# Patient Record
Sex: FEMALE | Race: WHITE | NOT HISPANIC OR LATINO | Employment: OTHER | RURAL
[De-identification: names, ages, dates, MRNs, and addresses within clinical notes are randomized per-mention and may not be internally consistent; named-entity substitution may affect disease eponyms.]

---

## 2017-02-01 ENCOUNTER — LAB (OUTPATIENT)
Dept: FAMILY MEDICINE CLINIC | Facility: CLINIC | Age: 77
End: 2017-02-01

## 2017-02-01 DIAGNOSIS — I10 ESSENTIAL HYPERTENSION: ICD-10-CM

## 2017-02-01 DIAGNOSIS — E03.9 HYPOTHYROIDISM, UNSPECIFIED TYPE: ICD-10-CM

## 2017-02-01 DIAGNOSIS — E78.5 HYPERLIPIDEMIA, UNSPECIFIED HYPERLIPIDEMIA TYPE: Primary | ICD-10-CM

## 2017-02-01 LAB
ALBUMIN SERPL-MCNC: 4.3 G/DL (ref 3.5–5)
ALBUMIN/GLOB SERPL: 1.4 G/DL (ref 1.1–2.5)
ALP SERPL-CCNC: 76 U/L (ref 24–120)
ALT SERPL-CCNC: 39 U/L (ref 0–54)
AST SERPL-CCNC: 21 U/L (ref 7–45)
BILIRUB SERPL-MCNC: 0.6 MG/DL (ref 0.1–1)
BUN SERPL-MCNC: 19 MG/DL (ref 5–21)
BUN/CREAT SERPL: 17.1 (ref 7–25)
CALCIUM SERPL-MCNC: 9 MG/DL (ref 8.4–10.4)
CHLORIDE SERPL-SCNC: 103 MMOL/L (ref 98–110)
CHOLEST SERPL-MCNC: 191 MG/DL (ref 130–200)
CO2 SERPL-SCNC: 28 MMOL/L (ref 24–31)
CREAT SERPL-MCNC: 1.11 MG/DL (ref 0.5–1.4)
GLOBULIN SER CALC-MCNC: 3 GM/DL
GLUCOSE SERPL-MCNC: 106 MG/DL (ref 70–100)
HDLC SERPL-MCNC: 47 MG/DL
LDLC SERPL CALC-MCNC: 118 MG/DL (ref 0–99)
POTASSIUM SERPL-SCNC: 4.5 MMOL/L (ref 3.5–5.3)
PROT SERPL-MCNC: 7.3 G/DL (ref 6.3–8.7)
SODIUM SERPL-SCNC: 138 MMOL/L (ref 135–145)
TRIGL SERPL-MCNC: 130 MG/DL (ref 0–149)
TSH SERPL DL<=0.005 MIU/L-ACNC: 1.71 MIU/ML (ref 0.47–4.68)
VLDLC SERPL CALC-MCNC: 26 MG/DL

## 2017-02-01 RX ORDER — HYDROCODONE BITARTRATE AND ACETAMINOPHEN 5; 325 MG/1; MG/1
1 TABLET ORAL DAILY
Qty: 30 TABLET | Refills: 0 | Status: SHIPPED | OUTPATIENT
Start: 2017-02-01 | End: 2017-08-28 | Stop reason: SDUPTHER

## 2017-02-02 NOTE — PROGRESS NOTES
Pt informed labs stable. Faxed to Dr. Valenzuela. She sees him 2/8/2017 and sees Dr. Jaramillo 2/6/2017

## 2017-02-06 ENCOUNTER — OFFICE VISIT (OUTPATIENT)
Dept: FAMILY MEDICINE CLINIC | Facility: CLINIC | Age: 77
End: 2017-02-06

## 2017-02-06 VITALS
BODY MASS INDEX: 31.18 KG/M2 | DIASTOLIC BLOOD PRESSURE: 80 MMHG | SYSTOLIC BLOOD PRESSURE: 130 MMHG | WEIGHT: 165 LBS | RESPIRATION RATE: 18 BRPM | OXYGEN SATURATION: 98 % | HEART RATE: 76 BPM

## 2017-02-06 DIAGNOSIS — N18.30 CHRONIC KIDNEY DISEASE, STAGE III (MODERATE) (HCC): ICD-10-CM

## 2017-02-06 DIAGNOSIS — E78.2 MIXED HYPERLIPIDEMIA: ICD-10-CM

## 2017-02-06 DIAGNOSIS — I10 ESSENTIAL HYPERTENSION: Primary | ICD-10-CM

## 2017-02-06 DIAGNOSIS — F41.9 ANXIETY: ICD-10-CM

## 2017-02-06 DIAGNOSIS — E03.9 ACQUIRED HYPOTHYROIDISM: ICD-10-CM

## 2017-02-06 PROCEDURE — 99214 OFFICE O/P EST MOD 30 MIN: CPT | Performed by: FAMILY MEDICINE

## 2017-02-06 RX ORDER — LOSARTAN POTASSIUM 100 MG/1
TABLET ORAL
Qty: 30 TABLET | Refills: 5 | Status: SHIPPED | OUTPATIENT
Start: 2017-02-06 | End: 2017-04-19 | Stop reason: SDUPTHER

## 2017-02-06 RX ORDER — ATORVASTATIN CALCIUM 10 MG/1
10 TABLET, FILM COATED ORAL DAILY
Qty: 30 TABLET | Refills: 4 | Status: SHIPPED | OUTPATIENT
Start: 2017-02-06 | End: 2017-11-16 | Stop reason: SDUPTHER

## 2017-02-06 NOTE — PROGRESS NOTES
Subjective   Paige Martinez is a 76 y.o. female.     Chief Complaint   Patient presents with   • Follow-up    CC: im here for [facility]    History of Present Illness     overall feels good..she notes good bp control--willl see neprhologist this week for recheck ...she statesa celexa is working well for anxiety..sleeps well..denies any suicidal thoughts--off statin drugs due to musle pain      Current Outpatient Prescriptions:   •  aspirin 81 MG EC tablet, Take 81 mg by mouth daily, Disp: , Rfl:   •  Cholecalciferol (VITAMIN D) 2000 UNITS capsule, Take 2,000 Units by mouth, Disp: , Rfl:   •  citalopram (CeleXA) 20 MG tablet, Take 1 tablet by mouth Daily., Disp: 90 tablet, Rfl: 1  •  HYDROcodone-acetaminophen (NORCO) 5-325 MG per tablet, Take 1 tablet by mouth Daily., Disp: 30 tablet, Rfl: 0  •  HYDROcodone-acetaminophen (NORCO) 7.5-325 MG per tablet, Every 6 (Six) Hours., Disp: , Rfl:   •  levothyroxine (SYNTHROID, LEVOTHROID) 100 MCG tablet, Take 100 mcg by mouth Daily, Disp: , Rfl:   •  losartan (COZAAR) 100 MG tablet, Take 100 mg by mouth daily, Disp: , Rfl:   •  montelukast (SINGULAIR) 10 MG tablet, Take 10 mg by mouth nightly, Disp: , Rfl:   •  Omega-3 Fatty Acids (FISH OIL) 1000 MG capsule capsule, Take 1,000 mg by mouth 3 times daily, Disp: , Rfl:   No Known Allergies    Past Medical History   Diagnosis Date   • Anxiety    • Hypertension      No past surgical history on file.    Review of Systems   Constitutional: Negative.    HENT: Negative.    Eyes: Negative.    Respiratory: Negative.    Cardiovascular: Negative.    Gastrointestinal: Negative.    Endocrine: Negative.    Genitourinary: Negative.    Musculoskeletal: Negative.    Skin: Negative.    Allergic/Immunologic: Negative.    Neurological: Negative.    Hematological: Negative.    Psychiatric/Behavioral: Negative for self-injury, sleep disturbance and suicidal ideas. The patient is nervous/anxious.        Objective    Visit Vitals   • /80   • Pulse 76   •  Resp 18   • Wt 165 lb (74.8 kg)   • SpO2 98%   • BMI 31.18 kg/m2     Physical Exam   Constitutional: She is oriented to person, place, and time. She appears well-developed and well-nourished.   HENT:   Head: Normocephalic and atraumatic.   Right Ear: External ear normal.   Left Ear: External ear normal.   Nose: Nose normal.   Mouth/Throat: Oropharynx is clear and moist.   Eyes: Conjunctivae and EOM are normal. Pupils are equal, round, and reactive to light.   Neck: Normal range of motion. Neck supple.   Cardiovascular: Normal rate, regular rhythm, normal heart sounds and intact distal pulses.    Pulmonary/Chest: Effort normal and breath sounds normal.   Abdominal: Soft. Bowel sounds are normal.   Musculoskeletal: Normal range of motion.   Neurological: She is alert and oriented to person, place, and time. She has normal reflexes.   Skin: Skin is warm and dry.   Psychiatric: She has a normal mood and affect. Judgment and thought content normal.   Nursing note and vitals reviewed.      Assessment/Plan   Paige was seen today for follow-up.    Diagnoses and all orders for this visit:    Essential hypertension    Chronic kidney disease, stage III (moderate)    Acquired hypothyroidism    Anxiety                 No orders of the defined types were placed in this encounter.      Follow up: 4 month(s)

## 2017-03-22 ENCOUNTER — TELEPHONE (OUTPATIENT)
Dept: FAMILY MEDICINE CLINIC | Facility: CLINIC | Age: 77
End: 2017-03-22

## 2017-03-22 ENCOUNTER — OFFICE VISIT (OUTPATIENT)
Dept: FAMILY MEDICINE CLINIC | Facility: CLINIC | Age: 77
End: 2017-03-22

## 2017-03-22 VITALS
HEART RATE: 83 BPM | DIASTOLIC BLOOD PRESSURE: 80 MMHG | RESPIRATION RATE: 16 BRPM | SYSTOLIC BLOOD PRESSURE: 124 MMHG | BODY MASS INDEX: 30.36 KG/M2 | WEIGHT: 165 LBS | HEIGHT: 62 IN | OXYGEN SATURATION: 98 %

## 2017-03-22 DIAGNOSIS — J32.9 OTHER SINUSITIS: Primary | ICD-10-CM

## 2017-03-22 PROCEDURE — 99213 OFFICE O/P EST LOW 20 MIN: CPT | Performed by: FAMILY MEDICINE

## 2017-03-22 RX ORDER — AMOXICILLIN AND CLAVULANATE POTASSIUM 875; 125 MG/1; MG/1
1 TABLET, FILM COATED ORAL 2 TIMES DAILY
Qty: 20 TABLET | Refills: 0 | Status: SHIPPED | OUTPATIENT
Start: 2017-03-22 | End: 2017-04-19

## 2017-03-22 RX ORDER — BENZONATATE 100 MG/1
100 CAPSULE ORAL 3 TIMES DAILY PRN
Qty: 30 CAPSULE | Refills: 0 | Status: SHIPPED | OUTPATIENT
Start: 2017-03-22 | End: 2017-04-19

## 2017-03-22 NOTE — PROGRESS NOTES
"Subjective   Paige Matrinez is a 76 y.o. female.     Chief Complaint   Patient presents with   • URI     chest & head congestion        History of Present Illness     notes having sinus pressure with post nasal drip wtih low grade temp..      Current Outpatient Prescriptions:   •  aspirin 81 MG EC tablet, Take 81 mg by mouth daily, Disp: , Rfl:   •  atorvastatin (LIPITOR) 10 MG tablet, Take 1 tablet by mouth Daily., Disp: 30 tablet, Rfl: 4  •  Cholecalciferol (VITAMIN D) 2000 UNITS capsule, Take 2,000 Units by mouth, Disp: , Rfl:   •  citalopram (CeleXA) 20 MG tablet, Take 1 tablet by mouth Daily., Disp: 90 tablet, Rfl: 1  •  HYDROcodone-acetaminophen (NORCO) 5-325 MG per tablet, Take 1 tablet by mouth Daily., Disp: 30 tablet, Rfl: 0  •  HYDROcodone-acetaminophen (NORCO) 7.5-325 MG per tablet, Every 6 (Six) Hours., Disp: , Rfl:   •  levothyroxine (SYNTHROID, LEVOTHROID) 100 MCG tablet, Take 100 mcg by mouth Daily, Disp: , Rfl:   •  losartan (COZAAR) 100 MG tablet, TAKE ONE TABLET DAILY GENERIC FOR COZAAR, Disp: 30 tablet, Rfl: 5  •  montelukast (SINGULAIR) 10 MG tablet, Take 10 mg by mouth nightly, Disp: , Rfl:   •  Omega-3 Fatty Acids (FISH OIL) 1000 MG capsule capsule, Take 1,000 mg by mouth 3 times daily, Disp: , Rfl:   No Known Allergies    Past Medical History:   Diagnosis Date   • Anxiety    • Hypertension      No past surgical history on file.    Review of Systems   Constitutional: Positive for fever.   HENT: Positive for rhinorrhea, sinus pressure and sore throat.    Respiratory: Negative.    Cardiovascular: Negative.    Gastrointestinal: Negative.    Endocrine: Negative.    Genitourinary: Negative.    Musculoskeletal: Negative.    Skin: Negative.    Allergic/Immunologic: Negative.    Neurological: Negative.    Hematological: Negative.    Psychiatric/Behavioral: Negative.        Objective  /80  Pulse 83  Resp 16  Ht 62\" (157.5 cm)  Wt 165 lb (74.8 kg)  SpO2 98%  BMI 30.18 kg/m2  Physical Exam "   Constitutional: She is oriented to person, place, and time. She appears well-developed and well-nourished.   HENT:   Head: Normocephalic and atraumatic.   Right Ear: External ear normal.   Left Ear: External ear normal.   Nose: Nose normal.   Mouth/Throat: Oropharynx is clear and moist.   Noted purulent post nasal drip   Eyes: Conjunctivae and EOM are normal. Pupils are equal, round, and reactive to light.   Neck: Normal range of motion. Neck supple.   Cardiovascular: Normal rate, regular rhythm, normal heart sounds and intact distal pulses.    Pulmonary/Chest: Effort normal and breath sounds normal.   Abdominal: Soft. Bowel sounds are normal.   Musculoskeletal: Normal range of motion.   Neurological: She is alert and oriented to person, place, and time. She has normal reflexes.   Skin: Skin is warm and dry.   Psychiatric: She has a normal mood and affect. Her behavior is normal. Judgment and thought content normal.   Nursing note and vitals reviewed.      Assessment/Plan   Paige was seen today for uri.    Diagnoses and all orders for this visit:    Other sinusitis    Other orders  -     amoxicillin-clavulanate (AUGMENTIN) 875-125 MG per tablet; Take 1 tablet by mouth 2 (Two) Times a Day.  -     benzonatate (TESSALON PERLES) 100 MG capsule; Take 1 capsule by mouth 3 (Three) Times a Day As Needed for Cough.    keep me informed           No orders of the defined types were placed in this encounter.      Follow up: prn

## 2017-03-22 NOTE — TELEPHONE ENCOUNTER
Pt called is wanting to be seen today she has been sick for 2 weeks with coughing with congestion? [phone]

## 2017-04-19 ENCOUNTER — OFFICE VISIT (OUTPATIENT)
Dept: FAMILY MEDICINE CLINIC | Facility: CLINIC | Age: 77
End: 2017-04-19

## 2017-04-19 VITALS
OXYGEN SATURATION: 97 % | HEIGHT: 62 IN | SYSTOLIC BLOOD PRESSURE: 130 MMHG | DIASTOLIC BLOOD PRESSURE: 82 MMHG | BODY MASS INDEX: 30.36 KG/M2 | WEIGHT: 165 LBS | RESPIRATION RATE: 16 BRPM | HEART RATE: 80 BPM

## 2017-04-19 DIAGNOSIS — J30.1 SEASONAL ALLERGIC RHINITIS DUE TO POLLEN: ICD-10-CM

## 2017-04-19 DIAGNOSIS — I10 ESSENTIAL HYPERTENSION: Primary | ICD-10-CM

## 2017-04-19 PROCEDURE — 99213 OFFICE O/P EST LOW 20 MIN: CPT | Performed by: FAMILY MEDICINE

## 2017-04-19 RX ORDER — LOSARTAN POTASSIUM 100 MG/1
100 TABLET ORAL DAILY
Qty: 90 TABLET | Refills: 1 | Status: SHIPPED | OUTPATIENT
Start: 2017-04-19 | End: 2017-12-16 | Stop reason: SDUPTHER

## 2017-04-19 NOTE — PROGRESS NOTES
"Subjective   Paige Martinez is a 76 y.o. female.     Chief Complaint   Patient presents with   • Follow-up        History of Present Illness     she does complain of sneezing and clear rhinorrhea well controlled with nightime benadryl...she notes bp has been stable wituout ha or chest pain      Current Outpatient Prescriptions:   •  aspirin 81 MG EC tablet, Take 81 mg by mouth daily, Disp: , Rfl:   •  atorvastatin (LIPITOR) 10 MG tablet, Take 1 tablet by mouth Daily., Disp: 30 tablet, Rfl: 4  •  Cholecalciferol (VITAMIN D) 2000 UNITS capsule, Take 2,000 Units by mouth, Disp: , Rfl:   •  citalopram (CeleXA) 20 MG tablet, Take 1 tablet by mouth Daily., Disp: 90 tablet, Rfl: 1  •  HYDROcodone-acetaminophen (NORCO) 5-325 MG per tablet, Take 1 tablet by mouth Daily., Disp: 30 tablet, Rfl: 0  •  levothyroxine (SYNTHROID, LEVOTHROID) 100 MCG tablet, Take 100 mcg by mouth Daily, Disp: , Rfl:   •  losartan (COZAAR) 100 MG tablet, Take 1 tablet by mouth Daily., Disp: 90 tablet, Rfl: 1  •  montelukast (SINGULAIR) 10 MG tablet, Take 10 mg by mouth nightly, Disp: , Rfl:   •  Omega-3 Fatty Acids (FISH OIL) 1000 MG capsule capsule, Take 1,000 mg by mouth 3 times daily, Disp: , Rfl:   No Known Allergies    Past Medical History:   Diagnosis Date   • Anxiety    • Hypertension      No past surgical history on file.    Review of Systems   Constitutional: Negative.    HENT: Positive for postnasal drip, rhinorrhea and sneezing.    Eyes: Negative.    Respiratory: Negative.    Cardiovascular: Negative.    Gastrointestinal: Negative.    Endocrine: Negative.    Genitourinary: Negative.    Musculoskeletal: Negative.    Skin: Negative.    Allergic/Immunologic: Negative.    Neurological: Negative.    Hematological: Negative.    Psychiatric/Behavioral: Negative.        Objective  /82  Pulse 80  Resp 16  Ht 62\" (157.5 cm)  Wt 165 lb (74.8 kg)  SpO2 97%  BMI 30.18 kg/m2  Physical Exam   Constitutional: She is oriented to person, place, " and time. She appears well-developed and well-nourished.   HENT:   Head: Normocephalic and atraumatic.   Right Ear: External ear normal.   Left Ear: External ear normal.   Nose: Nose normal.   Mouth/Throat: Oropharynx is clear and moist.   Eyes: Conjunctivae and EOM are normal. Pupils are equal, round, and reactive to light.   Neck: Normal range of motion. Neck supple.   Cardiovascular: Normal rate, regular rhythm, normal heart sounds and intact distal pulses.    Pulmonary/Chest: Effort normal and breath sounds normal.   Abdominal: Soft. Bowel sounds are normal.   Musculoskeletal: Normal range of motion.   Neurological: She is alert and oriented to person, place, and time. She has normal reflexes.   Skin: Skin is warm and dry.   Psychiatric: She has a normal mood and affect. Her behavior is normal. Judgment and thought content normal.   Nursing note and vitals reviewed.      Assessment/Plan   Paige was seen today for follow-up.    Diagnoses and all orders for this visit:    Essential hypertension    Seasonal allergic rhinitis due to pollen    im glad she is better           No orders of the defined types were placed in this encounter.      Follow up: next appt

## 2017-05-30 DIAGNOSIS — E03.9 ACQUIRED HYPOTHYROIDISM: Primary | ICD-10-CM

## 2017-05-30 DIAGNOSIS — E78.2 MIXED HYPERLIPIDEMIA: ICD-10-CM

## 2017-05-30 DIAGNOSIS — I10 ESSENTIAL HYPERTENSION: ICD-10-CM

## 2017-05-31 LAB
ALBUMIN SERPL-MCNC: 4 G/DL (ref 3.5–5)
ALBUMIN/GLOB SERPL: 1.3 G/DL (ref 1.1–2.5)
ALP SERPL-CCNC: 74 U/L (ref 24–120)
ALT SERPL-CCNC: 29 U/L (ref 0–54)
AST SERPL-CCNC: 20 U/L (ref 7–45)
BASOPHILS # BLD AUTO: 0.01 10*3/MM3 (ref 0–0.2)
BASOPHILS NFR BLD AUTO: 0.1 % (ref 0–2)
BILIRUB SERPL-MCNC: 0.6 MG/DL (ref 0.1–1)
BUN SERPL-MCNC: 21 MG/DL (ref 5–21)
BUN/CREAT SERPL: 18.9 (ref 7–25)
CALCIUM SERPL-MCNC: 9.1 MG/DL (ref 8.4–10.4)
CHLORIDE SERPL-SCNC: 101 MMOL/L (ref 98–110)
CHOLEST SERPL-MCNC: 192 MG/DL (ref 130–200)
CO2 SERPL-SCNC: 25 MMOL/L (ref 24–31)
CREAT SERPL-MCNC: 1.11 MG/DL (ref 0.5–1.4)
EOSINOPHIL # BLD AUTO: 0.18 10*3/MM3 (ref 0–0.7)
EOSINOPHIL NFR BLD AUTO: 2.2 % (ref 0–4)
ERYTHROCYTE [DISTWIDTH] IN BLOOD BY AUTOMATED COUNT: 13.4 % (ref 12–15)
GLOBULIN SER CALC-MCNC: 3.1 GM/DL
GLUCOSE SERPL-MCNC: 114 MG/DL (ref 70–100)
HCT VFR BLD AUTO: 38 % (ref 37–47)
HDLC SERPL-MCNC: 63 MG/DL
HGB BLD-MCNC: 12.4 G/DL (ref 12–16)
IMM GRANULOCYTES # BLD: 0.03 10*3/MM3 (ref 0–0.03)
IMM GRANULOCYTES NFR BLD: 0.4 % (ref 0–5)
LDLC SERPL CALC-MCNC: 110 MG/DL (ref 0–99)
LYMPHOCYTES # BLD AUTO: 1.75 10*3/MM3 (ref 0.72–4.86)
LYMPHOCYTES NFR BLD AUTO: 21.8 % (ref 15–45)
MCH RBC QN AUTO: 29.5 PG (ref 28–32)
MCHC RBC AUTO-ENTMCNC: 32.6 G/DL (ref 33–36)
MCV RBC AUTO: 90.5 FL (ref 82–98)
MONOCYTES # BLD AUTO: 0.68 10*3/MM3 (ref 0.19–1.3)
MONOCYTES NFR BLD AUTO: 8.5 % (ref 4–12)
NEUTROPHILS # BLD AUTO: 5.36 10*3/MM3 (ref 1.87–8.4)
NEUTROPHILS NFR BLD AUTO: 67 % (ref 39–78)
PLATELET # BLD AUTO: 218 10*3/MM3 (ref 130–400)
POTASSIUM SERPL-SCNC: 4.9 MMOL/L (ref 3.5–5.3)
PROT SERPL-MCNC: 7.1 G/DL (ref 6.3–8.7)
RBC # BLD AUTO: 4.2 10*6/MM3 (ref 4.2–5.4)
SODIUM SERPL-SCNC: 139 MMOL/L (ref 135–145)
T4 SERPL-MCNC: 6.8 UG/DL (ref 4.5–12)
TRIGL SERPL-MCNC: 95 MG/DL (ref 0–149)
TSH SERPL DL<=0.005 MIU/L-ACNC: 2.81 MIU/ML (ref 0.47–4.68)
VLDLC SERPL CALC-MCNC: 19 MG/DL
WBC # BLD AUTO: 8.01 10*3/MM3 (ref 4.8–10.8)

## 2017-06-06 ENCOUNTER — OFFICE VISIT (OUTPATIENT)
Dept: FAMILY MEDICINE CLINIC | Facility: CLINIC | Age: 77
End: 2017-06-06

## 2017-06-06 VITALS
OXYGEN SATURATION: 98 % | RESPIRATION RATE: 16 BRPM | DIASTOLIC BLOOD PRESSURE: 86 MMHG | BODY MASS INDEX: 31.28 KG/M2 | HEIGHT: 62 IN | HEART RATE: 83 BPM | WEIGHT: 170 LBS | SYSTOLIC BLOOD PRESSURE: 140 MMHG

## 2017-06-06 DIAGNOSIS — N18.30 CHRONIC KIDNEY DISEASE, STAGE III (MODERATE) (HCC): ICD-10-CM

## 2017-06-06 DIAGNOSIS — E78.2 MIXED HYPERLIPIDEMIA: ICD-10-CM

## 2017-06-06 DIAGNOSIS — I10 ESSENTIAL HYPERTENSION: Primary | ICD-10-CM

## 2017-06-06 DIAGNOSIS — E03.9 ACQUIRED HYPOTHYROIDISM: ICD-10-CM

## 2017-06-06 PROCEDURE — 99213 OFFICE O/P EST LOW 20 MIN: CPT | Performed by: FAMILY MEDICINE

## 2017-06-06 RX ORDER — CITALOPRAM 20 MG/1
20 TABLET ORAL DAILY
Qty: 90 TABLET | Refills: 1 | Status: SHIPPED | OUTPATIENT
Start: 2017-06-06 | End: 2018-01-09 | Stop reason: SDUPTHER

## 2017-06-06 NOTE — PROGRESS NOTES
"Subjective   Paige Martinez is a 76 y.o. female.     Chief Complaint   Patient presents with   • Follow-up     6 mo        History of Present Illness     he notes seeing dr alston yearly for ckd--she notes stable bp without cp or ha--sheis tolerating lipitor withoiut myalgia or muscle weakness--she is toleraging synthroid wituout chest pain or palpitation or heat/cold intolerances..      Current Outpatient Prescriptions:   •  aspirin 81 MG EC tablet, Take 81 mg by mouth daily, Disp: , Rfl:   •  atorvastatin (LIPITOR) 10 MG tablet, Take 1 tablet by mouth Daily., Disp: 30 tablet, Rfl: 4  •  Cholecalciferol (VITAMIN D) 2000 UNITS capsule, Take 2,000 Units by mouth, Disp: , Rfl:   •  citalopram (CeleXA) 20 MG tablet, Take 1 tablet by mouth Daily., Disp: 90 tablet, Rfl: 1  •  HYDROcodone-acetaminophen (NORCO) 5-325 MG per tablet, Take 1 tablet by mouth Daily., Disp: 30 tablet, Rfl: 0  •  levothyroxine (SYNTHROID, LEVOTHROID) 100 MCG tablet, Take 100 mcg by mouth Daily, Disp: , Rfl:   •  losartan (COZAAR) 100 MG tablet, Take 1 tablet by mouth Daily., Disp: 90 tablet, Rfl: 1  •  montelukast (SINGULAIR) 10 MG tablet, Take 10 mg by mouth nightly, Disp: , Rfl:   •  Omega-3 Fatty Acids (FISH OIL) 1000 MG capsule capsule, Take 1,000 mg by mouth 3 times daily, Disp: , Rfl:   No Known Allergies    Past Medical History:   Diagnosis Date   • Anxiety    • Hypertension      No past surgical history on file.    Review of Systems   Constitutional: Negative.    HENT: Negative.    Eyes: Negative.    Respiratory: Negative.    Cardiovascular: Negative.    Gastrointestinal: Negative.    Endocrine: Negative.    Genitourinary: Negative.    Musculoskeletal: Positive for arthralgias.   Skin: Negative.    Allergic/Immunologic: Negative.    Neurological: Negative.    Hematological: Negative.    Psychiatric/Behavioral: Negative.        Objective  /86  Pulse 83  Resp 16  Ht 62\" (157.5 cm)  Wt 170 lb (77.1 kg)  SpO2 98%  BMI 31.09 " kg/m2  Physical Exam   Constitutional: She is oriented to person, place, and time. She appears well-developed and well-nourished.   HENT:   Head: Normocephalic and atraumatic.   Right Ear: External ear normal.   Left Ear: External ear normal.   Nose: Nose normal.   Mouth/Throat: Oropharynx is clear and moist.   Eyes: Conjunctivae and EOM are normal. Pupils are equal, round, and reactive to light.   Neck: Normal range of motion. Neck supple.   Cardiovascular: Normal rate, regular rhythm, normal heart sounds and intact distal pulses.    Pulmonary/Chest: Effort normal and breath sounds normal.   Abdominal: Soft. Bowel sounds are normal.   Musculoskeletal: Normal range of motion.   Neurological: She is alert and oriented to person, place, and time. She has normal reflexes.   Skin: Skin is warm and dry.   Psychiatric: She has a normal mood and affect. Her behavior is normal. Judgment and thought content normal.   Nursing note and vitals reviewed.      Assessment/Plan   Paige was seen today for follow-up.    Diagnoses and all orders for this visit:    Essential hypertension    Mixed hyperlipidemia    Acquired hypothyroidism    Chronic kidney disease, stage III (moderate)                 No orders of the defined types were placed in this encounter.      Follow up: 6 month(s)

## 2017-08-28 ENCOUNTER — CLINICAL SUPPORT (OUTPATIENT)
Dept: FAMILY MEDICINE CLINIC | Facility: CLINIC | Age: 77
End: 2017-08-28

## 2017-08-28 DIAGNOSIS — Z23 NEED FOR VACCINATION: Primary | ICD-10-CM

## 2017-08-28 PROCEDURE — 86580 TB INTRADERMAL TEST: CPT | Performed by: FAMILY MEDICINE

## 2017-08-28 RX ORDER — HYDROCODONE BITARTRATE AND ACETAMINOPHEN 5; 325 MG/1; MG/1
1 TABLET ORAL DAILY
Qty: 30 TABLET | Refills: 0 | Status: SHIPPED | OUTPATIENT
Start: 2017-08-28 | End: 2017-12-27 | Stop reason: SDUPTHER

## 2017-08-29 NOTE — PROGRESS NOTES
The pt presented to the office today for a tb skin test    ndc  36778-979-16   Lot  [id number]   Exp  10-29-18

## 2017-08-31 LAB
INDURATION: 0 MM (ref 0–10)
TB SKIN TEST: NEGATIVE

## 2017-09-14 RX ORDER — LEVOTHYROXINE SODIUM 0.1 MG/1
TABLET ORAL
Qty: 90 TABLET | Refills: 1 | Status: SHIPPED | OUTPATIENT
Start: 2017-09-14 | End: 2018-06-05 | Stop reason: SDUPTHER

## 2017-10-18 RX ORDER — MONTELUKAST SODIUM 10 MG/1
TABLET ORAL
Qty: 30 TABLET | Refills: 5 | Status: SHIPPED | OUTPATIENT
Start: 2017-10-18 | End: 2018-06-05 | Stop reason: SDUPTHER

## 2017-10-18 RX ORDER — HYDROCODONE BITARTRATE AND ACETAMINOPHEN 5; 325 MG/1; MG/1
1 TABLET ORAL DAILY
Qty: 30 TABLET | Refills: 0 | Status: SHIPPED | OUTPATIENT
Start: 2017-10-18 | End: 2017-12-27 | Stop reason: SDUPTHER

## 2017-11-16 RX ORDER — ATORVASTATIN CALCIUM 10 MG/1
TABLET, FILM COATED ORAL
Qty: 30 TABLET | Refills: 2 | Status: SHIPPED | OUTPATIENT
Start: 2017-11-16 | End: 2018-10-05 | Stop reason: SDUPTHER

## 2017-11-30 ENCOUNTER — LAB (OUTPATIENT)
Dept: FAMILY MEDICINE CLINIC | Facility: CLINIC | Age: 77
End: 2017-11-30

## 2017-11-30 DIAGNOSIS — Z00.00 WELLNESS EXAMINATION: Primary | ICD-10-CM

## 2017-12-01 LAB
ALBUMIN SERPL-MCNC: 4.3 G/DL (ref 3.5–5)
ALBUMIN/GLOB SERPL: 1.4 G/DL (ref 1.1–2.5)
ALP SERPL-CCNC: 68 U/L (ref 24–120)
ALT SERPL-CCNC: 31 U/L (ref 0–54)
AST SERPL-CCNC: 20 U/L (ref 7–45)
BASOPHILS # BLD AUTO: 0.03 10*3/MM3 (ref 0–0.2)
BASOPHILS NFR BLD AUTO: 0.4 % (ref 0–2)
BILIRUB SERPL-MCNC: 0.4 MG/DL (ref 0.1–1)
BUN SERPL-MCNC: 22 MG/DL (ref 5–21)
BUN/CREAT SERPL: 20.6 (ref 7–25)
CALCIUM SERPL-MCNC: 9.4 MG/DL (ref 8.4–10.4)
CHLORIDE SERPL-SCNC: 104 MMOL/L (ref 98–110)
CHOLEST SERPL-MCNC: 201 MG/DL (ref 130–200)
CO2 SERPL-SCNC: 26 MMOL/L (ref 24–31)
CREAT SERPL-MCNC: 1.07 MG/DL (ref 0.5–1.4)
EOSINOPHIL # BLD AUTO: 0.09 10*3/MM3 (ref 0–0.7)
EOSINOPHIL NFR BLD AUTO: 1.3 % (ref 0–4)
ERYTHROCYTE [DISTWIDTH] IN BLOOD BY AUTOMATED COUNT: 12.5 % (ref 12–15)
GFR SERPLBLD CREATININE-BSD FMLA CKD-EPI: 50 ML/MIN/1.73
GFR SERPLBLD CREATININE-BSD FMLA CKD-EPI: 61 ML/MIN/1.73
GLOBULIN SER CALC-MCNC: 3 GM/DL
GLUCOSE SERPL-MCNC: 121 MG/DL (ref 70–100)
HCT VFR BLD AUTO: 36.5 % (ref 37–47)
HDLC SERPL-MCNC: 65 MG/DL
HGB BLD-MCNC: 12.3 G/DL (ref 12–16)
IMM GRANULOCYTES # BLD: 0.01 10*3/MM3 (ref 0–0.03)
IMM GRANULOCYTES NFR BLD: 0.1 % (ref 0–5)
LDLC SERPL CALC-MCNC: 117 MG/DL (ref 0–99)
LYMPHOCYTES # BLD AUTO: 1.43 10*3/MM3 (ref 0.72–4.86)
LYMPHOCYTES NFR BLD AUTO: 20.6 % (ref 15–45)
MCH RBC QN AUTO: 29.9 PG (ref 28–32)
MCHC RBC AUTO-ENTMCNC: 33.7 G/DL (ref 33–36)
MCV RBC AUTO: 88.6 FL (ref 82–98)
MONOCYTES # BLD AUTO: 0.5 10*3/MM3 (ref 0.19–1.3)
MONOCYTES NFR BLD AUTO: 7.2 % (ref 4–12)
NEUTROPHILS # BLD AUTO: 4.89 10*3/MM3 (ref 1.87–8.4)
NEUTROPHILS NFR BLD AUTO: 70.4 % (ref 39–78)
NRBC BLD AUTO-RTO: 0 /100 WBC (ref 0–0)
PLATELET # BLD AUTO: 222 10*3/MM3 (ref 130–400)
POTASSIUM SERPL-SCNC: 4.4 MMOL/L (ref 3.5–5.3)
PROT SERPL-MCNC: 7.3 G/DL (ref 6.3–8.7)
RBC # BLD AUTO: 4.12 10*6/MM3 (ref 4.2–5.4)
SODIUM SERPL-SCNC: 140 MMOL/L (ref 135–145)
T4 SERPL-MCNC: 6.9 UG/DL (ref 4.5–12)
TRIGL SERPL-MCNC: 94 MG/DL (ref 0–149)
TSH SERPL DL<=0.005 MIU/L-ACNC: 3.05 MIU/ML (ref 0.47–4.68)
VLDLC SERPL CALC-MCNC: 18.8 MG/DL
WBC # BLD AUTO: 6.95 10*3/MM3 (ref 4.8–10.8)

## 2017-12-06 ENCOUNTER — OFFICE VISIT (OUTPATIENT)
Dept: FAMILY MEDICINE CLINIC | Facility: CLINIC | Age: 77
End: 2017-12-06

## 2017-12-06 VITALS
WEIGHT: 175 LBS | OXYGEN SATURATION: 97 % | BODY MASS INDEX: 32.2 KG/M2 | DIASTOLIC BLOOD PRESSURE: 84 MMHG | HEIGHT: 62 IN | SYSTOLIC BLOOD PRESSURE: 140 MMHG | HEART RATE: 96 BPM | RESPIRATION RATE: 16 BRPM

## 2017-12-06 DIAGNOSIS — I10 ESSENTIAL HYPERTENSION: Primary | ICD-10-CM

## 2017-12-06 DIAGNOSIS — N18.30 CHRONIC KIDNEY DISEASE, STAGE III (MODERATE) (HCC): ICD-10-CM

## 2017-12-06 DIAGNOSIS — E78.2 MIXED HYPERLIPIDEMIA: ICD-10-CM

## 2017-12-06 DIAGNOSIS — E03.9 ACQUIRED HYPOTHYROIDISM: ICD-10-CM

## 2017-12-06 PROCEDURE — 99214 OFFICE O/P EST MOD 30 MIN: CPT | Performed by: FAMILY MEDICINE

## 2017-12-06 NOTE — PROGRESS NOTES
Subjective   Paige Martinez is a 77 y.o. female.     Chief Complaint   Patient presents with   • Follow-up     6 mo       History of Present Illness     she notes good bp control wituout ha or chest pain--still seeing nephrologist for ckd--she is noting thyroid replacement withtou cp or ha...she is tolerating liiptor witjhout myalgias...celex a is working for her anxiety..      Current Outpatient Prescriptions:   •  aspirin 81 MG EC tablet, Take 81 mg by mouth daily, Disp: , Rfl:   •  atorvastatin (LIPITOR) 10 MG tablet, TAKE ONE TABLET DAILY GENERIC FOR LIPITOR, Disp: 30 tablet, Rfl: 2  •  Cholecalciferol (VITAMIN D) 2000 UNITS capsule, Take 2,000 Units by mouth, Disp: , Rfl:   •  citalopram (CeleXA) 20 MG tablet, Take 1 tablet by mouth Daily., Disp: 90 tablet, Rfl: 1  •  fluocinonide-emollient (LIDEX-E) 0.05 % cream, Apply  topically 2 (Two) Times a Day., Disp: 30 g, Rfl: 1  •  HYDROcodone-acetaminophen (NORCO) 5-325 MG per tablet, Take 1 tablet by mouth Daily., Disp: 30 tablet, Rfl: 0  •  HYDROcodone-acetaminophen (NORCO) 5-325 MG per tablet, Take 1 tablet by mouth Daily., Disp: 30 tablet, Rfl: 0  •  levothyroxine (SYNTHROID, LEVOTHROID) 100 MCG tablet, TAKE ONE TABLET DAILY GENERIC FOR SYNTHROID, Disp: 90 tablet, Rfl: 1  •  losartan (COZAAR) 100 MG tablet, Take 1 tablet by mouth Daily., Disp: 90 tablet, Rfl: 1  •  montelukast (SINGULAIR) 10 MG tablet, TAKE ONE TABLET DAILY GENERIC FOR SINGULAR, Disp: 30 tablet, Rfl: 5  •  Omega-3 Fatty Acids (FISH OIL) 1000 MG capsule capsule, Take 1,000 mg by mouth 3 times daily, Disp: , Rfl:   No Known Allergies    Past Medical History:   Diagnosis Date   • Anxiety    • Hypertension      No past surgical history on file.    Review of Systems   Constitutional: Negative.    HENT: Negative.    Eyes: Negative.    Respiratory: Negative.    Cardiovascular: Negative.    Gastrointestinal: Negative.    Endocrine: Negative.    Genitourinary: Negative.    Musculoskeletal: Positive for  "arthralgias.   Skin: Negative.    Allergic/Immunologic: Negative.    Neurological: Negative.    Hematological: Negative.    Psychiatric/Behavioral: Negative.        Objective  /84  Pulse 96  Resp 16  Ht 157.5 cm (62\")  Wt 79.4 kg (175 lb)  SpO2 97%  BMI 32.01 kg/m2  Physical Exam   Constitutional: She is oriented to person, place, and time. She appears well-developed and well-nourished.   HENT:   Head: Normocephalic and atraumatic.   Right Ear: External ear normal.   Left Ear: External ear normal.   Nose: Nose normal.   Mouth/Throat: Oropharynx is clear and moist.   Eyes: Conjunctivae and EOM are normal. Pupils are equal, round, and reactive to light.   Neck: Normal range of motion. Neck supple.   Cardiovascular: Normal rate, regular rhythm, normal heart sounds and intact distal pulses.    Pulmonary/Chest: Effort normal and breath sounds normal.   Abdominal: Soft. Bowel sounds are normal.   Musculoskeletal: Normal range of motion.   Neurological: She is alert and oriented to person, place, and time. She has normal reflexes.   Skin: Skin is warm and dry.   Psychiatric: She has a normal mood and affect. Her behavior is normal. Judgment and thought content normal.   Nursing note and vitals reviewed.      Assessment/Plan   Paige was seen today for follow-up.    Diagnoses and all orders for this visit:    Essential hypertension    Mixed hyperlipidemia    Acquired hypothyroidism    Chronic kidney disease, stage III (moderate)      Influenza immunization was not given due to patient refusal.  We reviewed her recent labs  She will see nephrology in January       Current outpatient and discharge medications have been reconciled for the patient.  Barrington Jaramillo MD  No orders of the defined types were placed in this encounter.    Patient's BMI is above normal parameters. Follow-up plan includes: home exercises.  Follow up: 4 month(s)  "

## 2017-12-19 RX ORDER — LOSARTAN POTASSIUM 100 MG/1
TABLET ORAL
Qty: 90 TABLET | Refills: 2 | Status: SHIPPED | OUTPATIENT
Start: 2017-12-19 | End: 2017-12-27 | Stop reason: SDUPTHER

## 2017-12-27 RX ORDER — LOSARTAN POTASSIUM 100 MG/1
100 TABLET ORAL DAILY
Qty: 90 TABLET | Refills: 2 | Status: SHIPPED | OUTPATIENT
Start: 2017-12-27 | End: 2018-11-28 | Stop reason: SDUPTHER

## 2017-12-27 RX ORDER — HYDROCODONE BITARTRATE AND ACETAMINOPHEN 5; 325 MG/1; MG/1
1 TABLET ORAL DAILY
Qty: 30 TABLET | Refills: 0 | Status: SHIPPED | OUTPATIENT
Start: 2017-12-27 | End: 2018-06-05 | Stop reason: SDUPTHER

## 2018-01-10 RX ORDER — CITALOPRAM 20 MG/1
TABLET ORAL
Qty: 90 TABLET | Refills: 1 | Status: SHIPPED | OUTPATIENT
Start: 2018-01-10 | End: 2018-09-18 | Stop reason: SDUPTHER

## 2018-04-06 ENCOUNTER — OFFICE VISIT (OUTPATIENT)
Dept: FAMILY MEDICINE CLINIC | Facility: CLINIC | Age: 78
End: 2018-04-06

## 2018-04-06 VITALS
OXYGEN SATURATION: 98 % | HEART RATE: 86 BPM | WEIGHT: 180 LBS | HEIGHT: 62 IN | RESPIRATION RATE: 16 BRPM | SYSTOLIC BLOOD PRESSURE: 136 MMHG | BODY MASS INDEX: 33.13 KG/M2 | DIASTOLIC BLOOD PRESSURE: 84 MMHG

## 2018-04-06 DIAGNOSIS — F41.9 ANXIETY: ICD-10-CM

## 2018-04-06 DIAGNOSIS — E03.9 HYPOTHYROIDISM, UNSPECIFIED TYPE: ICD-10-CM

## 2018-04-06 DIAGNOSIS — I10 ESSENTIAL HYPERTENSION: Primary | ICD-10-CM

## 2018-04-06 DIAGNOSIS — E78.2 MIXED HYPERLIPIDEMIA: ICD-10-CM

## 2018-04-06 DIAGNOSIS — N18.30 CHRONIC KIDNEY DISEASE, STAGE III (MODERATE) (HCC): ICD-10-CM

## 2018-04-06 PROBLEM — J30.1 SEASONAL ALLERGIC RHINITIS DUE TO POLLEN: Status: RESOLVED | Noted: 2017-04-19 | Resolved: 2018-04-06

## 2018-04-06 PROBLEM — J32.9 SINUSITIS: Status: RESOLVED | Noted: 2017-03-22 | Resolved: 2018-04-06

## 2018-04-06 LAB
ALBUMIN SERPL-MCNC: 4.3 G/DL (ref 3.5–5)
ALBUMIN/GLOB SERPL: 1.4 G/DL (ref 1.1–2.5)
ALP SERPL-CCNC: 61 U/L (ref 24–120)
ALT SERPL-CCNC: 27 U/L (ref 0–54)
AST SERPL-CCNC: 26 U/L (ref 7–45)
BILIRUB SERPL-MCNC: 0.4 MG/DL (ref 0.1–1)
BUN SERPL-MCNC: 17 MG/DL (ref 5–21)
BUN/CREAT SERPL: 15.5 (ref 7–25)
CALCIUM SERPL-MCNC: 9.5 MG/DL (ref 8.4–10.4)
CHLORIDE SERPL-SCNC: 101 MMOL/L (ref 98–110)
CO2 SERPL-SCNC: 29 MMOL/L (ref 24–31)
CREAT SERPL-MCNC: 1.1 MG/DL (ref 0.5–1.4)
GFR SERPLBLD CREATININE-BSD FMLA CKD-EPI: 48 ML/MIN/1.73
GFR SERPLBLD CREATININE-BSD FMLA CKD-EPI: 59 ML/MIN/1.73
GLOBULIN SER CALC-MCNC: 3.1 GM/DL
GLUCOSE SERPL-MCNC: 106 MG/DL (ref 70–100)
POTASSIUM SERPL-SCNC: 4.1 MMOL/L (ref 3.5–5.3)
PROT SERPL-MCNC: 7.4 G/DL (ref 6.3–8.7)
SODIUM SERPL-SCNC: 143 MMOL/L (ref 135–145)
TSH SERPL DL<=0.005 MIU/L-ACNC: 4.43 MIU/ML (ref 0.47–4.68)

## 2018-04-06 PROCEDURE — 99214 OFFICE O/P EST MOD 30 MIN: CPT | Performed by: FAMILY MEDICINE

## 2018-04-06 NOTE — PROGRESS NOTES
Subjective   Paige Martinez is a 77 y.o. female.     Chief Complaint   Patient presents with   • Follow-up     4 mo       History of Present Illness     she notes good bp control wituout ha or chest pain--toleraging synthtroid whtout heat or cold intolerances--celexa is helping her anxiety--toelraging statin tx wituout myalgias      Current Outpatient Prescriptions:   •  aspirin 81 MG EC tablet, Take 81 mg by mouth daily, Disp: , Rfl:   •  atorvastatin (LIPITOR) 10 MG tablet, TAKE ONE TABLET DAILY GENERIC FOR LIPITOR, Disp: 30 tablet, Rfl: 2  •  Cholecalciferol (VITAMIN D) 2000 UNITS capsule, Take 2,000 Units by mouth, Disp: , Rfl:   •  citalopram (CeleXA) 20 MG tablet, TAKE ONE TABLET DAILY GENERIC FOR CELEXA, Disp: 90 tablet, Rfl: 1  •  fluocinonide-emollient (LIDEX-E) 0.05 % cream, Apply  topically 2 (Two) Times a Day., Disp: 30 g, Rfl: 1  •  HYDROcodone-acetaminophen (NORCO) 5-325 MG per tablet, Take 1 tablet by mouth Daily., Disp: 30 tablet, Rfl: 0  •  levothyroxine (SYNTHROID, LEVOTHROID) 100 MCG tablet, TAKE ONE TABLET DAILY GENERIC FOR SYNTHROID, Disp: 90 tablet, Rfl: 1  •  losartan (COZAAR) 100 MG tablet, Take 1 tablet by mouth Daily., Disp: 90 tablet, Rfl: 2  •  montelukast (SINGULAIR) 10 MG tablet, TAKE ONE TABLET DAILY GENERIC FOR SINGULAR, Disp: 30 tablet, Rfl: 5  •  Omega-3 Fatty Acids (FISH OIL) 1000 MG capsule capsule, Take 1,000 mg by mouth 3 times daily, Disp: , Rfl:   No Known Allergies    Past Medical History:   Diagnosis Date   • Anxiety    • Hypertension      No past surgical history on file.    Review of Systems   Constitutional: Negative.    HENT: Negative.    Eyes: Negative.    Respiratory: Negative.    Cardiovascular: Negative.    Gastrointestinal: Negative.    Endocrine: Negative.    Genitourinary: Negative.    Musculoskeletal: Positive for arthralgias.   Skin: Negative.    Allergic/Immunologic: Negative.    Neurological: Negative.    Hematological: Negative.    Psychiatric/Behavioral: The  patient is nervous/anxious.        Objective   Physical Exam   Constitutional: She is oriented to person, place, and time. She appears well-developed and well-nourished.   HENT:   Head: Normocephalic and atraumatic.   Right Ear: External ear normal.   Left Ear: External ear normal.   Nose: Nose normal.   Mouth/Throat: Oropharynx is clear and moist.   Eyes: Conjunctivae and EOM are normal. Pupils are equal, round, and reactive to light.   Neck: Normal range of motion. Neck supple.   Cardiovascular: Normal rate, regular rhythm, normal heart sounds and intact distal pulses.    Pulmonary/Chest: Effort normal and breath sounds normal.   Abdominal: Soft. Bowel sounds are normal.   Musculoskeletal: Normal range of motion.   Neurological: She is alert and oriented to person, place, and time. She has normal reflexes.   Skin: Skin is warm. Capillary refill takes less than 2 seconds.   Psychiatric: She has a normal mood and affect. Her behavior is normal. Judgment and thought content normal.   Nursing note and vitals reviewed.      Assessment/Plan   Paige was seen today for follow-up.    Diagnoses and all orders for this visit:    Essential hypertension    Mixed hyperlipidemia    Chronic kidney disease, stage III (moderate)    Anxiety    Hypothyroidism, unspecified type  -     Comprehensive metabolic panel  -     TSH    Discussed the patient's BMI with her. BMI is above normal parameters. Follow-up plan includes:  exercise counseling and nutrition counseling.             No orders of the defined types were placed in this encounter.    Current outpatient and discharge medications have been reconciled for the patient.  Barrington Jaramillo MD  Follow up: 4 month(s)

## 2018-06-05 RX ORDER — HYDROCODONE BITARTRATE AND ACETAMINOPHEN 5; 325 MG/1; MG/1
1 TABLET ORAL DAILY
Qty: 30 TABLET | Refills: 0 | Status: SHIPPED | OUTPATIENT
Start: 2018-06-05 | End: 2018-07-31 | Stop reason: SDUPTHER

## 2018-06-05 RX ORDER — MONTELUKAST SODIUM 10 MG/1
10 TABLET ORAL NIGHTLY
Qty: 90 TABLET | Refills: 1 | Status: SHIPPED | OUTPATIENT
Start: 2018-06-05 | End: 2019-04-25 | Stop reason: SDUPTHER

## 2018-06-05 RX ORDER — LEVOTHYROXINE SODIUM 0.1 MG/1
100 TABLET ORAL DAILY
Qty: 90 TABLET | Refills: 1 | Status: SHIPPED | OUTPATIENT
Start: 2018-06-05 | End: 2018-10-26 | Stop reason: DRUGHIGH

## 2018-07-12 ENCOUNTER — TELEPHONE (OUTPATIENT)
Dept: FAMILY MEDICINE CLINIC | Facility: CLINIC | Age: 78
End: 2018-07-12

## 2018-07-12 NOTE — TELEPHONE ENCOUNTER
Pt called her bp has been elevated and her legs and feet has been swollen she wants to know if u will see her tomorrow?

## 2018-07-13 ENCOUNTER — OFFICE VISIT (OUTPATIENT)
Dept: FAMILY MEDICINE CLINIC | Facility: CLINIC | Age: 78
End: 2018-07-13

## 2018-07-13 VITALS
HEART RATE: 83 BPM | OXYGEN SATURATION: 97 % | DIASTOLIC BLOOD PRESSURE: 88 MMHG | SYSTOLIC BLOOD PRESSURE: 188 MMHG | WEIGHT: 177 LBS | BODY MASS INDEX: 32.57 KG/M2 | RESPIRATION RATE: 16 BRPM | HEIGHT: 62 IN

## 2018-07-13 DIAGNOSIS — R60.9 EDEMA, UNSPECIFIED TYPE: Primary | ICD-10-CM

## 2018-07-13 DIAGNOSIS — I10 ESSENTIAL HYPERTENSION: ICD-10-CM

## 2018-07-13 PROCEDURE — 99213 OFFICE O/P EST LOW 20 MIN: CPT | Performed by: FAMILY MEDICINE

## 2018-07-13 RX ORDER — BUMETANIDE 0.5 MG/1
1 TABLET ORAL DAILY
Qty: 30 TABLET | Refills: 2 | Status: SHIPPED | OUTPATIENT
Start: 2018-07-13 | End: 2018-10-05 | Stop reason: SDUPTHER

## 2018-07-13 NOTE — PROGRESS NOTES
Subjective   Paige Martinez is a 77 y.o. female.     Chief Complaint   Patient presents with   • Hypertension   • Foot Swelling     bilateral feet edema        History of Present Illness     she notes having elevated bp and swollen feet for a few days wituout cp or ha      Current Outpatient Prescriptions:   •  aspirin 81 MG EC tablet, Take 81 mg by mouth daily, Disp: , Rfl:   •  atorvastatin (LIPITOR) 10 MG tablet, TAKE ONE TABLET DAILY GENERIC FOR LIPITOR, Disp: 30 tablet, Rfl: 2  •  bumetanide (BUMEX) 0.5 MG tablet, Take 2 tablets by mouth Daily., Disp: 30 tablet, Rfl: 2  •  Cholecalciferol (VITAMIN D) 2000 UNITS capsule, Take 2,000 Units by mouth, Disp: , Rfl:   •  citalopram (CeleXA) 20 MG tablet, TAKE ONE TABLET DAILY GENERIC FOR CELEXA, Disp: 90 tablet, Rfl: 1  •  fluocinonide-emollient (LIDEX-E) 0.05 % cream, Apply  topically 2 (Two) Times a Day., Disp: 30 g, Rfl: 1  •  HYDROcodone-acetaminophen (NORCO) 5-325 MG per tablet, Take 1 tablet by mouth Daily., Disp: 30 tablet, Rfl: 0  •  levothyroxine (SYNTHROID, LEVOTHROID) 100 MCG tablet, Take 1 tablet by mouth Daily., Disp: 90 tablet, Rfl: 1  •  losartan (COZAAR) 100 MG tablet, Take 1 tablet by mouth Daily., Disp: 90 tablet, Rfl: 2  •  montelukast (SINGULAIR) 10 MG tablet, Take 1 tablet by mouth Every Night., Disp: 90 tablet, Rfl: 1  •  Omega-3 Fatty Acids (FISH OIL) 1000 MG capsule capsule, Take 1,000 mg by mouth 3 times daily, Disp: , Rfl:   No Known Allergies    Past Medical History:   Diagnosis Date   • Anxiety    • Hypertension      No past surgical history on file.    Review of Systems   Constitutional: Negative.    HENT: Negative.    Eyes: Negative.    Respiratory: Negative.    Cardiovascular: Positive for leg swelling.   Gastrointestinal: Negative.    Endocrine: Negative.    Genitourinary: Negative.    Musculoskeletal: Negative.    Skin: Negative.    Allergic/Immunologic: Negative.    Neurological: Negative.    Hematological: Negative.   "  Psychiatric/Behavioral: Negative.        Objective  BP (!) 188/88   Pulse 83   Resp 16   Ht 157.5 cm (62\")   Wt 80.3 kg (177 lb)   SpO2 97%   BMI 32.37 kg/m²   Physical Exam   Constitutional: She is oriented to person, place, and time. She appears well-developed and well-nourished.   HENT:   Head: Normocephalic and atraumatic.   Right Ear: External ear normal.   Left Ear: External ear normal.   Nose: Nose normal.   Mouth/Throat: Oropharynx is clear and moist.   Eyes: Conjunctivae and EOM are normal. Pupils are equal, round, and reactive to light.   Neck: Normal range of motion. Neck supple.   Cardiovascular: Normal rate, regular rhythm, normal heart sounds and intact distal pulses.    Pulmonary/Chest: Breath sounds normal.   Abdominal: Soft. Bowel sounds are normal.   Musculoskeletal: Normal range of motion. She exhibits edema.   Neurological: She is alert and oriented to person, place, and time.   Skin: Skin is warm. Capillary refill takes less than 2 seconds.   Psychiatric: She has a normal mood and affect. Her behavior is normal. Judgment and thought content normal.   Vitals reviewed.      Assessment/Plan   Paige was seen today for hypertension and foot swelling.    Diagnoses and all orders for this visit:    Edema, unspecified type  -     Basic metabolic panel    Essential hypertension    Other orders  -     bumetanide (BUMEX) 0.5 MG tablet; Take 2 tablets by mouth Daily.                 Orders Placed This Encounter   Procedures   • Basic metabolic panel       Follow up: 2 week(s)  "

## 2018-07-14 LAB
BUN SERPL-MCNC: 14 MG/DL (ref 8–27)
BUN/CREAT SERPL: 13 (ref 12–28)
CALCIUM SERPL-MCNC: 9.1 MG/DL (ref 8.7–10.3)
CHLORIDE SERPL-SCNC: 103 MMOL/L (ref 96–106)
CO2 SERPL-SCNC: 25 MMOL/L (ref 20–29)
CREAT SERPL-MCNC: 1.1 MG/DL (ref 0.57–1)
GLUCOSE SERPL-MCNC: 92 MG/DL (ref 65–99)
POTASSIUM SERPL-SCNC: 4.1 MMOL/L (ref 3.5–5.2)
SODIUM SERPL-SCNC: 141 MMOL/L (ref 134–144)

## 2018-07-31 ENCOUNTER — OFFICE VISIT (OUTPATIENT)
Dept: FAMILY MEDICINE CLINIC | Facility: CLINIC | Age: 78
End: 2018-07-31

## 2018-07-31 VITALS
OXYGEN SATURATION: 98 % | RESPIRATION RATE: 16 BRPM | SYSTOLIC BLOOD PRESSURE: 122 MMHG | DIASTOLIC BLOOD PRESSURE: 64 MMHG | HEART RATE: 70 BPM | WEIGHT: 171 LBS | HEIGHT: 62 IN | TEMPERATURE: 98.6 F | BODY MASS INDEX: 31.47 KG/M2

## 2018-07-31 DIAGNOSIS — E78.2 MIXED HYPERLIPIDEMIA: ICD-10-CM

## 2018-07-31 DIAGNOSIS — I10 ESSENTIAL HYPERTENSION: Primary | ICD-10-CM

## 2018-07-31 PROCEDURE — 99213 OFFICE O/P EST LOW 20 MIN: CPT | Performed by: FAMILY MEDICINE

## 2018-07-31 PROCEDURE — G0439 PPPS, SUBSEQ VISIT: HCPCS | Performed by: FAMILY MEDICINE

## 2018-07-31 RX ORDER — HYDROCODONE BITARTRATE AND ACETAMINOPHEN 5; 325 MG/1; MG/1
1 TABLET ORAL DAILY
Qty: 30 TABLET | Refills: 0 | Status: SHIPPED | OUTPATIENT
Start: 2018-07-31 | End: 2018-10-05 | Stop reason: SDUPTHER

## 2018-07-31 NOTE — PROGRESS NOTES
Subjective   Paige Martinez is a 77 y.o. female.     Chief Complaint   Patient presents with   • Annual Exam        History of Present Illness     she nots good control of bp without cp or ha--she is toleraigng lipitgior wituout myalgi as      Current Outpatient Prescriptions:   •  aspirin 81 MG EC tablet, Take 81 mg by mouth daily, Disp: , Rfl:   •  atorvastatin (LIPITOR) 10 MG tablet, TAKE ONE TABLET DAILY GENERIC FOR LIPITOR, Disp: 30 tablet, Rfl: 2  •  bumetanide (BUMEX) 0.5 MG tablet, Take 2 tablets by mouth Daily., Disp: 30 tablet, Rfl: 2  •  Cholecalciferol (VITAMIN D) 2000 UNITS capsule, Take 2,000 Units by mouth, Disp: , Rfl:   •  citalopram (CeleXA) 20 MG tablet, TAKE ONE TABLET DAILY GENERIC FOR CELEXA, Disp: 90 tablet, Rfl: 1  •  fluocinonide-emollient (LIDEX-E) 0.05 % cream, Apply  topically 2 (Two) Times a Day., Disp: 30 g, Rfl: 1  •  HYDROcodone-acetaminophen (NORCO) 5-325 MG per tablet, Take 1 tablet by mouth Daily., Disp: 30 tablet, Rfl: 0  •  levothyroxine (SYNTHROID, LEVOTHROID) 100 MCG tablet, Take 1 tablet by mouth Daily., Disp: 90 tablet, Rfl: 1  •  losartan (COZAAR) 100 MG tablet, Take 1 tablet by mouth Daily., Disp: 90 tablet, Rfl: 2  •  montelukast (SINGULAIR) 10 MG tablet, Take 1 tablet by mouth Every Night., Disp: 90 tablet, Rfl: 1  •  Omega-3 Fatty Acids (FISH OIL) 1000 MG capsule capsule, Take 1,000 mg by mouth 3 times daily, Disp: , Rfl:   No Known Allergies    Past Medical History:   Diagnosis Date   • Anxiety    • Hypertension      No past surgical history on file.    Review of Systems   Constitutional: Negative.    HENT: Negative.    Eyes: Negative.    Respiratory: Negative.    Cardiovascular: Negative.    Gastrointestinal: Negative.    Endocrine: Negative.    Genitourinary: Negative.    Musculoskeletal: Negative.    Skin: Negative.    Allergic/Immunologic: Negative.    Neurological: Negative.    Hematological: Negative.    Psychiatric/Behavioral: Negative.        Objective  /64   " Pulse 70   Temp 98.6 °F (37 °C)   Resp 16   Ht 157.5 cm (62.01\")   Wt 77.6 kg (171 lb)   SpO2 98%   BMI 31.27 kg/m²   Physical Exam   Constitutional: She is oriented to person, place, and time. She appears well-developed and well-nourished.   HENT:   Head: Normocephalic and atraumatic.   Eyes: Pupils are equal, round, and reactive to light. EOM are normal.   Neck: Normal range of motion. Neck supple.   Cardiovascular: Normal rate, regular rhythm, normal heart sounds and intact distal pulses.    Pulmonary/Chest: Effort normal and breath sounds normal.   Abdominal: Soft. Bowel sounds are normal.   Musculoskeletal: Normal range of motion.   Neurological: She is alert and oriented to person, place, and time.   Skin: Skin is warm. Capillary refill takes less than 2 seconds.   Psychiatric: She has a normal mood and affect. Her behavior is normal. Judgment and thought content normal.   Nursing note and vitals reviewed.      Assessment/Plan   Paige was seen today for annual exam.    Diagnoses and all orders for this visit:    Essential hypertension    Mixed hyperlipidemia                 No orders of the defined types were placed in this encounter.      Follow up: 3 month(s)  "

## 2018-07-31 NOTE — PROGRESS NOTES
QUICK REFERENCE INFORMATION:  The ABCs of the Annual Wellness Visit    Subsequent Medicare Wellness Visit    HEALTH RISK ASSESSMENT    1940    Recent Hospitalizations:  No hospitalization(s) within the last year..        Current Medical Providers:  Patient Care Team:  Barrington Jaramillo MD as PCP - General  Barrington Jaramillo MD as PCP - Claims Attributed        Smoking Status:  History   Smoking Status   • Former Smoker   Smokeless Tobacco   • Never Used       Alcohol Consumption:  History   Alcohol Use No       Depression Screen:   PHQ-2/PHQ-9 Depression Screening 7/31/2018   Little interest or pleasure in doing things 0   Feeling down, depressed, or hopeless 0   Trouble falling or staying asleep, or sleeping too much 0   Feeling tired or having little energy 0   Poor appetite or overeating 0   Feeling bad about yourself - or that you are a failure or have let yourself or your family down 0   Trouble concentrating on things, such as reading the newspaper or watching television 0   Moving or speaking so slowly that other people could have noticed. Or the opposite - being so fidgety or restless that you have been moving around a lot more than usual 0   Thoughts that you would be better off dead, or of hurting yourself in some way 0   Total Score 0   If you checked off any problems, how difficult have these problems made it for you to do your work, take care of things at home, or get along with other people? Not difficult at all       Health Habits and Functional and Cognitive Screening:  Functional & Cognitive Status 7/31/2018   Do you have difficulty preparing food and eating? No   Do you have difficulty bathing yourself, getting dressed or grooming yourself? No   Do you have difficulty using the toilet? No   Do you have difficulty moving around from place to place? No   Do you have trouble with steps or getting out of a bed or a chair? No   In the past year have you fallen or experienced a near fall?  Yes   Current Diet Well Balanced Diet   Dental Exam Not up to date   Eye Exam Up to date   Exercise (times per week) 7 times per week   Current Exercise Activities Include Housecleaning   Do you need help using the phone?  No   Are you deaf or do you have serious difficulty hearing?  No   Do you need help with transportation? No   Do you need help shopping? No   Do you need help preparing meals?  No   Do you need help with housework?  No   Do you need help with laundry? No   Do you need help taking your medications? No   Do you need help managing money? No   Do you ever drive or ride in a car without wearing a seat belt? No   Have you felt unusual stress, anger or loneliness in the last month? No   Who do you live with? Alone   If you need help, do you have trouble finding someone available to you? No   Have you been bothered in the last four weeks by sexual problems? No   Do you have difficulty concentrating, remembering or making decisions? No           Does the patient have evidence of cognitive impairment? No    Aspirin use counseling: Taking ASA appropriately as indicated      Recent Lab Results:  CMP:  Lab Results   Component Value Date    GLU 92 07/13/2018    BUN 14 07/13/2018    CREATININE 1.10 (H) 07/13/2018    EGFRIFNONA 49 (L) 07/13/2018    EGFRIFAFRI 57 (L) 07/13/2018    BCR 13 07/13/2018     07/13/2018    K 4.1 07/13/2018    CO2 25 07/13/2018    CALCIUM 9.1 07/13/2018    PROTENTOTREF 7.4 04/06/2018    ALBUMIN 4.30 04/06/2018    LABGLOBREF 3.1 04/06/2018    LABIL2 1.4 04/06/2018    BILITOT 0.4 04/06/2018    ALKPHOS 61 04/06/2018    AST 26 04/06/2018    ALT 27 04/06/2018     Lipid Panel:  Lab Results   Component Value Date    TRIG 94 11/30/2017    HDL 65 11/30/2017    VLDL 18.8 11/30/2017     HbA1c:       Visual Acuity:   Visual Acuity Screening    Right eye Left eye Both eyes   Without correction: 20/25 20/25 20/25   With correction:          Age-appropriate Screening Schedule:  Refer to the list  below for future screening recommendations based on patient's age, sex and/or medical conditions. Orders for these recommended tests are listed in the plan section. The patient has been provided with a written plan.    Health Maintenance   Topic Date Due   • URINE MICROALBUMIN  1940   • TDAP/TD VACCINES (1 - Tdap) 11/14/1961   • ZOSTER VACCINE (1 of 2) 11/14/1992   • PNEUMOCOCCAL VACCINES (65+ LOW/MEDIUM RISK) (1 of 2 - PCV13) 11/14/2007   • DIABETIC FOOT EXAM  12/07/2016   • HEMOGLOBIN A1C  12/07/2016   • COLONOSCOPY  12/07/2016   • DIABETIC EYE EXAM  02/01/2017   • MAMMOGRAM  07/27/2017   • INFLUENZA VACCINE  08/01/2018   • LIPID PANEL  11/30/2018        Subjective   History of Present Illness    Paige Martinez is a 77 y.o. female who presents for an Subsequent Wellness Visit.    The following portions of the patient's history were reviewed and updated as appropriate: allergies, current medications, past family history, past medical history, past social history, past surgical history and problem list.    Outpatient Medications Prior to Visit   Medication Sig Dispense Refill   • aspirin 81 MG EC tablet Take 81 mg by mouth daily     • atorvastatin (LIPITOR) 10 MG tablet TAKE ONE TABLET DAILY GENERIC FOR LIPITOR 30 tablet 2   • bumetanide (BUMEX) 0.5 MG tablet Take 2 tablets by mouth Daily. 30 tablet 2   • Cholecalciferol (VITAMIN D) 2000 UNITS capsule Take 2,000 Units by mouth     • citalopram (CeleXA) 20 MG tablet TAKE ONE TABLET DAILY GENERIC FOR CELEXA 90 tablet 1   • fluocinonide-emollient (LIDEX-E) 0.05 % cream Apply  topically 2 (Two) Times a Day. 30 g 1   • HYDROcodone-acetaminophen (NORCO) 5-325 MG per tablet Take 1 tablet by mouth Daily. 30 tablet 0   • levothyroxine (SYNTHROID, LEVOTHROID) 100 MCG tablet Take 1 tablet by mouth Daily. 90 tablet 1   • losartan (COZAAR) 100 MG tablet Take 1 tablet by mouth Daily. 90 tablet 2   • montelukast (SINGULAIR) 10 MG tablet Take 1 tablet by mouth Every Night. 90  "tablet 1   • Omega-3 Fatty Acids (FISH OIL) 1000 MG capsule capsule Take 1,000 mg by mouth 3 times daily       No facility-administered medications prior to visit.        Patient Active Problem List   Diagnosis   • Essential hypertension   • Chronic kidney disease, stage III (moderate)   • Acquired hypothyroidism   • Anxiety   • Mixed hyperlipidemia   • Edema       Advance Care Planning:  has NO advance directive - information provided to the patient today    Identification of Risk Factors:  Risk factors include: cardiovascular risk.    Review of Systems    Compared to one year ago, the patient feels her physical health is the same.  Compared to one year ago, the patient feels her mental health is the same.    Objective     Physical Exam    Vitals:    07/31/18 0831   BP: 122/64   Pulse: 70   Resp: 16   Temp: 98.6 °F (37 °C)   SpO2: 98%   Weight: 77.6 kg (171 lb)   Height: 157.5 cm (62.01\")   PainSc: 0-No pain       Patient's Body mass index is 31.27 kg/m². BMI is above normal parameters. Recommendations include: no follow-up required.      Assessment/Plan   Patient Self-Management and Personalized Health Advice  The patient has been provided with information about: designing advance directives and preventive services including:   · Advance directive.    Visit Diagnoses:    ICD-10-CM ICD-9-CM   1. Essential hypertension I10 401.9   2. Mixed hyperlipidemia E78.2 272.2       No orders of the defined types were placed in this encounter.      Outpatient Encounter Prescriptions as of 7/31/2018   Medication Sig Dispense Refill   • aspirin 81 MG EC tablet Take 81 mg by mouth daily     • atorvastatin (LIPITOR) 10 MG tablet TAKE ONE TABLET DAILY GENERIC FOR LIPITOR 30 tablet 2   • bumetanide (BUMEX) 0.5 MG tablet Take 2 tablets by mouth Daily. 30 tablet 2   • Cholecalciferol (VITAMIN D) 2000 UNITS capsule Take 2,000 Units by mouth     • citalopram (CeleXA) 20 MG tablet TAKE ONE TABLET DAILY GENERIC FOR CELEXA 90 tablet 1   • " fluocinonide-emollient (LIDEX-E) 0.05 % cream Apply  topically 2 (Two) Times a Day. 30 g 1   • HYDROcodone-acetaminophen (NORCO) 5-325 MG per tablet Take 1 tablet by mouth Daily. 30 tablet 0   • levothyroxine (SYNTHROID, LEVOTHROID) 100 MCG tablet Take 1 tablet by mouth Daily. 90 tablet 1   • losartan (COZAAR) 100 MG tablet Take 1 tablet by mouth Daily. 90 tablet 2   • montelukast (SINGULAIR) 10 MG tablet Take 1 tablet by mouth Every Night. 90 tablet 1   • Omega-3 Fatty Acids (FISH OIL) 1000 MG capsule capsule Take 1,000 mg by mouth 3 times daily       No facility-administered encounter medications on file as of 7/31/2018.        Reviewed use of high risk medication in the elderly: yes  Reviewed for potential of harmful drug interactions in the elderly: yes    Follow Up:  No Follow-up on file.     An After Visit Summary and PPPS with all of these plans were given to the patient.

## 2018-07-31 NOTE — PATIENT INSTRUCTIONS
Advance Directive  Advance directives are legal documents that let you make choices ahead of time about your health care and medical treatment in case you become unable to communicate for yourself. Advance directives are a way for you to communicate your wishes to family, friends, and health care providers. This can help convey your decisions about end-of-life care if you become unable to communicate.  Discussing and writing advance directives should happen over time rather than all at once. Advance directives can be changed depending on your situation and what you want, even after you have signed the advance directives.  If you do not have an advance directive, some states assign family decision makers to act on your behalf based on how closely you are related to them. Each state has its own laws regarding advance directives. You may want to check with your health care provider, , or state representative about the laws in your state. There are different types of advance directives, such as:  · Medical power of .  · Living will.  · Do not resuscitate (DNR) or do not attempt resuscitation (DNAR) order.    Health care proxy and medical power of   A health care proxy, also called a health care agent, is a person who is appointed to make medical decisions for you in cases in which you are unable to make the decisions yourself. Generally, people choose someone they know well and trust to represent their preferences. Make sure to ask this person for an agreement to act as your proxy. A proxy may have to exercise judgment in the event of a medical decision for which your wishes are not known.  A medical power of  is a legal document that names your health care proxy. Depending on the laws in your state, after the document is written, it may also need to be:  · Signed.  · Notarized.  · Dated.  · Copied.  · Witnessed.  · Incorporated into your medical record.    You may also want to appoint  someone to manage your financial affairs in a situation in which you are unable to do so. This is called a durable power of  for finances. It is a separate legal document from the durable power of  for health care. You may choose the same person or someone different from your health care proxy to act as your agent in financial matters.  If you do not appoint a proxy, or if there is a concern that the proxy is not acting in your best interests, a court-appointed guardian may be designated to act on your behalf.  Living will  A living will is a set of instructions documenting your wishes about medical care when you cannot express them yourself. Health care providers should keep a copy of your living will in your medical record. You may want to give a copy to family members or friends. To alert caregivers in case of an emergency, you can place a card in your wallet to let them know that you have a living will and where they can find it. A living will is used if you become:  · Terminally ill.  · Incapacitated.  · Unable to communicate or make decisions.    Items to consider in your living will include:  · The use or non-use of life-sustaining equipment, such as dialysis machines and breathing machines (ventilators).  · A DNR or DNAR order, which is the instruction not to use cardiopulmonary resuscitation (CPR) if breathing or heartbeat stops.  · The use or non-use of tube feeding.  · Withholding of food and fluids.  · Comfort (palliative) care when the goal becomes comfort rather than a cure.  · Organ and tissue donation.    A living will does not give instructions for distributing your money and property if you should pass away. It is recommended that you seek the advice of a  when writing a will. Decisions about taxes, beneficiaries, and asset distribution will be legally binding. This process can relieve your family and friends of any concerns surrounding disputes or questions that may come up  about the distribution of your assets.  DNR or DNAR  A DNR or DNAR order is a request not to have CPR in the event that your heart stops beating or you stop breathing. If a DNR or DNAR order has not been made and shared, a health care provider will try to help any patient whose heart has stopped or who has stopped breathing. If you plan to have surgery, talk with your health care provider about how your DNR or DNAR order will be followed if problems occur.  Summary  · Advance directives are the legal documents that allow you to make choices ahead of time about your health care and medical treatment in case you become unable to communicate for yourself.  · The process of discussing and writing advance directives should happen over time. You can change the advance directives, even after you have signed them.  · Advance directives include DNR or DNAR orders, living valerio, and designating an agent as your medical power of .  This information is not intended to replace advice given to you by your health care provider. Make sure you discuss any questions you have with your health care provider.  Document Released: 03/26/2009 Document Revised: 11/06/2017 Document Reviewed: 11/06/2017  [employer] Interactive Patient Education © 2017 [employer]

## 2018-09-18 RX ORDER — CITALOPRAM 20 MG/1
TABLET ORAL
Qty: 90 TABLET | Refills: 1 | Status: SHIPPED | OUTPATIENT
Start: 2018-09-18 | End: 2019-04-25 | Stop reason: SDUPTHER

## 2018-10-05 RX ORDER — HYDROCODONE BITARTRATE AND ACETAMINOPHEN 5; 325 MG/1; MG/1
TABLET ORAL
Qty: 30 TABLET | Refills: 0 | Status: SHIPPED | OUTPATIENT
Start: 2018-10-05 | End: 2018-12-19 | Stop reason: SDUPTHER

## 2018-10-05 RX ORDER — BUMETANIDE 0.5 MG/1
TABLET ORAL
Qty: 60 TABLET | Refills: 5 | Status: ON HOLD | OUTPATIENT
Start: 2018-10-05 | End: 2019-10-03

## 2018-10-05 RX ORDER — ATORVASTATIN CALCIUM 10 MG/1
TABLET, FILM COATED ORAL
Qty: 30 TABLET | Refills: 5 | Status: ON HOLD | OUTPATIENT
Start: 2018-10-05 | End: 2019-10-03

## 2018-10-18 ENCOUNTER — TELEPHONE (OUTPATIENT)
Dept: FAMILY MEDICINE CLINIC | Facility: CLINIC | Age: 78
End: 2018-10-18

## 2018-10-18 NOTE — TELEPHONE ENCOUNTER
10-5-18  Pt req refill of [place].  [place] Drug Monitoring report ran and scanned into chart.

## 2018-10-24 DIAGNOSIS — E03.9 HYPOTHYROIDISM, UNSPECIFIED TYPE: Primary | ICD-10-CM

## 2018-10-24 DIAGNOSIS — I10 HYPERTENSION, UNSPECIFIED TYPE: ICD-10-CM

## 2018-10-25 LAB
ALBUMIN SERPL-MCNC: 4.1 G/DL (ref 3.5–5)
ALBUMIN/GLOB SERPL: 1.5 G/DL (ref 1.1–2.5)
ALP SERPL-CCNC: 67 U/L (ref 24–120)
ALT SERPL-CCNC: 22 U/L (ref 0–54)
AST SERPL-CCNC: 18 U/L (ref 7–45)
BASOPHILS # BLD AUTO: 0.03 10*3/MM3 (ref 0–0.2)
BASOPHILS NFR BLD AUTO: 0.4 % (ref 0–2)
BILIRUB SERPL-MCNC: 0.8 MG/DL (ref 0.1–1)
BUN SERPL-MCNC: 29 MG/DL (ref 5–21)
BUN/CREAT SERPL: 24.8 (ref 7–25)
CALCIUM SERPL-MCNC: 9.2 MG/DL (ref 8.4–10.4)
CHLORIDE SERPL-SCNC: 101 MMOL/L (ref 98–110)
CHOLEST SERPL-MCNC: 155 MG/DL (ref 130–200)
CO2 SERPL-SCNC: 25 MMOL/L (ref 24–31)
CREAT SERPL-MCNC: 1.17 MG/DL (ref 0.5–1.4)
EOSINOPHIL # BLD AUTO: 0.22 10*3/MM3 (ref 0–0.7)
EOSINOPHIL NFR BLD AUTO: 2.7 % (ref 0–4)
ERYTHROCYTE [DISTWIDTH] IN BLOOD BY AUTOMATED COUNT: 12.3 % (ref 12–15)
GLOBULIN SER CALC-MCNC: 2.7 GM/DL
GLUCOSE SERPL-MCNC: 163 MG/DL (ref 70–100)
HCT VFR BLD AUTO: 33.5 % (ref 37–47)
HDLC SERPL-MCNC: 50 MG/DL
HGB BLD-MCNC: 10.9 G/DL (ref 12–16)
IMM GRANULOCYTES # BLD: 0.02 10*3/MM3 (ref 0–0.03)
IMM GRANULOCYTES NFR BLD: 0.2 % (ref 0–5)
LDLC SERPL CALC-MCNC: 84 MG/DL (ref 0–99)
LYMPHOCYTES # BLD AUTO: 1.96 10*3/MM3 (ref 0.72–4.86)
LYMPHOCYTES NFR BLD AUTO: 24.4 % (ref 15–45)
MCH RBC QN AUTO: 30.8 PG (ref 28–32)
MCHC RBC AUTO-ENTMCNC: 32.5 G/DL (ref 33–36)
MCV RBC AUTO: 94.6 FL (ref 82–98)
MONOCYTES # BLD AUTO: 0.63 10*3/MM3 (ref 0.19–1.3)
MONOCYTES NFR BLD AUTO: 7.8 % (ref 4–12)
NEUTROPHILS # BLD AUTO: 5.17 10*3/MM3 (ref 1.87–8.4)
NEUTROPHILS NFR BLD AUTO: 64.5 % (ref 39–78)
NRBC BLD AUTO-RTO: 0 /100 WBC (ref 0–0)
PLATELET # BLD AUTO: 221 10*3/MM3 (ref 130–400)
POTASSIUM SERPL-SCNC: 4.4 MMOL/L (ref 3.5–5.3)
PROT SERPL-MCNC: 6.8 G/DL (ref 6.3–8.7)
RBC # BLD AUTO: 3.54 10*6/MM3 (ref 4.2–5.4)
SODIUM SERPL-SCNC: 140 MMOL/L (ref 135–145)
T4 FREE SERPL-MCNC: 1.56 NG/DL (ref 0.78–2.19)
TRIGL SERPL-MCNC: 103 MG/DL (ref 0–149)
TSH SERPL DL<=0.005 MIU/L-ACNC: 0.08 MIU/ML (ref 0.47–4.68)
VLDLC SERPL CALC-MCNC: 20.6 MG/DL
WBC # BLD AUTO: 8.03 10*3/MM3 (ref 4.8–10.8)

## 2018-10-26 ENCOUNTER — TELEPHONE (OUTPATIENT)
Dept: FAMILY MEDICINE CLINIC | Facility: CLINIC | Age: 78
End: 2018-10-26

## 2018-10-26 RX ORDER — LEVOTHYROXINE SODIUM 0.05 MG/1
50 TABLET ORAL DAILY
Qty: 30 TABLET | Refills: 2 | Status: SHIPPED | OUTPATIENT
Start: 2018-10-26 | End: 2019-02-20 | Stop reason: SDUPTHER

## 2018-10-30 ENCOUNTER — OFFICE VISIT (OUTPATIENT)
Dept: FAMILY MEDICINE CLINIC | Facility: CLINIC | Age: 78
End: 2018-10-30

## 2018-10-30 VITALS
BODY MASS INDEX: 31.65 KG/M2 | RESPIRATION RATE: 16 BRPM | WEIGHT: 172 LBS | OXYGEN SATURATION: 97 % | HEIGHT: 62 IN | DIASTOLIC BLOOD PRESSURE: 64 MMHG | HEART RATE: 60 BPM | TEMPERATURE: 98.8 F | SYSTOLIC BLOOD PRESSURE: 130 MMHG

## 2018-10-30 DIAGNOSIS — D64.9 ANEMIA, UNSPECIFIED TYPE: Primary | ICD-10-CM

## 2018-10-30 DIAGNOSIS — R19.5 OCCULT BLOOD IN STOOLS: ICD-10-CM

## 2018-10-30 LAB
HEMOCCULT STL QL IA: NEGATIVE
HEMOCCULT STL QL IA: POSITIVE
HEMOCCULT STL QL IA: POSITIVE

## 2018-10-30 PROCEDURE — 82274 ASSAY TEST FOR BLOOD FECAL: CPT | Performed by: FAMILY MEDICINE

## 2018-10-30 PROCEDURE — 99214 OFFICE O/P EST MOD 30 MIN: CPT | Performed by: FAMILY MEDICINE

## 2018-10-30 NOTE — PROGRESS NOTES
Subjective   Paige Martinez is a 77 y.o. female.     Chief Complaint   Patient presents with   • Follow-up     3mo       History of Present Illness     he thinks his bp has been stable ---denies any cp or ha---seh is toleratgint statin without myualgia s--toleratin thyroid without heat or cold intoelrances      Current Outpatient Prescriptions:   •  aspirin 81 MG EC tablet, Take 81 mg by mouth daily, Disp: , Rfl:   •  atorvastatin (LIPITOR) 10 MG tablet, TAKE ONE TABLET DAILY GENERIC FOR LIPITOR, Disp: 30 tablet, Rfl: 5  •  bumetanide (BUMEX) 0.5 MG tablet, TAKE TWO TABLETS DAILY GENERIC FOR BUMEX, Disp: 60 tablet, Rfl: 5  •  Cholecalciferol (VITAMIN D) 2000 UNITS capsule, Take 2,000 Units by mouth, Disp: , Rfl:   •  citalopram (CeleXA) 20 MG tablet, TAKE ONE TABLET DAILY GENERIC FOR CELEXA, Disp: 90 tablet, Rfl: 1  •  fluocinonide-emollient (LIDEX-E) 0.05 % cream, Apply  topically 2 (Two) Times a Day., Disp: 30 g, Rfl: 1  •  HYDROcodone-acetaminophen (NORCO) 5-325 MG per tablet, TAKE ONE TABLET DAILY GENERIC FOR NORCO, Disp: 30 tablet, Rfl: 0  •  levothyroxine (SYNTHROID) 50 MCG tablet, Take 1 tablet by mouth Daily., Disp: 30 tablet, Rfl: 2  •  losartan (COZAAR) 100 MG tablet, Take 1 tablet by mouth Daily., Disp: 90 tablet, Rfl: 2  •  montelukast (SINGULAIR) 10 MG tablet, Take 1 tablet by mouth Every Night., Disp: 90 tablet, Rfl: 1  •  Omega-3 Fatty Acids (FISH OIL) 1000 MG capsule capsule, Take 1,000 mg by mouth 3 times daily, Disp: , Rfl:   No Known Allergies    Past Medical History:   Diagnosis Date   • Anxiety    • Hypertension      No past surgical history on file.    Review of Systems   Constitutional: Negative.    HENT: Negative.    Eyes: Negative.    Respiratory: Negative.    Cardiovascular: Negative.    Gastrointestinal: Negative.    Endocrine: Negative.    Genitourinary: Negative.    Musculoskeletal: Negative.    Skin: Negative.    Allergic/Immunologic: Negative.    Neurological: Negative.    Hematological:  "Negative.    Psychiatric/Behavioral: Negative.        Objective  /64   Pulse 60   Temp 98.8 °F (37.1 °C)   Resp 16   Ht 157.5 cm (62.01\")   Wt 78 kg (172 lb)   SpO2 97%   BMI 31.45 kg/m²   Physical Exam   Constitutional: She is oriented to person, place, and time. She appears well-developed and well-nourished.   HENT:   Head: Normocephalic and atraumatic.   Right Ear: External ear normal.   Left Ear: External ear normal.   Nose: Nose normal.   Mouth/Throat: Oropharynx is clear and moist.   Eyes: Pupils are equal, round, and reactive to light. Conjunctivae and EOM are normal.   Neck: Normal range of motion. Neck supple.   Cardiovascular: Normal rate, regular rhythm, normal heart sounds and intact distal pulses.    Pulmonary/Chest: Effort normal and breath sounds normal.   Abdominal: Soft. Bowel sounds are normal.   Musculoskeletal: Normal range of motion.   Neurological: She is alert and oriented to person, place, and time.   Skin: Skin is warm. Capillary refill takes less than 2 seconds.   Psychiatric: She has a normal mood and affect. Her behavior is normal. Judgment and thought content normal.   Nursing note and vitals reviewed.  stools positive for blood    Assessment/Plan   Paige was seen today for follow-up.    Diagnoses and all orders for this visit:    Anemia, unspecified type  -     Ambulatory Referral to Gastroenterology    Occult blood in stools  -     Ambulatory Referral to Gastroenterology      Stop [facility]    Patient's Body mass index is 31.45 kg/m². BMI is above normal parameters. Recommendations include: no follow-up required.         Orders Placed This Encounter   Procedures   • Ambulatory Referral to Gastroenterology     Referral Priority:   Urgent     Referral Type:   Consultation     Referral Reason:   Specialty Services Required     Referred to Provider:   Gonzalez Cordero MD     Requested Specialty:   Gastroenterology     Number of Visits Requested:   1     Current outpatient and " discharge medications have been reconciled for the patient.  Reviewed by: Barrington Jaramillo MD  Follow up: 3 month(s)

## 2018-11-15 NOTE — PROGRESS NOTES
"[facility] GASTROENTEROLOGY - OFFICE NOTE    11/16/2018    Paige Martinez   1940    Primary Physician: Barrington Jaramillo MD    Chief Complaint   Patient presents with   • Anemia     possitive stool tests, dark stools         HISTORY OF PRESENT ILLNESS:    Paige Martinez is a 78 y.o. female presents  with anemia and heme positive stool.  The anemia is new.  Her stools have been \"dark\" x 1 month. The last dark stool was 2 weeks. No iron pills or pepto bismol. Was taking aspirin daily and stopped 2 weeks ago. Stools not as dark since stopped asa. No nsaids. No brb pr.  No constipation or diarrhea. No change in bowel habits. No n/v. No abdominal pain. No fever. No weight loss. Appetite good.     Labs:  10/2018 hgb 10.9, mcv normal, stool was positive for blood, bun 29, creat 1.17.               11/2017 hgb 12.3.     No h/o egd.   Last colonoscopy 7/2014 two polyps ( path hyperplastic), sigmoid diverticulosis, recall rec 5yr. She has h/o tubular adenomatous polyp 2009.       Past Medical History:   Diagnosis Date   • Anxiety    • Cholelithiasis    • Colon polyp    • Depression    • Diabetes mellitus (CMS/HCC)    • Disease of thyroid gland    • Hyperlipidemia    • Hypertension    • Myalgia    • Nephrolithiasis        Past Surgical History:   Procedure Laterality Date   • COLONOSCOPY  07/15/2014    2 rectal polyps, hyperplastic, diverticulosis       Outpatient Medications Marked as Taking for the 11/16/18 encounter (Office Visit) with Rhina Harrell APRN   Medication Sig Dispense Refill   • atorvastatin (LIPITOR) 10 MG tablet TAKE ONE TABLET DAILY GENERIC FOR LIPITOR 30 tablet 5   • bumetanide (BUMEX) 0.5 MG tablet TAKE TWO TABLETS DAILY GENERIC FOR BUMEX 60 tablet 5   • Cholecalciferol (VITAMIN D) 2000 UNITS capsule Take 2,000 Units by mouth     • citalopram (CeleXA) 20 MG tablet TAKE ONE TABLET DAILY GENERIC FOR CELEXA 90 tablet 1   • fluocinonide-emollient (LIDEX-E) 0.05 % cream Apply  topically 2 (Two) Times " a Day. 30 g 1   • HYDROcodone-acetaminophen (NORCO) 5-325 MG per tablet TAKE ONE TABLET DAILY GENERIC FOR NORCO 30 tablet 0   • levothyroxine (SYNTHROID) 50 MCG tablet Take 1 tablet by mouth Daily. 30 tablet 2   • losartan (COZAAR) 100 MG tablet Take 1 tablet by mouth Daily. 90 tablet 2   • montelukast (SINGULAIR) 10 MG tablet Take 1 tablet by mouth Every Night. 90 tablet 1   • Omega-3 Fatty Acids (FISH OIL) 1000 MG capsule capsule Take 1,000 mg by mouth 3 times daily         No Known Allergies    Social History     Socioeconomic History   • Marital status: Single     Spouse name: Not on file   • Number of children: Not on file   • Years of education: Not on file   • Highest education level: Not on file   Social Needs   • Financial resource strain: Not on file   • Food insecurity - worry: Not on file   • Food insecurity - inability: Not on file   • Transportation needs - medical: Not on file   • Transportation needs - non-medical: Not on file   Occupational History   • Occupation: retired   Tobacco Use   • Smoking status: Former Smoker   • Smokeless tobacco: Never Used   Substance and Sexual Activity   • Alcohol use: Yes     Comment: occ   • Drug use: No   • Sexual activity: No   Other Topics Concern   • Not on file   Social History Narrative   • Not on file       Family History   Problem Relation Age of Onset   • Colon cancer Neg Hx    • Colon polyps Neg Hx        Review of Systems   Constitutional: Negative for appetite change, chills, fatigue, fever and unexpected weight change.   HENT: Negative for sore throat and trouble swallowing.    Eyes: Negative for visual disturbance.   Respiratory: Negative for cough, chest tightness, shortness of breath and wheezing.    Cardiovascular: Negative for chest pain and palpitations.   Gastrointestinal: Negative for abdominal distention, abdominal pain, anal bleeding, blood in stool, constipation, diarrhea, nausea, rectal pain and vomiting.        As mentioned in hpi  "  Genitourinary: Negative for difficulty urinating and hematuria.   Musculoskeletal: Negative for arthralgias and back pain.   Skin: Negative for color change and rash.   Neurological: Negative for dizziness, seizures, syncope, light-headedness and headaches.   Hematological: Negative for adenopathy.   Psychiatric/Behavioral: Negative for confusion. The patient is not nervous/anxious.         Vitals:    11/16/18 0906   BP: 152/82   Pulse: 82   Temp: 98.2 °F (36.8 °C)   SpO2: 100%   Weight: 78.9 kg (174 lb)   Height: 154.9 cm (61\")      Body mass index is 32.88 kg/m².    Physical Exam   Constitutional: She appears well-developed and well-nourished. No distress.   HENT:   Head: Normocephalic and atraumatic.   Eyes: EOM are normal. No scleral icterus.   Neck: Neck supple. No JVD present.   Cardiovascular: Normal rate, regular rhythm and normal heart sounds.   Pulmonary/Chest: Effort normal and breath sounds normal. No stridor.   Abdominal: Soft. Bowel sounds are normal. She exhibits no distension and no mass. There is no tenderness. There is no rebound and no guarding.   Musculoskeletal: Normal range of motion. She exhibits no deformity.   Neurological: She is alert.   Skin: Skin is warm and dry. No rash noted.   Psychiatric: She has a normal mood and affect. Her behavior is normal.   Vitals reviewed.      Results for orders placed or performed in visit on 10/30/18   POCT FECAL OCCULT BLOOD BY IMMUNOASSAY   Result Value Ref Range    POC OCCULT BLOOD BY IMMUNOASSAY Positive (A) Negative   POCT FECAL OCCULT BLOOD BY IMMUNOASSAY   Result Value Ref Range    POC OCCULT BLOOD BY IMMUNOASSAY Positive (A) Negative   POCT FECAL OCCULT BLOOD BY IMMUNOASSAY   Result Value Ref Range    POC OCCULT BLOOD BY IMMUNOASSAY Negative Negative           ASSESSMENT AND PLAN    Assessment/Plan     Paige was seen today for anemia.    Diagnoses and all orders for this visit:    Heme positive stool  -     External Facility Surgical/Procedural " Request; Future    Anemia, unspecified type  Comments:  normocytic  Orders:  -     External Facility Surgical/Procedural Request; Future    Dark stools  -     External Facility Surgical/Procedural Request; Future    Essential hypertension    Other orders  -     omeprazole OTC (PriLOSEC OTC) 20 MG EC tablet; Take 1 tablet by mouth Daily.    no further dark stool.  Differential diagnoses discussed.  Recommend omeprazole daily. No asa nsaids. Er if worsening symptoms.  Plan for EGD.  Plan for colonoscopy. The patient was advised to take any blood pressure or heart  medications the morning of  procedure if that is when he/she normally takes.           egd and colonoscopy.   Risk, benefits, and alternatives of endoscopy were explained in full.  They understand that there is a risk of bleeding, perforation, and infection.  The risk of perforation goes up with esophageal dilation.  Other options to evaluate UGI complaints could involve barium swallow or UGI series, but these would be diagnostic tests only.  Patient was given time to ask questions.  I answered them to their satisfaction and they are agreeable to proceedingAll risks, benefits, alternatives, and indications of colonoscopy procedure have been discussed with the patient. Risks to include perforation of the colon requiring possible surgery or colostomy, risk of bleeding from biopsies or removal of colon tissue, possibility of missing a colon polyp or cancer, or adverse drug reaction.  Benefits to include the diagnosis and management of disease of the colon and rectum. Alternatives to include barium enema, radiographic evaluation, lab testing or no intervention. Pt verbalizes understanding and agrees.            Body mass index is 32.88 kg/m².    Patient's Body mass index is 32.88 kg/m². BMI is above normal parameters. Recommendations include: no follow-up required.  recommend weight loss.            [facility] Dragon/transcription disclaimer:   Much of this encounter note is electronic transcription/translation of spoken language to printed text.  The electronic translation of spoken language may be erroneous, or at times, nonsensical words or phrases may be inadvertently transcribed.  Although I have reviewed the note for such errors, some may still exist.

## 2018-11-16 ENCOUNTER — OFFICE VISIT (OUTPATIENT)
Dept: GASTROENTEROLOGY | Facility: CLINIC | Age: 78
End: 2018-11-16

## 2018-11-16 VITALS
TEMPERATURE: 98.2 F | SYSTOLIC BLOOD PRESSURE: 152 MMHG | OXYGEN SATURATION: 100 % | BODY MASS INDEX: 32.85 KG/M2 | DIASTOLIC BLOOD PRESSURE: 82 MMHG | HEART RATE: 82 BPM | HEIGHT: 61 IN | WEIGHT: 174 LBS

## 2018-11-16 DIAGNOSIS — R19.5 HEME POSITIVE STOOL: Primary | ICD-10-CM

## 2018-11-16 DIAGNOSIS — R19.5 DARK STOOLS: ICD-10-CM

## 2018-11-16 DIAGNOSIS — I10 ESSENTIAL HYPERTENSION: ICD-10-CM

## 2018-11-16 DIAGNOSIS — D64.9 ANEMIA, UNSPECIFIED TYPE: ICD-10-CM

## 2018-11-16 PROCEDURE — 99204 OFFICE O/P NEW MOD 45 MIN: CPT | Performed by: NURSE PRACTITIONER

## 2018-11-16 RX ORDER — POLYETHYLENE GLYCOL 3350, SODIUM CHLORIDE, SODIUM BICARBONATE, POTASSIUM CHLORIDE 420; 11.2; 5.72; 1.48 G/4L; G/4L; G/4L; G/4L
4000 POWDER, FOR SOLUTION ORAL SEE ADMIN INSTRUCTIONS
Qty: 4000 ML | Refills: 0 | Status: SHIPPED | OUTPATIENT
Start: 2018-11-16 | End: 2019-07-31

## 2018-11-16 RX ORDER — OMEPRAZOLE 20 MG/1
20 TABLET, DELAYED RELEASE ORAL DAILY
Qty: 28 TABLET | Refills: 11 | Status: ON HOLD | OUTPATIENT
Start: 2018-11-16 | End: 2019-10-03

## 2018-11-17 NOTE — TELEPHONE ENCOUNTER
----- Message from Carline Trevino LPN sent at 10/25/2018  5:00 PM CDT -----  Pt informed thyroid is high and she is anemic. She is presently taking 100mcg of Synthroid daily      Cut the dose to 50cmg and rpeat tsh in 3mos--hmeoccults x3  
Pt informed to cut thyroid med to 50 and torie labs in 3 mos. Also needs to  Hemoccults because of anemia  
Fall

## 2018-11-28 RX ORDER — LOSARTAN POTASSIUM 100 MG/1
TABLET ORAL
Qty: 90 TABLET | Refills: 2 | Status: ON HOLD | OUTPATIENT
Start: 2018-11-28 | End: 2019-10-03

## 2018-12-04 ENCOUNTER — OUTSIDE FACILITY SERVICE (OUTPATIENT)
Dept: GASTROENTEROLOGY | Facility: CLINIC | Age: 78
End: 2018-12-04

## 2018-12-04 PROCEDURE — 43235 EGD DIAGNOSTIC BRUSH WASH: CPT | Performed by: INTERNAL MEDICINE

## 2018-12-04 PROCEDURE — 45378 DIAGNOSTIC COLONOSCOPY: CPT | Performed by: INTERNAL MEDICINE

## 2018-12-19 RX ORDER — HYDROCODONE BITARTRATE AND ACETAMINOPHEN 5; 325 MG/1; MG/1
TABLET ORAL
Qty: 30 TABLET | Refills: 0 | Status: SHIPPED | OUTPATIENT
Start: 2018-12-19 | End: 2019-04-25 | Stop reason: SDUPTHER

## 2019-01-17 DIAGNOSIS — E03.9 ACQUIRED HYPOTHYROIDISM: Primary | ICD-10-CM

## 2019-01-21 ENCOUNTER — RESULTS ENCOUNTER (OUTPATIENT)
Dept: FAMILY MEDICINE CLINIC | Facility: CLINIC | Age: 79
End: 2019-01-21

## 2019-01-21 DIAGNOSIS — E03.9 ACQUIRED HYPOTHYROIDISM: ICD-10-CM

## 2019-01-21 LAB — TSH SERPL DL<=0.005 MIU/L-ACNC: 4.41 MIU/ML (ref 0.47–4.68)

## 2019-01-30 ENCOUNTER — OFFICE VISIT (OUTPATIENT)
Dept: FAMILY MEDICINE CLINIC | Facility: CLINIC | Age: 79
End: 2019-01-30

## 2019-01-30 VITALS
HEIGHT: 61 IN | RESPIRATION RATE: 16 BRPM | OXYGEN SATURATION: 96 % | SYSTOLIC BLOOD PRESSURE: 132 MMHG | HEART RATE: 74 BPM | DIASTOLIC BLOOD PRESSURE: 78 MMHG | BODY MASS INDEX: 32.66 KG/M2 | WEIGHT: 173 LBS

## 2019-01-30 DIAGNOSIS — I10 ESSENTIAL HYPERTENSION: Primary | ICD-10-CM

## 2019-01-30 DIAGNOSIS — N18.30 CHRONIC KIDNEY DISEASE, STAGE III (MODERATE) (HCC): ICD-10-CM

## 2019-01-30 DIAGNOSIS — K21.9 GASTROESOPHAGEAL REFLUX DISEASE, ESOPHAGITIS PRESENCE NOT SPECIFIED: ICD-10-CM

## 2019-01-30 DIAGNOSIS — E03.9 ACQUIRED HYPOTHYROIDISM: ICD-10-CM

## 2019-01-30 LAB
ALBUMIN SERPL-MCNC: 4.3 G/DL (ref 3.5–5)
ALBUMIN/GLOB SERPL: 1.4 G/DL (ref 1.1–2.5)
ALP SERPL-CCNC: 73 U/L (ref 24–120)
ALT SERPL-CCNC: 22 U/L (ref 0–54)
AST SERPL-CCNC: 21 U/L (ref 7–45)
BASOPHILS # BLD AUTO: 0.03 10*3/MM3 (ref 0–0.2)
BASOPHILS NFR BLD AUTO: 0.4 % (ref 0–2)
BILIRUB SERPL-MCNC: 0.9 MG/DL (ref 0.1–1)
BUN SERPL-MCNC: 18 MG/DL (ref 5–21)
BUN/CREAT SERPL: 16.5 (ref 7–25)
CALCIUM SERPL-MCNC: 9.3 MG/DL (ref 8.4–10.4)
CHLORIDE SERPL-SCNC: 99 MMOL/L (ref 98–110)
CO2 SERPL-SCNC: 30 MMOL/L (ref 24–31)
CREAT SERPL-MCNC: 1.09 MG/DL (ref 0.5–1.4)
EOSINOPHIL # BLD AUTO: 0.35 10*3/MM3 (ref 0–0.7)
EOSINOPHIL NFR BLD AUTO: 4.5 % (ref 0–4)
ERYTHROCYTE [DISTWIDTH] IN BLOOD BY AUTOMATED COUNT: 12.5 % (ref 12–15)
GLOBULIN SER CALC-MCNC: 3.1 GM/DL
GLUCOSE SERPL-MCNC: 139 MG/DL (ref 70–100)
HCT VFR BLD AUTO: 36.9 % (ref 37–47)
HGB BLD-MCNC: 11.9 G/DL (ref 12–16)
IMM GRANULOCYTES # BLD AUTO: 0.03 10*3/MM3 (ref 0–0.03)
IMM GRANULOCYTES NFR BLD AUTO: 0.4 % (ref 0–5)
LYMPHOCYTES # BLD AUTO: 1.29 10*3/MM3 (ref 0.72–4.86)
LYMPHOCYTES NFR BLD AUTO: 16.6 % (ref 15–45)
MCH RBC QN AUTO: 29.9 PG (ref 28–32)
MCHC RBC AUTO-ENTMCNC: 32.2 G/DL (ref 33–36)
MCV RBC AUTO: 92.7 FL (ref 82–98)
MONOCYTES # BLD AUTO: 0.61 10*3/MM3 (ref 0.19–1.3)
MONOCYTES NFR BLD AUTO: 7.9 % (ref 4–12)
NEUTROPHILS # BLD AUTO: 5.46 10*3/MM3 (ref 1.87–8.4)
NEUTROPHILS NFR BLD AUTO: 70.2 % (ref 39–78)
NRBC BLD AUTO-RTO: 0 /100 WBC (ref 0–0)
PLATELET # BLD AUTO: 230 10*3/MM3 (ref 130–400)
POTASSIUM SERPL-SCNC: 4.1 MMOL/L (ref 3.5–5.3)
PROT SERPL-MCNC: 7.4 G/DL (ref 6.3–8.7)
RBC # BLD AUTO: 3.98 10*6/MM3 (ref 4.2–5.4)
SODIUM SERPL-SCNC: 138 MMOL/L (ref 135–145)
WBC # BLD AUTO: 7.77 10*3/MM3 (ref 4.8–10.8)

## 2019-01-30 PROCEDURE — 99214 OFFICE O/P EST MOD 30 MIN: CPT | Performed by: FAMILY MEDICINE

## 2019-01-30 NOTE — PROGRESS NOTES
Subjective   Paige Martinez is a 78 y.o. female.     Chief Complaint   Patient presents with   • Follow-up     3 mo   htn       History of Present Illness     she thinks her bp is doing well wituout cp or ha--she will see nehrology for her ckd next month...she is toleerating synthroid wtuout heat or cold intolrandes--her gerd symptoms are stable      Current Outpatient Medications:   •  aspirin 81 MG EC tablet, Take 81 mg by mouth daily, Disp: , Rfl:   •  atorvastatin (LIPITOR) 10 MG tablet, TAKE ONE TABLET DAILY GENERIC FOR LIPITOR, Disp: 30 tablet, Rfl: 5  •  bumetanide (BUMEX) 0.5 MG tablet, TAKE TWO TABLETS DAILY GENERIC FOR BUMEX, Disp: 60 tablet, Rfl: 5  •  Cholecalciferol (VITAMIN D) 2000 UNITS capsule, Take 2,000 Units by mouth, Disp: , Rfl:   •  citalopram (CeleXA) 20 MG tablet, TAKE ONE TABLET DAILY GENERIC FOR CELEXA, Disp: 90 tablet, Rfl: 1  •  fluocinonide-emollient (LIDEX-E) 0.05 % cream, Apply  topically 2 (Two) Times a Day., Disp: 30 g, Rfl: 1  •  HYDROcodone-acetaminophen (NORCO) 5-325 MG per tablet, TAKE ONE TABLET DAILY GENERIC FOR NORCO, Disp: 30 tablet, Rfl: 0  •  levothyroxine (SYNTHROID) 50 MCG tablet, Take 1 tablet by mouth Daily., Disp: 30 tablet, Rfl: 2  •  losartan (COZAAR) 100 MG tablet, TAKE ONE TABLET DAILY GENERIC FOR COZAAR, Disp: 90 tablet, Rfl: 2  •  montelukast (SINGULAIR) 10 MG tablet, Take 1 tablet by mouth Every Night., Disp: 90 tablet, Rfl: 1  •  Omega-3 Fatty Acids (FISH OIL) 1000 MG capsule capsule, Take 1,000 mg by mouth 3 times daily, Disp: , Rfl:   •  omeprazole OTC (PriLOSEC OTC) 20 MG EC tablet, Take 1 tablet by mouth Daily., Disp: 28 tablet, Rfl: 11  •  polyethylene glycol-electrolytes (NULYTELY WITH FLAVOR PACKS) 420 g solution, Take 4,000 mL by mouth See Admin Instructions., Disp: 4000 mL, Rfl: 0  No Known Allergies    Past Medical History:   Diagnosis Date   • Anxiety    • Cholelithiasis    • Colon polyp    • Depression    • Diabetes mellitus (CMS/HCC)    • Disease of  "thyroid gland    • Hyperlipidemia    • Hypertension    • Myalgia    • Nephrolithiasis      Past Surgical History:   Procedure Laterality Date   • COLONOSCOPY  07/15/2014    2 rectal polyps, hyperplastic, diverticulosis       Review of Systems   Constitutional: Negative.    HENT: Negative.    Eyes: Negative.    Respiratory: Negative.    Cardiovascular: Negative.    Gastrointestinal: Negative.    Endocrine: Negative.    Genitourinary: Negative.    Musculoskeletal: Negative.    Skin: Negative.    Allergic/Immunologic: Negative.    Neurological: Negative.    Hematological: Negative.    Psychiatric/Behavioral: Negative.        Objective  /78   Pulse 74   Resp 16   Ht 154.9 cm (61\")   Wt 78.5 kg (173 lb)   SpO2 96%   BMI 32.69 kg/m²   Physical Exam   Constitutional: She is oriented to person, place, and time. She appears well-developed and well-nourished.   HENT:   Head: Normocephalic and atraumatic.   Right Ear: External ear normal.   Left Ear: External ear normal.   Nose: Nose normal.   Mouth/Throat: Oropharynx is clear and moist.   Eyes: Conjunctivae and EOM are normal. Pupils are equal, round, and reactive to light.   Neck: Normal range of motion. Neck supple.   Cardiovascular: Normal rate, regular rhythm, normal heart sounds and intact distal pulses.   Pulmonary/Chest: Effort normal and breath sounds normal.   Abdominal: Soft. Bowel sounds are normal.   Musculoskeletal: Normal range of motion.   Neurological: She is alert and oriented to person, place, and time.   Skin: Skin is warm and dry. Capillary refill takes less than 2 seconds.   Psychiatric: She has a normal mood and affect. Her behavior is normal. Judgment and thought content normal.   Nursing note and vitals reviewed.      Assessment/Plan   Paige was seen today for follow-up.    Diagnoses and all orders for this visit:    Essential hypertension  -     CBC w AUTO Differential  -     Comprehensive metabolic panel    Acquired hypothyroidism  -     " TSH    Chronic kidney disease, stage III (moderate) (CMS/HCC)    Gastroesophageal reflux disease, esophagitis presence not specified      Patient's Body mass index is 32.69 kg/m². BMI is above normal parameters. Recommendations include: no follow-up required.           Orders Placed This Encounter   Procedures   • Comprehensive metabolic panel   • TSH   • CBC w AUTO Differential     Order Specific Question:   Manual Differential     Answer:   No     Current outpatient and discharge medications have been reconciled for the patient.  Reviewed by: Barrington Jaramillo MD  Follow up: 6 month(s)

## 2019-02-20 RX ORDER — LEVOTHYROXINE SODIUM 0.05 MG/1
TABLET ORAL
Qty: 30 TABLET | Refills: 2 | Status: SHIPPED | OUTPATIENT
Start: 2019-02-20 | End: 2019-06-13 | Stop reason: SDUPTHER

## 2019-02-27 ENCOUNTER — TELEPHONE (OUTPATIENT)
Dept: FAMILY MEDICINE CLINIC | Facility: CLINIC | Age: 79
End: 2019-02-27

## 2019-02-27 RX ORDER — AZITHROMYCIN 250 MG/1
TABLET, FILM COATED ORAL
Qty: 6 TABLET | Refills: 0 | Status: SHIPPED | OUTPATIENT
Start: 2019-02-27 | End: 2019-07-31

## 2019-02-27 NOTE — TELEPHONE ENCOUNTER
Pt stopped by the office on her way to see dr alston and she has coughing with a lot of congestion she asked for anbtx to be called in md2

## 2019-04-25 RX ORDER — AMOXICILLIN 500 MG/1
500 TABLET, FILM COATED ORAL 3 TIMES DAILY
Qty: 30 TABLET | Refills: 0 | Status: SHIPPED | OUTPATIENT
Start: 2019-04-25 | End: 2019-05-05

## 2019-04-25 RX ORDER — CITALOPRAM 20 MG/1
TABLET ORAL
Qty: 90 TABLET | Refills: 1 | Status: ON HOLD | OUTPATIENT
Start: 2019-04-25 | End: 2019-10-03

## 2019-04-25 RX ORDER — HYDROCODONE BITARTRATE AND ACETAMINOPHEN 5; 325 MG/1; MG/1
TABLET ORAL
Qty: 30 TABLET | Refills: 0 | Status: SHIPPED | OUTPATIENT
Start: 2019-04-25 | End: 2019-06-24 | Stop reason: SDUPTHER

## 2019-04-25 RX ORDER — MONTELUKAST SODIUM 10 MG/1
TABLET ORAL
Qty: 90 TABLET | Refills: 1 | Status: ON HOLD | OUTPATIENT
Start: 2019-04-25 | End: 2019-10-03

## 2019-06-14 RX ORDER — LEVOTHYROXINE SODIUM 0.05 MG/1
TABLET ORAL
Qty: 30 TABLET | Refills: 2 | Status: ON HOLD | OUTPATIENT
Start: 2019-06-14 | End: 2019-10-03

## 2019-06-24 RX ORDER — HYDROCODONE BITARTRATE AND ACETAMINOPHEN 5; 325 MG/1; MG/1
TABLET ORAL
Qty: 30 TABLET | Refills: 0 | Status: SHIPPED | OUTPATIENT
Start: 2019-06-24 | End: 2019-08-28 | Stop reason: SDUPTHER

## 2019-07-26 DIAGNOSIS — E03.9 HYPOTHYROIDISM, UNSPECIFIED TYPE: Primary | ICD-10-CM

## 2019-07-26 DIAGNOSIS — N18.30 CHRONIC KIDNEY DISEASE, STAGE III (MODERATE) (HCC): ICD-10-CM

## 2019-07-26 DIAGNOSIS — I10 HYPERTENSION, UNSPECIFIED TYPE: ICD-10-CM

## 2019-07-29 ENCOUNTER — RESULTS ENCOUNTER (OUTPATIENT)
Dept: FAMILY MEDICINE CLINIC | Facility: CLINIC | Age: 79
End: 2019-07-29

## 2019-07-29 DIAGNOSIS — E03.9 HYPOTHYROIDISM, UNSPECIFIED TYPE: ICD-10-CM

## 2019-07-29 DIAGNOSIS — I10 HYPERTENSION, UNSPECIFIED TYPE: ICD-10-CM

## 2019-07-29 DIAGNOSIS — N18.30 CHRONIC KIDNEY DISEASE, STAGE III (MODERATE) (HCC): ICD-10-CM

## 2019-07-30 LAB
ALBUMIN SERPL-MCNC: 4.2 G/DL (ref 3.5–5.2)
ALBUMIN/GLOB SERPL: 1.4 G/DL
ALP SERPL-CCNC: 77 U/L (ref 39–117)
ALT SERPL-CCNC: 10 U/L (ref 1–33)
AST SERPL-CCNC: 15 U/L (ref 1–32)
BASOPHILS # BLD AUTO: 0.04 10*3/MM3 (ref 0–0.2)
BASOPHILS NFR BLD AUTO: 0.7 % (ref 0–1.5)
BILIRUB SERPL-MCNC: 0.8 MG/DL (ref 0.2–1.2)
BUN SERPL-MCNC: 22 MG/DL (ref 8–23)
BUN/CREAT SERPL: 19 (ref 7–25)
CALCIUM SERPL-MCNC: 9 MG/DL (ref 8.6–10.5)
CHLORIDE SERPL-SCNC: 100 MMOL/L (ref 98–107)
CHOLEST SERPL-MCNC: 189 MG/DL (ref 0–200)
CHOLEST/HDLC SERPL: 3.5 {RATIO}
CO2 SERPL-SCNC: 25.1 MMOL/L (ref 22–29)
CREAT SERPL-MCNC: 1.16 MG/DL (ref 0.57–1)
EOSINOPHIL # BLD AUTO: 0.16 10*3/MM3 (ref 0–0.4)
EOSINOPHIL NFR BLD AUTO: 2.7 % (ref 0.3–6.2)
ERYTHROCYTE [DISTWIDTH] IN BLOOD BY AUTOMATED COUNT: 13.3 % (ref 12.3–15.4)
GLOBULIN SER CALC-MCNC: 2.9 GM/DL
GLUCOSE SERPL-MCNC: 133 MG/DL (ref 65–99)
HCT VFR BLD AUTO: 38.4 % (ref 34–46.6)
HDLC SERPL-MCNC: 54 MG/DL (ref 40–60)
HGB BLD-MCNC: 11.5 G/DL (ref 12–15.9)
IMM GRANULOCYTES # BLD AUTO: 0.01 10*3/MM3 (ref 0–0.05)
IMM GRANULOCYTES NFR BLD AUTO: 0.2 % (ref 0–0.5)
LDLC SERPL CALC-MCNC: 114 MG/DL (ref 0–100)
LYMPHOCYTES # BLD AUTO: 1.48 10*3/MM3 (ref 0.7–3.1)
LYMPHOCYTES NFR BLD AUTO: 24.6 % (ref 19.6–45.3)
MCH RBC QN AUTO: 29.3 PG (ref 26.6–33)
MCHC RBC AUTO-ENTMCNC: 29.9 G/DL (ref 31.5–35.7)
MCV RBC AUTO: 98 FL (ref 79–97)
MONOCYTES # BLD AUTO: 0.52 10*3/MM3 (ref 0.1–0.9)
MONOCYTES NFR BLD AUTO: 8.7 % (ref 5–12)
NEUTROPHILS # BLD AUTO: 3.8 10*3/MM3 (ref 1.7–7)
NEUTROPHILS NFR BLD AUTO: 63.1 % (ref 42.7–76)
NRBC BLD AUTO-RTO: 0 /100 WBC (ref 0–0.2)
PLATELET # BLD AUTO: 204 10*3/MM3 (ref 140–450)
POTASSIUM SERPL-SCNC: 4.7 MMOL/L (ref 3.5–5.2)
PROT SERPL-MCNC: 7.1 G/DL (ref 6–8.5)
RBC # BLD AUTO: 3.92 10*6/MM3 (ref 3.77–5.28)
SODIUM SERPL-SCNC: 139 MMOL/L (ref 136–145)
T4 FREE SERPL-MCNC: 1.19 NG/DL (ref 0.93–1.7)
TRIGL SERPL-MCNC: 106 MG/DL (ref 0–150)
TSH SERPL DL<=0.005 MIU/L-ACNC: 4.66 MIU/ML (ref 0.27–4.2)
VLDLC SERPL CALC-MCNC: 21.2 MG/DL
WBC # BLD AUTO: 6.01 10*3/MM3 (ref 3.4–10.8)

## 2019-07-31 ENCOUNTER — OFFICE VISIT (OUTPATIENT)
Dept: FAMILY MEDICINE CLINIC | Facility: CLINIC | Age: 79
End: 2019-07-31

## 2019-07-31 VITALS
DIASTOLIC BLOOD PRESSURE: 76 MMHG | BODY MASS INDEX: 33.23 KG/M2 | SYSTOLIC BLOOD PRESSURE: 130 MMHG | RESPIRATION RATE: 16 BRPM | HEART RATE: 71 BPM | HEIGHT: 61 IN | OXYGEN SATURATION: 98 % | WEIGHT: 176 LBS

## 2019-07-31 DIAGNOSIS — N18.30 CHRONIC KIDNEY DISEASE, STAGE III (MODERATE) (HCC): ICD-10-CM

## 2019-07-31 DIAGNOSIS — K21.9 GASTROESOPHAGEAL REFLUX DISEASE, ESOPHAGITIS PRESENCE NOT SPECIFIED: ICD-10-CM

## 2019-07-31 DIAGNOSIS — M25.552 HIP PAIN, ACUTE, LEFT: ICD-10-CM

## 2019-07-31 DIAGNOSIS — E78.2 MIXED HYPERLIPIDEMIA: ICD-10-CM

## 2019-07-31 DIAGNOSIS — M25.552 HIP PAIN, ACUTE, LEFT: Primary | ICD-10-CM

## 2019-07-31 DIAGNOSIS — I10 ESSENTIAL HYPERTENSION: ICD-10-CM

## 2019-07-31 PROCEDURE — 99214 OFFICE O/P EST MOD 30 MIN: CPT | Performed by: FAMILY MEDICINE

## 2019-07-31 NOTE — PROGRESS NOTES
Subjective   Paige Martinez is a 78 y.o. female.     Chief Complaint   Patient presents with   • Follow-up     6 mo   htn       History of Present Illness     she nots good bp control witout cp or ha---she is toleraing statin tx without malgias...she is toleratgin synthroid wituot heat or cold intolances---her gerfd symptoms are stable without dysphagia  She is experiencing left hip pain    Current Outpatient Medications:   •  aspirin 81 MG EC tablet, Take 81 mg by mouth daily, Disp: , Rfl:   •  atorvastatin (LIPITOR) 10 MG tablet, TAKE ONE TABLET DAILY GENERIC FOR LIPITOR, Disp: 30 tablet, Rfl: 5  •  bumetanide (BUMEX) 0.5 MG tablet, TAKE TWO TABLETS DAILY GENERIC FOR BUMEX, Disp: 60 tablet, Rfl: 5  •  Cholecalciferol (VITAMIN D) 2000 UNITS capsule, Take 2,000 Units by mouth, Disp: , Rfl:   •  citalopram (CeleXA) 20 MG tablet, TAKE ONE TABLET DAILY GENERIC FOR CELEXA, Disp: 90 tablet, Rfl: 1  •  HYDROcodone-acetaminophen (NORCO) 5-325 MG per tablet, TAKE ONE TABLET DAILY GENERIC FOR NORCO, Disp: 30 tablet, Rfl: 0  •  levothyroxine (SYNTHROID, LEVOTHROID) 50 MCG tablet, TAKE ONE TABLET DAILY GENERIC FOR SYNTHROID, Disp: 30 tablet, Rfl: 2  •  losartan (COZAAR) 100 MG tablet, TAKE ONE TABLET DAILY GENERIC FOR COZAAR, Disp: 90 tablet, Rfl: 2  •  montelukast (SINGULAIR) 10 MG tablet, TAKE ONE TABLET AT BEDTIME GENERIC FOR SINGULAIR, Disp: 90 tablet, Rfl: 1  •  Omega-3 Fatty Acids (FISH OIL) 1000 MG capsule capsule, Take 1,000 mg by mouth 3 times daily, Disp: , Rfl:   •  omeprazole OTC (PriLOSEC OTC) 20 MG EC tablet, Take 1 tablet by mouth Daily., Disp: 28 tablet, Rfl: 11  No Known Allergies    Past Medical History:   Diagnosis Date   • Anxiety    • Cholelithiasis    • Colon polyp    • Depression    • Diabetes mellitus (CMS/HCC)    • Disease of thyroid gland    • Hyperlipidemia    • Hypertension    • Myalgia    • Nephrolithiasis      Past Surgical History:   Procedure Laterality Date   • COLONOSCOPY  07/15/2014    2 rectal  "polyps, hyperplastic, diverticulosis       Review of Systems   Constitutional: Negative.    HENT: Negative.    Eyes: Negative.    Respiratory: Negative.    Cardiovascular: Negative.    Gastrointestinal: Negative.    Endocrine: Negative.    Genitourinary: Negative.    Musculoskeletal: Positive for arthralgias and gait problem.   Skin: Negative.    Allergic/Immunologic: Negative.    Hematological: Negative.    Psychiatric/Behavioral: Negative.        Objective  /76   Pulse 71   Resp 16   Ht 154.9 cm (61\")   Wt 79.8 kg (176 lb)   SpO2 98%   BMI 33.25 kg/m²   Physical Exam   Constitutional: She is oriented to person, place, and time. She appears well-developed and well-nourished.   HENT:   Head: Normocephalic and atraumatic.   Right Ear: External ear normal.   Left Ear: External ear normal.   Nose: Nose normal.   Mouth/Throat: Oropharynx is clear and moist.   Eyes: Conjunctivae and EOM are normal. Pupils are equal, round, and reactive to light.   Neck: Normal range of motion. Neck supple.   Cardiovascular: Normal rate, regular rhythm, normal heart sounds and intact distal pulses.   Pulmonary/Chest: Effort normal and breath sounds normal.   Abdominal: Soft. Bowel sounds are normal.   Musculoskeletal: Normal range of motion.   Neurological: She is alert and oriented to person, place, and time.   Skin: Skin is warm. Capillary refill takes less than 2 seconds.   Psychiatric: She has a normal mood and affect. Her behavior is normal. Judgment and thought content normal.   Nursing note and vitals reviewed.      Assessment/Plan   Paige was seen today for follow-up.    Diagnoses and all orders for this visit:    Hip pain, acute, left  -     XR Hip With or Without Pelvis 2 - 3 View Left; Future    Essential hypertension    Mixed hyperlipidemia    Chronic kidney disease, stage III (moderate) (CMS/HCC)    Gastroesophageal reflux disease, esophagitis presence not specified    Patient's Body mass index is 33.25 kg/m². BMI " is above normal parameters. Recommendations include: nutrition counseling.           Orders Placed This Encounter   Procedures   • XR Hip With or Without Pelvis 2 - 3 View Left     Standing Status:   Future     Standing Expiration Date:   7/31/2020     Order Specific Question:   Reason for Exam:     Answer:   left hip pain     Current outpatient and discharge medications have been reconciled for the patient.  Reviewed by: Barrington Jaramillo MD  Follow up: 6 month(s)

## 2019-08-28 RX ORDER — HYDROCODONE BITARTRATE AND ACETAMINOPHEN 5; 325 MG/1; MG/1
TABLET ORAL
Qty: 30 TABLET | Refills: 0 | Status: ON HOLD | OUTPATIENT
Start: 2019-08-28 | End: 2019-10-03

## 2019-10-01 ENCOUNTER — HOSPITAL ENCOUNTER (OUTPATIENT)
Facility: HOSPITAL | Age: 79
Setting detail: OBSERVATION
Discharge: HOME | End: 2019-10-04
Attending: FAMILY MEDICINE | Admitting: FAMILY MEDICINE

## 2019-10-01 DIAGNOSIS — R55 SYNCOPE, UNSPECIFIED SYNCOPE TYPE: Primary | ICD-10-CM

## 2019-10-01 DIAGNOSIS — R90.89 ABNORMAL FINDING ON MRI OF BRAIN: ICD-10-CM

## 2019-10-01 PROCEDURE — G0378 HOSPITAL OBSERVATION PER HR: HCPCS

## 2019-10-01 PROCEDURE — 99218 PR INITIAL OBSERVATION CARE/DAY 30 MINUTES: CPT | Performed by: FAMILY MEDICINE

## 2019-10-01 PROCEDURE — G0379 DIRECT REFER HOSPITAL OBSERV: HCPCS

## 2019-10-01 PROCEDURE — 93005 ELECTROCARDIOGRAM TRACING: CPT | Performed by: FAMILY MEDICINE

## 2019-10-02 ENCOUNTER — APPOINTMENT (OUTPATIENT)
Dept: MRI IMAGING | Facility: HOSPITAL | Age: 79
End: 2019-10-02

## 2019-10-02 ENCOUNTER — APPOINTMENT (OUTPATIENT)
Dept: CARDIOLOGY | Facility: HOSPITAL | Age: 79
End: 2019-10-02

## 2019-10-02 ENCOUNTER — APPOINTMENT (OUTPATIENT)
Dept: NEUROLOGY | Facility: HOSPITAL | Age: 79
End: 2019-10-02

## 2019-10-02 LAB
ALBUMIN SERPL-MCNC: 3.6 G/DL (ref 3.5–5.2)
ALBUMIN/GLOB SERPL: 1.2 G/DL
ALP SERPL-CCNC: 57 U/L (ref 39–117)
ALT SERPL W P-5'-P-CCNC: 10 U/L (ref 1–33)
ANION GAP SERPL CALCULATED.3IONS-SCNC: 8 MMOL/L (ref 5–15)
AST SERPL-CCNC: 15 U/L (ref 1–32)
BASOPHILS # BLD AUTO: 0.01 10*3/MM3 (ref 0–0.2)
BASOPHILS NFR BLD AUTO: 0.2 % (ref 0–1.5)
BH CV ECHO MEAS - AO MAX PG (FULL): 2.5 MMHG
BH CV ECHO MEAS - AO MAX PG: 11.2 MMHG
BH CV ECHO MEAS - AO MEAN PG (FULL): 2 MMHG
BH CV ECHO MEAS - AO MEAN PG: 6 MMHG
BH CV ECHO MEAS - AO ROOT AREA (BSA CORRECTED): 1.6
BH CV ECHO MEAS - AO ROOT AREA: 6.6 CM^2
BH CV ECHO MEAS - AO ROOT DIAM: 2.9 CM
BH CV ECHO MEAS - AO V2 MAX: 167 CM/SEC
BH CV ECHO MEAS - AO V2 MEAN: 108 CM/SEC
BH CV ECHO MEAS - AO V2 VTI: 38 CM
BH CV ECHO MEAS - AVA(I,A): 2.9 CM^2
BH CV ECHO MEAS - AVA(I,D): 2.9 CM^2
BH CV ECHO MEAS - AVA(V,A): 2.8 CM^2
BH CV ECHO MEAS - AVA(V,D): 2.8 CM^2
BH CV ECHO MEAS - BSA(HAYCOCK): 1.9 M^2
BH CV ECHO MEAS - BSA: 1.9 M^2
BH CV ECHO MEAS - BZI_BMI: 29.1 KILOGRAMS/M^2
BH CV ECHO MEAS - BZI_METRIC_HEIGHT: 165.1 CM
BH CV ECHO MEAS - BZI_METRIC_WEIGHT: 79.4 KG
BH CV ECHO MEAS - EDV(CUBED): 107.2 ML
BH CV ECHO MEAS - EDV(MOD-SP4): 105 ML
BH CV ECHO MEAS - EDV(TEICH): 104.9 ML
BH CV ECHO MEAS - EF(MOD-SP4): 69.1 %
BH CV ECHO MEAS - ESV(MOD-SP4): 32.4 ML
BH CV ECHO MEAS - IVS/LVPW: 1
BH CV ECHO MEAS - IVSD: 1.4 CM
BH CV ECHO MEAS - LA DIMENSION: 4.1 CM
BH CV ECHO MEAS - LA/AO: 1.4
BH CV ECHO MEAS - LAT PEAK E' VEL: 6.7 CM/SEC
BH CV ECHO MEAS - LV DIASTOLIC VOL/BSA (35-75): 56.2 ML/M^2
BH CV ECHO MEAS - LV MASS(C)D: 252.7 GRAMS
BH CV ECHO MEAS - LV MASS(C)DI: 135.2 GRAMS/M^2
BH CV ECHO MEAS - LV MAX PG: 8.6 MMHG
BH CV ECHO MEAS - LV MEAN PG: 4 MMHG
BH CV ECHO MEAS - LV SYSTOLIC VOL/BSA (12-30): 17.3 ML/M^2
BH CV ECHO MEAS - LV V1 MAX: 147 CM/SEC
BH CV ECHO MEAS - LV V1 MEAN: 91 CM/SEC
BH CV ECHO MEAS - LV V1 VTI: 34.5 CM
BH CV ECHO MEAS - LVIDD: 4.8 CM
BH CV ECHO MEAS - LVLD AP4: 7.6 CM
BH CV ECHO MEAS - LVLS AP4: 6.5 CM
BH CV ECHO MEAS - LVOT AREA (M): 3.1 CM^2
BH CV ECHO MEAS - LVOT AREA: 3.1 CM^2
BH CV ECHO MEAS - LVOT DIAM: 2 CM
BH CV ECHO MEAS - LVPWD: 1.3 CM
BH CV ECHO MEAS - MED PEAK E' VEL: 7.2 CM/SEC
BH CV ECHO MEAS - MV A MAX VEL: 88.3 CM/SEC
BH CV ECHO MEAS - MV DEC SLOPE: 249 CM/SEC^2
BH CV ECHO MEAS - MV DEC TIME: 0.32 SEC
BH CV ECHO MEAS - MV E MAX VEL: 79.1 CM/SEC
BH CV ECHO MEAS - MV E/A: 0.9
BH CV ECHO MEAS - RAP SYSTOLE: 10 MMHG
BH CV ECHO MEAS - RVSP: 34.4 MMHG
BH CV ECHO MEAS - SI(AO): 134.3 ML/M^2
BH CV ECHO MEAS - SI(LVOT): 58 ML/M^2
BH CV ECHO MEAS - SI(MOD-SP4): 38.8 ML/M^2
BH CV ECHO MEAS - SV(AO): 251 ML
BH CV ECHO MEAS - SV(LVOT): 108.4 ML
BH CV ECHO MEAS - SV(MOD-SP4): 72.6 ML
BH CV ECHO MEAS - TR MAX VEL: 247 CM/SEC
BH CV ECHO MEASUREMENTS AVERAGE E/E' RATIO: 11.38
BILIRUB SERPL-MCNC: 0.5 MG/DL (ref 0.2–1.2)
BUN BLD-MCNC: 15 MG/DL (ref 8–23)
BUN/CREAT SERPL: 16 (ref 7–25)
CALCIUM SPEC-SCNC: 8.5 MG/DL (ref 8.6–10.5)
CHLORIDE SERPL-SCNC: 105 MMOL/L (ref 98–107)
CK SERPL-CCNC: 123 U/L (ref 20–180)
CO2 SERPL-SCNC: 29 MMOL/L (ref 22–29)
CREAT BLD-MCNC: 0.94 MG/DL (ref 0.57–1)
DEPRECATED RDW RBC AUTO: 41.8 FL (ref 37–54)
EOSINOPHIL # BLD AUTO: 0.08 10*3/MM3 (ref 0–0.4)
EOSINOPHIL NFR BLD AUTO: 1.2 % (ref 0.3–6.2)
ERYTHROCYTE [DISTWIDTH] IN BLOOD BY AUTOMATED COUNT: 12.8 % (ref 12.3–15.4)
GFR SERPL CREATININE-BSD FRML MDRD: 58 ML/MIN/1.73
GLOBULIN UR ELPH-MCNC: 2.9 GM/DL
GLUCOSE BLD-MCNC: 112 MG/DL (ref 65–99)
HCT VFR BLD AUTO: 31.8 % (ref 34–46.6)
HGB BLD-MCNC: 10.7 G/DL (ref 12–15.9)
IMM GRANULOCYTES # BLD AUTO: 0.04 10*3/MM3 (ref 0–0.05)
IMM GRANULOCYTES NFR BLD AUTO: 0.6 % (ref 0–0.5)
LEFT ATRIUM VOLUME INDEX: 22.2 ML/M2
LEFT ATRIUM VOLUME: 41.5 CM3
LYMPHOCYTES # BLD AUTO: 1.51 10*3/MM3 (ref 0.7–3.1)
LYMPHOCYTES NFR BLD AUTO: 22.8 % (ref 19.6–45.3)
MAXIMAL PREDICTED HEART RATE: 144 BPM
MCH RBC QN AUTO: 30 PG (ref 26.6–33)
MCHC RBC AUTO-ENTMCNC: 33.6 G/DL (ref 31.5–35.7)
MCV RBC AUTO: 89.1 FL (ref 79–97)
MONOCYTES # BLD AUTO: 0.82 10*3/MM3 (ref 0.1–0.9)
MONOCYTES NFR BLD AUTO: 12.4 % (ref 5–12)
NEUTROPHILS # BLD AUTO: 4.16 10*3/MM3 (ref 1.7–7)
NEUTROPHILS NFR BLD AUTO: 62.8 % (ref 42.7–76)
NRBC BLD AUTO-RTO: 0 /100 WBC (ref 0–0.2)
PLATELET # BLD AUTO: 176 10*3/MM3 (ref 140–450)
PMV BLD AUTO: 10.8 FL (ref 6–12)
POTASSIUM BLD-SCNC: 3.7 MMOL/L (ref 3.5–5.2)
PROT SERPL-MCNC: 6.5 G/DL (ref 6–8.5)
RBC # BLD AUTO: 3.57 10*6/MM3 (ref 3.77–5.28)
SODIUM BLD-SCNC: 142 MMOL/L (ref 136–145)
STRESS TARGET HR: 122 BPM
TROPONIN T SERPL-MCNC: <0.01 NG/ML (ref 0–0.03)
WBC NRBC COR # BLD: 6.62 10*3/MM3 (ref 3.4–10.8)

## 2019-10-02 PROCEDURE — G0378 HOSPITAL OBSERVATION PER HR: HCPCS

## 2019-10-02 PROCEDURE — A9577 INJ MULTIHANCE: HCPCS | Performed by: FAMILY MEDICINE

## 2019-10-02 PROCEDURE — 94760 N-INVAS EAR/PLS OXIMETRY 1: CPT

## 2019-10-02 PROCEDURE — 0 GADOBENATE DIMEGLUMINE 529 MG/ML SOLUTION: Performed by: FAMILY MEDICINE

## 2019-10-02 PROCEDURE — 94799 UNLISTED PULMONARY SVC/PX: CPT

## 2019-10-02 PROCEDURE — 93010 ELECTROCARDIOGRAM REPORT: CPT | Performed by: INTERNAL MEDICINE

## 2019-10-02 PROCEDURE — 99205 OFFICE O/P NEW HI 60 MIN: CPT | Performed by: PSYCHIATRY & NEUROLOGY

## 2019-10-02 PROCEDURE — 70553 MRI BRAIN STEM W/O & W/DYE: CPT

## 2019-10-02 PROCEDURE — 82550 ASSAY OF CK (CPK): CPT | Performed by: FAMILY MEDICINE

## 2019-10-02 PROCEDURE — 95812 EEG 41-60 MINUTES: CPT | Performed by: PSYCHIATRY & NEUROLOGY

## 2019-10-02 PROCEDURE — 85025 COMPLETE CBC W/AUTO DIFF WBC: CPT | Performed by: FAMILY MEDICINE

## 2019-10-02 PROCEDURE — 93306 TTE W/DOPPLER COMPLETE: CPT | Performed by: INTERNAL MEDICINE

## 2019-10-02 PROCEDURE — 99224 PR SBSQ OBSERVATION CARE/DAY 15 MINUTES: CPT | Performed by: FAMILY MEDICINE

## 2019-10-02 PROCEDURE — 95816 EEG AWAKE AND DROWSY: CPT

## 2019-10-02 PROCEDURE — 93306 TTE W/DOPPLER COMPLETE: CPT

## 2019-10-02 PROCEDURE — 80053 COMPREHEN METABOLIC PANEL: CPT | Performed by: FAMILY MEDICINE

## 2019-10-02 PROCEDURE — 84484 ASSAY OF TROPONIN QUANT: CPT | Performed by: FAMILY MEDICINE

## 2019-10-02 PROCEDURE — 25010000002 PERFLUTREN 6.52 MG/ML SUSPENSION: Performed by: FAMILY MEDICINE

## 2019-10-02 PROCEDURE — 99204 OFFICE O/P NEW MOD 45 MIN: CPT | Performed by: INTERNAL MEDICINE

## 2019-10-02 RX ORDER — ATORVASTATIN CALCIUM 10 MG/1
10 TABLET, FILM COATED ORAL DAILY
Status: DISCONTINUED | OUTPATIENT
Start: 2019-10-02 | End: 2019-10-03

## 2019-10-02 RX ORDER — CLONIDINE HYDROCHLORIDE 0.1 MG/1
0.1 TABLET ORAL EVERY 6 HOURS PRN
Status: DISCONTINUED | OUTPATIENT
Start: 2019-10-02 | End: 2019-10-04 | Stop reason: HOSPADM

## 2019-10-02 RX ORDER — CITALOPRAM 20 MG/1
20 TABLET ORAL DAILY
Status: DISCONTINUED | OUTPATIENT
Start: 2019-10-02 | End: 2019-10-04 | Stop reason: HOSPADM

## 2019-10-02 RX ORDER — LEVOTHYROXINE SODIUM 0.05 MG/1
50 TABLET ORAL
Status: DISCONTINUED | OUTPATIENT
Start: 2019-10-02 | End: 2019-10-04 | Stop reason: HOSPADM

## 2019-10-02 RX ORDER — HYDROCODONE BITARTRATE AND ACETAMINOPHEN 5; 325 MG/1; MG/1
1 TABLET ORAL DAILY PRN
Status: DISCONTINUED | OUTPATIENT
Start: 2019-10-02 | End: 2019-10-04 | Stop reason: HOSPADM

## 2019-10-02 RX ORDER — LOSARTAN POTASSIUM 50 MG/1
100 TABLET ORAL
Status: DISCONTINUED | OUTPATIENT
Start: 2019-10-02 | End: 2019-10-04 | Stop reason: HOSPADM

## 2019-10-02 RX ORDER — BUMETANIDE 1 MG/1
1 TABLET ORAL DAILY
Status: DISCONTINUED | OUTPATIENT
Start: 2019-10-02 | End: 2019-10-02

## 2019-10-02 RX ORDER — MONTELUKAST SODIUM 10 MG/1
10 TABLET ORAL NIGHTLY
Status: DISCONTINUED | OUTPATIENT
Start: 2019-10-02 | End: 2019-10-04 | Stop reason: HOSPADM

## 2019-10-02 RX ORDER — MELATONIN
2000 DAILY
Status: DISCONTINUED | OUTPATIENT
Start: 2019-10-02 | End: 2019-10-04 | Stop reason: HOSPADM

## 2019-10-02 RX ADMIN — BUMETANIDE 1 MG: 1 TABLET ORAL at 09:21

## 2019-10-02 RX ADMIN — VITAMIN D 2000 UNITS: 25 TAB ORAL at 09:21

## 2019-10-02 RX ADMIN — LEVOTHYROXINE SODIUM 50 MCG: 50 TABLET ORAL at 06:22

## 2019-10-02 RX ADMIN — LOSARTAN POTASSIUM 100 MG: 50 TABLET, FILM COATED ORAL at 09:22

## 2019-10-02 RX ADMIN — CLONIDINE HYDROCHLORIDE 0.1 MG: 0.1 TABLET ORAL at 01:37

## 2019-10-02 RX ADMIN — PERFLUTREN 8.48 MG: 6.52 INJECTION, SUSPENSION INTRAVENOUS at 12:49

## 2019-10-02 RX ADMIN — ATORVASTATIN CALCIUM 10 MG: 10 TABLET, FILM COATED ORAL at 09:22

## 2019-10-02 RX ADMIN — GADOBENATE DIMEGLUMINE 15 ML: 529 INJECTION, SOLUTION INTRAVENOUS at 13:00

## 2019-10-02 RX ADMIN — CITALOPRAM 20 MG: 20 TABLET, FILM COATED ORAL at 09:22

## 2019-10-02 RX ADMIN — MONTELUKAST SODIUM 10 MG: 10 TABLET, FILM COATED ORAL at 20:25

## 2019-10-02 NOTE — CONSULTS
Patient Care Team:  Barrington Jaramillo MD as PCP - General  Barrington Jaramillo MD as PCP - Claims [facility]  Gonzalez Cordero MD as Consulting Physician (Gastroenterology)  Barrington Jaramillo MD  REASON FOR REFERRAL: syncope   Chief complaint: syncope     Subjective     Patient is a 78 y.o. female presents with syncope. She states she was at home late Sunday/early Monday and was feeling sick at her stomach. She had nausea and was vomiting and had diarrhea as well. She states she was sitting on the edge of the bed, bent over hugging the trash can and the next thing she remembers she woke up in the floor. She states she had a similar episode in 2016. She presented to an [facility] and had multiple CT scans, including of her head. These were negative. EKG reveals NSR with non specific T wave abnormalities. Troponins were checked and were negative. She denies chest pain, shortness of breath, palpitations, or edema. She states she was lying in bed at the [facility] and had a brief episode of shaking and her eyes rolled back in her head per report from witnesses.  She was transferred to [facility]. She remains stable. No further syncope or spells. No arrhythmia on telemetry. She had an echo at [facility] 12/2016 which revealed an LVEF of 60%, borderline LVH, DD, and mild MR, AI, and TR. She denies a cardiac history.     Review of Systems   Review of Systems   Constitutional: Negative for diaphoresis, fatigue, fever and unexpected weight change.   HENT: Negative for nosebleeds.    Respiratory: Negative for apnea, cough, chest tightness, shortness of breath and wheezing.    Cardiovascular: Negative for chest pain, palpitations and leg swelling.   Gastrointestinal: Positive for diarrhea, nausea and vomiting. Negative for abdominal distention.   Genitourinary: Negative for hematuria.   Musculoskeletal: Negative for gait problem.   Skin: Negative for color change.   Neurological: Positive for syncope. Negative for dizziness, weakness  and light-headedness.       History  Past Medical History:   Diagnosis Date   • Anxiety    • Cholelithiasis    • Colon polyp    • Depression    • Disease of thyroid gland    • Hyperlipidemia    • Hypertension    • Myalgia    • Nephrolithiasis      Past Surgical History:   Procedure Laterality Date   • COLONOSCOPY  07/15/2014    2 rectal polyps, hyperplastic, diverticulosis     Family History   Problem Relation Age of Onset   • Arthritis Mother    • Arthritis Father    • Hypertension Sister    • Colon cancer Neg Hx    • Colon polyps Neg Hx      Social History     Tobacco Use   • Smoking status: Former Smoker     Last attempt to quit: 2014     Years since quittin.7   • Smokeless tobacco: Never Used   Substance Use Topics   • Alcohol use: No     Frequency: Never     Comment: occ   • Drug use: No     Medications Prior to Admission   Medication Sig Dispense Refill Last Dose   • atorvastatin (LIPITOR) 10 MG tablet TAKE ONE TABLET DAILY GENERIC FOR LIPITOR 30 tablet 5 Taking   • bumetanide (BUMEX) 0.5 MG tablet TAKE TWO TABLETS DAILY GENERIC FOR BUMEX 60 tablet 5 Taking   • Cholecalciferol (VITAMIN D) 2000 UNITS capsule Take 2,000 Units by mouth   Taking   • citalopram (CeleXA) 20 MG tablet TAKE ONE TABLET DAILY GENERIC FOR CELEXA 90 tablet 1    • HYDROcodone-acetaminophen (NORCO) 5-325 MG per tablet TAKE ONE TABLET DAILY GENERIC FOR NORCO 30 tablet 0    • levothyroxine (SYNTHROID, LEVOTHROID) 50 MCG tablet TAKE ONE TABLET DAILY GENERIC FOR SYNTHROID 30 tablet 2    • losartan (COZAAR) 100 MG tablet TAKE ONE TABLET DAILY GENERIC FOR COZAAR 90 tablet 2    • montelukast (SINGULAIR) 10 MG tablet TAKE ONE TABLET AT BEDTIME GENERIC FOR SINGULAIR 90 tablet 1    • Omega-3 Fatty Acids (FISH OIL) 1000 MG capsule capsule Take 1,000 mg by mouth 3 times daily   Taking   • omeprazole OTC (PriLOSEC OTC) 20 MG EC tablet Take 1 tablet by mouth Daily. 28 tablet 11        Current Facility-Administered Medications:   •  atorvastatin  (LIPITOR) tablet 10 mg, 10 mg, Oral, Daily, Barrington Jaramillo MD, 10 mg at 10/02/19 0922  •  bumetanide (BUMEX) tablet 1 mg, 1 mg, Oral, Daily, Barrington Jaramillo MD, 1 mg at 10/02/19 0921  •  cholecalciferol (VITAMIN D3) tablet 2,000 Units, 2,000 Units, Oral, Daily, Barrington Jaramillo MD, 2,000 Units at 10/02/19 0921  •  citalopram (CeleXA) tablet 20 mg, 20 mg, Oral, Daily, Barrington Jaramillo MD, 20 mg at 10/02/19 0922  •  cloNIDine (CATAPRES) tablet 0.1 mg, 0.1 mg, Oral, Q6H PRN, Barrington Jaramillo MD, 0.1 mg at 10/02/19 0137  •  HYDROcodone-acetaminophen (NORCO) 5-325 MG per tablet 1 tablet, 1 tablet, Oral, Daily PRN, Barrington Jaramillo MD  •  levothyroxine (SYNTHROID, LEVOTHROID) tablet 50 mcg, 50 mcg, Oral, Q AM, Barrington Jaramillo MD, 50 mcg at 10/02/19 0622  •  losartan (COZAAR) tablet 100 mg, 100 mg, Oral, Q24H, Barrington Jaramillo MD, 100 mg at 10/02/19 0922  •  montelukast (SINGULAIR) tablet 10 mg, 10 mg, Oral, Nightly, Barrington Jaramillo MD  Allergies:  Patient has no known allergies.    Objective     Vital Signs  Temp:  [97.7 °F (36.5 °C)-98.7 °F (37.1 °C)] 98.7 °F (37.1 °C)  Heart Rate:  [60-71] 60  Resp:  [20] 20  BP: (107-196)/(48-66) 133/57    Physical Exam:   Physical Exam   Constitutional: She is oriented to person, place, and time. She appears well-developed and well-nourished. No distress.   HENT:   Head: Normocephalic and atraumatic.   Eyes: Pupils are equal, round, and reactive to light.   Ecchymosis under right eye    Neck: Normal range of motion. Neck supple. No JVD present. No thyromegaly present.   Cardiovascular: Normal rate, regular rhythm, normal heart sounds and intact distal pulses. Exam reveals no gallop and no friction rub.   No murmur heard.  Pulmonary/Chest: Effort normal and breath sounds normal. No respiratory distress. She has no wheezes. She has no rales. She exhibits no tenderness.   Abdominal: Soft. Bowel sounds are normal. She exhibits no  distension. There is no tenderness.   Musculoskeletal: Normal range of motion. She exhibits no edema.   Neurological: She is alert and oriented to person, place, and time. No cranial nerve deficit.   Skin: Skin is warm and dry. She is not diaphoretic.   Psychiatric: She has a normal mood and affect. Her behavior is normal.     Results Review:     Lab Results (last 72 hours)     Procedure Component Value Units Date/Time    CK [[id number]]  (Normal) Collected:  10/02/19 0434    Specimen:  Blood Updated:  10/02/19 0541     Creatine Kinase 123 U/L     Comprehensive Metabolic Panel [[id number]]  (Abnormal) Collected:  10/02/19 0434    Specimen:  Blood Updated:  10/02/19 0541     Glucose 112 mg/dL      BUN 15 mg/dL      Creatinine 0.94 mg/dL      Sodium 142 mmol/L      Potassium 3.7 mmol/L      Chloride 105 mmol/L      CO2 29.0 mmol/L      Calcium 8.5 mg/dL      Total Protein 6.5 g/dL      Albumin 3.60 g/dL      ALT (SGPT) 10 U/L      AST (SGOT) 15 U/L      Alkaline Phosphatase 57 U/L      Total Bilirubin 0.5 mg/dL      eGFR Non African Amer 58 mL/min/1.73      Globulin 2.9 gm/dL      A/G Ratio 1.2 g/dL      BUN/Creatinine Ratio 16.0     Anion Gap 8.0 mmol/L     Narrative:       GFR Normal >60  Chronic Kidney Disease <60  Kidney Failure <15    Troponin [[id number]]  (Normal) Collected:  10/02/19 0434    Specimen:  Blood Updated:  10/02/19 0541     Troponin T <0.010 ng/mL     Narrative:       Troponin T Reference Range:  <= 0.03 ng/mL-   Negative for AMI  >0.03 ng/mL-     Abnormal for myocardial necrosis.  Clinicians would have to utilize clinical acumen, EKG, Troponin and serial changes to determine if it is an Acute Myocardial Infarction or myocardial injury due to an underlying chronic condition.     CBC Auto Differential [[id number]]  (Abnormal) Collected:  10/02/19 0434    Specimen:  Blood Updated:  10/02/19 0511     WBC 6.62 10*3/mm3      RBC 3.57 10*6/mm3      Hemoglobin 10.7 g/dL      Hematocrit 31.8 %      MCV 89.1 fL       MCH 30.0 pg      MCHC 33.6 g/dL      RDW 12.8 %      RDW-SD 41.8 fl      MPV 10.8 fL      Platelets 176 10*3/mm3      Neutrophil % 62.8 %      Lymphocyte % 22.8 %      Monocyte % 12.4 %      Eosinophil % 1.2 %      Basophil % 0.2 %      Immature Grans % 0.6 %      Neutrophils, Absolute 4.16 10*3/mm3      Lymphocytes, Absolute 1.51 10*3/mm3      Monocytes, Absolute 0.82 10*3/mm3      Eosinophils, Absolute 0.08 10*3/mm3      Basophils, Absolute 0.01 10*3/mm3      Immature Grans, Absolute 0.04 10*3/mm3      nRBC 0.0 /100 WBC           Assessment/Plan     -Syncope - following an episode of nausea, vomiting, and diarrhea ; she also was reported to have had a spell while in bed at the [facility] including shaking and abnormal eye movement   -Hypertension   -Hyperlipidemia   -Chronic kidney disease, stage III  -Anemia - stable     Plan-  2d echo  Monitor telemetry   Orthostatic blood pressures   Neurology evaluation pending   Given her GI illness and reported volume loss from vomiting and diarrhea, without evidence of volume overload, would hold her bumex for now (pt states she takes this at home for chronic, intermittent lower extremity edema, but she does not have a CHF history).   Encourage oral hydration  Further recommendations per Dr. Cuevas     I discussed the patient's findings and my recommendations with patient.     Rere Ernandez, ASH  10/02/19  9:49 AM

## 2019-10-02 NOTE — CONSULTS
Neurology Consult Note    Referring Provider: Dr. LEILA Jaramillo  Reason for Consultation: syncope      History of present illness:      This is a 78-year-old female who on Monday had 3 episodes of syncope.  She describes that she had GI upset and went to her restroom in her home.  She grabbed the trash can from her bathroom and proceeded to go back into the other room.  She then suddenly lost consciousness after dry heaving and woke up briefly afterwards.  She called her family and was taken to [facility].  There she had 2 further spells that were described as a GI upset leading to syncope.  The nursing staff saw shaking activity that was very brief in nature.  She did have bowel and bladder incontinence per the patient's report.  She had no tongue biting.  She has no previous history of head trauma.  She has no history of family members with seizures.  She did have similar episodes of syncope 2 years ago.  She reports a work-up at [facility].  I do see that in the fall 2016 she underwent a cardiac echo, twelve-lead EKG, and carotid ultrasound all which were unremarkable.      Past Medical History:   Diagnosis Date   • Anxiety    • Cholelithiasis    • Colon polyp    • Depression    • Disease of thyroid gland    • Hyperlipidemia    • Hypertension    • Myalgia    • Nephrolithiasis        No Known Allergies  No current facility-administered medications on file prior to encounter.      Current Outpatient Medications on File Prior to Encounter   Medication Sig   • atorvastatin (LIPITOR) 10 MG tablet TAKE ONE TABLET DAILY GENERIC FOR LIPITOR   • bumetanide (BUMEX) 0.5 MG tablet TAKE TWO TABLETS DAILY GENERIC FOR BUMEX   • Cholecalciferol (VITAMIN D) 2000 UNITS capsule Take 2,000 Units by mouth   • citalopram (CeleXA) 20 MG tablet TAKE ONE TABLET DAILY GENERIC FOR CELEXA   • HYDROcodone-acetaminophen (NORCO) 5-325 MG per tablet TAKE ONE TABLET DAILY GENERIC FOR NORCO   • levothyroxine (SYNTHROID, LEVOTHROID)  50 MCG tablet TAKE ONE TABLET DAILY GENERIC FOR SYNTHROID   • losartan (COZAAR) 100 MG tablet TAKE ONE TABLET DAILY GENERIC FOR COZAAR   • montelukast (SINGULAIR) 10 MG tablet TAKE ONE TABLET AT BEDTIME GENERIC FOR SINGULAIR   • Omega-3 Fatty Acids (FISH OIL) 1000 MG capsule capsule Take 1,000 mg by mouth 3 times daily   • omeprazole OTC (PriLOSEC OTC) 20 MG EC tablet Take 1 tablet by mouth Daily.       Social History     Socioeconomic History   • Marital status: Single     Spouse name: Not on file   • Number of children: Not on file   • Years of education: Not on file   • Highest education level: Not on file   Occupational History   • Occupation: retired   Tobacco Use   • Smoking status: Former Smoker     Last attempt to quit:      Years since quittin.7   • Smokeless tobacco: Never Used   Substance and Sexual Activity   • Alcohol use: No     Frequency: Never     Comment: occ   • Drug use: No   • Sexual activity: Defer     Family History   Problem Relation Age of Onset   • Arthritis Mother    • Arthritis Father    • Hypertension Sister    • Colon cancer Neg Hx    • Colon polyps Neg Hx        Review of Systems  A 14 point review of systems was reviewed and was negative except for syncope    Vital Signs   Temp:  [97.7 °F (36.5 °C)-98.7 °F (37.1 °C)] 98.7 °F (37.1 °C)  Heart Rate:  [60-71] 60  Resp:  [20] 20  BP: (107-196)/(48-66) 133/57    General Exam:  Head:  Normal cephalic, right periorbital ecchymosis  HEENT:  Neck supple  Fundoscopic Exam:  No signs of disc edema  CVS:  Regular rate and rhythm.  No murmurs  Carotid Examination:  No bruits  Lungs:  Clear to auscultation  Abdomen:  Non-tender, Non-distended  Extremities:  No signs of peripheral edema  Skin:  No rashes    Neurologic Exam:    Mental Status:    -Awake, Alert, Oriented X 3  -No word finding difficulties  -No aphasia  -No dysarthria  -Follows simple and complex commands    CN II:  Visual fields full.  Pupils equally reactive to light  CN III,  IV, VI:  Extraocular Muscles full with no signs of nystagmus  CN V:  Facial sensory is symmetric with no asymmetries.  CN VII:  Facial motor symmetric  CN VIII:  Gross hearing intact bilaterally  CN IX:  Palate elevates symmetrically  CN X:  Palate elevates symmetrically  CN XI:  Shoulder shrug symmetric  CN XII:  Tongue is midline on protrusion    Motor: (strength out of 5:  1= minimal movement, 2 = movement in plane of gravity, 3 = movement against gravity, 4 = movement against some resistance, 5 = full strength)    -Right Upper Ext: Proximal: 5 Distal: 5  -Left Upper Ext: Proximal: 5 Distal: 5    -Right Lower Ext: Proximal: 5 Distal: 5  -Left Lower Ext: Proximal: 5 Distal: 5    DTR:  -Right   Bicep: 2+ Tricep: 2+ Brachoradialis: 2+   Patella: 2+ Ankle: 2+ Neg Babinski  -Left   Bicep: 2+ Tricep: 2+ Brachoradialis: 2+   Patella: 2+ Ankle: 2+ Neg Babinski    Sensory:  -Intact to light touch, pinprick, temperature, pain, and proprioception    Coordination:  -Finger to nose intact  -Heel to shin intact  -No ataxia    Gait  -No signs of ataxia  -ambulates unassisted      Results Review:  Lab Results (last 24 hours)     Procedure Component Value Units Date/Time    CK [[id number]]  (Normal) Collected:  10/02/19 0434    Specimen:  Blood Updated:  10/02/19 0541     Creatine Kinase 123 U/L     Comprehensive Metabolic Panel [[id number]]  (Abnormal) Collected:  10/02/19 0434    Specimen:  Blood Updated:  10/02/19 0541     Glucose 112 mg/dL      BUN 15 mg/dL      Creatinine 0.94 mg/dL      Sodium 142 mmol/L      Potassium 3.7 mmol/L      Chloride 105 mmol/L      CO2 29.0 mmol/L      Calcium 8.5 mg/dL      Total Protein 6.5 g/dL      Albumin 3.60 g/dL      ALT (SGPT) 10 U/L      AST (SGOT) 15 U/L      Alkaline Phosphatase 57 U/L      Total Bilirubin 0.5 mg/dL      eGFR Non African Amer 58 mL/min/1.73      Globulin 2.9 gm/dL      A/G Ratio 1.2 g/dL      BUN/Creatinine Ratio 16.0     Anion Gap 8.0 mmol/L     Narrative:       GFR  Normal >60  Chronic Kidney Disease <60  Kidney Failure <15    Troponin [[id number]]  (Normal) Collected:  10/02/19 0434    Specimen:  Blood Updated:  10/02/19 0541     Troponin T <0.010 ng/mL     Narrative:       Troponin T Reference Range:  <= 0.03 ng/mL-   Negative for AMI  >0.03 ng/mL-     Abnormal for myocardial necrosis.  Clinicians would have to utilize clinical acumen, EKG, Troponin and serial changes to determine if it is an Acute Myocardial Infarction or myocardial injury due to an underlying chronic condition.     CBC Auto Differential [[id number]]  (Abnormal) Collected:  10/02/19 0434    Specimen:  Blood Updated:  10/02/19 0511     WBC 6.62 10*3/mm3      RBC 3.57 10*6/mm3      Hemoglobin 10.7 g/dL      Hematocrit 31.8 %      MCV 89.1 fL      MCH 30.0 pg      MCHC 33.6 g/dL      RDW 12.8 %      RDW-SD 41.8 fl      MPV 10.8 fL      Platelets 176 10*3/mm3      Neutrophil % 62.8 %      Lymphocyte % 22.8 %      Monocyte % 12.4 %      Eosinophil % 1.2 %      Basophil % 0.2 %      Immature Grans % 0.6 %      Neutrophils, Absolute 4.16 10*3/mm3      Lymphocytes, Absolute 1.51 10*3/mm3      Monocytes, Absolute 0.82 10*3/mm3      Eosinophils, Absolute 0.08 10*3/mm3      Basophils, Absolute 0.01 10*3/mm3      Immature Grans, Absolute 0.04 10*3/mm3      nRBC 0.0 /100 WBC           .  Imaging Results (last 24 hours)     ** No results found for the last 24 hours. **        Decision to review of lab work  Reviewed echo and carotid ultrasound reports as noted above  Reviewed lab work here including a CK at 123, a CBC which is unremarkable and a CMP which was unremarkable.      Impression    1.  Syncopal spells with preceding GI upset    Differential diagnosis includes vasovagal, orthostasis, and possible epileptic.    Plan    · Orthostatic blood pressures  · Recommend against carotid ultrasound  · Cardiac work-up per internal medicine physician  · EEG  · MRI brain with and without contrast    I discussed the patients  findings and my recommendations with patient    ePter Germain MD  10/02/19  9:46 AM

## 2019-10-02 NOTE — PLAN OF CARE
Problem: Patient Care Overview  Goal: Plan of Care Review  Outcome: Ongoing (interventions implemented as appropriate)   10/02/19 0309   Coping/Psychosocial   Plan of Care Reviewed With patient   Plan of Care Review   Progress no change   OTHER   Outcome Summary Pt was transferred here from [facility] with syncope/gastroenteritis/fall. Right lower eye black from fall. Elevated BP with Clonidine prn given. OSH had CT head -, CT A/P with liver cyst, ovarian cyst, and diverticulosis, CT face -, CT c,t,lsp -. Pt to have cardiology and neuro consult today. NIHSS 0. Passed swollow study. no c/o pain. Ab hien. NSR 61 on tele. No falls. Cont to monitor     Goal: Individualization and Mutuality  Outcome: Ongoing (interventions implemented as appropriate)    Goal: Discharge Needs Assessment  Outcome: Ongoing (interventions implemented as appropriate)      Problem: Fall Risk (Adult)  Goal: Identify Related Risk Factors and Signs and Symptoms  Outcome: Ongoing (interventions implemented as appropriate)   10/02/19 0309   Fall Risk (Adult)   Related Risk Factors (Fall Risk) history of falls;environment unfamiliar   Signs and Symptoms (Fall Risk) presence of risk factors     Goal: Absence of Fall  Outcome: Ongoing (interventions implemented as appropriate)      Problem: Syncope (Adult)  Goal: Identify Related Risk Factors and Signs and Symptoms  Outcome: Ongoing (interventions implemented as appropriate)   10/02/19 0309   Syncope (Adult)   Related Risk Factors (Syncope) other (see comments)  (all)   Signs and Symptoms (Syncope) other (see comments)  (none)     Goal: Physical Safety/Health Maintenance  Outcome: Ongoing (interventions implemented as appropriate)    Goal: Optimal Emotional/Functional [place]  Outcome: Ongoing (interventions implemented as appropriate)

## 2019-10-02 NOTE — PLAN OF CARE
Problem: Patient Care Overview  Goal: Plan of Care Review  Outcome: Ongoing (interventions implemented as appropriate)   10/02/19 1618   Coping/Psychosocial   Plan of Care Reviewed With patient   Plan of Care Review   Progress improving   OTHER   Outcome Summary Patient did not c/o being dizzy today. No pain voiced. Cardio and Neuro seen the patient today. EEG, MRI done and echo been done also. VSS. ORtho BP as ordered. Continue to monitor pt and labs.      Goal: Individualization and Mutuality  Outcome: Ongoing (interventions implemented as appropriate)      Problem: Fall Risk (Adult)  Goal: Identify Related Risk Factors and Signs and Symptoms  Outcome: Outcome(s) achieved Date Met: 10/02/19    Goal: Absence of Fall  Outcome: Ongoing (interventions implemented as appropriate)      Problem: Syncope (Adult)  Goal: Identify Related Risk Factors and Signs and Symptoms  Outcome: Outcome(s) achieved Date Met: 10/02/19    Goal: Physical Safety/Health Maintenance  Outcome: Ongoing (interventions implemented as appropriate)    Goal: Optimal Emotional/Functional [place]  Outcome: Ongoing (interventions implemented as appropriate)      Problem: Cardiac: ACS (Acute Coronary Syndrome) (Adult)  Goal: Signs and Symptoms of Listed Potential Problems Will be Absent, Minimized or Managed (Cardiac: ACS)  Outcome: Ongoing (interventions implemented as appropriate)

## 2019-10-02 NOTE — PROGRESS NOTES
Discharge Planning Assessment  [facility]     Patient Name: Paige Martinez  MRN: [id number]  Today's Date: 10/2/2019    Admit Date: 10/1/2019    Discharge Needs Assessment     Row Name 10/02/19 0957       Living Environment    Lives With  alone    Current Living Arrangements  home/apartment/condo    Primary Care Provided by  self    Provides Primary Care For  no one    Quality of Family Relationships  helpful;involved;supportive    Able to Return to Prior Arrangements  yes       Resource/Environmental Concerns    Resource/Environmental Concerns  none       Transition Planning    Patient/Family Anticipates Transition to  home    Patient/Family Anticipated Services at Transition  none    Transportation Anticipated  family or friend will provide       Discharge Needs Assessment    Readmission Within the Last 30 Days  no previous admission in last 30 days    Concerns to be Addressed  no discharge needs identified    Equipment Currently Used at Home  none    Anticipated Changes Related to Illness  none    Equipment Needed After Discharge  none    Current Discharge Risk  lives alone    Discharge Coordination/Progress  Pt has PCP and RX coverage.  Pt can afford medications.  Pt denies any dc needs and does not want hh services.         Discharge Plan    No documentation.       Destination      No service coordination in this encounter.      Durable Medical Equipment      No service coordination in this encounter.      Dialysis/Infusion      No service coordination in this encounter.      Home Medical Care      No service coordination in this encounter.      Therapy      No service coordination in this encounter.      Community Resources      No service coordination in this encounter.          Demographic Summary    No documentation.       Functional Status    No documentation.       Psychosocial    No documentation.       Abuse/Neglect    No documentation.       Legal    No documentation.       Substance Abuse    No  documentation.       Patient Forms    No documentation.           FELISA HowellW

## 2019-10-02 NOTE — H&P
"[facility]  HISTORY AND PHYSICAL    Date of Admission: 10/1/2019  Primary Care Physician: Barrington Jaramillo MD    Subjective    Chief Complaint: \"I keep passing out\"    This is a 77 yr old lady with hx of hypertension, ckd who presented to [facility] er after a fall. She states she was in the kitchen and she \"just passed out\". She sustained facial injury and came to the [facility] er--She began vomiting and was admitted to my services for obs. Shortly there after she was found to have a few secs of \"shaking and her eyes rolled back in her head\" witnessed by an RN- Ct head x 2 neg for bleeding and the patient was transferred to [facility] for further testing. She recalls having something similar and was hospitalized at [facility] severl mos ago.        Review of Systems   Constitutional: Positive for fatigue.   HENT: Negative.    Eyes: Negative.    Respiratory: Negative.    Cardiovascular: Negative.    Gastrointestinal: Negative.    Endocrine: Negative.    Genitourinary: Negative.    Musculoskeletal: Negative.    Skin: Positive for color change and wound.   Allergic/Immunologic: Negative.    Neurological: Positive for syncope. Negative for seizures.   Hematological: Negative.    Psychiatric/Behavioral: Negative.         Otherwise complete ROS reviewed and negative except as mentioned in the HPI.      Past Medical History:   Past Medical History:   Diagnosis Date   • Anxiety    • Cholelithiasis    • Colon polyp    • Depression    • Disease of thyroid gland    • Hyperlipidemia    • Hypertension    • Myalgia    • Nephrolithiasis        Past Surgical History:  Past Surgical History:   Procedure Laterality Date   • COLONOSCOPY  07/15/2014    2 rectal polyps, hyperplastic, diverticulosis       Social History:  reports that she quit smoking about 5 years ago. She has never used smokeless tobacco. She reports that she does not drink alcohol or use drugs.    Family History: family history includes Arthritis in her father " "and mother; Hypertension in her sister.     Allergies:  No Known Allergies    Medications:  Prior to Admission medications    Medication Sig Start Date End Date Taking? Authorizing Provider   atorvastatin (LIPITOR) 10 MG tablet TAKE ONE TABLET DAILY GENERIC FOR LIPITOR 10/5/18   Barrington Jaramillo MD   bumetanide (BUMEX) 0.5 MG tablet TAKE TWO TABLETS DAILY GENERIC FOR BUMEX 10/5/18   Barrington Jaramillo MD   Cholecalciferol (VITAMIN D) 2000 UNITS capsule Take 2,000 Units by mouth    ProviderErrol MD   citalopram (CeleXA) 20 MG tablet TAKE ONE TABLET DAILY GENERIC FOR CELEXA 4/25/19   Barrington Jaramillo MD   HYDROcodone-acetaminophen (NORCO) 5-325 MG per tablet TAKE ONE TABLET DAILY GENERIC FOR NORCO 8/28/19   Barrington Jaramillo MD   levothyroxine (SYNTHROID, LEVOTHROID) 50 MCG tablet TAKE ONE TABLET DAILY GENERIC FOR SYNTHROID 6/14/19   Barrington Jaramillo MD   losartan (COZAAR) 100 MG tablet TAKE ONE TABLET DAILY GENERIC FOR COZAAR 11/28/18   Barrington Jaramillo MD   montelukast (SINGULAIR) 10 MG tablet TAKE ONE TABLET AT BEDTIME GENERIC FOR SINGULAIR 4/25/19   Barrington Jaramillo MD   Omega-3 Fatty Acids (FISH OIL) 1000 MG capsule capsule Take 1,000 mg by mouth 3 times daily    ProviderErrol MD   omeprazole OTC (PriLOSEC OTC) 20 MG EC tablet Take 1 tablet by mouth Daily. 11/16/18   Rhina Harrell, APRN       Objective    Vital Signs: /48 (BP Location: Right arm, Patient Position: Lying)   Pulse 63   Temp 97.9 °F (36.6 °C) (Oral)   Resp 20   Ht 165.1 cm (65\")   Wt 79.7 kg (175 lb 12.8 oz)   SpO2 96%   BMI 29.25 kg/m²   Physical Exam   Constitutional: She is oriented to person, place, and time. She appears well-developed and well-nourished.   HENT:   Head: Normocephalic and atraumatic.   Right Ear: External ear normal.   Left Ear: External ear normal.   Nose: Nose normal.   Mouth/Throat: Oropharynx is clear and moist.   Eyes: Conjunctivae and EOM are normal. " Pupils are equal, round, and reactive to light.   Neck: Normal range of motion. Neck supple.   Cardiovascular: Normal rate, regular rhythm, normal heart sounds and intact distal pulses.   Pulmonary/Chest: Effort normal and breath sounds normal.   Abdominal: Soft. Bowel sounds are normal.   Musculoskeletal: Normal range of motion.   Neurological: She is alert and oriented to person, place, and time.   Skin: Skin is warm. Capillary refill takes less than 2 seconds.   Psychiatric: She has a normal mood and affect. Her behavior is normal. Judgment and thought content normal.   Nursing note and vitals reviewed.      Results Reviewed:  Lab Results (last 24 hours)     Procedure Component Value Units Date/Time    CK [[id number]]  (Normal) Collected:  10/02/19 0434    Specimen:  Blood Updated:  10/02/19 0541     Creatine Kinase 123 U/L     Comprehensive Metabolic Panel [[id number]]  (Abnormal) Collected:  10/02/19 0434    Specimen:  Blood Updated:  10/02/19 0541     Glucose 112 mg/dL      BUN 15 mg/dL      Creatinine 0.94 mg/dL      Sodium 142 mmol/L      Potassium 3.7 mmol/L      Chloride 105 mmol/L      CO2 29.0 mmol/L      Calcium 8.5 mg/dL      Total Protein 6.5 g/dL      Albumin 3.60 g/dL      ALT (SGPT) 10 U/L      AST (SGOT) 15 U/L      Alkaline Phosphatase 57 U/L      Total Bilirubin 0.5 mg/dL      eGFR Non African Amer 58 mL/min/1.73      Globulin 2.9 gm/dL      A/G Ratio 1.2 g/dL      BUN/Creatinine Ratio 16.0     Anion Gap 8.0 mmol/L     Narrative:       GFR Normal >60  Chronic Kidney Disease <60  Kidney Failure <15    Troponin [[id number]]  (Normal) Collected:  10/02/19 0434    Specimen:  Blood Updated:  10/02/19 0541     Troponin T <0.010 ng/mL     Narrative:       Troponin T Reference Range:  <= 0.03 ng/mL-   Negative for AMI  >0.03 ng/mL-     Abnormal for myocardial necrosis.  Clinicians would have to utilize clinical acumen, EKG, Troponin and serial changes to determine if it is an Acute Myocardial Infarction or  myocardial injury due to an underlying chronic condition.     CBC Auto Differential [[id number]]  (Abnormal) Collected:  10/02/19 0434    Specimen:  Blood Updated:  10/02/19 0511     WBC 6.62 10*3/mm3      RBC 3.57 10*6/mm3      Hemoglobin 10.7 g/dL      Hematocrit 31.8 %      MCV 89.1 fL      MCH 30.0 pg      MCHC 33.6 g/dL      RDW 12.8 %      RDW-SD 41.8 fl      MPV 10.8 fL      Platelets 176 10*3/mm3      Neutrophil % 62.8 %      Lymphocyte % 22.8 %      Monocyte % 12.4 %      Eosinophil % 1.2 %      Basophil % 0.2 %      Immature Grans % 0.6 %      Neutrophils, Absolute 4.16 10*3/mm3      Lymphocytes, Absolute 1.51 10*3/mm3      Monocytes, Absolute 0.82 10*3/mm3      Eosinophils, Absolute 0.08 10*3/mm3      Basophils, Absolute 0.01 10*3/mm3      Immature Grans, Absolute 0.04 10*3/mm3      nRBC 0.0 /100 WBC         Imaging Results (last 24 hours)     ** No results found for the last 24 hours. **            Active Hospital Problems    Diagnosis   • Syncope       Assessment / Plan  Admit, telemetry, will ask neurology and cardiology to see--see orders      Code Status: full code     I discussed the patient's findings and my recommendations with the patient..    Estimated length of stay 24 -36hrs.    Barrington Jaramillo MD   10/02/19   7:27 AM

## 2019-10-03 PROCEDURE — 99214 OFFICE O/P EST MOD 30 MIN: CPT | Performed by: PSYCHIATRY & NEUROLOGY

## 2019-10-03 PROCEDURE — 99213 OFFICE O/P EST LOW 20 MIN: CPT | Performed by: NURSE PRACTITIONER

## 2019-10-03 PROCEDURE — G0378 HOSPITAL OBSERVATION PER HR: HCPCS

## 2019-10-03 PROCEDURE — 99224 PR SBSQ OBSERVATION CARE/DAY 15 MINUTES: CPT | Performed by: FAMILY MEDICINE

## 2019-10-03 RX ORDER — CHLORAL HYDRATE 500 MG
1 CAPSULE ORAL 3 TIMES DAILY
COMMUNITY
End: 2019-10-04 | Stop reason: HOSPADM

## 2019-10-03 RX ORDER — ATORVASTATIN CALCIUM 10 MG/1
10 TABLET, FILM COATED ORAL DAILY
COMMUNITY
End: 2019-10-28 | Stop reason: SDUPTHER

## 2019-10-03 RX ORDER — BUMETANIDE 0.5 MG/1
1 TABLET ORAL DAILY
COMMUNITY

## 2019-10-03 RX ORDER — CITALOPRAM 20 MG/1
20 TABLET ORAL DAILY
COMMUNITY
End: 2020-01-06

## 2019-10-03 RX ORDER — OMEPRAZOLE 20 MG/1
20 CAPSULE, DELAYED RELEASE ORAL DAILY
COMMUNITY
End: 2020-04-03 | Stop reason: SDUPTHER

## 2019-10-03 RX ORDER — HYDROCODONE BITARTRATE AND ACETAMINOPHEN 5; 325 MG/1; MG/1
1 TABLET ORAL DAILY
COMMUNITY
End: 2020-01-29

## 2019-10-03 RX ORDER — LEVOTHYROXINE SODIUM 0.05 MG/1
50 TABLET ORAL DAILY
COMMUNITY
End: 2019-10-28 | Stop reason: SDUPTHER

## 2019-10-03 RX ORDER — LOSARTAN POTASSIUM 100 MG/1
100 TABLET ORAL DAILY
COMMUNITY
End: 2019-12-09 | Stop reason: SDUPTHER

## 2019-10-03 RX ORDER — MONTELUKAST SODIUM 10 MG/1
10 TABLET ORAL NIGHTLY
COMMUNITY
End: 2020-03-16 | Stop reason: HOSPADM

## 2019-10-03 RX ORDER — ATORVASTATIN CALCIUM 10 MG/1
10 TABLET, FILM COATED ORAL NIGHTLY
Status: DISCONTINUED | OUTPATIENT
Start: 2019-10-03 | End: 2019-10-04 | Stop reason: HOSPADM

## 2019-10-03 RX ADMIN — MONTELUKAST SODIUM 10 MG: 10 TABLET, FILM COATED ORAL at 20:01

## 2019-10-03 RX ADMIN — ATORVASTATIN CALCIUM 10 MG: 10 TABLET, FILM COATED ORAL at 20:01

## 2019-10-03 RX ADMIN — VITAMIN D 2000 UNITS: 25 TAB ORAL at 08:01

## 2019-10-03 RX ADMIN — CITALOPRAM 20 MG: 20 TABLET, FILM COATED ORAL at 08:01

## 2019-10-03 RX ADMIN — LOSARTAN POTASSIUM 100 MG: 50 TABLET, FILM COATED ORAL at 08:01

## 2019-10-03 RX ADMIN — LEVOTHYROXINE SODIUM 50 MCG: 50 TABLET ORAL at 05:59

## 2019-10-03 NOTE — PROGRESS NOTES
Neurology Progress Note      Date of admission: 10/1/2019 10:43 PM  Date of visit: 10/3/2019    Chief Complaint:  F/u syncope    Subjective     Subjective:    Syncope x 3 after emesis or after dry heaving. She was transferred from [facility] when nursing staff at [place] saw brief shaking spell and had bladder incontinence but no tongue biting.     She reports her right eye must've hit the pail she had brought to her bedroom for her nausea.  She states when she lost consciousness, the pail was turned over and broken when she came to.    Medications:  Current Facility-Administered Medications   Medication Dose Route Frequency Provider Last Rate Last Dose   • atorvastatin (LIPITOR) tablet 10 mg  10 mg Oral Nightly Barrington Jaramillo MD       • cholecalciferol (VITAMIN D3) tablet 2,000 Units  2,000 Units Oral Daily Barrington Jaramillo MD   2,000 Units at 10/03/19 0801   • citalopram (CeleXA) tablet 20 mg  20 mg Oral Daily Barrington Jaramillo MD   20 mg at 10/03/19 0801   • cloNIDine (CATAPRES) tablet 0.1 mg  0.1 mg Oral Q6H PRN Barrington Jaramillo MD   0.1 mg at 10/02/19 0137   • HYDROcodone-acetaminophen (NORCO) 5-325 MG per tablet 1 tablet  1 tablet Oral Daily PRN Barrington Jaramillo MD       • levothyroxine (SYNTHROID, LEVOTHROID) tablet 50 mcg  50 mcg Oral Q AM Barrington Jaramillo MD   50 mcg at 10/03/19 0559   • losartan (COZAAR) tablet 100 mg  100 mg Oral Q24H Barrington Jaramillo MD   100 mg at 10/03/19 0801   • montelukast (SINGULAIR) tablet 10 mg  10 mg Oral Nightly Barrington Jaramillo MD   10 mg at 10/02/19 2025       Review of Systems:   -A 14 point review of systems is completed and is negative  Objective     Objective      Vital Signs  Temp:  [98 °F (36.7 °C)-98.4 °F (36.9 °C)] 98.1 °F (36.7 °C)  Heart Rate:  [62-78] 70  Resp:  [20] 20  BP: (131-163)/(55-67) 136/57    Physical Exam:    HEENT:  Neck supple  Contusion over the right eye  CVS:  Regular rate and rhythm.  No  murmurs  Carotid Examination:  No bruits  Lungs:  Clear to auscultation  Abdomen:  Non-tender, Non-distended  Extremities:  No signs of peripheral edema    Neurologic Exam:    -Awake, Alert, Oriented X 3  -No word finding difficulties  -No aphasia  -No dysarthria  -Follows simple and complex commands    Cranial nerves II through XII    CN II:  Visual fields full.  Pupils equally reactive to light  CN III, IV, VI:  Extraocular Muscles full with no signs of nystagmus  CN V:  Facial sensory is symmetric with no asymmetries.  CN VII:  Facial motor symmetric  CN VIII:  Gross hearing intact bilaterally  CN IX/X:  Palate elevates symmetrically  CN XI:  Shoulder shrug symmetric  CN XII:  Tongue is midline on protrusion    Motor: (strength out of 5:  1= minimal movement, 2 = movement in plane of gravity, 3 = movement against gravity, 4 = movement against some resistance, 5 = full strength)    -Right Upper Ext: Proximal: 5 Distal: 5  -Left Upper Ext: Proximal: 5 Distal: 5    -Right Lower Ext: Proximal: 5 Distal: 5  -Left Lower Ext: Proximal: 5 Distal: 5    DTR:  2+ throughout in all four extremities    Sensory:  -Intact to light touch, pinprick, temperature, pain, and proprioception    Coordination/Gait:  -No ataxia     Results Review:    I reviewed the patient's new clinical results.    Lab Results (last 72 hours)     Procedure Component Value Units Date/Time    CK [[id number]]  (Normal) Collected:  10/02/19 0434    Specimen:  Blood Updated:  10/02/19 0541     Creatine Kinase 123 U/L     Comprehensive Metabolic Panel [[id number]]  (Abnormal) Collected:  10/02/19 0434    Specimen:  Blood Updated:  10/02/19 0541     Glucose 112 mg/dL      BUN 15 mg/dL      Creatinine 0.94 mg/dL      Sodium 142 mmol/L      Potassium 3.7 mmol/L      Chloride 105 mmol/L      CO2 29.0 mmol/L      Calcium 8.5 mg/dL      Total Protein 6.5 g/dL      Albumin 3.60 g/dL      ALT (SGPT) 10 U/L      AST (SGOT) 15 U/L      Alkaline Phosphatase 57 U/L       Total Bilirubin 0.5 mg/dL      eGFR Non African Amer 58 mL/min/1.73      Globulin 2.9 gm/dL      A/G Ratio 1.2 g/dL      BUN/Creatinine Ratio 16.0     Anion Gap 8.0 mmol/L     Narrative:       GFR Normal >60  Chronic Kidney Disease <60  Kidney Failure <15    Troponin [[id number]]  (Normal) Collected:  10/02/19 0434    Specimen:  Blood Updated:  10/02/19 0541     Troponin T <0.010 ng/mL     Narrative:       Troponin T Reference Range:  <= 0.03 ng/mL-   Negative for AMI  >0.03 ng/mL-     Abnormal for myocardial necrosis.  Clinicians would have to utilize clinical acumen, EKG, Troponin and serial changes to determine if it is an Acute Myocardial Infarction or myocardial injury due to an underlying chronic condition.     CBC Auto Differential [[id number]]  (Abnormal) Collected:  10/02/19 0434    Specimen:  Blood Updated:  10/02/19 0511     WBC 6.62 10*3/mm3      RBC 3.57 10*6/mm3      Hemoglobin 10.7 g/dL      Hematocrit 31.8 %      MCV 89.1 fL      MCH 30.0 pg      MCHC 33.6 g/dL      RDW 12.8 %      RDW-SD 41.8 fl      MPV 10.8 fL      Platelets 176 10*3/mm3      Neutrophil % 62.8 %      Lymphocyte % 22.8 %      Monocyte % 12.4 %      Eosinophil % 1.2 %      Basophil % 0.2 %      Immature Grans % 0.6 %      Neutrophils, Absolute 4.16 10*3/mm3      Lymphocytes, Absolute 1.51 10*3/mm3      Monocytes, Absolute 0.82 10*3/mm3      Eosinophils, Absolute 0.08 10*3/mm3      Basophils, Absolute 0.01 10*3/mm3      Immature Grans, Absolute 0.04 10*3/mm3      nRBC 0.0 /100 WBC         Imaging Results (last 72 hours)     Procedure Component Value Units Date/Time    MRI Brain With & Without Contrast [[id number]] Collected:  10/02/19 1245     Updated:  10/02/19 1315    Narrative:       EXAMINATION: MRI BRAIN W WO CONTRAST- 10/2/2019 12:45 PM CDT     HISTORY: Encephalopathy     COMPARISON: None     Technical: Multiplanar, multisequence imaging was performed through the  brain before and after the administration of IV contrast.      FINDINGS:  There is no diffusion restriction to suggest acute ischemia.     There is normal-appearing anatomy at tram cervical junction. There is  a mostly empty sella configuration.     There is no evidence of midline shift.     The ventricles appear normal in configuration. Normal signal void is in  the visualized carotid and basilar arteries.     The visualized globes and orbits are unremarkable. There is a mucus  retention cyst versus polyp in the inferior left maxillary sinus.     Periventricular and subcortical FLAIR signal hyperintensities are  present, a nonspecific finding most often seen as sequela of chronic  microvascular ischemia. There is gliosis in the medial right occipital  lobe. There is a focal area of increased FLAIR signal adjacent to the  right temporal lobe measuring 9 mm in size. This area appears mildly  hyperintense on the precontrast T1-weighted images. On postcontrast  imaging, there appears to be mild enhancement in this region. There is  otherwise no abnormal enhancement identified.       Impression:          1. Area of abnormal signal with associated enhancement adjacent to the  right temporal lobe, possibly extra-axial in location. Due to the small  size, this is somewhat difficult to characterize. This could represent  hemorrhage or a small lesion. Short interval follow-up is recommended in  4 weeks.  2. Sequela of chronic microvascular ischemia.  This report was finalized on 10/02/2019 13:12 by Dr. Hamlet Millan MD.        Results for orders placed during the hospital encounter of 10/01/19   Transthoracic Echo Complete With Contrast if Necessary Per Protocol    Narrative · Left ventricular systolic function is normal. Estimated EF appears to be   in the range of 61 - 65%.  · Left ventricular wall thickness is consistent with mild-to-moderate   concentric hypertrophy.  · No hemodynamically significant valvular abnormalities identified on the   study.  · There is a small (<1cm)  pericardial effusion with no evidence of cardiac   tamponade.        Telemetry   Sinus 60 to 93  Last night sinus 63 to 81    Personal review of EEG 10/2/2019  : Normal  Formal report later (currently assigned to another physician I will need to get the techs to change to my name or we can wait for formal reading from the other provider.     Personal review of MRI of brain with and without contrast:       Assessment/Plan     Hospital Problem List      Syncope    Impression:    1. Syncope vs seizure.  EEG is normal.   2. Abnormal MRI  Lesion in right temporal lobe is small and appears to be extra-axial.  In the sagittal view it is rounded and does not look like an acute bleed so I favor mass. . However she did fall and hit her head .  3. Patient was mildly orthostatic     63 79 78   /50 123/68 123/68   Patient Position Lying Sitting Standing       Plan:  · Repeat MRI of brain in 4 weeks. If is an acute bleed there will be an obvious evolution.  If it looks the same then is likely mass.   · Obtain magnesium level  · Repeat orthostasis tomorrow after hydration.  · Recommend  Neurosurgery. input    I attempted to call Dr Jaramillo around 7:45 PM but there was a bad connection and repeat calls were unanswered. Suspect cell phone connection  issue. .         Kym Lyles MD  10/03/19  6:55 PM

## 2019-10-03 NOTE — PLAN OF CARE
Problem: Patient Care Overview  Goal: Plan of Care Review  Outcome: Ongoing (interventions implemented as appropriate)   10/03/19 1024   Coping/Psychosocial   Plan of Care Reviewed With patient   Plan of Care Review   Progress improving   OTHER   Outcome Summary Pt A&O. Room air. No neuro changes. No syncopal episodes Halter monitor to be placed. VSS. Safety maintained. Possible DC a little later today.

## 2019-10-03 NOTE — PROGRESS NOTES
"No new \"spells\"--awaiting w/u and recs per neurology    Review of Systems   Constitutional: Negative.    HENT: Negative.    Eyes: Negative.    Respiratory: Negative.    Cardiovascular: Negative.    Gastrointestinal: Negative.    Endocrine: Negative.    Genitourinary: Negative.    Musculoskeletal: Negative.    Skin: Positive for wound.   Allergic/Immunologic: Negative.    Neurological: Negative.    Hematological: Negative.    Psychiatric/Behavioral: Negative.      Temp:  [98.3 °F (36.8 °C)-98.7 °F (37.1 °C)] 98.3 °F (36.8 °C)  Heart Rate:  [60-81] 78  Resp:  [20] 20  BP: (123-163)/(50-71) 163/67  I/O last 3 completed shifts:  In: 960 [P.O.:960]  Out: 1800 [Urine:1800]  No intake/output data recorded.    Physical Exam   Constitutional: She is oriented to person, place, and time. She appears well-developed and well-nourished.   HENT:   Head: Normocephalic and atraumatic.   Right Ear: External ear normal.   Left Ear: External ear normal.   Nose: Nose normal.   Mouth/Throat: Oropharynx is clear and moist.   Eyes: Conjunctivae and EOM are normal. Pupils are equal, round, and reactive to light.   Neck: Normal range of motion. Neck supple.   Cardiovascular: Normal rate, regular rhythm, normal heart sounds and intact distal pulses.   Pulmonary/Chest: Effort normal and breath sounds normal.   Abdominal: Soft. Bowel sounds are normal.   Musculoskeletal: Normal range of motion.   Neurological: She is alert and oriented to person, place, and time.   Skin: Skin is warm. Capillary refill takes less than 2 seconds.   Psychiatric: She has a normal mood and affect. Her behavior is normal. Judgment and thought content normal.   Nursing note and vitals reviewed.        Syncope    Neurology w/u in progresss    "

## 2019-10-03 NOTE — PROGRESS NOTES
[facility] -  Progress Note     LOS: 0 days   Patient Care Team:  Barrington Jaramillo MD as PCP - General  Barrington Jaramillo MD as PCP - [facility]  Gonzalez Cordero MD as Consulting Physician (Gastroenterology)    Chief Complaint: syncope     Subjective     Interval History:     Patient Complaints: No further syncope or N/V/D. She is resting comfortably and without compliant. Review of telemetry reveals NSR without arrhythmia.          Review of Systems:     Review of Systems   Constitutional: Negative for diaphoresis, fatigue, fever and unexpected weight change.   HENT: Negative for nosebleeds.    Respiratory: Negative for apnea, cough, chest tightness, shortness of breath and wheezing.    Cardiovascular: Negative for chest pain, palpitations and leg swelling.   Gastrointestinal: Negative for abdominal distention, nausea and vomiting.   Genitourinary: Negative for hematuria.   Musculoskeletal: Negative for gait problem.   Skin: Negative for color change.   Neurological: Negative for dizziness, syncope, weakness and light-headedness.     Objective     Vital Sign Min/Max for last 24 hours  Temp  Min: 98 °F (36.7 °C)  Max: 98.7 °F (37.1 °C)   BP  Min: 131/61  Max: 163/67   Pulse  Min: 62  Max: 81   Resp  Min: 20  Max: 20   SpO2  Min: 93 %  Max: 97 %   No Data Recorded   Weight  Min: 79.5 kg (175 lb 6 oz)  Max: 79.7 kg (175 lb 11.3 oz)         10/02/19  2031   Weight: 79.5 kg (175 lb 6 oz)       Physical Exam:    Physical Exam   Constitutional: She is oriented to person, place, and time. She appears well-developed and well-nourished. No distress.   HENT:   Head: Normocephalic and atraumatic.   Eyes: Pupils are equal, round, and reactive to light.   Neck: Normal range of motion. Neck supple. No JVD present. No thyromegaly present.   Cardiovascular: Normal rate, regular rhythm, normal heart sounds and intact distal pulses. Exam reveals no gallop and no friction rub.   No murmur  heard.  Pulmonary/Chest: Effort normal and breath sounds normal. No respiratory distress. She has no wheezes. She has no rales. She exhibits no tenderness.   Abdominal: Soft. Bowel sounds are normal. She exhibits no distension. There is no tenderness.   Musculoskeletal: Normal range of motion. She exhibits no edema.   Neurological: She is alert and oriented to person, place, and time. No cranial nerve deficit.   Skin: Skin is warm and dry. She is not diaphoretic.   Ecchymosis under right eye    Psychiatric: She has a normal mood and affect. Her behavior is normal.     Results Review:   Lab Results (last 72 hours)     Procedure Component Value Units Date/Time    CK [[id number]]  (Normal) Collected:  10/02/19 0434    Specimen:  Blood Updated:  10/02/19 0541     Creatine Kinase 123 U/L     Comprehensive Metabolic Panel [[id number]]  (Abnormal) Collected:  10/02/19 0434    Specimen:  Blood Updated:  10/02/19 0541     Glucose 112 mg/dL      BUN 15 mg/dL      Creatinine 0.94 mg/dL      Sodium 142 mmol/L      Potassium 3.7 mmol/L      Chloride 105 mmol/L      CO2 29.0 mmol/L      Calcium 8.5 mg/dL      Total Protein 6.5 g/dL      Albumin 3.60 g/dL      ALT (SGPT) 10 U/L      AST (SGOT) 15 U/L      Alkaline Phosphatase 57 U/L      Total Bilirubin 0.5 mg/dL      eGFR Non African Amer 58 mL/min/1.73      Globulin 2.9 gm/dL      A/G Ratio 1.2 g/dL      BUN/Creatinine Ratio 16.0     Anion Gap 8.0 mmol/L     Narrative:       GFR Normal >60  Chronic Kidney Disease <60  Kidney Failure <15    Troponin [[id number]]  (Normal) Collected:  10/02/19 0434    Specimen:  Blood Updated:  10/02/19 0541     Troponin T <0.010 ng/mL     Narrative:       Troponin T Reference Range:  <= 0.03 ng/mL-   Negative for AMI  >0.03 ng/mL-     Abnormal for myocardial necrosis.  Clinicians would have to utilize clinical acumen, EKG, Troponin and serial changes to determine if it is an Acute Myocardial Infarction or myocardial injury due to an underlying  chronic condition.     CBC Auto Differential [[id number]]  (Abnormal) Collected:  10/02/19 0434    Specimen:  Blood Updated:  10/02/19 0511     WBC 6.62 10*3/mm3      RBC 3.57 10*6/mm3      Hemoglobin 10.7 g/dL      Hematocrit 31.8 %      MCV 89.1 fL      MCH 30.0 pg      MCHC 33.6 g/dL      RDW 12.8 %      RDW-SD 41.8 fl      MPV 10.8 fL      Platelets 176 10*3/mm3      Neutrophil % 62.8 %      Lymphocyte % 22.8 %      Monocyte % 12.4 %      Eosinophil % 1.2 %      Basophil % 0.2 %      Immature Grans % 0.6 %      Neutrophils, Absolute 4.16 10*3/mm3      Lymphocytes, Absolute 1.51 10*3/mm3      Monocytes, Absolute 0.82 10*3/mm3      Eosinophils, Absolute 0.08 10*3/mm3      Basophils, Absolute 0.01 10*3/mm3      Immature Grans, Absolute 0.04 10*3/mm3      nRBC 0.0 /100 WBC               Echo EF Estimated  No results found for: ECHOEFEST      Cath Ejection Fraction Quantitative  No results found for: CATHEF        Medication Review: yes  Current Facility-Administered Medications   Medication Dose Route Frequency Provider Last Rate Last Dose   • atorvastatin (LIPITOR) tablet 10 mg  10 mg Oral Nightly Barrington Jaramillo MD       • cholecalciferol (VITAMIN D3) tablet 2,000 Units  2,000 Units Oral Daily Barrington Jaramillo MD   2,000 Units at 10/03/19 0801   • citalopram (CeleXA) tablet 20 mg  20 mg Oral Daily Barrington Jaramillo MD   20 mg at 10/03/19 0801   • cloNIDine (CATAPRES) tablet 0.1 mg  0.1 mg Oral Q6H PRN Barrington Jaramillo MD   0.1 mg at 10/02/19 0137   • HYDROcodone-acetaminophen (NORCO) 5-325 MG per tablet 1 tablet  1 tablet Oral Daily PRN Barrington Jaramillo MD       • levothyroxine (SYNTHROID, LEVOTHROID) tablet 50 mcg  50 mcg Oral Q AM Barrington Jaramillo MD   50 mcg at 10/03/19 0559   • losartan (COZAAR) tablet 100 mg  100 mg Oral Q24H Barrington Jaramillo MD   100 mg at 10/03/19 0801   • montelukast (SINGULAIR) tablet 10 mg  10 mg Oral Nightly Barrington Jaramillo MD   10 mg at  10/02/19 2025         Assessment/Plan     -Syncope - following an episode of nausea, vomiting, and diarrhea ; she also was reported to have had a spell while in bed at the [facility] including shaking and abnormal eye movement   -Hypertension   -Hyperlipidemia   -Chronic kidney disease, stage III  -Anemia - stable   -Abnormal MRI - neurology following, workup ongoing     Plan -   Cardiac workup is essentially normal - no significant abnormalities on echo to explain syncope, no arrhythmia on telemetry  However 14 day zio patch will be order to be placed at discharge for longer monitoring - if this is abnormal, will contact the patient to schedule follow up   Orthostatic blood pressures WNL   Will sign off, recall KONRAD Ernandez, ASH  10/03/19  11:27 AM

## 2019-10-03 NOTE — PLAN OF CARE
Problem: Patient Care Overview  Goal: Plan of Care Review  Outcome: Ongoing (interventions implemented as appropriate)   10/03/19 0226   Coping/Psychosocial   Plan of Care Reviewed With patient   Plan of Care Review   Progress improving   OTHER   Outcome Summary No c/o dizziness , up ad hien , no king changes , pt planning on going home in am , cont to monitor        Problem: Fall Risk (Adult)  Goal: Absence of Fall  Outcome: Ongoing (interventions implemented as appropriate)      Problem: Syncope (Adult)  Goal: Physical Safety/Health Maintenance  Outcome: Ongoing (interventions implemented as appropriate)      Problem: Cardiac: ACS (Acute Coronary Syndrome) (Adult)  Goal: Signs and Symptoms of Listed Potential Problems Will be Absent, Minimized or Managed (Cardiac: ACS)  Outcome: Ongoing (interventions implemented as appropriate)

## 2019-10-03 NOTE — PROGRESS NOTES
"  No new \"spells\"  Review of Systems   Constitutional: Positive for activity change.   HENT: Negative.    Eyes: Negative.    Respiratory: Negative.    Cardiovascular: Negative.    Gastrointestinal: Negative.    Endocrine: Negative.    Genitourinary: Negative.    Musculoskeletal: Negative.    Skin: Negative.    Allergic/Immunologic: Negative.    Neurological: Negative.    Hematological: Negative.    Psychiatric/Behavioral: Negative.      Temp:  [98 °F (36.7 °C)-98.4 °F (36.9 °C)] 98.3 °F (36.8 °C)  Heart Rate:  [62-78] 63  Resp:  [20] 20  BP: (131-163)/(55-67) 131/55  I/O last 3 completed shifts:  In: 960 [P.O.:960]  Out: 1800 [Urine:1800]  No intake/output data recorded.    Physical Exam   Constitutional: She appears well-developed and well-nourished.   HENT:   Head: Normocephalic and atraumatic.   Right Ear: External ear normal.   Left Ear: External ear normal.   Nose: Nose normal.   Mouth/Throat: Oropharynx is clear and moist.   Eyes: Conjunctivae and EOM are normal. Pupils are equal, round, and reactive to light.   Neck: Normal range of motion. Neck supple.   Cardiovascular: Normal rate, regular rhythm, normal heart sounds and intact distal pulses.   Pulmonary/Chest: Effort normal and breath sounds normal.   Abdominal: Soft. Bowel sounds are normal.   Musculoskeletal: Normal range of motion.   Neurological: She is alert.   Skin: Skin is warm. Capillary refill takes less than 2 seconds.   Psychiatric: She has a normal mood and affect. Her behavior is normal. Judgment and thought content normal.   Nursing note and vitals reviewed.        Syncope    Awaiting neuro input on MRI and EEG results    "

## 2019-10-04 ENCOUNTER — HOSPITAL ENCOUNTER (OUTPATIENT)
Dept: CARDIOLOGY | Facility: HOSPITAL | Age: 79
Setting detail: OBSERVATION
Discharge: HOME | End: 2019-10-04

## 2019-10-04 VITALS
WEIGHT: 174.5 LBS | TEMPERATURE: 98.5 F | RESPIRATION RATE: 18 BRPM | HEART RATE: 64 BPM | HEIGHT: 65 IN | SYSTOLIC BLOOD PRESSURE: 153 MMHG | DIASTOLIC BLOOD PRESSURE: 53 MMHG | BODY MASS INDEX: 29.07 KG/M2 | OXYGEN SATURATION: 98 %

## 2019-10-04 LAB — MAGNESIUM SERPL-MCNC: 2.1 MG/DL (ref 1.6–2.4)

## 2019-10-04 PROCEDURE — 99213 OFFICE O/P EST LOW 20 MIN: CPT | Performed by: NURSE PRACTITIONER

## 2019-10-04 PROCEDURE — 99217 PR OBSERVATION CARE DISCHARGE MANAGEMENT: CPT | Performed by: FAMILY MEDICINE

## 2019-10-04 PROCEDURE — 83735 ASSAY OF MAGNESIUM: CPT | Performed by: PSYCHIATRY & NEUROLOGY

## 2019-10-04 PROCEDURE — 0296T HC EXT ECG > 48HR TO 21 DAY RCRD W/CONECT INTL RCRD: CPT

## 2019-10-04 PROCEDURE — G0378 HOSPITAL OBSERVATION PER HR: HCPCS

## 2019-10-04 RX ADMIN — CITALOPRAM 20 MG: 20 TABLET, FILM COATED ORAL at 08:20

## 2019-10-04 RX ADMIN — VITAMIN D 2000 UNITS: 25 TAB ORAL at 08:20

## 2019-10-04 RX ADMIN — HYDROCODONE BITARTRATE AND ACETAMINOPHEN 0.5 TABLET: 5; 325 TABLET ORAL at 05:38

## 2019-10-04 RX ADMIN — LEVOTHYROXINE SODIUM 50 MCG: 50 TABLET ORAL at 05:32

## 2019-10-04 RX ADMIN — LOSARTAN POTASSIUM 100 MG: 50 TABLET, FILM COATED ORAL at 08:20

## 2019-10-04 NOTE — DISCHARGE INSTRUCTIONS
[facility] Neurosurgery    Dear Patient,  You recently were admitted to the hospital for small hemorrhage versus lesion near the right temporal lobe and are now ready to go home. These written instructions are intended to help you to recover quickly.  • If you have ANY QUESTIONS about your condition prior to discharge please ask Dr. Chilel. In particular, if you have concerns about going home discuss them now. We do not want you to go until you are completely comfortable leaving the hospital.   • If you have ANY QUESTIONS about your condition after you go home call your doctor. The number is [phone] which is answered 24 hours a day. During regular working hours a  will connect you to your doctor, one of his partners, or one of our nurses. At night or on weekends the answering service will connect you with the physicianon call. DO NOT HESITATE to call. We want to help you with any problems.     Seizures  Anyone who has brain injury has a small risk of seizures. Seizures may involve involuntary shaking of the arms and legs, loss of consciousness, loss of urinary or bowel control. They typically last a few minutes, then the patient returns to normal. Seizures are frightening to watch, but usually resolve without long-term problems. Your doctor will often attempt to prevent seizures after surgery by putting you on anti-seizure medicine like Dilantin or Keppra. Sometimes seizures occur even when these medicines are used  What to do if a seizure occurs:  • If a seizure occurs and the patient does not return to normal in 10 minutes, call your Dr. Chilel or go to the local emergency room.   • If multiple seizures occur, call 911.     Neurological Deficit  Neurological deficits are problems with brain function like speech difficulty, weakness, numbness, imbalance, etc. These deficits may be present before or after brain injury. Prior to discharge Dr. Chilel will make sure that all treatment needed  to help you recover from such deficits has been instituted. He will also make sure that these deficits are stable or improving. After you go home, if you think any of your brain problems are getting worse, not better, it may be a sign of bleeding, infection, or other problems. Call Dr. Chilel. He will order tests and prescribe treatment as needed.    Deep vein thrombosis/ pulmonary embolus  Some patients who are admitted to the hospital develop blood clots in the veins of the legs. These are caused by decreased walking and laying in bed.  These clots can cause pain or swelling in the legs, or may cause no obvious problem. They can break free from the legs and travel to the lungs causing shortness of breath and/or chest pain. If you develop pain or swelling in your legs after surgery, call your doctor. If you develop breathing problems or chest pain after surgery, call 911.    Medication  It is important to take your medication EXACTLY as prescribed. Some patients are reluctant to take pain medication. It is perfectly fine to take pain medication for several weeks after injury. We want to eliminate pain whenever possible. Many pain medications can cause nausea (sick to your stomach), constipation (inability to poop), or itching. Nausea may be minimized by taking the medication with food. Constipation can be relieved by taking stool softeners and/ or laxatives that you can purchase over the counter as needed. Some medications, like steroids or seizure medications, should not be stopped abruptly. They need to be weaned off over time. For instructions on weaning schedules call your doctor. Sometimes patients develop an allergy to a medication that was started during hospitalization. Most frequently an allergy will cause an itchy rash. Call your doctor if you think you might be having an allergic reaction.    Hydrocephalus  Your brain manufactures about one quart of clear fluid (called cerebrospinal fluid or CSF)  every day. Normally this fluid is reabsorbed into the blood stream. After brain injury the flow of fluid and the reabsorption process may be impaired. This leads to a buildup of water on the brain that is called hydrocephalus. Hydrocephalus can cause headache, somnolence, difficulty thinking, imbalance and loss of urinary control. If you think that the patient may have hydrocephalus, call your doctor. She/He will order a brain scan. If it shows water buildup a simple operation called a shunt may be needed to fix the problem.    How to contact your doctor  The neurosurgeon, Dr. Chilel, and her/his team did your surgery and, therefore, are likely to know more about your condition than any other physicians. We are immediately available to help you with any problems after surgery. Please call us for any concerns at the following numbers:   • Doctor’s office [phone] (answered 24 hours a day)   • [facility]'s  [phone] (alternative emergency number for on-call neurosurgeon)     Specific instructions related to your surgery  Diet: no restrictions, eat a heart healthy diet.   Activity: as tolerated, no heavy lifting or strenuous exercise for at least 2 weeks.    Follow up:   You should call as soon as possible for follow up with Rafael ALEMAN/ Dr. Chilel in the neurosurgery clinic ([phone]) in 6 weeks.

## 2019-10-04 NOTE — CONSULTS
NEUROSURGERY INITIAL HOSPITAL ENCOUNTER    Assessment/Plan:   Paige Martinez is a 78 y.o. female with a significant medical history of hypertension, hyperlipidemia, anxiety, depression, and nephrolithiasis.  She presents with a new problem of syncope and fall with no significant complaints.  Physical exam findings of hematoma in various stages of healing surrounding right eye; neurologically intact.  Their imaging shows a small area of abnormal signal with associated enhancement adjacent to the right temporal lobe that could represent either a hemorrhage or small lesion.    Differential Diagnosis:   Small subdural hemorrhage versus lesion    Recommendations:  Ms. Martinez is neurologically intact with no significant complaints.  Given she is not currently taking anticoagulants no additional imaging is needed.  Recommend close observation with outpatient follow-up in the neurosurgical clinic.    Subdural hematoma  In a large database study from [place] (2015) the cumulative risk of recurrent subdural hematoma was 9% at 4 weeks after the primary bleeding, increasing to and stabilising at 14% after one year. Predictors associated with recurrence were: Male sex (RR 1.60, 95% CI:1.43-1.80), older age (>70 years compared to 20-49 years; RR 1.41, 95% CI: 1.21-1.65), alcohol addiction (RR 1.20, 95% CI:1.04-1.37), surgical treatment (RR 1.76, 95% CI:1.58-1.96), trauma diagnoses (RR 1.14, 95% CI:1.03-1.27), and diabetes mellitus (RR 1.40, 95% CI:1.11-1.74). Out of a selected combination of risk factors, the highest cumulative 1-year recurrence risks for subdural hematoma of 25% (compared to 14% for all patients) was found in surgically treated males with diabetes mellitus.  Oddly adjusting for anticoagulation use did not significantly alter the risk of recurrence.     I discussed the patients findings and my recommendations with patient and primary care team    Thank you very much for this interesting consult.     Level of Risk:  Moderate due to: undiagnosed new problem  MDM: Moderate Complexity  Mod = 66465, High=99223  ___________________________________________________________________    Reason for consult: Abnormal MRI brain    Chief Complaint: None    HPI: Paige Martinez is a 78 y.o. female with a significant medical history of hypertension, hyperlipidemia, anxiety, depression, and nephrolithiasis.  She presents today with no significant complaints.  She was transferred from [facility] on 10/2/19 for further evaluation and care following an episode of emesis and syncope that occurred 9/29/2019 at approximately 0200.  Ms. Martinez states she experienced a bout of nausea, ambulated to her bathroom, shortly after returning to her room carrying a bucket and sitting on the edge of her bed.  She is unsure of any specific events or time, but states she woke up on the floor, her nose was bleeding, and the bucket she was previously holding was broken in multiple pieces.  At approximately 0500 she notified her son and he transferred her to [facility] ED.  Ms. Martinez does report a previous similar event that occurred approximately 2 years prior.    Currently, Ms. Martinez denies headaches, dizziness, blurred vision, diplopia, pain with ocular motion, additional episodes of syncope, seizure-like activity, upper or lower extremity weakness, unilateral weakness, numbness or paresthesias to the upper or lower extremities, or additional episodes of nausea and/or vomiting.  She does report occasional neck stiffness which is unchanged prior to fall.  She denies thoracic or lumbar back pain.  She additionally denies fevers, chills, night sweats, unexplained weight loss, saddle anesthesia, or bowel bladder dysfunction.  Ms. Martinez states she is overall fairly healthy and generally active, keeping her grandchildren when needed.  She currently rates the severity of her symptoms 0/10.  No additional concerns at this time.    Review of  Systems   Constitutional: Negative.  Negative for activity change, appetite change, chills, diaphoresis, fatigue, fever and unexpected weight change.   HENT: Negative.    Eyes: Negative.  Negative for photophobia and visual disturbance.   Respiratory: Negative.    Cardiovascular: Negative.    Gastrointestinal: Negative.  Negative for nausea and vomiting.   Endocrine: Negative.    Genitourinary: Negative.    Musculoskeletal: Negative.  Negative for back pain, gait problem, joint swelling, neck pain and neck stiffness.   Skin: Positive for color change.   Allergic/Immunologic: Negative.    Neurological: Positive for syncope. Negative for dizziness, tremors, seizures, facial asymmetry, speech difficulty, weakness, light-headedness, numbness and headaches.   Hematological: Negative.    Psychiatric/Behavioral: Negative.    All other systems reviewed and are negative.     Past Medical History:  has a past medical history of Anxiety, Cholelithiasis, Colon polyp, Depression, Disease of thyroid gland, Hyperlipidemia, Hypertension, Myalgia, and Nephrolithiasis.    Past Surgical History:  has a past surgical history that includes Colonoscopy (07/15/2014).    Family History: family history includes Arthritis in her father and mother; Hypertension in her sister.    Social History:  reports that she quit smoking about 5 years ago. She has never used smokeless tobacco. She reports that she does not drink alcohol or use drugs.    Allergies: Patient has no known allergies.    Home Medications:   Current Facility-Administered Medications:   •  atorvastatin (LIPITOR) tablet 10 mg, 10 mg, Oral, Nightly, Barrington Jaramillo MD, 10 mg at 10/03/19 2001  •  cholecalciferol (VITAMIN D3) tablet 2,000 Units, 2,000 Units, Oral, Daily, Barrington Jaramillo MD, 2,000 Units at 10/04/19 0820  •  citalopram (CeleXA) tablet 20 mg, 20 mg, Oral, Daily, Barrington Jaramillo MD, 20 mg at 10/04/19 0820  •  cloNIDine (CATAPRES) tablet 0.1 mg, 0.1  mg, Oral, Q6H PRN, Barrington Jaramillo MD, 0.1 mg at 10/02/19 0137  •  HYDROcodone-acetaminophen (NORCO) 5-325 MG per tablet 1 tablet, 1 tablet, Oral, Daily PRN, Barrington Jaramillo MD, 0.5 tablet at 10/04/19 0538  •  levothyroxine (SYNTHROID, LEVOTHROID) tablet 50 mcg, 50 mcg, Oral, Q AM, Barrington Jaramillo MD, 50 mcg at 10/04/19 0532  •  losartan (COZAAR) tablet 100 mg, 100 mg, Oral, Q24H, Barrington Jaramillo MD, 100 mg at 10/04/19 0820  •  montelukast (SINGULAIR) tablet 10 mg, 10 mg, Oral, Nightly, Barrington Jaramillo MD, 10 mg at 10/03/19 2001    Medications: Scheduled Meds:  atorvastatin 10 mg Oral Nightly   cholecalciferol 2,000 Units Oral Daily   citalopram 20 mg Oral Daily   levothyroxine 50 mcg Oral Q AM   losartan 100 mg Oral Q24H   montelukast 10 mg Oral Nightly     Continuous Infusions:   PRN Meds:.cloNIDine  •  HYDROcodone-acetaminophen    Vital Signs  Temp:  [98 °F (36.7 °C)-98.6 °F (37 °C)] 98.6 °F (37 °C)  Heart Rate:  [63-75] 75  Resp:  [18-20] 18  BP: (131-154)/(55-71) 147/71    Physical Exam  Physical Exam   Constitutional: She is oriented to person, place, and time. She appears well-developed and well-nourished. She is cooperative.  Non-toxic appearance. She does not have a sickly appearance. She does not appear ill. No distress.   BMI 29.04   HENT:   Head: Normocephalic and atraumatic.   Right Ear: Hearing normal.   Left Ear: Hearing normal.   Mouth/Throat: Mucous membranes are normal.   Eyes: Conjunctivae and EOM are normal. Pupils are equal, round, and reactive to light.   Neck: Trachea normal and full passive range of motion without pain. Neck supple.   Cardiovascular: Normal rate and regular rhythm.   Pulmonary/Chest: Effort normal. No accessory muscle usage. No apnea, no tachypnea and no bradypnea. No respiratory distress.   Abdominal: Soft. Normal appearance.   Neurological: She is alert and oriented to person, place, and time. Gait normal.   Reflex Scores:       Tricep  reflexes are 2+ on the right side and 2+ on the left side.       Bicep reflexes are 2+ on the right side and 2+ on the left side.       Brachioradialis reflexes are 2+ on the right side and 2+ on the left side.       Patellar reflexes are 2+ on the right side and 2+ on the left side.       Achilles reflexes are 2+ on the right side and 2+ on the left side.  Skin: Skin is warm, dry and intact. Bruising noted.   Hematoma and very stages of healing surrounding right eye   Psychiatric: She has a normal mood and affect. Her speech is normal and behavior is normal.   Nursing note and vitals reviewed.      Neurologic Exam     Mental Status   Oriented to person, place, and time.   Attention: normal. Concentration: normal.   Speech: speech is normal   Level of consciousness: alert  Able to name object.     Oriented x3.  Follows commands.  Shows thumbs up into fingers bilaterally.     Cranial Nerves     CN II   Visual fields full to confrontation.     CN III, IV, VI   Pupils are equal, round, and reactive to light.  Extraocular motions are normal.     CN V   Facial sensation intact.     CN VII   Facial expression full, symmetric.     CN VIII   CN VIII normal.     CN IX, X   CN IX normal.     CN XI   CN XI normal.     Motor Exam   Muscle bulk: normal  Overall muscle tone: normal  Right arm tone: normal  Left arm tone: normal  Right arm pronator drift: absent  Left arm pronator drift: absent  Right leg tone: normal  Left leg tone: normal    Strength   Right deltoid: 5/5  Left deltoid: 5/5  Right biceps: 5/5  Left biceps: 5/5  Right triceps: 5/5  Left triceps: 5/5  Right wrist extension: 5/5  Left wrist extension: 5/5  Right iliopsoas: 5/5  Left iliopsoas: 5/5  Right quadriceps: 5/5  Left quadriceps: 5/5  Right anterior tibial: 5/5  Left anterior tibial: 5/5  Right posterior tibial: 5/5  Left posterior tibial: 5/5  No pronator drift or dysmetria     Sensory Exam   Right arm light touch: normal  Left arm light touch:  normal  Right leg light touch: normal  Left leg light touch: normal    Gait, Coordination, and Reflexes     Gait  Gait: normal    Tremor   Resting tremor: absent  Intention tremor: absent  Action tremor: absent    Reflexes   Right brachioradialis: 2+  Left brachioradialis: 2+  Right biceps: 2+  Left biceps: 2+  Right triceps: 2+  Left triceps: 2+  Right patellar: 2+  Left patellar: 2+  Right achilles: 2+  Left achilles: 2+  Right : 4+  Left : 4+  Right plantar: normal  Left plantar: normal  Right Faulkner: absent  Left Faulkner: absent  Right ankle clonus: absent  Left ankle clonus: absent  Right pendular knee jerk: absent  Left pendular knee jerk: absent    Results Review:   Independent review and interpretation of imaging  Imaging Results (last 24 hours)     ** No results found for the last 24 hours. **        MRI brain:  10/2/19      MRI spine:   CT Head:  CT c-spine:  CT t-spine:  CT l-spine:  X-ray:    I reviewed the patient's new clinical results.  Lab Results (last 24 hours)     Procedure Component Value Units Date/Time    Magnesium [[id number]]  (Normal) Collected:  10/04/19 0555    Specimen:  Blood Updated:  10/04/19 0659     Magnesium 2.1 mg/dL         Rafael Castillo, APRN

## 2019-10-04 NOTE — PLAN OF CARE
Problem: Patient Care Overview  Goal: Plan of Care Review  Outcome: Ongoing (interventions implemented as appropriate)   10/04/19 0787   Coping/Psychosocial   Plan of Care Reviewed With patient   Plan of Care Review   Progress improving   OTHER   Outcome Summary Pt A&O. Room air. No neuro changes and no syncopal episodes. Halter monitor to be placed upon DC. Neurosurgery consult placed. VSS. DC possible today. Safety maintained.

## 2019-10-04 NOTE — PLAN OF CARE
Problem: Patient Care Overview  Goal: Plan of Care Review  Outcome: Ongoing (interventions implemented as appropriate)   10/04/19 0602   Coping/Psychosocial   Plan of Care Reviewed With patient   Plan of Care Review   Progress improving   OTHER   Outcome Summary Pt c/o pain this am, given 0.5 tablet Norco. A & O. No neuro changes, no syncopal episodes. New iv access started in the left forearm. Up ad hien. Sinus 64-83 on tele. Safety maintained, no falls. Possible d/c today. Continue to monitor.      Goal: Individualization and Mutuality  Outcome: Ongoing (interventions implemented as appropriate)    Goal: Discharge Needs Assessment  Outcome: Ongoing (interventions implemented as appropriate)      Problem: Fall Risk (Adult)  Goal: Absence of Fall  Outcome: Ongoing (interventions implemented as appropriate)    Goal: Identify Related Risk Factors and Signs and Symptoms  Outcome: Outcome(s) achieved Date Met: 10/04/19    Goal: Absence of Fall  Outcome: Ongoing (interventions implemented as appropriate)      Problem: Syncope (Adult)  Goal: Physical Safety/Health Maintenance  Outcome: Ongoing (interventions implemented as appropriate)    Goal: Optimal Emotional/Functional Nelson  Outcome: Ongoing (interventions implemented as appropriate)      Problem: Cardiac: ACS (Acute Coronary Syndrome) (Adult)  Goal: Signs and Symptoms of Listed Potential Problems Will be Absent, Minimized or Managed (Cardiac: ACS)  Outcome: Ongoing (interventions implemented as appropriate)

## 2019-10-11 ENCOUNTER — OFFICE VISIT (OUTPATIENT)
Dept: FAMILY MEDICINE CLINIC | Facility: CLINIC | Age: 79
End: 2019-10-11

## 2019-10-11 VITALS
SYSTOLIC BLOOD PRESSURE: 146 MMHG | HEIGHT: 65 IN | OXYGEN SATURATION: 98 % | RESPIRATION RATE: 16 BRPM | TEMPERATURE: 98.8 F | WEIGHT: 175 LBS | HEART RATE: 68 BPM | BODY MASS INDEX: 29.16 KG/M2 | DIASTOLIC BLOOD PRESSURE: 90 MMHG

## 2019-10-11 DIAGNOSIS — R93.89 ABNORMAL MRI: ICD-10-CM

## 2019-10-11 DIAGNOSIS — I10 ESSENTIAL HYPERTENSION: Primary | ICD-10-CM

## 2019-10-11 PROCEDURE — 99213 OFFICE O/P EST LOW 20 MIN: CPT | Performed by: FAMILY MEDICINE

## 2019-10-11 PROCEDURE — G0439 PPPS, SUBSEQ VISIT: HCPCS | Performed by: FAMILY MEDICINE

## 2019-10-11 NOTE — PROGRESS NOTES
The ABCs of the Annual Wellness Visit  Subsequent Medicare Wellness Visit    Chief Complaint   Patient presents with   • Medicare Wellness-subsequent     er/follow up       Subjective   History of Present Illness:  Paige Martinez is a 78 y.o. female who presents for a Subsequent Medicare Wellness Visit.    HEALTH RISK ASSESSMENT    Recent Hospitalizations:  Recently treated at the following:  [facility]    Current Medical Providers:  Patient Care Team:  Barrington Jaramillo MD as PCP - General  Barrington Jaramillo MD as PCP - Claims Attributed  Gonzalez Cordero MD as Consulting Physician (Gastroenterology)    Smoking Status:  Social History     Tobacco Use   Smoking Status Former Smoker   • Last attempt to quit:    • Years since quittin.7   Smokeless Tobacco Never Used       Alcohol Consumption:  Social History     Substance and Sexual Activity   Alcohol Use No   • Frequency: Never    Comment: occ       Depression Screen:   PHQ-2/PHQ-9 Depression Screening 10/11/2019   Little interest or pleasure in doing things 0   Feeling down, depressed, or hopeless 0   Trouble falling or staying asleep, or sleeping too much 0   Feeling tired or having little energy 0   Poor appetite or overeating 0   Feeling bad about yourself - or that you are a failure or have let yourself or your family down 0   Trouble concentrating on things, such as reading the newspaper or watching television 0   Moving or speaking so slowly that other people could have noticed. Or the opposite - being so fidgety or restless that you have been moving around a lot more than usual 0   Thoughts that you would be better off dead, or of hurting yourself in some way 0   Total Score 0   If you checked off any problems, how difficult have these problems made it for you to do your work, take care of things at home, or get along with other people? Not difficult at all       Fall Risk Screen:  STEADI Fall Risk Assessment was completed, and  patient is at MODERATE risk for falls. Assessment completed on:10/11/2019    Health Habits and Functional and Cognitive Screening:  Functional & Cognitive Status 10/11/2019   Do you have difficulty preparing food and eating? No   Do you have difficulty bathing yourself, getting dressed or grooming yourself? No   Do you have difficulty using the toilet? No   Do you have difficulty moving around from place to place? No   Do you have trouble with steps or getting out of a bed or a chair? No   Current Diet Well Balanced Diet   Dental Exam Up to date   Eye Exam Up to date   Exercise (times per week) 7 times per week   Current Exercise Activities Include Housecleaning   Do you need help using the phone?  No   Are you deaf or do you have serious difficulty hearing?  No   Do you need help with transportation? No   Do you need help shopping? No   Do you need help preparing meals?  No   Do you need help with housework?  No   Do you need help with laundry? No   Do you need help taking your medications? No   Do you need help managing money? No   Do you ever drive or ride in a car without wearing a seat belt? No   Have you felt unusual stress, anger or loneliness in the last month? No   Who do you live with? Alone   If you need help, do you have trouble finding someone available to you? No   Have you been bothered in the last four weeks by sexual problems? No   Do you have difficulty concentrating, remembering or making decisions? No         Does the patient have evidence of cognitive impairment? No    Asprin use counseling:Does not need ASA (and currently is not on it)    Age-appropriate Screening Schedule:  Refer to the list below for future screening recommendations based on patient's age, sex and/or medical conditions. Orders for these recommended tests are listed in the plan section. The patient has been provided with a written plan.    Health Maintenance   Topic Date Due   • URINE MICROALBUMIN  1940   • TDAP/TD  VACCINES (1 - Tdap) 11/14/1961   • ZOSTER VACCINE (1 of 2) 11/14/1992   • PNEUMOCOCCAL VACCINES (65+ LOW/MEDIUM RISK) (1 of 2 - PCV13) 11/14/2007   • HEMOGLOBIN A1C  12/07/2016   • DIABETIC EYE EXAM  02/01/2017   • MAMMOGRAM  08/03/2018   • INFLUENZA VACCINE  08/01/2019   • LIPID PANEL  07/29/2020   • COLONOSCOPY  12/04/2023          The following portions of the patient's history were reviewed and updated as appropriate: allergies, current medications, past family history, past medical history, past social history, past surgical history and problem list.    Outpatient Medications Prior to Visit   Medication Sig Dispense Refill   • atorvastatin (LIPITOR) 10 MG tablet Take 10 mg by mouth Daily.     • bumetanide (BUMEX) 0.5 MG tablet Take 1 mg by mouth Daily.     • Cholecalciferol 2000 units capsule Take 2,000 Units by mouth Daily.     • citalopram (CeleXA) 20 MG tablet Take 20 mg by mouth Daily.     • HYDROcodone-acetaminophen (NORCO) 5-325 MG per tablet Take 1 tablet by mouth Daily.     • levothyroxine (SYNTHROID, LEVOTHROID) 50 MCG tablet Take 50 mcg by mouth Daily.     • losartan (COZAAR) 100 MG tablet Take 100 mg by mouth Daily.     • montelukast (SINGULAIR) 10 MG tablet Take 10 mg by mouth Every Night.     • omeprazole (priLOSEC) 20 MG capsule Take 20 mg by mouth Daily.       No facility-administered medications prior to visit.        Patient Active Problem List   Diagnosis   • Essential hypertension   • Chronic kidney disease, stage III (moderate) (CMS/HCC)   • Acquired hypothyroidism   • Anxiety   • Mixed hyperlipidemia   • Edema   • Anemia   • Heme positive stool   • Dark stools   • Gastroesophageal reflux disease   • Hip pain, acute, left   • Syncope   • Abnormal MRI       Advanced Care Planning:  power of  for healthcare on file    Review of Systems    Compared to one year ago, the patient feels her physical health is the same.  Compared to one year ago, the patient feels her mental health is the  "same.    Reviewed chart for potential of high risk medication in the elderly: yes  Reviewed chart for potential of harmful drug interactions in the elderly:yes    Objective         Vitals:    10/11/19 0943   BP: 146/90   Pulse: 68   Resp: 16   Temp: 98.8 °F (37.1 °C)   SpO2: 98%   Weight: 79.4 kg (175 lb)   Height: 165.1 cm (65\")   PainSc: 0-No pain       Body mass index is 29.12 kg/m².  Discussed the patient's BMI with her. The BMI is in the acceptable range.    Physical Exam    Lab Results   Component Value Date     (H) 07/29/2019    CHLPL 189 07/29/2019    TRIG 106 07/29/2019    HDL 54 07/29/2019     (H) 07/29/2019    VLDL 21.2 07/29/2019        Assessment/Plan   Medicare Risks and Personalized Health Plan  CMS Preventative Services Quick Reference  Cardiovascular risk    The above risks/problems have been discussed with the patient.  Pertinent information has been shared with the patient in the After Visit Summary.  Follow up plans and orders are seen below in the Assessment/Plan Section.    Diagnoses and all orders for this visit:    1. Essential hypertension (Primary)    2. Abnormal MRI      Follow Up:  No Follow-up on file.     An After Visit Summary and PPPS were given to the patient.             "

## 2019-10-11 NOTE — PROGRESS NOTES
Subjective   Paige Martinez is a 78 y.o. female.     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 and has f/u with neurosurgeyrin a few days--no further spells      Current Outpatient Medications:   •  atorvastatin (LIPITOR) 10 MG tablet, Take 10 mg by mouth Daily., Disp: , Rfl:   •  bumetanide (BUMEX) 0.5 MG tablet, Take 1 mg by mouth Daily., Disp: , Rfl:   •  Cholecalciferol 2000 units capsule, Take 2,000 Units by mouth Daily., Disp: , Rfl:   •  citalopram (CeleXA) 20 MG tablet, Take 20 mg by mouth Daily., Disp: , Rfl:   •  HYDROcodone-acetaminophen (NORCO) 5-325 MG per tablet, Take 1 tablet by mouth Daily., Disp: , Rfl:   •  levothyroxine (SYNTHROID, LEVOTHROID) 50 MCG tablet, Take 50 mcg by mouth Daily., Disp: , Rfl:   •  losartan (COZAAR) 100 MG tablet, Take 100 mg by mouth Daily., Disp: , Rfl:   •  montelukast (SINGULAIR) 10 MG tablet, Take 10 mg by mouth Every Night., Disp: , Rfl:   •  omeprazole (priLOSEC) 20 MG capsule, Take 20 mg by mouth Daily., Disp: , Rfl:   No Known Allergies    Past Medical History:   Diagnosis Date   • Anxiety    • Cholelithiasis    • Colon polyp    • Depression    • Disease of thyroid gland    • Hyperlipidemia    • Hypertension    • Myalgia    • Nephrolithiasis      Past Surgical History:   Procedure Laterality Date   • COLONOSCOPY  07/15/2014    2 rectal polyps, hyperplastic, diverticulosis       Review of Systems   Constitutional: Negative.    HENT: Negative.    Eyes: Negative.    Respiratory: Negative.    Cardiovascular: Negative.    Gastrointestinal: Negative.    Endocrine: Negative.    Genitourinary: Negative.    Musculoskeletal: Negative.    Skin: Negative.    Allergic/Immunologic: Negative.    Neurological: Negative.    Hematological: Negative.        Objective  /90   Pulse 68   Temp 98.8 °F  "(37.1 °C)   Resp 16   Ht 165.1 cm (65\")   Wt 79.4 kg (175 lb)   SpO2 98%   BMI 29.12 kg/m²   Physical Exam   Constitutional: She is oriented to person, place, and time. She appears well-developed and well-nourished.   HENT:   Head: Normocephalic and atraumatic.   Right Ear: External ear normal.   Left Ear: External ear normal.   Mouth/Throat: Oropharynx is clear and moist.   Eyes: Conjunctivae and EOM are normal. Pupils are equal, round, and reactive to light.   Neck: Normal range of motion. Neck supple.   Cardiovascular: Normal rate, regular rhythm, normal heart sounds and intact distal pulses.   Pulmonary/Chest: Effort normal and breath sounds normal.   Abdominal: Soft. Bowel sounds are normal.   Musculoskeletal: Normal range of motion.   Neurological: She is alert and oriented to person, place, and time.   Skin: Skin is warm. Capillary refill takes less than 2 seconds.   Psychiatric: She has a normal mood and affect. Her behavior is normal. Judgment and thought content normal.   Nursing note and vitals reviewed.      Assessment/Plan   Paige was seen today for medicare wellness-subsequent.    Diagnoses and all orders for this visit:    Essential hypertension    Abnormal MRI        Keep appt with ns           No orders of the defined types were placed in this encounter.      Follow up: 3 month(s)  "

## 2019-10-14 NOTE — DISCHARGE SUMMARY
"[facility]   DISCHARGE SUMMARY       Date of Admission: 10/1/2019  Date of Discharge:  10/04/2019  Primary Care Physician: Barrington Jaramillo MD    Presenting Problem/History of Present Illness:  Syncope [R55]     Final Discharge Diagnoses:  Active Hospital Problems    Diagnosis   • Syncope       Consults: neurology, cardiology, neurosurgery    Procedures Performed: zio patch, eeg, mri brain    Pertinent Test Results: noted bleed on mri brain    Chief Complaint on Day of Discharge: \"im ready to go home\"    History of Present Illness on Day of Discharge: no further syncope or seizure     Hospital Course:  The patient is a 78 y.o. female who presented to [facility] with syncopal episode as a transfer from [facility]. She was seen by cardiology and was cleared with zio patch applied. She was seen by neurology where EEG was negative but MRI of brain was suspicious for brain blood. She was seen by NS who elected to follow this as an outpatient..      Condition on Discharge:  stable    Physical Exam on Discharge:  /53 (BP Location: Right arm, Patient Position: Lying)   Pulse 64   Temp 98.5 °F (36.9 °C) (Oral)   Resp 18   Ht 165.1 cm (65\")   Wt 79.2 kg (174 lb 8 oz)   SpO2 98%   BMI 29.04 kg/m²   Physical Exam   Constitutional: She appears well-developed and well-nourished.   Cardiovascular: Normal rate, regular rhythm, normal heart sounds and intact distal pulses.   Pulmonary/Chest: Effort normal and breath sounds normal.   Abdominal: Soft. Bowel sounds are normal.   Nursing note and vitals reviewed.        Discharge Disposition:  Home or Self Care    Discharge Medications:     Discharge Medications      Continue These Medications      Instructions Start Date   atorvastatin 10 MG tablet  Commonly known as:  LIPITOR   10 mg, Oral, Daily      bumetanide 0.5 MG tablet  Commonly known as:  BUMEX   1 mg, Oral, Daily      Cholecalciferol 2000 units capsule   2,000 Units, Oral, Daily    "   citalopram 20 MG tablet  Commonly known as:  CeleXA   20 mg, Oral, Daily      HYDROcodone-acetaminophen 5-325 MG per tablet  Commonly known as:  NORCO   1 tablet, Oral, Daily      levothyroxine 50 MCG tablet  Commonly known as:  SYNTHROID, LEVOTHROID   50 mcg, Oral, Daily      losartan 100 MG tablet  Commonly known as:  COZAAR   100 mg, Oral, Daily      montelukast 10 MG tablet  Commonly known as:  SINGULAIR   10 mg, Oral, Nightly      omeprazole 20 MG capsule  Commonly known as:  priLOSEC   20 mg, Oral, Daily         Stop These Medications    Omega-3 1000 MG capsule            Discharge Diet:   As tolerated  Activity at Discharge:   As tolerated  Discharge Care Plan/Instructions: she will get f/u mri brain and see NS in a few weeks    Follow-up Appointments:   Future Appointments   Date Time Provider Department Center   11/14/2019 12:15 PM PAD B[facility] CT 1 BH PAD CT BI PAD   11/14/2019  1:00 PM Rafael Castillo APRN [facility] PAD None   1/10/2020  9:15 AM Barrington Jaramillo MD [facility] None       Test Results Pending at Discharge: none    Barrington Jaramillo MD  10/14/19  7:15 AM    Time: 7:19am

## 2019-10-28 RX ORDER — LEVOTHYROXINE SODIUM 0.05 MG/1
TABLET ORAL
Qty: 30 TABLET | Refills: 5 | Status: SHIPPED | OUTPATIENT
Start: 2019-10-28 | End: 2020-01-29

## 2019-10-28 RX ORDER — HYDROCODONE BITARTRATE AND ACETAMINOPHEN 5; 325 MG/1; MG/1
TABLET ORAL
Qty: 30 TABLET | Refills: 0 | Status: SHIPPED | OUTPATIENT
Start: 2019-10-28 | End: 2019-12-09 | Stop reason: SDUPTHER

## 2019-10-28 RX ORDER — ATORVASTATIN CALCIUM 10 MG/1
TABLET, FILM COATED ORAL
Qty: 30 TABLET | Refills: 5 | Status: SHIPPED | OUTPATIENT
Start: 2019-10-28 | End: 2021-08-23

## 2019-11-01 PROCEDURE — 0298T PR EXT ECG > 48HR TO 21 DAY REVIEW AND INTERPRETATN: CPT | Performed by: INTERNAL MEDICINE

## 2019-11-07 ENCOUNTER — TELEPHONE (OUTPATIENT)
Dept: FAMILY MEDICINE CLINIC | Facility: CLINIC | Age: 79
End: 2019-11-07

## 2019-11-14 ENCOUNTER — OFFICE VISIT (OUTPATIENT)
Dept: NEUROSURGERY | Facility: CLINIC | Age: 79
End: 2019-11-14

## 2019-11-14 ENCOUNTER — HOSPITAL ENCOUNTER (OUTPATIENT)
Dept: CT IMAGING | Facility: HOSPITAL | Age: 79
Discharge: HOME | End: 2019-11-14
Admitting: NURSE PRACTITIONER

## 2019-11-14 VITALS — WEIGHT: 177 LBS | BODY MASS INDEX: 29.49 KG/M2 | HEIGHT: 65 IN

## 2019-11-14 DIAGNOSIS — E66.3 OVERWEIGHT (BMI 25.0-29.9): ICD-10-CM

## 2019-11-14 DIAGNOSIS — R90.89 ABNORMAL FINDING ON MRI OF BRAIN: ICD-10-CM

## 2019-11-14 DIAGNOSIS — R93.89 ABNORMAL MRI: Primary | ICD-10-CM

## 2019-11-14 PROCEDURE — 70450 CT HEAD/BRAIN W/O DYE: CPT

## 2019-11-14 PROCEDURE — 99214 OFFICE O/P EST MOD 30 MIN: CPT | Performed by: NURSE PRACTITIONER
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
## 2019-11-14 NOTE — PATIENT INSTRUCTIONS
"DASH Eating Plan  DASH stands for \"Dietary Approaches to Stop Hypertension.\" The DASH eating plan is a healthy eating plan that has been shown to reduce high blood pressure (hypertension). It may also reduce your risk for type 2 diabetes, heart disease, and stroke. The DASH eating plan may also help with weight loss.  What are tips for following this plan?    General guidelines  · Avoid eating more than 2,300 mg (milligrams) of salt (sodium) a day. If you have hypertension, you may need to reduce your sodium intake to 1,500 mg a day.  · Limit alcohol intake to no more than 1 drink a day for nonpregnant women and 2 drinks a day for men. One drink equals 12 oz of beer, 5 oz of wine, or 1½ oz of hard liquor.  · Work with your health care provider to maintain a healthy body weight or to lose weight. Ask what an ideal weight is for you.  · Get at least 30 minutes of exercise that causes your heart to beat faster (aerobic exercise) most days of the week. Activities may include walking, swimming, or biking.  · Work with your health care provider or diet and nutrition specialist (dietitian) to adjust your eating plan to your individual calorie needs.  Reading food labels    · Check food labels for the amount of sodium per serving. Choose foods with less than 5 percent of the Daily Value of sodium. Generally, foods with less than 300 mg of sodium per serving fit into this eating plan.  · To find whole grains, look for the word \"whole\" as the first word in the ingredient list.  Shopping  · Buy products labeled as \"low-sodium\" or \"no salt added.\"  · Buy fresh foods. Avoid canned foods and premade or frozen meals.  Cooking  · Avoid adding salt when cooking. Use salt-free seasonings or herbs instead of table salt or sea salt. Check with your health care provider or pharmacist before using salt substitutes.  · Do not cortez foods. Cook foods using healthy methods such as baking, boiling, grilling, and broiling instead.  · Cook with " heart-healthy oils, such as olive, canola, soybean, or sunflower oil.  Meal planning  · Eat a balanced diet that includes:  ? 5 or more servings of fruits and vegetables each day. At each meal, try to fill half of your plate with fruits and vegetables.  ? Up to 6-8 servings of whole grains each day.  ? Less than 6 oz of lean meat, poultry, or fish each day. A 3-oz serving of meat is about the same size as a deck of cards. One egg equals 1 oz.  ? 2 servings of low-fat dairy each day.  ? A serving of nuts, seeds, or beans 5 times each week.  ? Heart-healthy fats. Healthy fats called Omega-3 fatty acids are found in foods such as flaxseeds and coldwater fish, like sardines, salmon, and mackerel.  · Limit how much you eat of the following:  ? Canned or prepackaged foods.  ? Food that is high in trans fat, such as fried foods.  ? Food that is high in saturated fat, such as fatty meat.  ? Sweets, desserts, sugary drinks, and other foods with added sugar.  ? Full-fat dairy products.  · Do not salt foods before eating.  · Try to eat at least 2 vegetarian meals each week.  · Eat more home-cooked food and less restaurant, buffet, and fast food.  · When eating at a restaurant, ask that your food be prepared with less salt or no salt, if possible.  What foods are recommended?  The items listed may not be a complete list. Talk with your dietitian about what dietary choices are best for you.  Grains  Whole-grain or whole-wheat bread. Whole-grain or whole-wheat pasta. Brown rice. Oatmeal. Quinoa. Bulgur. Whole-grain and low-sodium cereals. Amira bread. Low-fat, low-sodium crackers. Whole-wheat flour tortillas.  Vegetables  Fresh or frozen vegetables (raw, steamed, roasted, or grilled). Low-sodium or reduced-sodium tomato and vegetable juice. Low-sodium or reduced-sodium tomato sauce and tomato paste. Low-sodium or reduced-sodium canned vegetables.  Fruits  All fresh, dried, or frozen fruit. Canned fruit in natural juice (without  added sugar).  Meat and other protein foods  Skinless chicken or turkey. Ground chicken or turkey. Pork with fat trimmed off. Fish and seafood. Egg whites. Dried beans, peas, or lentils. Unsalted nuts, nut butters, and seeds. Unsalted canned beans. Lean cuts of beef with fat trimmed off. Low-sodium, lean deli meat.  Dairy  Low-fat (1%) or fat-free (skim) milk. Fat-free, low-fat, or reduced-fat cheeses. Nonfat, low-sodium ricotta or cottage cheese. Low-fat or nonfat yogurt. Low-fat, low-sodium cheese.  Fats and oils  Soft margarine without trans fats. Vegetable oil. Low-fat, reduced-fat, or light mayonnaise and salad dressings (reduced-sodium). Canola, safflower, olive, soybean, and sunflower oils. Avocado.  Seasoning and other foods  Herbs. Spices. Seasoning mixes without salt. Unsalted popcorn and pretzels. Fat-free sweets.  What foods are not recommended?  The items listed may not be a complete list. Talk with your dietitian about what dietary choices are best for you.  Grains  Baked goods made with fat, such as croissants, muffins, or some breads. Dry pasta or rice meal packs.  Vegetables  Creamed or fried vegetables. Vegetables in a cheese sauce. Regular canned vegetables (not low-sodium or reduced-sodium). Regular canned tomato sauce and paste (not low-sodium or reduced-sodium). Regular tomato and vegetable juice (not low-sodium or reduced-sodium). Pickles. Olives.  Fruits  Canned fruit in a light or heavy syrup. Fried fruit. Fruit in cream or butter sauce.  Meat and other protein foods  Fatty cuts of meat. Ribs. Fried meat. Corey. Sausage. Bologna and other processed lunch meats. Salami. Fatback. Hotdogs. Bratwurst. Salted nuts and seeds. Canned beans with added salt. Canned or smoked fish. Whole eggs or egg yolks. Chicken or turkey with skin.  Dairy  Whole or 2% milk, cream, and half-and-half. Whole or full-fat cream cheese. Whole-fat or sweetened yogurt. Full-fat cheese. Nondairy creamers. Whipped toppings.  Processed cheese and cheese spreads.  Fats and oils  Butter. Stick margarine. Lard. Shortening. Ghee. Corey fat. Tropical oils, such as coconut, palm kernel, or palm oil.  Seasoning and other foods  Salted popcorn and pretzels. Onion salt, garlic salt, seasoned salt, table salt, and sea salt. Worcestershire sauce. Tartar sauce. Barbecue sauce. Teriyaki sauce. Soy sauce, including reduced-sodium. Steak sauce. Canned and packaged gravies. Fish sauce. Oyster sauce. Cocktail sauce. Horseradish that you find on the shelf. Ketchup. Mustard. Meat flavorings and tenderizers. Bouillon cubes. Hot sauce and Tabasco sauce. Premade or packaged marinades. Premade or packaged taco seasonings. Relishes. Regular salad dressings.  Where to find more information:  · National Heart, Lung, and Blood [place]: www.nhlbi.nih.gov  · American Heart Association: www.heart.org  Summary  · The DASH eating plan is a healthy eating plan that has been shown to reduce high blood pressure (hypertension). It may also reduce your risk for type 2 diabetes, heart disease, and stroke.  · With the DASH eating plan, you should limit salt (sodium) intake to 2,300 mg a day. If you have hypertension, you may need to reduce your sodium intake to 1,500 mg a day.  · When on the DASH eating plan, aim to eat more fresh fruits and vegetables, whole grains, lean proteins, low-fat dairy, and heart-healthy fats.  · Work with your health care provider or diet and nutrition specialist (dietitian) to adjust your eating plan to your individual calorie needs.  This information is not intended to replace advice given to you by your health care provider. Make sure you discuss any questions you have with your health care provider.  Document Released: 12/06/2012 Document Revised: 12/11/2017 Document Reviewed: 12/11/2017  [employer] Interactive Patient Education © 2019 [employer]

## 2019-12-09 DIAGNOSIS — M19.90 OSTEOARTHRITIS, UNSPECIFIED OSTEOARTHRITIS TYPE, UNSPECIFIED SITE: Primary | ICD-10-CM

## 2019-12-09 RX ORDER — LOSARTAN POTASSIUM 100 MG/1
TABLET ORAL
Qty: 90 TABLET | Refills: 1 | Status: SHIPPED | OUTPATIENT
Start: 2019-12-09 | End: 2020-08-10

## 2019-12-09 RX ORDER — HYDROCODONE BITARTRATE AND ACETAMINOPHEN 5; 325 MG/1; MG/1
TABLET ORAL
Qty: 30 TABLET | Refills: 0 | Status: SHIPPED | OUTPATIENT
Start: 2019-12-09 | End: 2020-01-29 | Stop reason: SDUPTHER

## 2020-01-06 RX ORDER — CITALOPRAM 20 MG/1
TABLET ORAL
Qty: 90 TABLET | Refills: 1 | Status: SHIPPED | OUTPATIENT
Start: 2020-01-06 | End: 2020-09-14

## 2020-01-21 DIAGNOSIS — I10 ESSENTIAL HYPERTENSION: Primary | ICD-10-CM

## 2020-01-21 DIAGNOSIS — E03.9 ACQUIRED HYPOTHYROIDISM: ICD-10-CM

## 2020-01-21 DIAGNOSIS — E78.2 MIXED HYPERLIPIDEMIA: ICD-10-CM

## 2020-01-23 DIAGNOSIS — E03.9 ACQUIRED HYPOTHYROIDISM: Primary | ICD-10-CM

## 2020-01-23 LAB
ALBUMIN SERPL-MCNC: 4.4 G/DL (ref 3.5–5.2)
ALBUMIN/GLOB SERPL: 1.9 G/DL
ALP SERPL-CCNC: 71 U/L (ref 39–117)
ALT SERPL-CCNC: 9 U/L (ref 1–33)
AST SERPL-CCNC: 14 U/L (ref 1–32)
BASOPHILS # BLD AUTO: 0.04 10*3/MM3 (ref 0–0.2)
BASOPHILS NFR BLD AUTO: 0.5 % (ref 0–1.5)
BILIRUB SERPL-MCNC: 0.4 MG/DL (ref 0.2–1.2)
BUN SERPL-MCNC: 17 MG/DL (ref 8–23)
BUN/CREAT SERPL: 14.8 (ref 7–25)
CALCIUM SERPL-MCNC: 8.8 MG/DL (ref 8.6–10.5)
CHLORIDE SERPL-SCNC: 100 MMOL/L (ref 98–107)
CHOLEST SERPL-MCNC: 189 MG/DL (ref 0–200)
CO2 SERPL-SCNC: 28 MMOL/L (ref 22–29)
CREAT SERPL-MCNC: 1.15 MG/DL (ref 0.57–1)
EOSINOPHIL # BLD AUTO: 0.12 10*3/MM3 (ref 0–0.4)
EOSINOPHIL NFR BLD AUTO: 1.5 % (ref 0.3–6.2)
ERYTHROCYTE [DISTWIDTH] IN BLOOD BY AUTOMATED COUNT: 12.6 % (ref 12.3–15.4)
GLOBULIN SER CALC-MCNC: 2.3 GM/DL
GLUCOSE SERPL-MCNC: 117 MG/DL (ref 65–99)
HCT VFR BLD AUTO: 35.7 % (ref 34–46.6)
HDLC SERPL-MCNC: 57 MG/DL (ref 40–60)
HGB BLD-MCNC: 11.9 G/DL (ref 12–15.9)
IMM GRANULOCYTES # BLD AUTO: 0.02 10*3/MM3 (ref 0–0.05)
IMM GRANULOCYTES NFR BLD AUTO: 0.3 % (ref 0–0.5)
LDLC SERPL CALC-MCNC: 114 MG/DL (ref 0–100)
LYMPHOCYTES # BLD AUTO: 1.95 10*3/MM3 (ref 0.7–3.1)
LYMPHOCYTES NFR BLD AUTO: 25.1 % (ref 19.6–45.3)
MCH RBC QN AUTO: 29.7 PG (ref 26.6–33)
MCHC RBC AUTO-ENTMCNC: 33.3 G/DL (ref 31.5–35.7)
MCV RBC AUTO: 89 FL (ref 79–97)
MONOCYTES # BLD AUTO: 0.57 10*3/MM3 (ref 0.1–0.9)
MONOCYTES NFR BLD AUTO: 7.3 % (ref 5–12)
NEUTROPHILS # BLD AUTO: 5.06 10*3/MM3 (ref 1.7–7)
NEUTROPHILS NFR BLD AUTO: 65.3 % (ref 42.7–76)
NRBC BLD AUTO-RTO: 0 /100 WBC (ref 0–0.2)
PLATELET # BLD AUTO: 238 10*3/MM3 (ref 140–450)
POTASSIUM SERPL-SCNC: 4.9 MMOL/L (ref 3.5–5.2)
PROT SERPL-MCNC: 6.7 G/DL (ref 6–8.5)
RBC # BLD AUTO: 4.01 10*6/MM3 (ref 3.77–5.28)
SODIUM SERPL-SCNC: 141 MMOL/L (ref 136–145)
T4 FREE SERPL-MCNC: 1 NG/DL (ref 0.93–1.7)
TRIGL SERPL-MCNC: 89 MG/DL (ref 0–150)
TSH SERPL DL<=0.005 MIU/L-ACNC: 7.46 UIU/ML (ref 0.27–4.2)
VLDLC SERPL CALC-MCNC: 17.8 MG/DL
WBC # BLD AUTO: 7.76 10*3/MM3 (ref 3.4–10.8)

## 2020-01-23 RX ORDER — LEVOTHYROXINE SODIUM 0.07 MG/1
75 TABLET ORAL DAILY
Qty: 30 TABLET | Refills: 4 | Status: SHIPPED | OUTPATIENT
Start: 2020-01-23 | End: 2020-08-10

## 2020-01-29 ENCOUNTER — OFFICE VISIT (OUTPATIENT)
Dept: FAMILY MEDICINE CLINIC | Facility: CLINIC | Age: 80
End: 2020-01-29

## 2020-01-29 VITALS
OXYGEN SATURATION: 98 % | RESPIRATION RATE: 16 BRPM | DIASTOLIC BLOOD PRESSURE: 78 MMHG | SYSTOLIC BLOOD PRESSURE: 140 MMHG | HEART RATE: 80 BPM | HEIGHT: 65 IN | TEMPERATURE: 98.5 F | WEIGHT: 179 LBS | BODY MASS INDEX: 29.82 KG/M2

## 2020-01-29 DIAGNOSIS — N18.30 CHRONIC KIDNEY DISEASE, STAGE III (MODERATE) (HCC): ICD-10-CM

## 2020-01-29 DIAGNOSIS — E78.2 MIXED HYPERLIPIDEMIA: ICD-10-CM

## 2020-01-29 DIAGNOSIS — M19.90 ARTHRITIS: ICD-10-CM

## 2020-01-29 DIAGNOSIS — I10 ESSENTIAL HYPERTENSION: Primary | ICD-10-CM

## 2020-01-29 DIAGNOSIS — E03.9 ACQUIRED HYPOTHYROIDISM: ICD-10-CM

## 2020-01-29 DIAGNOSIS — M19.90 OSTEOARTHRITIS, UNSPECIFIED OSTEOARTHRITIS TYPE, UNSPECIFIED SITE: ICD-10-CM

## 2020-01-29 PROCEDURE — 99214 OFFICE O/P EST MOD 30 MIN: CPT | Performed by: FAMILY MEDICINE

## 2020-01-29 RX ORDER — HYDROCODONE BITARTRATE AND ACETAMINOPHEN 5; 325 MG/1; MG/1
1 TABLET ORAL DAILY PRN
Qty: 30 TABLET | Refills: 0 | Status: SHIPPED | OUTPATIENT
Start: 2020-01-29 | End: 2020-03-10 | Stop reason: SDUPTHER

## 2020-01-29 NOTE — PROGRESS NOTES
Subjective   Paige Martinez is a 79 y.o. female.     Chief Complaint   Patient presents with   • Follow-up     6mo check up        History of Present Illness     we recently had to increase her thyroid meds---tolerating lipitior wituhout myalgia s...her athriris pain is stable--toleraigng synthroid wituout heat ro cold intolaerance---her kidney function is stable      Current Outpatient Medications:   •  atorvastatin (LIPITOR) 10 MG tablet, TAKE ONE TABLET DAILY GENERIC FOR LIPITOR, Disp: 30 tablet, Rfl: 5  •  bumetanide (BUMEX) 0.5 MG tablet, Take 1 mg by mouth Daily., Disp: , Rfl:   •  Cholecalciferol 2000 units capsule, Take 2,000 Units by mouth Daily., Disp: , Rfl:   •  citalopram (CeleXA) 20 MG tablet, TAKE ONE TABLET DAILY GENERIC FOR CELEXA, Disp: 90 tablet, Rfl: 1  •  HYDROcodone-acetaminophen (NORCO) 5-325 MG per tablet, Take 1 tablet by mouth Daily., Disp: , Rfl:   •  HYDROcodone-acetaminophen (NORCO) 5-325 MG per tablet, TAKE ONE TABLET DAILY GENERIC FOR NORCO, Disp: 30 tablet, Rfl: 0  •  levothyroxine (SYNTHROID) 75 MCG tablet, Take 1 tablet by mouth Daily., Disp: 30 tablet, Rfl: 4  •  levothyroxine (SYNTHROID, LEVOTHROID) 50 MCG tablet, TAKE ONE TABLET DAILY GENERIC FOR SYNTHROID, Disp: 30 tablet, Rfl: 5  •  losartan (COZAAR) 100 MG tablet, TAKE ONE TABLET DAILY GENERIC FOR COZAAR, Disp: 90 tablet, Rfl: 1  •  montelukast (SINGULAIR) 10 MG tablet, Take 10 mg by mouth Every Night., Disp: , Rfl:   •  omeprazole (priLOSEC) 20 MG capsule, Take 20 mg by mouth Daily., Disp: , Rfl:   No Known Allergies    Past Medical History:   Diagnosis Date   • Anxiety    • Cholelithiasis    • Colon polyp    • Depression    • Disease of thyroid gland    • Hyperlipidemia    • Hypertension    • Myalgia    • Nephrolithiasis      Past Surgical History:   Procedure Laterality Date   • COLONOSCOPY  07/15/2014    2 rectal polyps, hyperplastic, diverticulosis       Review of Systems   Constitutional: Negative.    HENT: Negative.   "  Eyes: Negative.    Respiratory: Negative.    Cardiovascular: Negative.    Gastrointestinal: Negative.    Endocrine: Negative.    Genitourinary: Negative.    Musculoskeletal: Positive for arthralgias.   Skin: Negative.    Allergic/Immunologic: Negative.    Neurological: Negative.    Hematological: Negative.    Psychiatric/Behavioral: Negative.        Objective  /78   Pulse 80   Temp 98.5 °F (36.9 °C)   Resp 16   Ht 165.1 cm (65\")   Wt 81.2 kg (179 lb)   SpO2 98%   BMI 29.79 kg/m²   Physical Exam   Constitutional: She is oriented to person, place, and time. She appears well-developed and well-nourished.   HENT:   Head: Normocephalic and atraumatic.   Right Ear: External ear normal.   Left Ear: External ear normal.   Nose: Nose normal.   Mouth/Throat: Oropharynx is clear and moist.   Eyes: Pupils are equal, round, and reactive to light. Conjunctivae and EOM are normal.   Neck: Normal range of motion. Neck supple.   Cardiovascular: Normal rate, regular rhythm, normal heart sounds and intact distal pulses.   Pulmonary/Chest: Effort normal and breath sounds normal.   Abdominal: Soft. Bowel sounds are normal.   Musculoskeletal: Normal range of motion.   Neurological: She is alert and oriented to person, place, and time.   Skin: Skin is warm. Capillary refill takes less than 2 seconds.   Psychiatric: She has a normal mood and affect. Her behavior is normal. Judgment and thought content normal.   Nursing note and vitals reviewed.      Assessment/Plan   Paige was seen today for follow-up.    Diagnoses and all orders for this visit:    Essential hypertension    Mixed hyperlipidemia    Acquired hypothyroidism    Chronic kidney disease, stage III (moderate) (CMS/HCC)    Arthritis      We reviewed [facility]    Patient's Body mass index is 29.79 kg/m². BMI is within normal parameters. No follow-up required..       No orders of the defined types were placed in this encounter.  Current outpatient and discharge " medications have been reconciled for the patient.  Reviewed by: Barrington Jaramillo MD    Follow up: 4 month(s)

## 2020-02-03 ENCOUNTER — HOSPITAL ENCOUNTER (OUTPATIENT)
Dept: CT IMAGING | Facility: HOSPITAL | Age: 80
Discharge: HOME | End: 2020-02-03
Admitting: NURSE PRACTITIONER

## 2020-02-03 ENCOUNTER — OFFICE VISIT (OUTPATIENT)
Dept: NEUROSURGERY | Facility: CLINIC | Age: 80
End: 2020-02-03

## 2020-02-03 VITALS
DIASTOLIC BLOOD PRESSURE: 70 MMHG | BODY MASS INDEX: 29.96 KG/M2 | HEIGHT: 65 IN | SYSTOLIC BLOOD PRESSURE: 120 MMHG | WEIGHT: 179.8 LBS

## 2020-02-03 DIAGNOSIS — R93.89 ABNORMAL MRI: ICD-10-CM

## 2020-02-03 DIAGNOSIS — Z78.9 NON-SMOKER: ICD-10-CM

## 2020-02-03 DIAGNOSIS — D32.9 MENINGIOMA (HCC): Primary | ICD-10-CM

## 2020-02-03 DIAGNOSIS — E66.3 OVERWEIGHT (BMI 25.0-29.9): ICD-10-CM

## 2020-02-03 PROCEDURE — 99213 OFFICE O/P EST LOW 20 MIN: CPT | Performed by: NEUROLOGICAL SURGERY

## 2020-02-03 PROCEDURE — 70450 CT HEAD/BRAIN W/O DYE: CPT

## 2020-02-03 RX ORDER — ASPIRIN 81 MG/1
81 TABLET ORAL
COMMUNITY
End: 2021-02-01
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
## 2020-02-20 ENCOUNTER — OFFICE VISIT (OUTPATIENT)
Dept: FAMILY MEDICINE CLINIC | Facility: CLINIC | Age: 80
End: 2020-02-20

## 2020-02-20 VITALS
WEIGHT: 179 LBS | RESPIRATION RATE: 18 BRPM | DIASTOLIC BLOOD PRESSURE: 90 MMHG | BODY MASS INDEX: 29.82 KG/M2 | HEART RATE: 77 BPM | SYSTOLIC BLOOD PRESSURE: 136 MMHG | HEIGHT: 65 IN | TEMPERATURE: 98.2 F | OXYGEN SATURATION: 99 %

## 2020-02-20 DIAGNOSIS — J40 BRONCHITIS: Primary | ICD-10-CM

## 2020-02-20 PROCEDURE — 99213 OFFICE O/P EST LOW 20 MIN: CPT | Performed by: NURSE PRACTITIONER

## 2020-02-20 RX ORDER — BENZONATATE 100 MG/1
100 CAPSULE ORAL 3 TIMES DAILY PRN
Qty: 15 CAPSULE | Refills: 0 | Status: SHIPPED | OUTPATIENT
Start: 2020-02-20 | End: 2020-03-10

## 2020-02-20 RX ORDER — METHYLPREDNISOLONE 4 MG/1
TABLET ORAL
Qty: 1 EACH | Refills: 0 | Status: SHIPPED | OUTPATIENT
Start: 2020-02-20 | End: 2020-03-16 | Stop reason: HOSPADM

## 2020-02-20 RX ORDER — AMOXICILLIN AND CLAVULANATE POTASSIUM 875; 125 MG/1; MG/1
1 TABLET, FILM COATED ORAL 2 TIMES DAILY
Qty: 20 TABLET | Refills: 0 | Status: SHIPPED | OUTPATIENT
Start: 2020-02-20 | End: 2020-03-01

## 2020-02-20 NOTE — PROGRESS NOTES
Subjective   Chief Complaint:  Cough    History of Present Illness:  This 79 y.o. female was seen in the office today for cough x 1 month.  Worse in the last 1 week.  Reports some sputum production.      No Known Allergies   Current Outpatient Medications on File Prior to Visit   Medication Sig   • aspirin 81 MG EC tablet Take 81 mg by mouth.   • atorvastatin (LIPITOR) 10 MG tablet TAKE ONE TABLET DAILY GENERIC FOR LIPITOR   • bumetanide (BUMEX) 0.5 MG tablet Take 1 mg by mouth Daily.   • Cholecalciferol 2000 units capsule Take 2,000 Units by mouth Daily.   • citalopram (CeleXA) 20 MG tablet TAKE ONE TABLET DAILY GENERIC FOR CELEXA   • HYDROcodone-acetaminophen (NORCO) 5-325 MG per tablet Take 1 tablet by mouth Daily As Needed for Moderate Pain .   • levothyroxine (SYNTHROID) 75 MCG tablet Take 1 tablet by mouth Daily.   • losartan (COZAAR) 100 MG tablet TAKE ONE TABLET DAILY GENERIC FOR COZAAR   • montelukast (SINGULAIR) 10 MG tablet Take 10 mg by mouth Every Night.   • OMEGA 3-6-9 FATTY ACIDS PO Take 1,000 mg by mouth.   • omeprazole (priLOSEC) 20 MG capsule Take 20 mg by mouth Daily.     No current facility-administered medications on file prior to visit.       Past Medical, Surgical, Social, and Family History:  Past Medical History:   Diagnosis Date   • Anxiety    • Cholelithiasis    • Colon polyp    • Depression    • Disease of thyroid gland    • Hyperlipidemia    • Hypertension    • Myalgia    • Nephrolithiasis      Past Surgical History:   Procedure Laterality Date   • COLONOSCOPY  07/15/2014    2 rectal polyps, hyperplastic, diverticulosis     Social History     Socioeconomic History   • Marital status: Single     Spouse name: Not on file   • Number of children: Not on file   • Years of education: Not on file   • Highest education level: Not on file   Occupational History   • Occupation: retired   Tobacco Use   • Smoking status: Former Smoker     Last attempt to quit:      Years since quittin.1   •  Smokeless tobacco: Never Used   Substance and Sexual Activity   • Alcohol use: No     Frequency: Never     Comment: occ   • Drug use: No   • Sexual activity: Defer     Family History   Problem Relation Age of Onset   • Arthritis Mother    • Arthritis Father    • Hypertension Sister    • Colon cancer Neg Hx    • Colon polyps Neg Hx      Review of Systems   Constitutional: Negative for appetite change, chills and fever.   Respiratory: Positive for cough and wheezing. Negative for shortness of breath.    Cardiovascular: Negative for chest pain and palpitations.   Musculoskeletal: Negative for arthralgias and myalgias.     Objective   Physical Exam   Constitutional: She is oriented to person, place, and time. No distress.   HENT:   Head: Normocephalic and atraumatic.   Nose: Nose normal.   Eyes: Pupils are equal, round, and reactive to light. Conjunctivae and EOM are normal.   Neck: Normal range of motion. Neck supple. No JVD present. No tracheal deviation present. No thyromegaly present.   Cardiovascular: Normal rate, regular rhythm, normal heart sounds and intact distal pulses. Exam reveals no gallop and no friction rub.   No murmur heard.  Pulmonary/Chest: Effort normal. No stridor. No respiratory distress. She has no decreased breath sounds. She has wheezes in the right upper field and the left upper field. She has rhonchi in the right upper field and the left upper field.   Abdominal: Soft. Bowel sounds are normal. There is no tenderness. There is no guarding.   Musculoskeletal: Normal range of motion. She exhibits no edema, tenderness or deformity.   Lymphadenopathy:     She has no cervical adenopathy.   Neurological: She is alert and oriented to person, place, and time.   Skin: Skin is warm and dry. Capillary refill takes less than 2 seconds. She is not diaphoretic.   Psychiatric: She has a normal mood and affect. Her behavior is normal. Judgment and thought content normal.   Nursing note and vitals reviewed.  BP  "136/90 (BP Location: Left arm)   Pulse 77   Temp 98.2 °F (36.8 °C) (Temporal)   Resp 18   Ht 165.1 cm (65\")   Wt 81.2 kg (179 lb)   SpO2 99%   BMI 29.79 kg/m²     Assessment/Plan   Diagnoses and all orders for this visit:    1. Bronchitis (Primary)  -     amoxicillin-clavulanate (AUGMENTIN) 875-125 MG per tablet; Take 1 tablet by mouth 2 (Two) Times a Day for 10 days.  Dispense: 20 tablet; Refill: 0  -     methylPREDNISolone (MEDROL, DEWEY,) 4 MG tablet; Take as directed on package instructions.  Dispense: 1 each; Refill: 0  -     benzonatate (TESSALON PERLES) 100 MG capsule; Take 1 capsule by mouth 3 (Three) Times a Day As Needed for Cough.  Dispense: 15 capsule; Refill: 0    Discussion:  Advised and educated plan of care.  ABT and steroids as ordered.  F/u as needed.  Advised with true bronchitis, residual cough may take several weeks to resolve.    Follow-up:  Return if symptoms worsen or fail to improve.    Note e-Signed by ASH Elam on 02/20/2020 at 10:42 AM  "

## 2020-03-03 ENCOUNTER — OFFICE VISIT (OUTPATIENT)
Dept: FAMILY MEDICINE CLINIC | Facility: CLINIC | Age: 80
End: 2020-03-03

## 2020-03-03 VITALS
WEIGHT: 179 LBS | BODY MASS INDEX: 29.82 KG/M2 | OXYGEN SATURATION: 96 % | SYSTOLIC BLOOD PRESSURE: 134 MMHG | HEART RATE: 114 BPM | DIASTOLIC BLOOD PRESSURE: 88 MMHG | HEIGHT: 65 IN | RESPIRATION RATE: 16 BRPM

## 2020-03-03 DIAGNOSIS — R05.9 COUGH: Primary | ICD-10-CM

## 2020-03-03 DIAGNOSIS — R05.9 COUGH: ICD-10-CM

## 2020-03-03 PROCEDURE — 99213 OFFICE O/P EST LOW 20 MIN: CPT | Performed by: FAMILY MEDICINE

## 2020-03-03 NOTE — PROGRESS NOTES
Subjective   Paige Maritnez is a 79 y.o. female.     Chief Complaint   Patient presents with   • Cough     chest congestion        History of Present Illness     she nots having chest congestion wth cough and wheezing--denies any fever or hemoptysis      Current Outpatient Medications:   •  aspirin 81 MG EC tablet, Take 81 mg by mouth., Disp: , Rfl:   •  atorvastatin (LIPITOR) 10 MG tablet, TAKE ONE TABLET DAILY GENERIC FOR LIPITOR, Disp: 30 tablet, Rfl: 5  •  benzonatate (TESSALON PERLES) 100 MG capsule, Take 1 capsule by mouth 3 (Three) Times a Day As Needed for Cough., Disp: 15 capsule, Rfl: 0  •  bumetanide (BUMEX) 0.5 MG tablet, Take 1 mg by mouth Daily., Disp: , Rfl:   •  Cholecalciferol 2000 units capsule, Take 2,000 Units by mouth Daily., Disp: , Rfl:   •  citalopram (CeleXA) 20 MG tablet, TAKE ONE TABLET DAILY GENERIC FOR CELEXA, Disp: 90 tablet, Rfl: 1  •  HYDROcodone-acetaminophen (NORCO) 5-325 MG per tablet, Take 1 tablet by mouth Daily As Needed for Moderate Pain ., Disp: 30 tablet, Rfl: 0  •  levothyroxine (SYNTHROID) 75 MCG tablet, Take 1 tablet by mouth Daily., Disp: 30 tablet, Rfl: 4  •  losartan (COZAAR) 100 MG tablet, TAKE ONE TABLET DAILY GENERIC FOR COZAAR, Disp: 90 tablet, Rfl: 1  •  methylPREDNISolone (MEDROL, DEWEY,) 4 MG tablet, Take as directed on package instructions., Disp: 1 each, Rfl: 0  •  montelukast (SINGULAIR) 10 MG tablet, Take 10 mg by mouth Every Night., Disp: , Rfl:   •  OMEGA 3-6-9 FATTY ACIDS PO, Take 1,000 mg by mouth., Disp: , Rfl:   •  omeprazole (priLOSEC) 20 MG capsule, Take 20 mg by mouth Daily., Disp: , Rfl:   No Known Allergies    Past Medical History:   Diagnosis Date   • Anxiety    • Cholelithiasis    • Colon polyp    • Depression    • Disease of thyroid gland    • Hyperlipidemia    • Hypertension    • Myalgia    • Nephrolithiasis      Past Surgical History:   Procedure Laterality Date   • COLONOSCOPY  07/15/2014    2 rectal polyps, hyperplastic, diverticulosis  "      Review of Systems   Constitutional: Negative.    HENT: Negative.    Eyes: Negative.    Respiratory: Positive for cough and wheezing.    Cardiovascular: Negative.    Gastrointestinal: Negative.    Endocrine: Negative.    Genitourinary: Negative.    Musculoskeletal: Negative.    Skin: Negative.    Allergic/Immunologic: Negative.    Neurological: Negative.    Hematological: Negative.    Psychiatric/Behavioral: Negative.        Objective  /88   Pulse 114   Resp 16   Ht 165.1 cm (65\")   Wt 81.2 kg (179 lb)   SpO2 96%   BMI 29.79 kg/m²   Physical Exam   Constitutional: She is oriented to person, place, and time. She appears well-developed and well-nourished.   HENT:   Head: Normocephalic and atraumatic.   Right Ear: External ear normal.   Left Ear: External ear normal.   Nose: Nose normal.   Mouth/Throat: Oropharynx is clear and moist.   Eyes: Pupils are equal, round, and reactive to light. Conjunctivae and EOM are normal.   Neck: Normal range of motion. Neck supple.   Cardiovascular: Normal rate, regular rhythm, normal heart sounds and intact distal pulses.   Pulmonary/Chest: Effort normal. She has wheezes.   Abdominal: Soft. Bowel sounds are normal.   Musculoskeletal: Normal range of motion.   Neurological: She is alert and oriented to person, place, and time.   Skin: Skin is warm. Capillary refill takes less than 2 seconds.   Psychiatric: She has a normal mood and affect. Her behavior is normal. Judgment and thought content normal.   Nursing note and vitals reviewed.      Assessment/Plan   Paige was seen today for cough.    Diagnoses and all orders for this visit:    Cough  -     XR Chest PA & Lateral; Future                 Orders Placed This Encounter   Procedures   • XR Chest PA & Lateral     Standing Status:   Future     Standing Expiration Date:   3/3/2021     Order Specific Question:   Reason for Exam:     Answer:   cough     Order Specific Question:   Does this patient have a diabetic " monitoring/medication delivering device on?     Answer:   No       Follow up: 2 week(s)

## 2020-03-04 RX ORDER — IPRATROPIUM BROMIDE AND ALBUTEROL SULFATE 2.5; .5 MG/3ML; MG/3ML
3 SOLUTION RESPIRATORY (INHALATION)
Qty: 360 ML | Refills: 2 | Status: SHIPPED | OUTPATIENT
Start: 2020-03-04 | End: 2020-03-04

## 2020-03-04 RX ORDER — PREDNISONE 10 MG/1
TABLET ORAL
Qty: 15 TABLET | Refills: 0 | Status: SHIPPED | OUTPATIENT
Start: 2020-03-04 | End: 2020-03-10

## 2020-03-04 RX ORDER — IPRATROPIUM BROMIDE AND ALBUTEROL SULFATE 2.5; .5 MG/3ML; MG/3ML
3 SOLUTION RESPIRATORY (INHALATION)
Qty: 360 ML | Refills: 2 | Status: SHIPPED | OUTPATIENT
Start: 2020-03-04 | End: 2020-03-16 | Stop reason: HOSPADM

## 2020-03-10 ENCOUNTER — OFFICE VISIT (OUTPATIENT)
Dept: FAMILY MEDICINE CLINIC | Facility: CLINIC | Age: 80
End: 2020-03-10

## 2020-03-10 VITALS
DIASTOLIC BLOOD PRESSURE: 94 MMHG | OXYGEN SATURATION: 97 % | SYSTOLIC BLOOD PRESSURE: 148 MMHG | BODY MASS INDEX: 29.82 KG/M2 | HEIGHT: 65 IN | HEART RATE: 97 BPM | RESPIRATION RATE: 16 BRPM | WEIGHT: 179 LBS

## 2020-03-10 DIAGNOSIS — M79.645 THUMB PAIN, LEFT: Primary | ICD-10-CM

## 2020-03-10 DIAGNOSIS — M79.645 THUMB PAIN, LEFT: ICD-10-CM

## 2020-03-10 DIAGNOSIS — M19.90 OSTEOARTHRITIS, UNSPECIFIED OSTEOARTHRITIS TYPE, UNSPECIFIED SITE: ICD-10-CM

## 2020-03-10 DIAGNOSIS — L85.3 DRY SKIN DERMATITIS: ICD-10-CM

## 2020-03-10 PROCEDURE — 99213 OFFICE O/P EST LOW 20 MIN: CPT | Performed by: FAMILY MEDICINE

## 2020-03-10 RX ORDER — HYDROCODONE BITARTRATE AND ACETAMINOPHEN 5; 325 MG/1; MG/1
1 TABLET ORAL DAILY PRN
Qty: 30 TABLET | Refills: 0 | Status: SHIPPED | OUTPATIENT
Start: 2020-03-10 | End: 2020-04-27

## 2020-03-15 ENCOUNTER — HOSPITAL ENCOUNTER (OUTPATIENT)
Facility: HOSPITAL | Age: 80
Setting detail: OBSERVATION
Discharge: HOME | End: 2020-03-16
Attending: FAMILY MEDICINE | Admitting: FAMILY MEDICINE

## 2020-03-15 PROBLEM — R56.9 SEIZURE: Status: ACTIVE | Noted: 2020-03-15

## 2020-03-15 LAB

## 2020-03-15 PROCEDURE — G0378 HOSPITAL OBSERVATION PER HR: HCPCS

## 2020-03-15 PROCEDURE — G0379 DIRECT REFER HOSPITAL OBSERV: HCPCS

## 2020-03-15 PROCEDURE — 0100U HC BIOFIRE FILMARRAY RESP PANEL 2: CPT | Performed by: FAMILY MEDICINE

## 2020-03-15 PROCEDURE — 96374 THER/PROPH/DIAG INJ IV PUSH: CPT

## 2020-03-15 PROCEDURE — 25010000002 ONDANSETRON PER 1 MG: Performed by: FAMILY MEDICINE

## 2020-03-15 RX ORDER — PANTOPRAZOLE SODIUM 40 MG/1
40 TABLET, DELAYED RELEASE ORAL
Status: DISCONTINUED | OUTPATIENT
Start: 2020-03-16 | End: 2020-03-16 | Stop reason: HOSPADM

## 2020-03-15 RX ORDER — LEVOTHYROXINE SODIUM 0.07 MG/1
75 TABLET ORAL
Status: DISCONTINUED | OUTPATIENT
Start: 2020-03-16 | End: 2020-03-16 | Stop reason: HOSPADM

## 2020-03-15 RX ORDER — SODIUM CHLORIDE 9 MG/ML
50 INJECTION, SOLUTION INTRAVENOUS CONTINUOUS
Status: DISCONTINUED | OUTPATIENT
Start: 2020-03-15 | End: 2020-03-16

## 2020-03-15 RX ORDER — SODIUM CHLORIDE 0.9 % (FLUSH) 0.9 %
10 SYRINGE (ML) INJECTION EVERY 12 HOURS SCHEDULED
Status: DISCONTINUED | OUTPATIENT
Start: 2020-03-15 | End: 2020-03-16 | Stop reason: HOSPADM

## 2020-03-15 RX ORDER — ONDANSETRON 2 MG/ML
4 INJECTION INTRAMUSCULAR; INTRAVENOUS EVERY 6 HOURS PRN
Status: DISCONTINUED | OUTPATIENT
Start: 2020-03-15 | End: 2020-03-16 | Stop reason: HOSPADM

## 2020-03-15 RX ORDER — LOSARTAN POTASSIUM 25 MG/1
100 TABLET ORAL DAILY
Status: DISCONTINUED | OUTPATIENT
Start: 2020-03-15 | End: 2020-03-16 | Stop reason: HOSPADM

## 2020-03-15 RX ORDER — SODIUM CHLORIDE 0.9 % (FLUSH) 0.9 %
10 SYRINGE (ML) INJECTION AS NEEDED
Status: DISCONTINUED | OUTPATIENT
Start: 2020-03-15 | End: 2020-03-16 | Stop reason: HOSPADM

## 2020-03-15 RX ORDER — CITALOPRAM 20 MG/1
20 TABLET ORAL DAILY
Status: DISCONTINUED | OUTPATIENT
Start: 2020-03-15 | End: 2020-03-16 | Stop reason: HOSPADM

## 2020-03-15 RX ORDER — ASPIRIN 81 MG/1
81 TABLET ORAL DAILY
Status: DISCONTINUED | OUTPATIENT
Start: 2020-03-15 | End: 2020-03-16 | Stop reason: HOSPADM

## 2020-03-15 RX ORDER — ATORVASTATIN CALCIUM 10 MG/1
10 TABLET, FILM COATED ORAL NIGHTLY
Status: DISCONTINUED | OUTPATIENT
Start: 2020-03-15 | End: 2020-03-16 | Stop reason: HOSPADM

## 2020-03-15 RX ORDER — HYDROCODONE BITARTRATE AND ACETAMINOPHEN 5; 325 MG/1; MG/1
1 TABLET ORAL DAILY PRN
Status: DISCONTINUED | OUTPATIENT
Start: 2020-03-15 | End: 2020-03-16 | Stop reason: HOSPADM

## 2020-03-15 RX ORDER — BUMETANIDE 1 MG/1
1 TABLET ORAL DAILY
Status: DISCONTINUED | OUTPATIENT
Start: 2020-03-15 | End: 2020-03-16 | Stop reason: HOSPADM

## 2020-03-15 RX ADMIN — LOSARTAN POTASSIUM 100 MG: 25 TABLET, FILM COATED ORAL at 22:18

## 2020-03-15 RX ADMIN — ONDANSETRON HYDROCHLORIDE 4 MG: 2 SOLUTION INTRAMUSCULAR; INTRAVENOUS at 21:25

## 2020-03-15 RX ADMIN — BUMETANIDE 1 MG: 1 TABLET ORAL at 22:14

## 2020-03-15 RX ADMIN — Medication 5000 UNITS: at 22:15

## 2020-03-15 RX ADMIN — SODIUM CHLORIDE, PRESERVATIVE FREE 10 ML: 5 INJECTION INTRAVENOUS at 21:29

## 2020-03-15 RX ADMIN — CITALOPRAM 20 MG: 20 TABLET, FILM COATED ORAL at 22:14

## 2020-03-15 RX ADMIN — SODIUM CHLORIDE 50 ML/HR: 9 INJECTION, SOLUTION INTRAVENOUS at 20:47

## 2020-03-15 RX ADMIN — ASPIRIN 81 MG: 81 TABLET ORAL at 22:15

## 2020-03-15 RX ADMIN — ATORVASTATIN CALCIUM 10 MG: 10 TABLET, FILM COATED ORAL at 22:15

## 2020-03-16 ENCOUNTER — APPOINTMENT (OUTPATIENT)
Dept: MRI IMAGING | Facility: HOSPITAL | Age: 80
End: 2020-03-16

## 2020-03-16 ENCOUNTER — APPOINTMENT (OUTPATIENT)
Dept: NEUROLOGY | Facility: HOSPITAL | Age: 80
End: 2020-03-16

## 2020-03-16 VITALS
SYSTOLIC BLOOD PRESSURE: 130 MMHG | TEMPERATURE: 97.9 F | RESPIRATION RATE: 16 BRPM | OXYGEN SATURATION: 97 % | HEIGHT: 61 IN | DIASTOLIC BLOOD PRESSURE: 60 MMHG | WEIGHT: 190.92 LBS | BODY MASS INDEX: 36.05 KG/M2 | HEART RATE: 76 BPM

## 2020-03-16 LAB
ALBUMIN SERPL-MCNC: 4 G/DL (ref 3.5–5.2)
ALBUMIN/GLOB SERPL: 1.3 G/DL
ALP SERPL-CCNC: 70 U/L (ref 39–117)
ALT SERPL W P-5'-P-CCNC: 12 U/L (ref 1–33)
ANION GAP SERPL CALCULATED.3IONS-SCNC: 14 MMOL/L (ref 5–15)
AST SERPL-CCNC: 14 U/L (ref 1–32)
BACTERIA UR QL AUTO: ABNORMAL /HPF
BASOPHILS # BLD AUTO: 0.01 10*3/MM3 (ref 0–0.2)
BASOPHILS NFR BLD AUTO: 0.1 % (ref 0–1.5)
BILIRUB SERPL-MCNC: 0.8 MG/DL (ref 0.2–1.2)
BILIRUB UR QL STRIP: NEGATIVE
BUN BLD-MCNC: 14 MG/DL (ref 8–23)
BUN/CREAT SERPL: 15.7 (ref 7–25)
CALCIUM SPEC-SCNC: 8.7 MG/DL (ref 8.6–10.5)
CHLORIDE SERPL-SCNC: 100 MMOL/L (ref 98–107)
CK SERPL-CCNC: 84 U/L (ref 20–180)
CLARITY UR: CLEAR
CO2 SERPL-SCNC: 24 MMOL/L (ref 22–29)
COLOR UR: YELLOW
CREAT BLD-MCNC: 0.89 MG/DL (ref 0.57–1)
DEPRECATED RDW RBC AUTO: 40.6 FL (ref 37–54)
EOSINOPHIL # BLD AUTO: 0.04 10*3/MM3 (ref 0–0.4)
EOSINOPHIL NFR BLD AUTO: 0.5 % (ref 0.3–6.2)
ERYTHROCYTE [DISTWIDTH] IN BLOOD BY AUTOMATED COUNT: 12.7 % (ref 12.3–15.4)
GFR SERPL CREATININE-BSD FRML MDRD: 62 ML/MIN/1.73
GLOBULIN UR ELPH-MCNC: 3.2 GM/DL
GLUCOSE BLD-MCNC: 156 MG/DL (ref 65–99)
GLUCOSE UR STRIP-MCNC: NEGATIVE MG/DL
HCT VFR BLD AUTO: 34.4 % (ref 34–46.6)
HGB BLD-MCNC: 11.8 G/DL (ref 12–15.9)
HGB UR QL STRIP.AUTO: ABNORMAL
HYALINE CASTS UR QL AUTO: ABNORMAL /LPF
IMM GRANULOCYTES # BLD AUTO: 0.05 10*3/MM3 (ref 0–0.05)
IMM GRANULOCYTES NFR BLD AUTO: 0.6 % (ref 0–0.5)
KETONES UR QL STRIP: NEGATIVE
LEUKOCYTE ESTERASE UR QL STRIP.AUTO: NEGATIVE
LYMPHOCYTES # BLD AUTO: 0.96 10*3/MM3 (ref 0.7–3.1)
LYMPHOCYTES NFR BLD AUTO: 11.9 % (ref 19.6–45.3)
MCH RBC QN AUTO: 30 PG (ref 26.6–33)
MCHC RBC AUTO-ENTMCNC: 34.3 G/DL (ref 31.5–35.7)
MCV RBC AUTO: 87.5 FL (ref 79–97)
MONOCYTES # BLD AUTO: 0.61 10*3/MM3 (ref 0.1–0.9)
MONOCYTES NFR BLD AUTO: 7.6 % (ref 5–12)
NEUTROPHILS # BLD AUTO: 6.39 10*3/MM3 (ref 1.7–7)
NEUTROPHILS NFR BLD AUTO: 79.3 % (ref 42.7–76)
NITRITE UR QL STRIP: NEGATIVE
NRBC BLD AUTO-RTO: 0 /100 WBC (ref 0–0.2)
PH UR STRIP.AUTO: <=5 [PH] (ref 5–8)
PLATELET # BLD AUTO: 199 10*3/MM3 (ref 140–450)
PMV BLD AUTO: 10.4 FL (ref 6–12)
POTASSIUM BLD-SCNC: 3.8 MMOL/L (ref 3.5–5.2)
PROT SERPL-MCNC: 7.2 G/DL (ref 6–8.5)
PROT UR QL STRIP: NEGATIVE
RBC # BLD AUTO: 3.93 10*6/MM3 (ref 3.77–5.28)
RBC # UR: ABNORMAL /HPF
REF LAB TEST METHOD: ABNORMAL
SODIUM BLD-SCNC: 138 MMOL/L (ref 136–145)
SP GR UR STRIP: 1.01 (ref 1–1.03)
SQUAMOUS #/AREA URNS HPF: ABNORMAL /HPF
TROPONIN T SERPL-MCNC: <0.01 NG/ML (ref 0–0.03)
UROBILINOGEN UR QL STRIP: ABNORMAL
WBC NRBC COR # BLD: 8.06 10*3/MM3 (ref 3.4–10.8)
WBC UR QL AUTO: ABNORMAL /HPF

## 2020-03-16 PROCEDURE — 82550 ASSAY OF CK (CPK): CPT | Performed by: FAMILY MEDICINE

## 2020-03-16 PROCEDURE — G0378 HOSPITAL OBSERVATION PER HR: HCPCS

## 2020-03-16 PROCEDURE — 85025 COMPLETE CBC W/AUTO DIFF WBC: CPT | Performed by: FAMILY MEDICINE

## 2020-03-16 PROCEDURE — 95819 EEG AWAKE AND ASLEEP: CPT

## 2020-03-16 PROCEDURE — 80053 COMPREHEN METABOLIC PANEL: CPT | Performed by: FAMILY MEDICINE

## 2020-03-16 PROCEDURE — 99215 OFFICE O/P EST HI 40 MIN: CPT | Performed by: PSYCHIATRY & NEUROLOGY

## 2020-03-16 PROCEDURE — 84484 ASSAY OF TROPONIN QUANT: CPT | Performed by: FAMILY MEDICINE

## 2020-03-16 PROCEDURE — 95816 EEG AWAKE AND DROWSY: CPT | Performed by: PSYCHIATRY & NEUROLOGY

## 2020-03-16 PROCEDURE — 70553 MRI BRAIN STEM W/O & W/DYE: CPT

## 2020-03-16 PROCEDURE — A9577 INJ MULTIHANCE: HCPCS | Performed by: FAMILY MEDICINE

## 2020-03-16 PROCEDURE — 99234 HOSP IP/OBS SM DT SF/LOW 45: CPT | Performed by: FAMILY MEDICINE

## 2020-03-16 PROCEDURE — 0 GADOBENATE DIMEGLUMINE 529 MG/ML SOLUTION: Performed by: FAMILY MEDICINE

## 2020-03-16 PROCEDURE — 81001 URINALYSIS AUTO W/SCOPE: CPT | Performed by: FAMILY MEDICINE

## 2020-03-16 RX ADMIN — LEVOTHYROXINE SODIUM 75 MCG: 75 TABLET ORAL at 06:30

## 2020-03-16 RX ADMIN — PANTOPRAZOLE SODIUM 40 MG: 40 TABLET, DELAYED RELEASE ORAL at 06:31

## 2020-03-16 RX ADMIN — GADOBENATE DIMEGLUMINE 16 ML: 529 INJECTION, SOLUTION INTRAVENOUS at 15:00

## 2020-03-16 RX ADMIN — LOSARTAN POTASSIUM 100 MG: 25 TABLET, FILM COATED ORAL at 08:20

## 2020-03-16 RX ADMIN — CITALOPRAM 20 MG: 20 TABLET, FILM COATED ORAL at 08:20

## 2020-03-16 RX ADMIN — ASPIRIN 81 MG: 81 TABLET ORAL at 08:20

## 2020-03-16 RX ADMIN — SODIUM CHLORIDE, PRESERVATIVE FREE 10 ML: 5 INJECTION INTRAVENOUS at 08:21

## 2020-03-16 RX ADMIN — Medication 5000 UNITS: at 08:20

## 2020-03-16 NOTE — CONSULTS
Neurology Consult Note    Referring Provider: Dr. Barrington Jaramillo  Reason for Consultation: Seizure activity      History of present illness:      This is a 79-year-old female who was transferred [facility] overnight.  She reportedly has a family member who has been experiencing some GI symptoms since Thursday.  She denies that family member having any fevers or chills.  Yesterday the patient began having a sudden onset of nausea and dry heaving.  During 1 of her dry heaving spells she passed out and fell to the ground.  It does not sound as if she hit her head.  She does recall that several days earlier she had a mechanical fall which did result in some mild head trauma just behind her right ear.  She was taken to [facility] ER.  There there was another event where she was reportedly talking all of a sudden stopped talking and may have had a generalized tonic-clonic seizure.  She does recall she had urinary incontinence but no tongue biting.  She tells me this is not the first time this happened.  Several years ago she had what was thought to be a seizure.  She was taken to [facility].  She recalls they did an MRI of the brain.  She was not diagnosed with epilepsy at that time was not started on anticonvulsants that she knows of.  She has no family history of a seizure disorder.  She was initially admitted to the intensive care unit overnight but now has been moved to the floor this morning.  Past Medical History:   Diagnosis Date   • Anxiety    • Cholelithiasis    • Colon polyp    • Depression    • Disease of thyroid gland    • Hyperlipidemia    • Hypertension    • Myalgia    • Nephrolithiasis        No Known Allergies  No current facility-administered medications on file prior to encounter.      Current Outpatient Medications on File Prior to Encounter   Medication Sig   • aspirin 81 MG EC tablet Take 81 mg by mouth.   • atorvastatin (LIPITOR) 10 MG tablet TAKE ONE  TABLET DAILY GENERIC FOR LIPITOR   • bumetanide (BUMEX) 0.5 MG tablet Take 1 mg by mouth Daily.   • Cholecalciferol 2000 units capsule Take 2,000 Units by mouth Daily.   • citalopram (CeleXA) 20 MG tablet TAKE ONE TABLET DAILY GENERIC FOR CELEXA   • fluocinonide-emollient (LIDEX-E) 0.05 % cream Apply  topically to the appropriate area as directed 2 (Two) Times a Day.   • HYDROcodone-acetaminophen (NORCO) 5-325 MG per tablet Take 1 tablet by mouth Daily As Needed for Moderate Pain .   • ipratropium-albuterol (DUO-NEB) 0.5-2.5 mg/3 ml nebulizer Take 3 mL by nebulization 4 (Four) Times a Day.   • levothyroxine (SYNTHROID) 75 MCG tablet Take 1 tablet by mouth Daily.   • losartan (COZAAR) 100 MG tablet TAKE ONE TABLET DAILY GENERIC FOR COZAAR   • methylPREDNISolone (MEDROL, DEWEY,) 4 MG tablet Take as directed on package instructions.   • montelukast (SINGULAIR) 10 MG tablet Take 10 mg by mouth Every Night.   • OMEGA 3-6-9 FATTY ACIDS PO Take 1,000 mg by mouth.   • omeprazole (priLOSEC) 20 MG capsule Take 20 mg by mouth Daily.       Social History     Socioeconomic History   • Marital status: Single     Spouse name: Not on file   • Number of children: Not on file   • Years of education: Not on file   • Highest education level: Not on file   Occupational History   • Occupation: retired   Tobacco Use   • Smoking status: Former Smoker     Last attempt to quit: 2014     Years since quittin.2   • Smokeless tobacco: Never Used   Substance and Sexual Activity   • Alcohol use: No     Frequency: Never     Comment: occ   • Drug use: No   • Sexual activity: Defer     Family History   Problem Relation Age of Onset   • Arthritis Mother    • Arthritis Father    • Hypertension Sister    • Colon cancer Neg Hx    • Colon polyps Neg Hx        Review of Systems  A 14 point review of systems was reviewed and was negative except for seizure activity    Vital Signs   Temp:  [97.8 °F (36.6 °C)-98.6 °F (37 °C)] 98.4 °F (36.9 °C)  Heart Rate:   [] 78  BP: ()/() 144/70    General Exam:  Head:  Normal cephalic, atraumatic  HEENT:  Neck supple  Fundoscopic Exam:  No signs of disc edema  CVS:  Regular rate and rhythm.  No murmurs  Carotid Examination:  No bruits  Lungs:  Clear to auscultation  Abdomen:  Non-tender, Non-distended  Extremities:  No signs of peripheral edema  Skin:  No rashes    Neurologic Exam:    Mental Status:    -Awake, Alert, Oriented X 3  -No word finding difficulties  -No aphasia  -No dysarthria  -Follows simple and complex commands    CN II:  Visual fields full.  Pupils equally reactive to light  CN III, IV, VI:  Extraocular Muscles full with no signs of nystagmus  CN V:  Facial sensory is symmetric with no asymmetries.  CN VII:  Facial motor symmetric  CN VIII:  Gross hearing intact bilaterally  CN IX:  Palate elevates symmetrically  CN X:  Palate elevates symmetrically  CN XI:  Shoulder shrug symmetric  CN XII:  Tongue is midline on protrusion    Motor: (strength out of 5:  1= minimal movement, 2 = movement in plane of gravity, 3 = movement against gravity, 4 = movement against some resistance, 5 = full strength)    -Right Upper Ext: Proximal: 5 Distal: 5  -Left Upper Ext: Proximal: 5 Distal: 5    -Right Lower Ext: Proximal: 5 Distal: 5  -Left Lower Ext: Proximal: 5 Distal: 5    DTR:  -Right   Bicep: 2+ Tricep: 2+ Brachoradialis: 2+   Patella: 2+ Ankle: 2+ Neg Babinski  -Left   Bicep: 2+ Tricep: 2+ Brachoradialis: 2+   Patella: 2+ Ankle: 2+ Neg Babinski    Sensory:  -Intact to light touch, pinprick, temperature, pain, and proprioception    Coordination:  -Finger to nose intact  -Heel to shin intact  -No ataxia    Gait  -No signs of ataxia  -ambulates unassisted      Results Review:  Lab Results (last 24 hours)     Procedure Component Value Units Date/Time    Urinalysis With Microscopic If Indicated (No Culture) - Urine, Clean Catch [[id number]]  (Abnormal) Collected:  03/16/20 0429    Specimen:  Urine, Clean Catch  Updated:  03/16/20 0546     Color, UA Yellow     Appearance, UA Clear     pH, UA <=5.0     Specific Gravity, UA 1.008     Glucose, UA Negative     Ketones, UA Negative     Bilirubin, UA Negative     Blood, UA Trace     Protein, UA Negative     Leuk Esterase, UA Negative     Nitrite, UA Negative     Urobilinogen, UA 0.2 E.U./dL    Urinalysis, Microscopic Only - Urine, Clean Catch [[id number]]  (Abnormal) Collected:  03/16/20 0429    Specimen:  Urine, Clean Catch Updated:  03/16/20 0546     RBC, UA None Seen /HPF      WBC, UA 0-2 /HPF      Bacteria, UA None Seen /HPF      Squamous Epithelial Cells, UA 0-2 /HPF      Hyaline Casts, UA None Seen /LPF      Methodology Automated Microscopy    Comprehensive Metabolic Panel [[id number]]  (Abnormal) Collected:  03/16/20 0339    Specimen:  Blood Updated:  03/16/20 0433     Glucose 156 mg/dL      BUN 14 mg/dL      Creatinine 0.89 mg/dL      Sodium 138 mmol/L      Potassium 3.8 mmol/L      Chloride 100 mmol/L      CO2 24.0 mmol/L      Calcium 8.7 mg/dL      Total Protein 7.2 g/dL      Albumin 4.00 g/dL      ALT (SGPT) 12 U/L      AST (SGOT) 14 U/L      Alkaline Phosphatase 70 U/L      Total Bilirubin 0.8 mg/dL      eGFR Non African Amer 62 mL/min/1.73      Globulin 3.2 gm/dL      A/G Ratio 1.3 g/dL      BUN/Creatinine Ratio 15.7     Anion Gap 14.0 mmol/L     Narrative:       GFR Normal >60  Chronic Kidney Disease <60  Kidney Failure <15      CK [[id number]]  (Normal) Collected:  03/16/20 0339    Specimen:  Blood Updated:  03/16/20 0433     Creatine Kinase 84 U/L     Troponin [[id number]]  (Normal) Collected:  03/16/20 0339    Specimen:  Blood Updated:  03/16/20 0432     Troponin T <0.010 ng/mL     Narrative:       Troponin T Reference Range:  <= 0.03 ng/mL-   Negative for AMI  >0.03 ng/mL-     Abnormal for myocardial necrosis.  Clinicians would have to utilize clinical acumen, EKG, Troponin and serial changes to determine if it is an Acute Myocardial Infarction or myocardial injury  due to an underlying chronic condition.       Results may be falsely decreased if patient taking Biotin.      CBC Auto Differential [[id number]]  (Abnormal) Collected:  03/16/20 0339    Specimen:  Blood Updated:  03/16/20 0411     WBC 8.06 10*3/mm3      RBC 3.93 10*6/mm3      Hemoglobin 11.8 g/dL      Hematocrit 34.4 %      MCV 87.5 fL      MCH 30.0 pg      MCHC 34.3 g/dL      RDW 12.7 %      RDW-SD 40.6 fl      MPV 10.4 fL      Platelets 199 10*3/mm3      Neutrophil % 79.3 %      Lymphocyte % 11.9 %      Monocyte % 7.6 %      Eosinophil % 0.5 %      Basophil % 0.1 %      Immature Grans % 0.6 %      Neutrophils, Absolute 6.39 10*3/mm3      Lymphocytes, Absolute 0.96 10*3/mm3      Monocytes, Absolute 0.61 10*3/mm3      Eosinophils, Absolute 0.04 10*3/mm3      Basophils, Absolute 0.01 10*3/mm3      Immature Grans, Absolute 0.05 10*3/mm3      nRBC 0.0 /100 WBC     Respiratory Panel, PCR - Swab, Nasopharynx [[id number]]  (Normal) Collected:  03/15/20 2017    Specimen:  Swab from Nasopharynx Updated:  03/15/20 2119     ADENOVIRUS, PCR Not Detected     Coronavirus 229E Not Detected     Coronavirus HKU1 Not Detected     Coronavirus NL63 Not Detected     Coronavirus OC43 Not Detected     Human Metapneumovirus Not Detected     Human Rhinovirus/Enterovirus Not Detected     Influenza B PCR Not Detected     Parainfluenza Virus 1 Not Detected     Parainfluenza Virus 2 Not Detected     Parainfluenza Virus 3 Not Detected     Parainfluenza Virus 4 Not Detected     Bordetella pertussis pcr Not Detected     Influenza A H1 2009 PCR Not Detected     Chlamydophila pneumoniae PCR Not Detected     Mycoplasma pneumo by PCR Not Detected     Influenza A PCR Not Detected     Influenza A H3 Not Detected     Influenza A H1 Not Detected     RSV, PCR Not Detected     Bordetella parapertussis PCR Not Detected    Narrative:       The coronavirus on the RVP is NOT COVID-19 and is NOT indicative of infection with COVID-19.           .  Imaging Results  (Last 24 Hours)     ** No results found for the last 24 hours. **        CT of head without contrast reviewed by me from outside hospital showing no acute findings  MRI brain performed 10/02/2019 reviewed showing an area of abnormal enhancement adjacent to the right temporal lobe that is considered extra-axial in location.    Creatine Kinase  20 - 180 U/L 84      CBC and CMP unremarkable    Impression  Active Hospital Problems    Diagnosis POA   • **Seizure (CMS/HCC) [R56.9] Yes       Spell of unknown etiology with seizure-like activity.  This could be secondary to tussive syncope.  Given the fact that she has had a previous spell and that she had urinary incontinence this would make it more consistent with a possible seizure disorder.  We will repeat MRI the brain with and without contrast.  In addition we will perform an EEG.  Based on this we will decide whether to move forward with antiepileptic therapy.    Plan    · Mri brain with and without  · EEG  · Seizure precautions  · Can be discharged at any time after testing and follow up with neurology    Recommended to observe all seizure precautions, including, but not limited to no driving until seizure free for more than 3 months- as per State driving regulation / law;   Avoid all high-risk activity,   Take showers instead of baths,   Avoid swimming without observation,   Avoid open heat sources,   Avoid working at heights and   Avoid engaging in all potentially hazardous activities.   Patient expressed clear understanding      I discussed the patients findings and my recommendations with patient and nursing staff    Peter Germain MD  03/16/20  10:23

## 2020-03-17 NOTE — DISCHARGE SUMMARY
"[facility]   DISCHARGE SUMMARY       Date of Admission: 3/15/2020  Date of Discharge:  3/16/2020  Primary Care Physician: Barrington Jaramillo MD    Presenting Problem/History of Present Illness:  Spell--undefined    Final Discharge Diagnoses:  Spell--undefined    Consults: neurology    Procedures Performed: mri brain, eeg    Pertinent Test Results: neg eeg for seizure    Chief Complaint on Day of Discharge: no further vomiting or spells    History of Present Illness on Day of Discharge: recent resolution of vomiting     Hospital Course:  The patient is a 79 y.o. female who presented to [facility] with a transfer from [facility] ed with vomiting and a spell as best defined as \"shaking and jerking all over after a vomiting episode. [facility]. Her vomiting resolved and her diet increased. Her  Brain mri and eeg were unrevealing and she was discharged home.      Condition on Discharge:  stable    Physical Exam on Discharge:  /60 (BP Location: Left arm, Patient Position: Lying)   Pulse 76   Temp 97.9 °F (36.6 °C) (Oral)   Resp 16   Ht 153.7 cm (60.51\")   Wt 86.6 kg (190 lb 14.7 oz)   SpO2 97%   BMI 36.66 kg/m²   Physical Exam   Constitutional: She appears well-developed and well-nourished.   Cardiovascular: Normal rate, regular rhythm, normal heart sounds and intact distal pulses.   Pulmonary/Chest: Effort normal and breath sounds normal.   Abdominal: Soft. Bowel sounds are normal.   Nursing note and vitals reviewed.      Discharge Disposition:  Home or Self Care    Discharge Medications:     Discharge Medications      Continue These Medications      Instructions Start Date   aspirin 81 MG EC tablet   81 mg, Oral      atorvastatin 10 MG tablet  Commonly known as:  LIPITOR   TAKE ONE TABLET DAILY GENERIC FOR LIPITOR      bumetanide 0.5 MG tablet  Commonly known as:  BUMEX   1 mg, Oral, Daily      Cholecalciferol 50 MCG (2000 UT) capsule   2,000 " Units, Oral, Daily      citalopram 20 MG tablet  Commonly known as:  CeleXA   TAKE ONE TABLET DAILY GENERIC FOR CELEXA      fluocinonide-emollient 0.05 % cream  Commonly known as:  LIDEX-E   Topical, 2 Times Daily      HYDROcodone-acetaminophen 5-325 MG per tablet  Commonly known as:  NORCO   1 tablet, Oral, Daily PRN      levothyroxine 75 MCG tablet  Commonly known as:  Synthroid   75 mcg, Oral, Daily      losartan 100 MG tablet  Commonly known as:  COZAAR   TAKE ONE TABLET DAILY GENERIC FOR COZAAR      omeprazole 20 MG capsule  Commonly known as:  priLOSEC   20 mg, Oral, Daily         Stop These Medications    ipratropium-albuterol 0.5-2.5 mg/3 ml nebulizer  Commonly known as:  DUO-NEB     methylPREDNISolone 4 MG tablet  Commonly known as:  MEDROL (DEWEY)     montelukast 10 MG tablet  Commonly known as:  SINGULAIR     OMEGA 3-6-9 FATTY ACIDS PO            Discharge Diet:   Diet Instructions     Regular diet               Activity at Discharge:   Activity Instructions     Activity as tolerated               Discharge Care Plan/Instructions: take meds as directed    Follow-up Appointments:   Future Appointments   Date Time Provider Department Center   4/7/2020  9:15 AM Barrington Jaramillo MD [facility] None   5/28/2020  8:45 AM Barrington Jaramillo MD [facility] None   2/1/2021 10:30 AM Dennys Chilel MD [facility]       Test Results Pending at Discharge: none    Barrington Jaramillo MD  03/17/20  07:27    Time: 7:30am

## 2020-03-17 NOTE — H&P
"[facility]  HISTORY AND PHYSICAL    Date of Admission: 3/15/2020  Primary Care Physician: Barrington Jaramillo MD    Subjective    Chief Complaint: \"they said I had a seizure\"    This is a 79 yr old lady with hypertension who presented to the ed at [facility] with vomiting and an episode of \"shking\" lasting a few secs. She was not post ictal. The ed doctor was concerned about a possible seizure and she was transferred here for neurology consultation.      Review of Systems   HENT: Negative.    Eyes: Negative.    Respiratory: Negative.    Cardiovascular: Negative.    Gastrointestinal: Positive for vomiting.   Endocrine: Negative.    Genitourinary: Negative.    Musculoskeletal: Negative.    Skin: Negative.    Neurological: Positive for weakness.   Hematological: Negative.    Psychiatric/Behavioral: Negative.         Otherwise complete ROS reviewed and negative except as mentioned in the HPI.      Past Medical History:   Past Medical History:   Diagnosis Date   • Anxiety    • Cholelithiasis    • Colon polyp    • Depression    • Disease of thyroid gland    • Hyperlipidemia    • Hypertension    • Myalgia    • Nephrolithiasis        Past Surgical History:  Past Surgical History:   Procedure Laterality Date   • COLONOSCOPY  07/15/2014    2 rectal polyps, hyperplastic, diverticulosis       Social History:  reports that she quit smoking about 6 years ago. She has never used smokeless tobacco. She reports that she does not drink alcohol or use drugs.    Family History: family history includes Arthritis in her father and mother; Hypertension in her sister.     Allergies:  No Known Allergies    Medications:  Prior to Admission medications    Medication Sig Start Date End Date Taking? Authorizing Provider   aspirin 81 MG EC tablet Take 81 mg by mouth.    Provider, MD Errol   atorvastatin (LIPITOR) 10 MG tablet TAKE ONE TABLET DAILY GENERIC FOR LIPITOR 10/28/19   Barrington Jaramillo MD   bumetanide (BUMEX) 0.5 MG " "tablet Take 1 mg by mouth Daily.    Errol Graf MD   Cholecalciferol 2000 units capsule Take 2,000 Units by mouth Daily.    Errol Graf MD   citalopram (CeleXA) 20 MG tablet TAKE ONE TABLET DAILY GENERIC FOR CELEXA 1/6/20   Barrington Jaramillo MD   fluocinonide-emollient (LIDEX-E) 0.05 % cream Apply  topically to the appropriate area as directed 2 (Two) Times a Day. 3/10/20   Barrington Jaramillo MD   HYDROcodone-acetaminophen (NORCO) 5-325 MG per tablet Take 1 tablet by mouth Daily As Needed for Moderate Pain . 3/10/20   Barrington Jaramillo MD   ipratropium-albuterol (DUO-NEB) 0.5-2.5 mg/3 ml nebulizer Take 3 mL by nebulization 4 (Four) Times a Day. 3/4/20   Barrington Jaramillo MD   levothyroxine (SYNTHROID) 75 MCG tablet Take 1 tablet by mouth Daily. 1/23/20   Barrington Jaramillo MD   losartan (COZAAR) 100 MG tablet TAKE ONE TABLET DAILY GENERIC FOR COZAAR 12/9/19   Barrington Jaramillo MD   methylPREDNISolone (MEDROL, DEWEY,) 4 MG tablet Take as directed on package instructions. 2/20/20   John Berry APRN   montelukast (SINGULAIR) 10 MG tablet Take 10 mg by mouth Every Night.    Errol Graf MD   OMEGA 3-6-9 FATTY ACIDS PO Take 1,000 mg by mouth.    Errol Graf MD   omeprazole (priLOSEC) 20 MG capsule Take 20 mg by mouth Daily.    Errol Graf MD       Objective    Vital Signs: /60 (BP Location: Left arm, Patient Position: Lying)   Pulse 76   Temp 97.9 °F (36.6 °C) (Oral)   Resp 16   Ht 153.7 cm (60.51\")   Wt 86.6 kg (190 lb 14.7 oz)   SpO2 97%   BMI 36.66 kg/m²   Physical Exam   Constitutional: She is oriented to person, place, and time. She appears well-developed and well-nourished.   HENT:   Head: Normocephalic and atraumatic.   Eyes: Pupils are equal, round, and reactive to light. EOM are normal.   Neck: Normal range of motion.   Cardiovascular: Normal rate, regular rhythm, normal heart sounds and intact distal pulses.   "   Pulmonary/Chest: Effort normal and breath sounds normal.   Abdominal: Soft. Bowel sounds are normal.   Musculoskeletal: Normal range of motion.   Neurological: She is alert and oriented to person, place, and time.   Skin: Skin is warm and dry. Capillary refill takes less than 2 seconds.   Psychiatric: She has a normal mood and affect. Her behavior is normal. Judgment and thought content normal.   Nursing note and vitals reviewed.        Results Reviewed:  Lab Results (last 24 hours)     Procedure Component Value Units Date/Time    Urinalysis With Microscopic If Indicated (No Culture) - Urine, Clean Catch [[id number]]  (Abnormal) Collected:  03/16/20 0429    Specimen:  Urine, Clean Catch Updated:  03/16/20 0546     Color, UA Yellow     Appearance, UA Clear     pH, UA <=5.0     Specific Gravity, UA 1.008     Glucose, UA Negative     Ketones, UA Negative     Bilirubin, UA Negative     Blood, UA Trace     Protein, UA Negative     Leuk Esterase, UA Negative     Nitrite, UA Negative     Urobilinogen, UA 0.2 E.U./dL    Urinalysis, Microscopic Only - Urine, Clean Catch [[id number]]  (Abnormal) Collected:  03/16/20 0429    Specimen:  Urine, Clean Catch Updated:  03/16/20 0546     RBC, UA None Seen /HPF      WBC, UA 0-2 /HPF      Bacteria, UA None Seen /HPF      Squamous Epithelial Cells, UA 0-2 /HPF      Hyaline Casts, UA None Seen /LPF      Methodology Automated Microscopy    Comprehensive Metabolic Panel [[id number]]  (Abnormal) Collected:  03/16/20 0339    Specimen:  Blood Updated:  03/16/20 0433     Glucose 156 mg/dL      BUN 14 mg/dL      Creatinine 0.89 mg/dL      Sodium 138 mmol/L      Potassium 3.8 mmol/L      Chloride 100 mmol/L      CO2 24.0 mmol/L      Calcium 8.7 mg/dL      Total Protein 7.2 g/dL      Albumin 4.00 g/dL      ALT (SGPT) 12 U/L      AST (SGOT) 14 U/L      Alkaline Phosphatase 70 U/L      Total Bilirubin 0.8 mg/dL      eGFR Non African Amer 62 mL/min/1.73      Globulin 3.2 gm/dL      A/G Ratio 1.3  g/dL      BUN/Creatinine Ratio 15.7     Anion Gap 14.0 mmol/L     Narrative:       GFR Normal >60  Chronic Kidney Disease <60  Kidney Failure <15      CK [[id number]]  (Normal) Collected:  03/16/20 0339    Specimen:  Blood Updated:  03/16/20 0433     Creatine Kinase 84 U/L     Troponin [[id number]]  (Normal) Collected:  03/16/20 0339    Specimen:  Blood Updated:  03/16/20 0432     Troponin T <0.010 ng/mL     Narrative:       Troponin T Reference Range:  <= 0.03 ng/mL-   Negative for AMI  >0.03 ng/mL-     Abnormal for myocardial necrosis.  Clinicians would have to utilize clinical acumen, EKG, Troponin and serial changes to determine if it is an Acute Myocardial Infarction or myocardial injury due to an underlying chronic condition.       Results may be falsely decreased if patient taking Biotin.      CBC Auto Differential [[id number]]  (Abnormal) Collected:  03/16/20 0339    Specimen:  Blood Updated:  03/16/20 0411     WBC 8.06 10*3/mm3      RBC 3.93 10*6/mm3      Hemoglobin 11.8 g/dL      Hematocrit 34.4 %      MCV 87.5 fL      MCH 30.0 pg      MCHC 34.3 g/dL      RDW 12.7 %      RDW-SD 40.6 fl      MPV 10.4 fL      Platelets 199 10*3/mm3      Neutrophil % 79.3 %      Lymphocyte % 11.9 %      Monocyte % 7.6 %      Eosinophil % 0.5 %      Basophil % 0.1 %      Immature Grans % 0.6 %      Neutrophils, Absolute 6.39 10*3/mm3      Lymphocytes, Absolute 0.96 10*3/mm3      Monocytes, Absolute 0.61 10*3/mm3      Eosinophils, Absolute 0.04 10*3/mm3      Basophils, Absolute 0.01 10*3/mm3      Immature Grans, Absolute 0.05 10*3/mm3      nRBC 0.0 /100 WBC     Respiratory Panel, PCR - Swab, Nasopharynx [[id number]]  (Normal) Collected:  03/15/20 2017    Specimen:  Swab from Nasopharynx Updated:  03/15/20 2119     ADENOVIRUS, PCR Not Detected     Coronavirus 229E Not Detected     Coronavirus HKU1 Not Detected     Coronavirus NL63 Not Detected     Coronavirus OC43 Not Detected     Human Metapneumovirus Not Detected     Human  Rhinovirus/Enterovirus Not Detected     Influenza B PCR Not Detected     Parainfluenza Virus 1 Not Detected     Parainfluenza Virus 2 Not Detected     Parainfluenza Virus 3 Not Detected     Parainfluenza Virus 4 Not Detected     Bordetella pertussis pcr Not Detected     Influenza A H1 2009 PCR Not Detected     Chlamydophila pneumoniae PCR Not Detected     Mycoplasma pneumo by PCR Not Detected     Influenza A PCR Not Detected     Influenza A H3 Not Detected     Influenza A H1 Not Detected     RSV, PCR Not Detected     Bordetella parapertussis PCR Not Detected    Narrative:       The coronavirus on the RVP is NOT COVID-19 and is NOT indicative of infection with COVID-19.         Imaging Results (Last 24 Hours)     Procedure Component Value Units Date/Time    MRI Brain With & Without Contrast [[id number]] Collected:  03/16/20 1542     Updated:  03/16/20 1550    Narrative:       EXAMINATION: MRI BRAIN W WO CONTRAST-     3/16/2020 2:10 PM CDT     HISTORY: Encephalopathy     Pre and postcontrast MR imaging of the brain compared with a head CT  from 02/03/2020.     No acute parenchymal ischemia based on the DWI sequence.  A small oval focus of diffusion restriction within the body of the left  lateral ventricle is unchanged and probably related to an incidental  choroid plexus cyst.     Old chorionic change in the right occipital lobe.     A few small hyperintense foci are present within the periventricular and  subcortical white matter.     There is patchy increased FLAIR signal within the matt.     No brain hemorrhage or abnormal calcification is seen.     Moderate diffuse cortical atrophy.  Mild central atrophy with mild ventricular prominence.  22 mm left maxillary sinus cyst.     Postcontrast imaging shows no abnormal brain enhancement.  No significant mass lesion and no sign of vasculitis.     Unchanged 9 x 8 x 5 mm peripheral right temporal lobe enhancing focus  appears to be extra-axial and probably represents a  tiny meningioma.     Summary:  1. Atrophy and small vessel disease.  2. Old ischemic change within the right occipital lobe.  3. No significant mass or acute infarct.  This report was finalized on 03/16/2020 15:47 by Dr. Sammy Ewing MD.            Active Hospital Problems    Diagnosis   • **Seizure (CMS/[facility])       Assessment / Plan  Seizure precautions, check mri and eeg, neurology consultation      Code Status: full code     I discussed the patient's findings and my recommendations with the patient..    Estimated length of stay 24 hrs.    Barrington Jaramillo MD   03/16/20   19:33

## 2020-03-18 ENCOUNTER — OFFICE VISIT (OUTPATIENT)
Dept: FAMILY MEDICINE CLINIC | Facility: CLINIC | Age: 80
End: 2020-03-18

## 2020-03-18 VITALS
HEIGHT: 61 IN | RESPIRATION RATE: 16 BRPM | HEART RATE: 87 BPM | OXYGEN SATURATION: 97 % | BODY MASS INDEX: 35.87 KG/M2 | WEIGHT: 190 LBS | SYSTOLIC BLOOD PRESSURE: 132 MMHG | DIASTOLIC BLOOD PRESSURE: 74 MMHG

## 2020-03-18 DIAGNOSIS — R11.11 VOMITING WITHOUT NAUSEA, INTRACTABILITY OF VOMITING NOT SPECIFIED, UNSPECIFIED VOMITING TYPE: Primary | ICD-10-CM

## 2020-03-18 PROCEDURE — 99213 OFFICE O/P EST LOW 20 MIN: CPT | Performed by: FAMILY MEDICINE

## 2020-04-03 DIAGNOSIS — K21.9 GASTROESOPHAGEAL REFLUX DISEASE WITHOUT ESOPHAGITIS: Primary | ICD-10-CM

## 2020-04-03 RX ORDER — OMEPRAZOLE 20 MG/1
20 CAPSULE, DELAYED RELEASE ORAL DAILY
Qty: 30 CAPSULE | Refills: 3 | Status: SHIPPED | OUTPATIENT
Start: 2020-04-03 | End: 2021-06-07

## 2020-04-03 RX ORDER — OMEPRAZOLE 20 MG/1
CAPSULE, DELAYED RELEASE ORAL
Qty: 30 CAPSULE | Refills: 1 | Status: SHIPPED | OUTPATIENT
Start: 2020-04-03 | End: 2020-09-28 | Stop reason: SDUPTHER

## 2020-04-27 DIAGNOSIS — M19.90 OSTEOARTHRITIS, UNSPECIFIED OSTEOARTHRITIS TYPE, UNSPECIFIED SITE: ICD-10-CM

## 2020-04-27 RX ORDER — HYDROCODONE BITARTRATE AND ACETAMINOPHEN 5; 325 MG/1; MG/1
1 TABLET ORAL DAILY PRN
Qty: 30 TABLET | Refills: 0 | Status: SHIPPED | OUTPATIENT
Start: 2020-04-27 | End: 2020-06-22

## 2020-05-28 ENCOUNTER — OFFICE VISIT (OUTPATIENT)
Dept: FAMILY MEDICINE CLINIC | Facility: CLINIC | Age: 80
End: 2020-05-28

## 2020-05-28 DIAGNOSIS — E03.9 ACQUIRED HYPOTHYROIDISM: ICD-10-CM

## 2020-05-28 DIAGNOSIS — K21.9 GASTROESOPHAGEAL REFLUX DISEASE, ESOPHAGITIS PRESENCE NOT SPECIFIED: ICD-10-CM

## 2020-05-28 DIAGNOSIS — I10 ESSENTIAL HYPERTENSION: Primary | ICD-10-CM

## 2020-05-28 DIAGNOSIS — E78.2 MIXED HYPERLIPIDEMIA: ICD-10-CM

## 2020-05-28 DIAGNOSIS — N18.30 CHRONIC KIDNEY DISEASE, STAGE III (MODERATE) (HCC): ICD-10-CM

## 2020-05-28 PROCEDURE — 99442 PR PHYS/QHP TELEPHONE EVALUATION 11-20 MIN: CPT | Performed by: FAMILY MEDICINE

## 2020-05-28 NOTE — PROGRESS NOTES
"Subjective   Paige Martinez is a 79 y.o. female.   You have chosen to receive care through a telephone visit. Do you consent to use a telephone visit for your medical care today? Yes  No chief complaint on file.   \"myh bp is doing well\"    History of Present Illness     she says her bp is doing well without cp or ha--she is toleraging lipitor without myallgias..toleraging synthrodi witjhout heat or cold intolerances  He anxietyh symp;toms are stable with meds--her gerd symtgoms are stable with meds    Current Outpatient Medications:   •  aspirin 81 MG EC tablet, Take 81 mg by mouth., Disp: , Rfl:   •  atorvastatin (LIPITOR) 10 MG tablet, TAKE ONE TABLET DAILY GENERIC FOR LIPITOR, Disp: 30 tablet, Rfl: 5  •  bumetanide (BUMEX) 0.5 MG tablet, Take 1 mg by mouth Daily., Disp: , Rfl:   •  Cholecalciferol 2000 units capsule, Take 2,000 Units by mouth Daily., Disp: , Rfl:   •  citalopram (CeleXA) 20 MG tablet, TAKE ONE TABLET DAILY GENERIC FOR CELEXA, Disp: 90 tablet, Rfl: 1  •  fluocinonide-emollient (LIDEX-E) 0.05 % cream, Apply  topically to the appropriate area as directed 2 (Two) Times a Day., Disp: 30 g, Rfl: 0  •  HYDROcodone-acetaminophen (NORCO) 5-325 MG per tablet, TAKE 1 TABLET BY MOUTH DAILY AS NEEDED FOR MODERATE PAIN ., Disp: 30 tablet, Rfl: 0  •  levothyroxine (SYNTHROID) 75 MCG tablet, Take 1 tablet by mouth Daily., Disp: 30 tablet, Rfl: 4  •  losartan (COZAAR) 100 MG tablet, TAKE ONE TABLET DAILY GENERIC FOR COZAAR, Disp: 90 tablet, Rfl: 1  •  omeprazole (priLOSEC) 20 MG capsule, TAKE 1 CAPSULE DAILY GENERIC FOR PRILOSEC, Disp: 30 capsule, Rfl: 1  •  omeprazole (priLOSEC) 20 MG capsule, Take 1 capsule by mouth Daily., Disp: 30 capsule, Rfl: 3  No Known Allergies    Past Medical History:   Diagnosis Date   • Anxiety    • Cholelithiasis    • Colon polyp    • Depression    • Disease of thyroid gland    • Hyperlipidemia    • Hypertension    • Myalgia    • Nephrolithiasis      Past Surgical History:   Procedure " Laterality Date   • COLONOSCOPY  07/15/2014    2 rectal polyps, hyperplastic, diverticulosis       Review of Systems   Constitutional: Negative.    HENT: Negative.    Eyes: Negative.    Respiratory: Negative.    Cardiovascular: Negative.    Gastrointestinal: Negative.    Endocrine: Negative.    Genitourinary: Negative.    Musculoskeletal: Negative.    Skin: Negative.    Allergic/Immunologic: Negative.    Neurological: Negative.    Hematological: Negative.    Psychiatric/Behavioral: The patient is nervous/anxious.        Objective  There were no vitals taken for this visit.  Physical Exam    Assessment/Plan   Diagnoses and all orders for this visit:    Essential hypertension    Mixed hyperlipidemia    Acquired hypothyroidism    Chronic kidney disease, stage III (moderate) (CMS/[facility])    Gastroesophageal reflux disease, esophagitis presence not specified    she will see neurology in June  We reviewed recent labs    This visit has been rescheduled as a phone visit to comply with patient safety concerns in accordance with CDC recommendations. Total time of discussion was 20  minutes.         No orders of the defined types were placed in this encounter.      Follow up: 4 month(s)

## 2020-06-22 DIAGNOSIS — M19.90 OSTEOARTHRITIS, UNSPECIFIED OSTEOARTHRITIS TYPE, UNSPECIFIED SITE: ICD-10-CM

## 2020-06-22 RX ORDER — HYDROCODONE BITARTRATE AND ACETAMINOPHEN 5; 325 MG/1; MG/1
TABLET ORAL
Qty: 30 TABLET | Refills: 0 | Status: SHIPPED | OUTPATIENT
Start: 2020-06-22 | End: 2020-08-10

## 2020-07-29 ENCOUNTER — OFFICE VISIT (OUTPATIENT)
Dept: NEUROLOGY | Facility: CLINIC | Age: 80
End: 2020-07-29

## 2020-07-29 VITALS
SYSTOLIC BLOOD PRESSURE: 140 MMHG | OXYGEN SATURATION: 98 % | DIASTOLIC BLOOD PRESSURE: 80 MMHG | HEIGHT: 65 IN | BODY MASS INDEX: 30.16 KG/M2 | HEART RATE: 82 BPM | WEIGHT: 181 LBS

## 2020-07-29 DIAGNOSIS — R55 SYNCOPE, UNSPECIFIED SYNCOPE TYPE: Primary | ICD-10-CM

## 2020-07-29 DIAGNOSIS — D32.9 MENINGIOMA (HCC): ICD-10-CM

## 2020-07-29 DIAGNOSIS — I10 ESSENTIAL HYPERTENSION: ICD-10-CM

## 2020-07-29 PROCEDURE — 99214 OFFICE O/P EST MOD 30 MIN: CPT | Performed by: CLINICAL NURSE SPECIALIST

## 2020-07-29 NOTE — PROGRESS NOTES
Subjective     Chief Complaint   Patient presents with   • Seizures     pt denies seizure activty since hospital stay       Paige Martinez is a 79 y.o. female right handed. She was born in [place] and moved to the [place] in the 1960s. Patient is here for hospital follow up for tussive syncope in 10/2019 and 3/2020. She has history of hypertension, thyroid disorder, hyperlipidemia, GERD. MRI brain 10/2019 showed small right meningioma and sees Dr. Chilel with plans for annual follow up. MRI brain was repeated March 2020 and meningioma was stable. Since March 2020 she denies further episodes of syncope. She denies episodes of n/v. She denies involuntary tremor, tongue biting, dizziness, staring episodes, urinary/bowel incontinence. EEG done 10/2019 and 3/2020 did not show evidence of seizure. She denies family history of seizures.     Syncope   Chronicity: episode in 10/2019 and 3/2020. The problem has been resolved. Exacerbated by: n/v, dry heaving. Pertinent negatives include no dizziness, headaches, nausea or vomiting. Associated symptoms comments: Loss of consciousness and did have observed involuntary tremor.. She has tried nothing for the symptoms. Her past medical history is significant for HTN. cerebral meningioma        Current Outpatient Medications   Medication Sig Dispense Refill   • aspirin 81 MG EC tablet Take 81 mg by mouth.     • atorvastatin (LIPITOR) 10 MG tablet TAKE ONE TABLET DAILY GENERIC FOR LIPITOR 30 tablet 5   • bumetanide (BUMEX) 0.5 MG tablet Take 1 mg by mouth Daily.     • Cholecalciferol 2000 units capsule Take 2,000 Units by mouth Daily.     • citalopram (CeleXA) 20 MG tablet TAKE ONE TABLET DAILY GENERIC FOR CELEXA 90 tablet 1   • fluocinonide-emollient (LIDEX-E) 0.05 % cream Apply  topically to the appropriate area as directed 2 (Two) Times a Day. 30 g 0   • HYDROcodone-acetaminophen (NORCO) 5-325 MG per tablet TAKE ONE TABLET DAILY AS NEEDED FOR PAIN GENERIC FOR NORCO 30 tablet  0   • levothyroxine (SYNTHROID) 75 MCG tablet Take 1 tablet by mouth Daily. 30 tablet 4   • losartan (COZAAR) 100 MG tablet TAKE ONE TABLET DAILY GENERIC FOR COZAAR 90 tablet 1   • omeprazole (priLOSEC) 20 MG capsule TAKE 1 CAPSULE DAILY GENERIC FOR PRILOSEC 30 capsule 1   • omeprazole (priLOSEC) 20 MG capsule Take 1 capsule by mouth Daily. 30 capsule 3     No current facility-administered medications for this visit.        Past Medical History:   Diagnosis Date   • Anxiety    • Cholelithiasis    • Colon polyp    • Depression    • Disease of thyroid gland    • Hyperlipidemia    • Hypertension    • Myalgia    • Nephrolithiasis        Past Surgical History:   Procedure Laterality Date   • COLONOSCOPY  07/15/2014    2 rectal polyps, hyperplastic, diverticulosis       family history includes Arthritis in her father and mother; Hypertension in her sister.    Social History     Tobacco Use   • Smoking status: Former Smoker     Last attempt to quit:      Years since quittin.5   • Smokeless tobacco: Never Used   Substance Use Topics   • Alcohol use: No     Frequency: Never     Comment: occ   • Drug use: No       Review of Systems   Constitutional: Negative.  Negative for appetite change and fatigue.   HENT: Positive for dental problem (full teeth extraction and has full dentures) and tinnitus. Negative for postnasal drip and sinus pressure.    Eyes: Negative.  Negative for visual disturbance.   Respiratory: Negative.  Negative for apnea and shortness of breath.    Cardiovascular: Positive for syncope. Negative for leg swelling.   Gastrointestinal: Negative.  Negative for diarrhea, nausea and vomiting.   Endocrine: Negative.    Genitourinary: Negative.  Negative for dysuria.   Musculoskeletal: Positive for arthralgias. Negative for gait problem and myalgias.   Skin: Negative.    Allergic/Immunologic: Negative.    Neurological: Negative for dizziness, speech difficulty and headaches.   Hematological: Negative.   "  Psychiatric/Behavioral: Negative for agitation and decreased concentration.   All other systems reviewed and are negative.      Objective     /80 (BP Location: Left arm, Patient Position: Sitting, Cuff Size: Adult)   Pulse 82   Ht 165.1 cm (65\")   Wt 82.1 kg (181 lb)   SpO2 98%   BMI 30.12 kg/m² , Body mass index is 30.12 kg/m².    Physical Exam   Constitutional: She is oriented to person, place, and time. Vital signs are normal. She appears well-developed and well-nourished. She is cooperative.   HENT:   Head: Normocephalic and atraumatic.   Right Ear: Hearing, tympanic membrane and external ear normal.   Left Ear: Hearing, tympanic membrane and external ear normal.   Nose: Nose normal.   Mouth/Throat: Oropharynx is clear and moist.   Eyes: Pupils are equal, round, and reactive to light. Conjunctivae, EOM and lids are normal. Right eye exhibits normal extraocular motion and no nystagmus. Left eye exhibits normal extraocular motion and no nystagmus. Right pupil is round and reactive. Left pupil is round and reactive. Pupils are equal.   Neck: Normal range of motion. Neck supple. Carotid bruit is not present.   Cardiovascular: Normal rate, regular rhythm and normal heart sounds.   No murmur heard.  Pulses:       Dorsalis pedis pulses are 1+ on the right side.   Pulmonary/Chest: Effort normal and breath sounds normal. She has no decreased breath sounds. She has no rhonchi.   Abdominal: Soft. Bowel sounds are normal.   Musculoskeletal: Normal range of motion.   Neurological: She is alert and oriented to person, place, and time. She has normal strength and normal reflexes. She displays no tremor. No cranial nerve deficit or sensory deficit. She exhibits normal muscle tone. She displays a negative Romberg sign. Coordination and gait normal. GCS eye subscore is 4. GCS verbal subscore is 5. GCS motor subscore is 6.   Reflex Scores:       Tricep reflexes are 2+ on the right side and 2+ on the left side.       " Bicep reflexes are 2+ on the right side and 2+ on the left side.       Brachioradialis reflexes are 2+ on the right side and 2+ on the left side.       Patellar reflexes are 2+ on the right side and 2+ on the left side.       Achilles reflexes are 2+ on the right side and 2+ on the left side.  Awake, alert. No aphasia, no dysarthria  Completes simple and complex commands    CN II:  Visual fields full.  Pupils equally reactive to light  CN III, IV, VI:  Extraocular Muscles full with no signs of nystagmus  CN V:  Facial sensory is symmetric with no asymetries.  CN VII:  Facial motor symmetric  CN VIII:  Gross hearing intact bilaterally  CN IX:  Palate elevates symmetrically  CN X:  Palate elevates symmetrically  CN XI:  Shoulder shrug symmetric  CN XII:  Tongue is midline on protrusion    Full and symmetric strength bilateral upper and lower extremities.   Skin: Skin is warm and dry.   Psychiatric: She has a normal mood and affect. Her speech is normal and behavior is normal. Cognition and memory are normal.   Nursing note and vitals reviewed.      Results for orders placed or performed in visit on 05/21/20   Comprehensive metabolic panel   Result Value Ref Range    Glucose 147 (H) 65 - 99 mg/dL    BUN 21 8 - 23 mg/dL    Creatinine 1.06 (H) 0.57 - 1.00 mg/dL    eGFR Non African Am 50 (L) >60 mL/min/1.73    eGFR African Am 61 >60 mL/min/1.73    BUN/Creatinine Ratio 19.8 7.0 - 25.0    Sodium 139 136 - 145 mmol/L    Potassium 4.4 3.5 - 5.2 mmol/L    Chloride 102 98 - 107 mmol/L    Total CO2 26.7 22.0 - 29.0 mmol/L    Calcium 9.4 8.6 - 10.5 mg/dL    Total Protein 7.2 6.0 - 8.5 g/dL    Albumin 4.50 3.50 - 5.20 g/dL    Globulin 2.7 gm/dL    A/G Ratio 1.7 g/dL    Total Bilirubin 0.9 0.2 - 1.2 mg/dL    Alkaline Phosphatase 67 39 - 117 U/L    AST (SGOT) 16 1 - 32 U/L    ALT (SGPT) 14 1 - 33 U/L   Lipid Panel w/ Chol/HDL Ratio   Result Value Ref Range    Total Cholesterol 191 0 - 200 mg/dL    Triglycerides 115 0 - 150 mg/dL     HDL Cholesterol 49 40 - 60 mg/dL    VLDL Cholesterol 23 5 - 40 mg/dL    LDL Cholesterol  119 (H) 0 - 100 mg/dL    Chol/HDL Ratio 3.90    TSH   Result Value Ref Range    TSH 4.200 0.270 - 4.200 uIU/mL   CBC w AUTO Differential   Result Value Ref Range    WBC 6.28 3.40 - 10.80 10*3/mm3    RBC 3.64 (L) 3.77 - 5.28 10*6/mm3    Hemoglobin 11.3 (L) 12.0 - 15.9 g/dL    Hematocrit 33.0 (L) 34.0 - 46.6 %    MCV 90.7 79.0 - 97.0 fL    MCH 31.0 26.6 - 33.0 pg    MCHC 34.2 31.5 - 35.7 g/dL    RDW 12.6 12.3 - 15.4 %    Platelets 216 140 - 450 10*3/mm3    Neutrophil Rel % 60.4 42.7 - 76.0 %    Lymphocyte Rel % 25.3 19.6 - 45.3 %    Monocyte Rel % 11.0 5.0 - 12.0 %    Eosinophil Rel % 2.5 0.3 - 6.2 %    Basophil Rel % 0.5 0.0 - 1.5 %    Neutrophils Absolute 3.79 1.70 - 7.00 10*3/mm3    Lymphocytes Absolute 1.59 0.70 - 3.10 10*3/mm3    Monocytes Absolute 0.69 0.10 - 0.90 10*3/mm3    Eosinophils Absolute 0.16 0.00 - 0.40 10*3/mm3    Basophils Absolute 0.03 0.00 - 0.20 10*3/mm3    Immature Granulocyte Rel % 0.3 0.0 - 0.5 %    Immature Grans Absolute 0.02 0.00 - 0.05 10*3/mm3    nRBC 0.0 0.0 - 0.2 /100 WBC      MRI BRAIN:  Unchanged 9 x 8 x 5 mm peripheral right temporal lobe enhancing focus  appears to be extra-axial and probably represents a tiny meningioma.     Summary:  1. Atrophy and small vessel disease.  2. Old ischemic change within the right occipital lobe.  3. No significant mass or acute infarct.  This report was finalized on 03/16/2020 15:47 by Dr. Sammy Ewing MD.      EEG 3/2020:  Report:  This is an 18 channel EEG recording.  The background activity is best seen over the occipital leads and ranges between 7-12 hz.  This rhythm attenuates with eye opening.       Sleep Architecture: None seen  Photic Stimulation: Photic stimulation does provide photic driving especially at higher frequencies  Hyperventilation: Not performed     Asymetries:  None  Epileptiform Activities:  None  There is evidence of episodic generalized  slowing of unclear clinical significance.     Findings: Virtually unremarkable awake EEG with discrete episodes of generalized slowing of unclear clinical significance.  No convincing evidence of epileptiform activity nor asymmetries.     Peter Germain MD        EEG 3:2019:  Report:  This is an 18 channel EEG recording.  The background activity is best seen over the occipital leads and ranges between 8-9 Hz.  This rhythm attenuates with eye opening.       Sleep Architecture:  None seen  Photic Stimulation:  Not performed  Hyperventilation:  Not performed     Asymetries:  None  Epileptiform Activities:  None     Findings: Normal awake and drowsy EEG     Peter Germain MD         ASSESSMENT/PLAN    Diagnoses and all orders for this visit:    Syncope, unspecified syncope type    Essential hypertension    Meningioma (CMS/HCC)      MEDICAL DECISION MAKIN. Both episodes on 10/2019 and 3/2020 occurred in the setting of nausea and vomiting. She has not had further episodes since. Feel like this most likely tussive syncope.  2. Meningioma is being monitored by neurosurgery, Dr. Chilel.  3. BP management per PCP.   4. Would like to see patient as needed and instructed that if she should have another episode we would like to see her and she is agreeable to this.   5. Patient's Body mass index is 30.12 kg/m². BMI is above normal parameters. Recommendations include: educational material.       allergies and all known medications/prescriptions have been reviewed using resources available on this encounter.    Return if symptoms worsen or fail to improve.        ASH Quinones

## 2020-07-29 NOTE — PATIENT INSTRUCTIONS

## 2020-08-10 DIAGNOSIS — M19.90 OSTEOARTHRITIS, UNSPECIFIED OSTEOARTHRITIS TYPE, UNSPECIFIED SITE: ICD-10-CM

## 2020-08-10 RX ORDER — HYDROCODONE BITARTRATE AND ACETAMINOPHEN 5; 325 MG/1; MG/1
TABLET ORAL
Qty: 30 TABLET | Refills: 0 | Status: SHIPPED | OUTPATIENT
Start: 2020-08-10 | End: 2020-09-14

## 2020-08-10 RX ORDER — LEVOTHYROXINE SODIUM 0.07 MG/1
TABLET ORAL
Qty: 90 TABLET | Refills: 1 | Status: SHIPPED | OUTPATIENT
Start: 2020-08-10 | End: 2021-03-25

## 2020-08-10 RX ORDER — LOSARTAN POTASSIUM 100 MG/1
TABLET ORAL
Qty: 90 TABLET | Refills: 1 | Status: SHIPPED | OUTPATIENT
Start: 2020-08-10 | End: 2021-03-30

## 2020-09-14 DIAGNOSIS — M19.90 OSTEOARTHRITIS, UNSPECIFIED OSTEOARTHRITIS TYPE, UNSPECIFIED SITE: ICD-10-CM

## 2020-09-14 RX ORDER — CITALOPRAM 20 MG/1
TABLET ORAL
Qty: 90 TABLET | Refills: 1 | Status: SHIPPED | OUTPATIENT
Start: 2020-09-14 | End: 2021-04-26

## 2020-09-14 RX ORDER — HYDROCODONE BITARTRATE AND ACETAMINOPHEN 5; 325 MG/1; MG/1
TABLET ORAL
Qty: 30 TABLET | Refills: 0 | Status: SHIPPED | OUTPATIENT
Start: 2020-09-14 | End: 2020-10-19

## 2020-09-24 ENCOUNTER — LAB (OUTPATIENT)
Dept: FAMILY MEDICINE CLINIC | Facility: CLINIC | Age: 80
End: 2020-09-24

## 2020-09-28 ENCOUNTER — OFFICE VISIT (OUTPATIENT)
Dept: FAMILY MEDICINE CLINIC | Facility: CLINIC | Age: 80
End: 2020-09-28

## 2020-09-28 VITALS
WEIGHT: 183 LBS | TEMPERATURE: 97.3 F | HEIGHT: 61 IN | BODY MASS INDEX: 34.55 KG/M2 | OXYGEN SATURATION: 99 % | SYSTOLIC BLOOD PRESSURE: 138 MMHG | RESPIRATION RATE: 16 BRPM | DIASTOLIC BLOOD PRESSURE: 80 MMHG | HEART RATE: 87 BPM

## 2020-09-28 DIAGNOSIS — Z12.31 ENCOUNTER FOR SCREENING MAMMOGRAM FOR MALIGNANT NEOPLASM OF BREAST: ICD-10-CM

## 2020-09-28 DIAGNOSIS — F41.9 ANXIETY: ICD-10-CM

## 2020-09-28 DIAGNOSIS — E78.2 MIXED HYPERLIPIDEMIA: ICD-10-CM

## 2020-09-28 DIAGNOSIS — Z12.39 SCREENING FOR BREAST CANCER: ICD-10-CM

## 2020-09-28 DIAGNOSIS — E03.9 ACQUIRED HYPOTHYROIDISM: ICD-10-CM

## 2020-09-28 DIAGNOSIS — I10 ESSENTIAL HYPERTENSION: Primary | ICD-10-CM

## 2020-09-28 PROCEDURE — 99214 OFFICE O/P EST MOD 30 MIN: CPT | Performed by: FAMILY MEDICINE

## 2020-09-28 RX ORDER — METOPROLOL SUCCINATE 25 MG/1
25 TABLET, EXTENDED RELEASE ORAL DAILY
Qty: 30 TABLET | Refills: 5 | Status: SHIPPED | OUTPATIENT
Start: 2020-09-28 | End: 2021-05-03

## 2020-09-28 NOTE — PROGRESS NOTES
Subjective   Paige Martinez is a 79 y.o. female.     Chief Complaint   Patient presents with   • Hypertension     History of Present Illness     she nots good bp control without cp or ha---toleriang synthroid without heat or cold intotalnces---toleratgign statin witout mylagias---her anxiety is controlled wit hmeds      Current Outpatient Medications:   •  atorvastatin (LIPITOR) 10 MG tablet, TAKE ONE TABLET DAILY GENERIC FOR LIPITOR, Disp: 30 tablet, Rfl: 5  •  bumetanide (BUMEX) 0.5 MG tablet, Take 1 mg by mouth Daily., Disp: , Rfl:   •  Cholecalciferol 2000 units capsule, Take 2,000 Units by mouth Daily., Disp: , Rfl:   •  citalopram (CeleXA) 20 MG tablet, TAKE ONE TABLET DAILY GENERIC FOR CELEXA, Disp: 90 tablet, Rfl: 1  •  HYDROcodone-acetaminophen (NORCO) 5-325 MG per tablet, TAKE ONE TABLET DAILY AS NEEDED FOR PAIN GENERIC FOR NORCO, Disp: 30 tablet, Rfl: 0  •  levothyroxine (SYNTHROID, LEVOTHROID) 75 MCG tablet, TAKE ONE TABLET DAILY GENERIC FOR SYNTHROID, Disp: 90 tablet, Rfl: 1  •  losartan (COZAAR) 100 MG tablet, TAKE ONE TABLET DAILY GENERIC FOR COZAAR, Disp: 90 tablet, Rfl: 1  •  omeprazole (priLOSEC) 20 MG capsule, Take 1 capsule by mouth Daily., Disp: 30 capsule, Rfl: 3  •  aspirin 81 MG EC tablet, Take 81 mg by mouth., Disp: , Rfl:   •  fluocinonide-emollient (LIDEX-E) 0.05 % cream, Apply  topically to the appropriate area as directed 2 (Two) Times a Day., Disp: 30 g, Rfl: 0  •  metoprolol succinate XL (Toprol XL) 25 MG 24 hr tablet, Take 1 tablet by mouth Daily., Disp: 30 tablet, Rfl: 5  No Known Allergies    Past Medical History:   Diagnosis Date   • Anxiety    • Cholelithiasis    • Colon polyp    • Depression    • Disease of thyroid gland    • Hyperlipidemia    • Hypertension    • Myalgia    • Nephrolithiasis      Past Surgical History:   Procedure Laterality Date   • COLONOSCOPY  07/15/2014    2 rectal polyps, hyperplastic, diverticulosis       Review of Systems   Constitutional: Negative.   "  HENT: Negative.    Eyes: Negative.    Respiratory: Negative.    Cardiovascular: Negative.    Gastrointestinal: Negative.    Endocrine: Negative.    Genitourinary: Negative.    Musculoskeletal: Negative.    Skin: Negative.    Allergic/Immunologic: Negative.    Neurological: Negative.    Hematological: Negative.    Psychiatric/Behavioral: The patient is nervous/anxious.        Objective  /80   Pulse 87   Temp 97.3 °F (36.3 °C)   Resp 16   Ht 153.7 cm (60.5\")   Wt 83 kg (183 lb)   SpO2 99%   BMI 35.15 kg/m²   Physical Exam  Vitals signs and nursing note reviewed.   Constitutional:       Appearance: Normal appearance. She is normal weight.   HENT:      Head: Normocephalic and atraumatic.      Right Ear: Tympanic membrane, ear canal and external ear normal.      Left Ear: Tympanic membrane, ear canal and external ear normal.      Nose: Nose normal.      Mouth/Throat:      Mouth: Mucous membranes are dry.      Pharynx: Oropharynx is clear.   Eyes:      Extraocular Movements: Extraocular movements intact.      Conjunctiva/sclera: Conjunctivae normal.      Pupils: Pupils are equal, round, and reactive to light.   Neck:      Musculoskeletal: Normal range of motion and neck supple.   Cardiovascular:      Rate and Rhythm: Normal rate and regular rhythm.      Pulses: Normal pulses.      Heart sounds: Normal heart sounds.   Pulmonary:      Effort: Pulmonary effort is normal.      Breath sounds: Normal breath sounds.   Abdominal:      General: Abdomen is flat. Bowel sounds are normal.      Palpations: Abdomen is soft.   Musculoskeletal: Normal range of motion.   Skin:     General: Skin is warm and dry.      Capillary Refill: Capillary refill takes less than 2 seconds.   Neurological:      General: No focal deficit present.      Mental Status: She is alert. Mental status is at baseline.   Psychiatric:         Mood and Affect: Mood normal.         Behavior: Behavior normal.         Thought Content: Thought content " normal.         Judgment: Judgment normal.         Assessment/Plan   Paige was seen today for hypertension.    Diagnoses and all orders for this visit:    Essential hypertension  -     Comprehensive metabolic panel  -     TSH  -     Lipid Panel With / Chol / HDL Ratio  -     Hepatitis C Antibody    Mixed hyperlipidemia  -     Comprehensive metabolic panel  -     TSH  -     Lipid Panel With / Chol / HDL Ratio  -     Hepatitis C Antibody    Acquired hypothyroidism    Screening for breast cancer  -     Mammo Screening Bilateral With CAD; Future    Encounter for screening mammogram for malignant neoplasm of breast   -     Mammo Screening Bilateral With CAD; Future    Anxiety    Other orders  -     metoprolol succinate XL (Toprol XL) 25 MG 24 hr tablet; Take 1 tablet by mouth Daily.                 Orders Placed This Encounter   Procedures   • Mammo Screening Bilateral With CAD     Standing Status:   Future     Standing Expiration Date:   9/28/2021     Order Specific Question:   Reason for Exam:     Answer:   screen for breast cancer   • Comprehensive metabolic panel   • TSH   • Lipid Panel With / Chol / HDL Ratio   • Hepatitis C Antibody       Follow up: 6 month(s)

## 2020-09-29 LAB
ALBUMIN SERPL-MCNC: 4.6 G/DL (ref 3.5–5.2)
ALBUMIN/GLOB SERPL: 1.9 G/DL
ALP SERPL-CCNC: 78 U/L (ref 39–117)
ALT SERPL-CCNC: 15 U/L (ref 1–33)
AST SERPL-CCNC: 15 U/L (ref 1–32)
BILIRUB SERPL-MCNC: 0.5 MG/DL (ref 0–1.2)
BUN SERPL-MCNC: 20 MG/DL (ref 8–23)
BUN/CREAT SERPL: 19.8 (ref 7–25)
CALCIUM SERPL-MCNC: 9.2 MG/DL (ref 8.6–10.5)
CHLORIDE SERPL-SCNC: 101 MMOL/L (ref 98–107)
CHOLEST SERPL-MCNC: 209 MG/DL (ref 0–200)
CHOLEST/HDLC SERPL: 3.94 {RATIO}
CO2 SERPL-SCNC: 28.1 MMOL/L (ref 22–29)
CREAT SERPL-MCNC: 1.01 MG/DL (ref 0.57–1)
GLOBULIN SER CALC-MCNC: 2.4 GM/DL
GLUCOSE SERPL-MCNC: 126 MG/DL (ref 65–99)
HCV AB S/CO SERPL IA: 0.2 S/CO RATIO (ref 0–0.9)
HDLC SERPL-MCNC: 53 MG/DL (ref 40–60)
LDLC SERPL CALC-MCNC: 121 MG/DL (ref 0–100)
POTASSIUM SERPL-SCNC: 4.3 MMOL/L (ref 3.5–5.2)
PROT SERPL-MCNC: 7 G/DL (ref 6–8.5)
SODIUM SERPL-SCNC: 138 MMOL/L (ref 136–145)
TRIGL SERPL-MCNC: 177 MG/DL (ref 0–150)
TSH SERPL DL<=0.005 MIU/L-ACNC: 2.56 UIU/ML (ref 0.27–4.2)
VLDLC SERPL CALC-MCNC: 35.4 MG/DL

## 2020-10-19 DIAGNOSIS — M19.90 OSTEOARTHRITIS, UNSPECIFIED OSTEOARTHRITIS TYPE, UNSPECIFIED SITE: ICD-10-CM

## 2020-10-19 RX ORDER — HYDROCODONE BITARTRATE AND ACETAMINOPHEN 5; 325 MG/1; MG/1
TABLET ORAL
Qty: 30 TABLET | Refills: 0 | Status: SHIPPED | OUTPATIENT
Start: 2020-10-19 | End: 2020-11-23 | Stop reason: SDUPTHER

## 2020-11-02 DIAGNOSIS — Z12.31 ENCOUNTER FOR SCREENING MAMMOGRAM FOR MALIGNANT NEOPLASM OF BREAST: ICD-10-CM

## 2020-11-02 DIAGNOSIS — Z12.39 SCREENING FOR BREAST CANCER: ICD-10-CM

## 2020-11-23 DIAGNOSIS — M19.90 OSTEOARTHRITIS, UNSPECIFIED OSTEOARTHRITIS TYPE, UNSPECIFIED SITE: ICD-10-CM

## 2020-11-23 RX ORDER — HYDROCODONE BITARTRATE AND ACETAMINOPHEN 5; 325 MG/1; MG/1
1 TABLET ORAL DAILY
Qty: 30 TABLET | Refills: 0 | Status: SHIPPED | OUTPATIENT
Start: 2020-11-23 | End: 2021-01-07

## 2020-11-23 NOTE — TELEPHONE ENCOUNTER
Caller: Paige Martinez    Relationship: Self    Best call back number: [phone]       Medication needed:   Requested Prescriptions     Pending Prescriptions Disp Refills   • HYDROcodone-acetaminophen (NORCO) 5-325 MG per tablet 30 tablet 0       When do you need the refill by: 11/23/20    What details did the patient provide when requesting the medication: PATIENT IS CURRENTLY OUT    Does the patient have less than a 3 day supply:  [x] Yes  [] No    What is the patient's preferred pharmacy: [facility] - [place], [place] - [address] [place] [address] [phone] [facility] [phone] FX                No

## 2021-01-07 DIAGNOSIS — M19.90 OSTEOARTHRITIS, UNSPECIFIED OSTEOARTHRITIS TYPE, UNSPECIFIED SITE: ICD-10-CM

## 2021-01-07 RX ORDER — HYDROCODONE BITARTRATE AND ACETAMINOPHEN 5; 325 MG/1; MG/1
TABLET ORAL
Qty: 30 TABLET | Refills: 0 | Status: SHIPPED | OUTPATIENT
Start: 2021-01-07 | End: 2021-02-22

## 2021-01-27 ENCOUNTER — LAB (OUTPATIENT)
Dept: FAMILY MEDICINE CLINIC | Facility: CLINIC | Age: 81
End: 2021-01-27

## 2021-02-01 ENCOUNTER — HOSPITAL ENCOUNTER (OUTPATIENT)
Dept: MRI IMAGING | Facility: HOSPITAL | Age: 81
Discharge: HOME | End: 2021-02-01
Admitting: NEUROLOGICAL SURGERY

## 2021-02-01 ENCOUNTER — OFFICE VISIT (OUTPATIENT)
Dept: NEUROSURGERY | Facility: CLINIC | Age: 81
End: 2021-02-01

## 2021-02-01 VITALS — HEIGHT: 61 IN | WEIGHT: 189 LBS | BODY MASS INDEX: 35.68 KG/M2

## 2021-02-01 DIAGNOSIS — D32.9 MENINGIOMA (HCC): ICD-10-CM

## 2021-02-01 DIAGNOSIS — E66.9 OBESITY (BMI 30-39.9): ICD-10-CM

## 2021-02-01 DIAGNOSIS — D32.9 MENINGIOMA (HCC): Primary | ICD-10-CM

## 2021-02-01 PROBLEM — M79.645 THUMB PAIN, LEFT: Status: RESOLVED | Noted: 2020-03-10 | Resolved: 2021-02-01

## 2021-02-01 PROBLEM — R05.9 COUGH: Status: RESOLVED | Noted: 2020-03-03 | Resolved: 2021-02-01

## 2021-02-01 PROBLEM — R93.89 ABNORMAL MRI: Status: RESOLVED | Noted: 2019-10-11 | Resolved: 2021-02-01

## 2021-02-01 PROBLEM — R11.11 VOMITING WITHOUT NAUSEA: Status: RESOLVED | Noted: 2020-03-18 | Resolved: 2021-02-01

## 2021-02-01 PROBLEM — Z78.9 NON-SMOKER: Status: RESOLVED | Noted: 2020-02-03 | Resolved: 2021-02-01

## 2021-02-01 LAB — CREAT BLDA-MCNC: 1.1 MG/DL (ref 0.6–1.3)

## 2021-02-01 PROCEDURE — 0 GADOBENATE DIMEGLUMINE 529 MG/ML SOLUTION: Performed by: NEUROLOGICAL SURGERY

## 2021-02-01 PROCEDURE — 99213 OFFICE O/P EST LOW 20 MIN: CPT | Performed by: NEUROLOGICAL SURGERY

## 2021-02-01 PROCEDURE — A9577 INJ MULTIHANCE: HCPCS | Performed by: NEUROLOGICAL SURGERY

## 2021-02-01 PROCEDURE — 70553 MRI BRAIN STEM W/O & W/DYE: CPT

## 2021-02-01 PROCEDURE — 82565 ASSAY OF CREATININE: CPT

## 2021-02-01 RX ADMIN — GADOBENATE DIMEGLUMINE 14 ML: 529 INJECTION, SOLUTION INTRAVENOUS at 09:15

## 2021-02-01 NOTE — PATIENT INSTRUCTIONS
"PATIENT TO CONTINUE TO FOLLOW UP WITH HIS/HER PRIMARY CARE PROVIDER FOR YEARLY PHYSICAL EXAMS TO ENSURE COMPLETE HEALTH MAINTENANCE    DASH Eating Plan  DASH stands for \"Dietary Approaches to Stop Hypertension.\" The DASH eating plan is a healthy eating plan that has been shown to reduce high blood pressure (hypertension). It may also reduce your risk for type 2 diabetes, heart disease, and stroke. The DASH eating plan may also help with weight loss.  What are tips for following this plan?    General guidelines  · Avoid eating more than 2,300 mg (milligrams) of salt (sodium) a day. If you have hypertension, you may need to reduce your sodium intake to 1,500 mg a day.  · Limit alcohol intake to no more than 1 drink a day for nonpregnant women and 2 drinks a day for men. One drink equals 12 oz of beer, 5 oz of wine, or 1½ oz of hard liquor.  · Work with your health care provider to maintain a healthy body weight or to lose weight. Ask what an ideal weight is for you.  · Get at least 30 minutes of exercise that causes your heart to beat faster (aerobic exercise) most days of the week. Activities may include walking, swimming, or biking.  · Work with your health care provider or diet and nutrition specialist (dietitian) to adjust your eating plan to your individual calorie needs.  Reading food labels    · Check food labels for the amount of sodium per serving. Choose foods with less than 5 percent of the Daily Value of sodium. Generally, foods with less than 300 mg of sodium per serving fit into this eating plan.  · To find whole grains, look for the word \"whole\" as the first word in the ingredient list.  Shopping  · Buy products labeled as \"low-sodium\" or \"no salt added.\"  · Buy fresh foods. Avoid canned foods and premade or frozen meals.  Cooking  · Avoid adding salt when cooking. Use salt-free seasonings or herbs instead of table salt or sea salt. Check with your health care provider or pharmacist before using salt " substitutes.  · Do not cortez foods. Cook foods using healthy methods such as baking, boiling, grilling, and broiling instead.  · Cook with heart-healthy oils, such as olive, canola, soybean, or sunflower oil.  Meal planning  · Eat a balanced diet that includes:  ? 5 or more servings of fruits and vegetables each day. At each meal, try to fill half of your plate with fruits and vegetables.  ? Up to 6-8 servings of whole grains each day.  ? Less than 6 oz of lean meat, poultry, or fish each day. A 3-oz serving of meat is about the same size as a deck of cards. One egg equals 1 oz.  ? 2 servings of low-fat dairy each day.  ? A serving of nuts, seeds, or beans 5 times each week.  ? Heart-healthy fats. Healthy fats called Omega-3 fatty acids are found in foods such as flaxseeds and coldwater fish, like sardines, salmon, and mackerel.  · Limit how much you eat of the following:  ? Canned or prepackaged foods.  ? Food that is high in trans fat, such as fried foods.  ? Food that is high in saturated fat, such as fatty meat.  ? Sweets, desserts, sugary drinks, and other foods with added sugar.  ? Full-fat dairy products.  · Do not salt foods before eating.  · Try to eat at least 2 vegetarian meals each week.  · Eat more home-cooked food and less restaurant, buffet, and fast food.  · When eating at a restaurant, ask that your food be prepared with less salt or no salt, if possible.  What foods are recommended?  The items listed may not be a complete list. Talk with your dietitian about what dietary choices are best for you.  Grains  Whole-grain or whole-wheat bread. Whole-grain or whole-wheat pasta. Brown rice. Oatmeal. Quinoa. Bulgur. Whole-grain and low-sodium cereals. Amira bread. Low-fat, low-sodium crackers. Whole-wheat flour tortillas.  Vegetables  Fresh or frozen vegetables (raw, steamed, roasted, or grilled). Low-sodium or reduced-sodium tomato and vegetable juice. Low-sodium or reduced-sodium tomato sauce and tomato  paste. Low-sodium or reduced-sodium canned vegetables.  Fruits  All fresh, dried, or frozen fruit. Canned fruit in natural juice (without added sugar).  Meat and other protein foods  Skinless chicken or turkey. Ground chicken or turkey. Pork with fat trimmed off. Fish and seafood. Egg whites. Dried beans, peas, or lentils. Unsalted nuts, nut butters, and seeds. Unsalted canned beans. Lean cuts of beef with fat trimmed off. Low-sodium, lean deli meat.  Dairy  Low-fat (1%) or fat-free (skim) milk. Fat-free, low-fat, or reduced-fat cheeses. Nonfat, low-sodium ricotta or cottage cheese. Low-fat or nonfat yogurt. Low-fat, low-sodium cheese.  Fats and oils  Soft margarine without trans fats. Vegetable oil. Low-fat, reduced-fat, or light mayonnaise and salad dressings (reduced-sodium). Canola, safflower, olive, soybean, and sunflower oils. Avocado.  Seasoning and other foods  Herbs. Spices. Seasoning mixes without salt. Unsalted popcorn and pretzels. Fat-free sweets.  What foods are not recommended?  The items listed may not be a complete list. Talk with your dietitian about what dietary choices are best for you.  Grains  Baked goods made with fat, such as croissants, muffins, or some breads. Dry pasta or rice meal packs.  Vegetables  Creamed or fried vegetables. Vegetables in a cheese sauce. Regular canned vegetables (not low-sodium or reduced-sodium). Regular canned tomato sauce and paste (not low-sodium or reduced-sodium). Regular tomato and vegetable juice (not low-sodium or reduced-sodium). Pickles. Olives.  Fruits  Canned fruit in a light or heavy syrup. Fried fruit. Fruit in cream or butter sauce.  Meat and other protein foods  Fatty cuts of meat. Ribs. Fried meat. Corey. Sausage. Bologna and other processed lunch meats. Salami. Fatback. Hotdogs. Bratwurst. Salted nuts and seeds. Canned beans with added salt. Canned or smoked fish. Whole eggs or egg yolks. Chicken or turkey with skin.  Dairy  Whole or 2% milk,  cream, and half-and-half. Whole or full-fat cream cheese. Whole-fat or sweetened yogurt. Full-fat cheese. Nondairy creamers. Whipped toppings. Processed cheese and cheese spreads.  Fats and oils  Butter. Stick margarine. Lard. Shortening. Ghee. Corey fat. Tropical oils, such as coconut, palm kernel, or palm oil.  Seasoning and other foods  Salted popcorn and pretzels. Onion salt, garlic salt, seasoned salt, table salt, and sea salt. Worcestershire sauce. Tartar sauce. Barbecue sauce. Teriyaki sauce. Soy sauce, including reduced-sodium. Steak sauce. Canned and packaged gravies. Fish sauce. Oyster sauce. Cocktail sauce. Horseradish that you find on the shelf. Ketchup. Mustard. Meat flavorings and tenderizers. Bouillon cubes. Hot sauce and Tabasco sauce. Premade or packaged marinades. Premade or packaged taco seasonings. Relishes. Regular salad dressings.  Where to find more information:  · National Heart, Lung, and [employer] [place]: www.nhlbi.nih.gov  · American Heart Association: www.heart.org  Summary  · The DASH eating plan is a healthy eating plan that has been shown to reduce high blood pressure (hypertension). It may also reduce your risk for type 2 diabetes, heart disease, and stroke.  · With the DASH eating plan, you should limit salt (sodium) intake to 2,300 mg a day. If you have hypertension, you may need to reduce your sodium intake to 1,500 mg a day.  · When on the DASH eating plan, aim to eat more fresh fruits and vegetables, whole grains, lean proteins, low-fat dairy, and heart-healthy fats.  · Work with your health care provider or diet and nutrition specialist (dietitian) to adjust your eating plan to your individual calorie needs.  This information is not intended to replace advice given to you by your health care provider. Make sure you discuss any questions you have with your health care provider.  Document Revised: 11/30/2018 Document Reviewed: 12/11/2017  Elsevier Patient Education © 2020 [employer]
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
## 2021-02-22 DIAGNOSIS — M19.90 OSTEOARTHRITIS, UNSPECIFIED OSTEOARTHRITIS TYPE, UNSPECIFIED SITE: ICD-10-CM

## 2021-02-22 RX ORDER — HYDROCODONE BITARTRATE AND ACETAMINOPHEN 5; 325 MG/1; MG/1
TABLET ORAL
Qty: 30 TABLET | Refills: 0 | Status: SHIPPED | OUTPATIENT
Start: 2021-02-22 | End: 2021-03-25

## 2021-02-22 NOTE — TELEPHONE ENCOUNTER
Requested Prescriptions     Pending Prescriptions Disp Refills   • HYDROcodone-acetaminophen (NORCO) 5-325 MG per tablet [Pharmacy Med Name: HYDROCODONE/APAP 5/325MG TABLET] 30 tablet 0     Sig: TAKE ONE TABLET DAILY GENERIC FOR NORCO     Last filled 1/7/21

## 2021-03-15 ENCOUNTER — OFFICE VISIT (OUTPATIENT)
Dept: FAMILY MEDICINE CLINIC | Facility: CLINIC | Age: 81
End: 2021-03-15

## 2021-03-15 VITALS
DIASTOLIC BLOOD PRESSURE: 80 MMHG | WEIGHT: 189 LBS | OXYGEN SATURATION: 98 % | BODY MASS INDEX: 35.68 KG/M2 | HEIGHT: 61 IN | RESPIRATION RATE: 16 BRPM | SYSTOLIC BLOOD PRESSURE: 134 MMHG | HEART RATE: 79 BPM

## 2021-03-15 DIAGNOSIS — E78.2 MIXED HYPERLIPIDEMIA: ICD-10-CM

## 2021-03-15 DIAGNOSIS — I10 ESSENTIAL HYPERTENSION: Primary | ICD-10-CM

## 2021-03-15 DIAGNOSIS — E03.9 ACQUIRED HYPOTHYROIDISM: ICD-10-CM

## 2021-03-15 PROCEDURE — 99213 OFFICE O/P EST LOW 20 MIN: CPT | Performed by: FAMILY MEDICINE

## 2021-03-15 PROCEDURE — G0439 PPPS, SUBSEQ VISIT: HCPCS | Performed by: FAMILY MEDICINE

## 2021-03-15 NOTE — PROGRESS NOTES
Subjective   Paige Martinez is a 80 y.o. female.     Chief Complaint   Patient presents with   • Follow-up     6 mo   htn   • Medicare Wellness-subsequent       History of Present Illness     she notes good bp contrrol without cp or ha---she is toleratging synthroid without heat or cold intoandes..      Current Outpatient Medications:   •  atorvastatin (LIPITOR) 10 MG tablet, TAKE ONE TABLET DAILY GENERIC FOR LIPITOR, Disp: 30 tablet, Rfl: 5  •  bumetanide (BUMEX) 0.5 MG tablet, Take 1 mg by mouth Daily., Disp: , Rfl:   •  Cholecalciferol 2000 units capsule, Take 2,000 Units by mouth Daily., Disp: , Rfl:   •  citalopram (CeleXA) 20 MG tablet, TAKE ONE TABLET DAILY GENERIC FOR CELEXA, Disp: 90 tablet, Rfl: 1  •  fluocinonide-emollient (LIDEX-E) 0.05 % cream, Apply  topically to the appropriate area as directed 2 (Two) Times a Day., Disp: 30 g, Rfl: 0  •  HYDROcodone-acetaminophen (NORCO) 5-325 MG per tablet, TAKE ONE TABLET DAILY GENERIC FOR NORCO, Disp: 30 tablet, Rfl: 0  •  levothyroxine (SYNTHROID, LEVOTHROID) 75 MCG tablet, TAKE ONE TABLET DAILY GENERIC FOR SYNTHROID, Disp: 90 tablet, Rfl: 1  •  losartan (COZAAR) 100 MG tablet, TAKE ONE TABLET DAILY GENERIC FOR COZAAR, Disp: 90 tablet, Rfl: 1  •  metoprolol succinate XL (Toprol XL) 25 MG 24 hr tablet, Take 1 tablet by mouth Daily., Disp: 30 tablet, Rfl: 5  •  omeprazole (priLOSEC) 20 MG capsule, Take 1 capsule by mouth Daily., Disp: 30 capsule, Rfl: 3  No Known Allergies    Past Medical History:   Diagnosis Date   • Anxiety    • Cholelithiasis    • Colon polyp    • Depression    • Disease of thyroid gland    • Hyperlipidemia    • Hypertension    • Meningiomatosis (CMS/HCC)    • Myalgia    • Nephrolithiasis      Past Surgical History:   Procedure Laterality Date   • COLONOSCOPY  07/15/2014    2 rectal polyps, hyperplastic, diverticulosis       Review of Systems   Constitutional: Negative.    HENT: Negative.    Eyes: Negative.    Respiratory: Negative.   "  Cardiovascular: Negative.    Gastrointestinal: Negative.    Endocrine: Negative.    Genitourinary: Negative.    Musculoskeletal: Negative.    Skin: Negative.    Allergic/Immunologic: Negative.    Neurological: Negative.    Hematological: Negative.    Psychiatric/Behavioral: Negative.        Objective  /80   Pulse 79   Resp 16   Ht 153.7 cm (60.5\")   Wt 85.7 kg (189 lb)   SpO2 98%   BMI 36.30 kg/m²   Physical Exam  Vitals and nursing note reviewed.   Constitutional:       Appearance: Normal appearance. She is normal weight.   HENT:      Head: Normocephalic and atraumatic.      Nose: Nose normal.      Mouth/Throat:      Mouth: Mucous membranes are moist.      Pharynx: Oropharynx is clear.   Eyes:      Extraocular Movements: Extraocular movements intact.      Conjunctiva/sclera: Conjunctivae normal.      Pupils: Pupils are equal, round, and reactive to light.   Cardiovascular:      Rate and Rhythm: Normal rate and regular rhythm.      Pulses: Normal pulses.      Heart sounds: Normal heart sounds.   Pulmonary:      Effort: Pulmonary effort is normal.      Breath sounds: Normal breath sounds.   Abdominal:      General: Abdomen is flat. Bowel sounds are normal.   Musculoskeletal:         General: Normal range of motion.      Cervical back: Normal range of motion and neck supple.   Skin:     General: Skin is warm and dry.      Capillary Refill: Capillary refill takes less than 2 seconds.   Neurological:      General: No focal deficit present.      Mental Status: She is alert and oriented to person, place, and time. Mental status is at baseline.   Psychiatric:         Mood and Affect: Mood normal.         Behavior: Behavior normal.         Thought Content: Thought content normal.         Judgment: Judgment normal.         Assessment/Plan   Diagnoses and all orders for this visit:    1. Essential hypertension (Primary)  -     TSH  -     Comprehensive metabolic panel  -     Lipid Panel With / Chol / HDL Ratio  - "     CBC & Differential    2. Acquired hypothyroidism  -     TSH  -     Comprehensive metabolic panel  -     Lipid Panel With / Chol / HDL Ratio  -     CBC & Differential    3. Mixed hyperlipidemia  -     TSH  -     Comprehensive metabolic panel  -     Lipid Panel With / Chol / HDL Ratio  -     CBC & Differential                 Orders Placed This Encounter   Procedures   • TSH   • Comprehensive metabolic panel   • Lipid Panel With / Chol / HDL Ratio   • CBC & Differential       Follow up: 6 month(s)   Reports weight fluctuations in the last 7 months. 145lbs (06/19)-->122lbs -->126lbs (11/04)-->131lbs (12/10) Unable to obtain UBW prior to May 2019       Reviewed Ileostomy nutrition therapy/food list handout (provided by ostomy RN). Discussed eating low-fiber foods, chewing foods well, small frequent meals high in protein & energy. Reviewed foods that may cause odors &/or gas, discussed foods that bulk stool and thin stool, and importance of adequate hydration. Pt reports no GI discomfort. Follows low fiber diet at home s/p exploratory laparotomy, L colectomy and Hartmanns on 5/30/19. Confirms NKFA. No chewing or swallowing difficulties.     Reports weight fluctuations in the last 7 months. 145lbs (05/19)-->122lbs (06/19) -->126lbs (11/04)-->131lbs (12/10) Unable to obtain UBW prior to May 2019.     No c/o nausea or emesis. LBM per EMR 12/11. Discussed diet advancement. Reviewed Ileostomy nutrition therapy/food list handout (provided by ostomy RN). Discussed eating low-fiber foods, chewing foods well, small frequent meals high in protein & energy. Reviewed foods that may cause odors &/or gas, discussed foods that bulk stool and thin stool, and importance of adequate hydration. Reports good appetite and po intake PTA. Educated on and follows low fiber diet at home s/p exploratory laparotomy, L colectomy and Hartmanns on 5/30/19. Confirms NKFA. No chewing or swallowing difficulties.     Reports weight fluctuations in the last 7 months. 145lbs (05/19) per pt recall-->122lbs (06/19) per pt recall-->126lbs (11/04) per [facility]-->131lbs (12/10) per EMR. Unable to obtain UBW prior to May 2019.     Pt s/p ileostomy, output 100mL (12/10) amd 50mL at 2:45PM (12/11). Pt on NGT, output 200mL (11/10) Pt received clamp trial 12/11. Noted pt's h/x of GDM, A1c 6.3% Recommend outpatient management. No c/o nausea or emesis. LBM per EMR 12/11. Discussed diet advancement. Reviewed Ileostomy nutrition therapy/food list handout (provided by ostomy RN). Discussed eating low-fiber foods, chewing foods well, small frequent meals high in protein & energy. Reviewed foods that may cause odors &/or gas, discussed foods that bulk stool and thin stool, and importance of adequate hydration. Reports good appetite and po intake PTA. Previously educated on and follows low fiber diet at home s/p exploratory laparotomy, L colectomy and Hartmanns on 5/30/19. Confirms NKFA. No chewing or swallowing difficulties.     Reports weight fluctuations in the last 7 months. 145lbs (05/19) per pt recall-->122lbs (06/19) per pt recall-->126lbs (11/04) per [facility]-->131lbs (12/10) per EMR. Unable to obtain UBW prior to May 2019.     Pt s/p ileostomy (12/10), output 100mL (12/10) amd 50mL at 2:45PM (12/11). Pt on NGT, output 200mL (11/10) Pt received clamp trial 12/11. Noted pt's h/x of GDM, A1c 6.3% Recommend outpatient management. No c/o nausea or emesis. LBM per EMR 12/11. Discussed diet advancement. Reviewed Ileostomy nutrition therapy/food list handout (provided by ostomy RN). Discussed eating low-fiber foods, chewing foods well, small frequent meals high in protein & energy. Reviewed foods that may cause odors &/or gas, discussed foods that bulk stool and thin stool, and importance of adequate hydration. Reports good appetite and po intake PTA. Previously educated on and follows low fiber diet at home s/p exploratory laparotomy, L colectomy and Hartmanns on 5/30/19. Confirms NKFA. No chewing or swallowing difficulties.     Reports weight fluctuations in the last 7 months. 145lbs (05/19) per pt recall-->122lbs (06/19) per pt recall-->126lbs (11/04) per [facility]-->131lbs (12/10) per EMR. Overall pt with 9% weight loss over last 6 months with noted recent weight gain. Unable to obtain UBW prior to May 2019.     Pt s/p ileostomy (12/10), output 100mL (12/10) and 50mL at 2:45PM (12/11). Pt with NGT for suction, output 200mL (11/10) Pt with clamp trial 12/11. Noted  A1c 6.3% Recommend outpatient follow up. No c/o nausea or emesis. Discussed diet advancement. Reviewed Ileostomy nutrition therapy/food list handout (provided by ostomy RN). Discussed eating low-fiber foods, chewing foods well, small frequent meals high in protein & energy. Reviewed foods that may cause odors &/or gas, discussed foods that bulk stool and thin stool, and importance of adequate hydration. Encouraged adequate intake of protein with meals and reducing intake of concentrated sweets.

## 2021-03-15 NOTE — PROGRESS NOTES
The ABCs of the Annual Wellness Visit  Subsequent Medicare Wellness Visit    Chief Complaint   Patient presents with   • Follow-up     6 mo   htn   • Medicare Wellness-subsequent       Subjective   History of Present Illness:  Paige Martinez is a 80 y.o. female who presents for a Subsequent Medicare Wellness Visit.    HEALTH RISK ASSESSMENT    Recent Hospitalizations:  No hospitalization(s) within the last year.    Current Medical Providers:  Patient Care Team:  Barrington Jaramillo MD as PCP - General  Gonzalez Cordero MD as Consulting Physician (Gastroenterology)    Smoking Status:  Social History     Tobacco Use   Smoking Status Former Smoker   • Quit date:    • Years since quittin.2   Smokeless Tobacco Never Used       Alcohol Consumption:  Social History     Substance and Sexual Activity   Alcohol Use No    Comment: occ       Depression Screen:   PHQ-2/PHQ-9 Depression Screening 3/15/2021   Little interest or pleasure in doing things 0   Feeling down, depressed, or hopeless 0   Trouble falling or staying asleep, or sleeping too much 1   Feeling tired or having little energy 0   Poor appetite or overeating 0   Feeling bad about yourself - or that you are a failure or have let yourself or your family down 0   Trouble concentrating on things, such as reading the newspaper or watching television 0   Moving or speaking so slowly that other people could have noticed. Or the opposite - being so fidgety or restless that you have been moving around a lot more than usual 0   Thoughts that you would be better off dead, or of hurting yourself in some way 0   Total Score 1   If you checked off any problems, how difficult have these problems made it for you to do your work, take care of things at home, or get along with other people? Not difficult at all       Fall Risk Screen:  STEADI Fall Risk Assessment was completed, and patient is at LOW risk for falls.Assessment completed on:3/15/2021    Health Habits and  Functional and Cognitive Screening:  Functional & Cognitive Status 3/15/2021   Do you have difficulty preparing food and eating? No   Do you have difficulty bathing yourself, getting dressed or grooming yourself? No   Do you have difficulty using the toilet? No   Do you have difficulty moving around from place to place? No   Do you have trouble with steps or getting out of a bed or a chair? No   Current Diet Well Balanced Diet   Dental Exam Up to date   Eye Exam Up to date   Exercise (times per week) 3 times per week   Current Exercises Include Walking   Current Exercise Activities Include -   Do you need help using the phone?  No   Are you deaf or do you have serious difficulty hearing?  No   Do you need help with transportation? No   Do you need help shopping? No   Do you need help preparing meals?  No   Do you need help with housework?  No   Do you need help with laundry? No   Do you need help taking your medications? No   Do you need help managing money? No   Do you ever drive or ride in a car without wearing a seat belt? No   Have you felt unusual stress, anger or loneliness in the last month? No   Who do you live with? Alone   If you need help, do you have trouble finding someone available to you? No   Have you been bothered in the last four weeks by sexual problems? No   Do you have difficulty concentrating, remembering or making decisions? No         Does the patient have evidence of cognitive impairment? No    Asprin use counseling:Does not need ASA (and currently is not on it)    Age-appropriate Screening Schedule:  Refer to the list below for future screening recommendations based on patient's age, sex and/or medical conditions. Orders for these recommended tests are listed in the plan section. The patient has been provided with a written plan.    Health Maintenance   Topic Date Due   • DXA SCAN  Never done   • TDAP/TD VACCINES (1 - Tdap) Never done   • ZOSTER VACCINE (1 of 2) Never done   • INFLUENZA  VACCINE  08/01/2020   • LIPID PANEL  09/28/2021   • MAMMOGRAM  11/02/2022   • COLONOSCOPY  12/04/2023          The following portions of the patient's history were reviewed and updated as appropriate: allergies, current medications, past family history, past medical history, past social history, past surgical history and problem list.    Outpatient Medications Prior to Visit   Medication Sig Dispense Refill   • atorvastatin (LIPITOR) 10 MG tablet TAKE ONE TABLET DAILY GENERIC FOR LIPITOR 30 tablet 5   • bumetanide (BUMEX) 0.5 MG tablet Take 1 mg by mouth Daily.     • Cholecalciferol 2000 units capsule Take 2,000 Units by mouth Daily.     • citalopram (CeleXA) 20 MG tablet TAKE ONE TABLET DAILY GENERIC FOR CELEXA 90 tablet 1   • fluocinonide-emollient (LIDEX-E) 0.05 % cream Apply  topically to the appropriate area as directed 2 (Two) Times a Day. 30 g 0   • HYDROcodone-acetaminophen (NORCO) 5-325 MG per tablet TAKE ONE TABLET DAILY GENERIC FOR NORCO 30 tablet 0   • levothyroxine (SYNTHROID, LEVOTHROID) 75 MCG tablet TAKE ONE TABLET DAILY GENERIC FOR SYNTHROID 90 tablet 1   • losartan (COZAAR) 100 MG tablet TAKE ONE TABLET DAILY GENERIC FOR COZAAR 90 tablet 1   • metoprolol succinate XL (Toprol XL) 25 MG 24 hr tablet Take 1 tablet by mouth Daily. 30 tablet 5   • omeprazole (priLOSEC) 20 MG capsule Take 1 capsule by mouth Daily. 30 capsule 3     No facility-administered medications prior to visit.       Patient Active Problem List   Diagnosis   • Essential hypertension   • Chronic kidney disease, stage III (moderate) (CMS/HCC)   • Acquired hypothyroidism   • Anxiety   • Mixed hyperlipidemia   • Edema   • Anemia   • Heme positive stool   • Dark stools   • Gastroesophageal reflux disease   • Hip pain, acute, left   • Syncope   • Obesity (BMI 30-39.9)   • Arthritis   • Meningioma (CMS/HCC)   • Dry skin dermatitis   • Seizure (CMS/HCC)   • Screening for breast cancer       Advanced Care Planning:  ACP discussion was held  "with the patient during this visit. Patient does not have an advance directive, information provided.    Review of Systems    Compared to one year ago, the patient feels her physical health is the same.  Compared to one year ago, the patient feels her mental health is the same.    Reviewed chart for potential of high risk medication in the elderly: yes  Reviewed chart for potential of harmful drug interactions in the elderly:yes    Objective         Vitals:    03/15/21 1518   BP: 134/80   Pulse: 79   Resp: 16   SpO2: 98%   Weight: 85.7 kg (189 lb)   Height: 153.7 cm (60.5\")       Body mass index is 36.3 kg/m².  Discussed the patient's BMI with her. The BMI is above average; BMI management plan is completed.    Physical Exam          Assessment/Plan   Medicare Risks and Personalized Health Plan  CMS Preventative Services Quick Reference  Advance Directive Discussion    The above risks/problems have been discussed with the patient.  Pertinent information has been shared with the patient in the After Visit Summary.  Follow up plans and orders are seen below in the Assessment/Plan Section.    Diagnoses and all orders for this visit:    1. Essential hypertension (Primary)  -     TSH  -     Comprehensive metabolic panel  -     Lipid Panel With / Chol / HDL Ratio  -     CBC & Differential    2. Acquired hypothyroidism  -     TSH  -     Comprehensive metabolic panel  -     Lipid Panel With / Chol / HDL Ratio  -     CBC & Differential    3. Mixed hyperlipidemia  -     TSH  -     Comprehensive metabolic panel  -     Lipid Panel With / Chol / HDL Ratio  -     CBC & Differential      Follow Up:  No follow-ups on file.     An After Visit Summary and PPPS were given to the patient.             "

## 2021-03-16 LAB
ALBUMIN SERPL-MCNC: 4.4 G/DL (ref 3.5–5.2)
ALBUMIN/GLOB SERPL: 1.5 G/DL
ALP SERPL-CCNC: 75 U/L (ref 39–117)
ALT SERPL-CCNC: 13 U/L (ref 1–33)
AST SERPL-CCNC: 16 U/L (ref 1–32)
BASOPHILS # BLD AUTO: 0.05 10*3/MM3 (ref 0–0.2)
BASOPHILS NFR BLD AUTO: 0.6 % (ref 0–1.5)
BILIRUB SERPL-MCNC: 0.3 MG/DL (ref 0–1.2)
BUN SERPL-MCNC: 20 MG/DL (ref 8–23)
BUN/CREAT SERPL: 18 (ref 7–25)
CALCIUM SERPL-MCNC: 9.3 MG/DL (ref 8.6–10.5)
CHLORIDE SERPL-SCNC: 100 MMOL/L (ref 98–107)
CHOLEST SERPL-MCNC: 209 MG/DL (ref 0–200)
CHOLEST/HDLC SERPL: 4.45 {RATIO}
CO2 SERPL-SCNC: 27.7 MMOL/L (ref 22–29)
CREAT SERPL-MCNC: 1.11 MG/DL (ref 0.57–1)
EOSINOPHIL # BLD AUTO: 0.16 10*3/MM3 (ref 0–0.4)
EOSINOPHIL NFR BLD AUTO: 1.8 % (ref 0.3–6.2)
ERYTHROCYTE [DISTWIDTH] IN BLOOD BY AUTOMATED COUNT: 12.6 % (ref 12.3–15.4)
GLOBULIN SER CALC-MCNC: 2.9 GM/DL
GLUCOSE SERPL-MCNC: 163 MG/DL (ref 65–99)
HCT VFR BLD AUTO: 38.5 % (ref 34–46.6)
HDLC SERPL-MCNC: 47 MG/DL (ref 40–60)
HGB BLD-MCNC: 12.7 G/DL (ref 12–15.9)
IMM GRANULOCYTES # BLD AUTO: 0.02 10*3/MM3 (ref 0–0.05)
IMM GRANULOCYTES NFR BLD AUTO: 0.2 % (ref 0–0.5)
LDLC SERPL CALC-MCNC: 112 MG/DL (ref 0–100)
LYMPHOCYTES # BLD AUTO: 2.42 10*3/MM3 (ref 0.7–3.1)
LYMPHOCYTES NFR BLD AUTO: 27.7 % (ref 19.6–45.3)
MCH RBC QN AUTO: 30.5 PG (ref 26.6–33)
MCHC RBC AUTO-ENTMCNC: 33 G/DL (ref 31.5–35.7)
MCV RBC AUTO: 92.3 FL (ref 79–97)
MONOCYTES # BLD AUTO: 0.73 10*3/MM3 (ref 0.1–0.9)
MONOCYTES NFR BLD AUTO: 8.4 % (ref 5–12)
NEUTROPHILS # BLD AUTO: 5.36 10*3/MM3 (ref 1.7–7)
NEUTROPHILS NFR BLD AUTO: 61.3 % (ref 42.7–76)
NRBC BLD AUTO-RTO: 0 /100 WBC (ref 0–0.2)
PLATELET # BLD AUTO: 231 10*3/MM3 (ref 140–450)
POTASSIUM SERPL-SCNC: 4.3 MMOL/L (ref 3.5–5.2)
PROT SERPL-MCNC: 7.3 G/DL (ref 6–8.5)
RBC # BLD AUTO: 4.17 10*6/MM3 (ref 3.77–5.28)
SODIUM SERPL-SCNC: 139 MMOL/L (ref 136–145)
TRIGL SERPL-MCNC: 292 MG/DL (ref 0–150)
TSH SERPL DL<=0.005 MIU/L-ACNC: 4.58 UIU/ML (ref 0.27–4.2)
VLDLC SERPL CALC-MCNC: 50 MG/DL (ref 5–40)
WBC # BLD AUTO: 8.74 10*3/MM3 (ref 3.4–10.8)

## 2021-03-25 DIAGNOSIS — M19.90 OSTEOARTHRITIS, UNSPECIFIED OSTEOARTHRITIS TYPE, UNSPECIFIED SITE: ICD-10-CM

## 2021-03-25 RX ORDER — LEVOTHYROXINE SODIUM 0.07 MG/1
TABLET ORAL
Qty: 90 TABLET | Refills: 1 | Status: SHIPPED | OUTPATIENT
Start: 2021-03-25 | End: 2021-09-14

## 2021-03-25 RX ORDER — HYDROCODONE BITARTRATE AND ACETAMINOPHEN 5; 325 MG/1; MG/1
TABLET ORAL
Qty: 30 TABLET | Refills: 0 | Status: SHIPPED | OUTPATIENT
Start: 2021-03-25 | End: 2021-05-03

## 2021-03-30 RX ORDER — LOSARTAN POTASSIUM 100 MG/1
TABLET ORAL
Qty: 90 TABLET | Refills: 1 | Status: SHIPPED | OUTPATIENT
Start: 2021-03-30 | End: 2021-09-14

## 2021-04-26 DIAGNOSIS — K21.9 GASTROESOPHAGEAL REFLUX DISEASE WITHOUT ESOPHAGITIS: ICD-10-CM

## 2021-04-26 RX ORDER — CITALOPRAM 20 MG/1
TABLET ORAL
Qty: 90 TABLET | Refills: 1 | Status: SHIPPED | OUTPATIENT
Start: 2021-04-26 | End: 2021-06-28

## 2021-04-26 RX ORDER — OMEPRAZOLE 20 MG/1
CAPSULE, DELAYED RELEASE ORAL
Qty: 30 CAPSULE | Refills: 3 | OUTPATIENT
Start: 2021-04-26

## 2021-04-30 DIAGNOSIS — M19.90 OSTEOARTHRITIS, UNSPECIFIED OSTEOARTHRITIS TYPE, UNSPECIFIED SITE: ICD-10-CM

## 2021-05-03 RX ORDER — HYDROCODONE BITARTRATE AND ACETAMINOPHEN 5; 325 MG/1; MG/1
TABLET ORAL
Qty: 30 TABLET | Refills: 0 | Status: SHIPPED | OUTPATIENT
Start: 2021-05-03 | End: 2021-06-03

## 2021-05-03 RX ORDER — METOPROLOL SUCCINATE 25 MG/1
TABLET, EXTENDED RELEASE ORAL
Qty: 30 TABLET | Refills: 5 | Status: SHIPPED | OUTPATIENT
Start: 2021-05-03 | End: 2021-10-20

## 2021-05-03 NOTE — TELEPHONE ENCOUNTER
Last filled 3/25/21/[facility]        Requested Prescriptions     Pending Prescriptions Disp Refills   • HYDROcodone-acetaminophen (NORCO) 5-325 MG per tablet [Pharmacy Med Name: HYDROCO/APAP TAB 5-325MG TABLET] 30 tablet      Sig: TAKE ONE TABLET DAILY GENERIC FOR NORCO

## 2021-05-07 ENCOUNTER — OFFICE VISIT (OUTPATIENT)
Dept: FAMILY MEDICINE CLINIC | Facility: CLINIC | Age: 81
End: 2021-05-07

## 2021-05-07 VITALS
TEMPERATURE: 97.8 F | HEIGHT: 61 IN | SYSTOLIC BLOOD PRESSURE: 138 MMHG | RESPIRATION RATE: 16 BRPM | HEART RATE: 73 BPM | DIASTOLIC BLOOD PRESSURE: 82 MMHG | OXYGEN SATURATION: 98 % | WEIGHT: 187 LBS | BODY MASS INDEX: 35.3 KG/M2

## 2021-05-07 DIAGNOSIS — R10.32 LEFT LOWER QUADRANT ABDOMINAL PAIN: Primary | ICD-10-CM

## 2021-05-07 PROCEDURE — 99213 OFFICE O/P EST LOW 20 MIN: CPT | Performed by: FAMILY MEDICINE

## 2021-05-07 RX ORDER — CIPROFLOXACIN 500 MG/1
500 TABLET, FILM COATED ORAL 2 TIMES DAILY
Qty: 20 TABLET | Refills: 0 | Status: SHIPPED | OUTPATIENT
Start: 2021-05-07 | End: 2021-05-26

## 2021-05-07 RX ORDER — METRONIDAZOLE 250 MG/1
250 TABLET ORAL 3 TIMES DAILY
Qty: 30 TABLET | Refills: 0 | Status: SHIPPED | OUTPATIENT
Start: 2021-05-07 | End: 2021-05-26

## 2021-05-07 NOTE — PROGRESS NOTES
Subjective   Paige Martinez is a 80 y.o. female.     Chief Complaint   Patient presents with   • Abdominal Pain     LLQ, x 3 days, did not feel like eating this morning   • Diarrhea     Softer stools         History of Present Illness     she notes llq pain now for 3 days ---denies any fever or chils---she notes softer stools      Current Outpatient Medications:   •  atorvastatin (LIPITOR) 10 MG tablet, TAKE ONE TABLET DAILY GENERIC FOR LIPITOR, Disp: 30 tablet, Rfl: 5  •  bumetanide (BUMEX) 0.5 MG tablet, Take 1 mg by mouth Daily., Disp: , Rfl:   •  Cholecalciferol 2000 units capsule, Take 2,000 Units by mouth Daily., Disp: , Rfl:   •  citalopram (CeleXA) 20 MG tablet, TAKE ONE TABLET DAILY GENERIC FOR CELEXA, Disp: 90 tablet, Rfl: 1  •  fluocinonide-emollient (LIDEX-E) 0.05 % cream, Apply  topically to the appropriate area as directed 2 (Two) Times a Day., Disp: 30 g, Rfl: 0  •  HYDROcodone-acetaminophen (NORCO) 5-325 MG per tablet, TAKE ONE TABLET DAILY GENERIC FOR NORCO, Disp: 30 tablet, Rfl: 0  •  levothyroxine (SYNTHROID, LEVOTHROID) 75 MCG tablet, TAKE ONE TABLET DAILY GENERIC FOR SYNTHROID, Disp: 90 tablet, Rfl: 1  •  losartan (COZAAR) 100 MG tablet, TAKE ONE TABLET DAILY GENERIC FOR COZAAR, Disp: 90 tablet, Rfl: 1  •  metoprolol succinate XL (TOPROL-XL) 25 MG 24 hr tablet, TAKE ONE TABLET DAILY GENERIC FOR TOPROL, Disp: 30 tablet, Rfl: 5  •  omeprazole (priLOSEC) 20 MG capsule, Take 1 capsule by mouth Daily., Disp: 30 capsule, Rfl: 3  •  ciprofloxacin (Cipro) 500 MG tablet, Take 1 tablet by mouth 2 (Two) Times a Day., Disp: 20 tablet, Rfl: 0  •  metroNIDAZOLE (Flagyl) 250 MG tablet, Take 1 tablet by mouth 3 (Three) Times a Day., Disp: 30 tablet, Rfl: 0  No Known Allergies    Past Medical History:   Diagnosis Date   • Anxiety    • Cholelithiasis    • Colon polyp    • Depression    • Disease of thyroid gland    • Hyperlipidemia    • Hypertension    • Meningiomatosis (CMS/HCC)    • Myalgia    • Nephrolithiasis   "    Past Surgical History:   Procedure Laterality Date   • COLONOSCOPY  07/15/2014    2 rectal polyps, hyperplastic, diverticulosis       Review of Systems   Constitutional: Negative.    HENT: Negative.    Eyes: Negative.    Respiratory: Negative.    Cardiovascular: Negative.    Gastrointestinal: Positive for abdominal pain and diarrhea.   Endocrine: Negative.    Genitourinary: Negative.    Musculoskeletal: Negative.    Skin: Negative.        Objective  /82 (BP Location: Left arm)   Pulse 73   Temp 97.8 °F (36.6 °C)   Resp 16   Ht 153.7 cm (60.5\")   Wt 84.8 kg (187 lb)   SpO2 98%   BMI 35.92 kg/m²   Physical Exam  Vitals and nursing note reviewed.   Constitutional:       Appearance: Normal appearance. She is normal weight.   HENT:      Head: Normocephalic.      Nose: Nose normal.      Mouth/Throat:      Mouth: Mucous membranes are moist.   Eyes:      Extraocular Movements: Extraocular movements intact.      Pupils: Pupils are equal, round, and reactive to light.   Cardiovascular:      Rate and Rhythm: Normal rate and regular rhythm.      Pulses: Normal pulses.      Heart sounds: Normal heart sounds.   Pulmonary:      Effort: Pulmonary effort is normal.   Abdominal:      General: Abdomen is flat. Bowel sounds are normal. There is no distension.      Palpations: Abdomen is soft. There is no mass.      Tenderness: There is abdominal tenderness. There is no guarding or rebound.   Musculoskeletal:         General: Normal range of motion.      Cervical back: Normal range of motion and neck supple.   Skin:     General: Skin is warm and dry.      Capillary Refill: Capillary refill takes less than 2 seconds.   Neurological:      General: No focal deficit present.      Mental Status: She is alert and oriented to person, place, and time. Mental status is at baseline.   Psychiatric:         Mood and Affect: Mood normal.         Behavior: Behavior normal.         Thought Content: Thought content normal.         " Judgment: Judgment normal.         Assessment/Plan   Diagnoses and all orders for this visit:    1. Left lower quadrant abdominal pain (Primary)  -     CT Abdomen Pelvis Without Contrast; Future    Other orders  -     ciprofloxacin (Cipro) 500 MG tablet; Take 1 tablet by mouth 2 (Two) Times a Day.  Dispense: 20 tablet; Refill: 0  -     metroNIDAZOLE (Flagyl) 250 MG tablet; Take 1 tablet by mouth 3 (Three) Times a Day.  Dispense: 30 tablet; Refill: 0                 Orders Placed This Encounter   Procedures   • CT Abdomen Pelvis Without Contrast     Standing Status:   Future     Standing Expiration Date:   5/7/2022     Order Specific Question:   Will Oral Contrast be needed for this procedure?     Answer:   No     Order Specific Question:   Release to patient     Answer:   Immediate       Follow up: 2 week(s)

## 2021-05-10 ENCOUNTER — TELEPHONE (OUTPATIENT)
Dept: FAMILY MEDICINE CLINIC | Facility: CLINIC | Age: 81
End: 2021-05-10

## 2021-05-10 DIAGNOSIS — R10.32 LEFT LOWER QUADRANT ABDOMINAL PAIN: ICD-10-CM

## 2021-05-11 ENCOUNTER — OFFICE VISIT (OUTPATIENT)
Dept: FAMILY MEDICINE CLINIC | Facility: CLINIC | Age: 81
End: 2021-05-11

## 2021-05-11 VITALS
BODY MASS INDEX: 35.3 KG/M2 | DIASTOLIC BLOOD PRESSURE: 84 MMHG | OXYGEN SATURATION: 97 % | WEIGHT: 187 LBS | SYSTOLIC BLOOD PRESSURE: 144 MMHG | HEART RATE: 71 BPM | HEIGHT: 61 IN | RESPIRATION RATE: 16 BRPM

## 2021-05-11 DIAGNOSIS — K57.92 DIVERTICULITIS: Primary | ICD-10-CM

## 2021-05-11 DIAGNOSIS — R19.5 CHANGE IN STOOL CALIBER: ICD-10-CM

## 2021-05-11 PROCEDURE — 99213 OFFICE O/P EST LOW 20 MIN: CPT | Performed by: FAMILY MEDICINE

## 2021-05-11 NOTE — PROGRESS NOTES
Subjective   Paige Martinez is a 80 y.o. female.     Chief Complaint   Patient presents with   • Abdominal Pain     f/u from CT results   pt states that her stools are very dark in color        History of Present Illness     she notes her abd pain has improved--denies any fever or chills or blood in stool....we note last colon 2018 with repeat in 2023--she has noted some change in the caliber size of the stool      Current Outpatient Medications:   •  atorvastatin (LIPITOR) 10 MG tablet, TAKE ONE TABLET DAILY GENERIC FOR LIPITOR, Disp: 30 tablet, Rfl: 5  •  bumetanide (BUMEX) 0.5 MG tablet, Take 1 mg by mouth Daily., Disp: , Rfl:   •  Cholecalciferol 2000 units capsule, Take 2,000 Units by mouth Daily., Disp: , Rfl:   •  ciprofloxacin (Cipro) 500 MG tablet, Take 1 tablet by mouth 2 (Two) Times a Day., Disp: 20 tablet, Rfl: 0  •  citalopram (CeleXA) 20 MG tablet, TAKE ONE TABLET DAILY GENERIC FOR CELEXA, Disp: 90 tablet, Rfl: 1  •  fluocinonide-emollient (LIDEX-E) 0.05 % cream, Apply  topically to the appropriate area as directed 2 (Two) Times a Day., Disp: 30 g, Rfl: 0  •  HYDROcodone-acetaminophen (NORCO) 5-325 MG per tablet, TAKE ONE TABLET DAILY GENERIC FOR NORCO, Disp: 30 tablet, Rfl: 0  •  levothyroxine (SYNTHROID, LEVOTHROID) 75 MCG tablet, TAKE ONE TABLET DAILY GENERIC FOR SYNTHROID, Disp: 90 tablet, Rfl: 1  •  losartan (COZAAR) 100 MG tablet, TAKE ONE TABLET DAILY GENERIC FOR COZAAR, Disp: 90 tablet, Rfl: 1  •  metoprolol succinate XL (TOPROL-XL) 25 MG 24 hr tablet, TAKE ONE TABLET DAILY GENERIC FOR TOPROL, Disp: 30 tablet, Rfl: 5  •  metroNIDAZOLE (Flagyl) 250 MG tablet, Take 1 tablet by mouth 3 (Three) Times a Day., Disp: 30 tablet, Rfl: 0  •  omeprazole (priLOSEC) 20 MG capsule, Take 1 capsule by mouth Daily., Disp: 30 capsule, Rfl: 3  No Known Allergies    Past Medical History:   Diagnosis Date   • Anxiety    • Cholelithiasis    • Colon polyp    • Depression    • Disease of thyroid gland    •  "Hyperlipidemia    • Hypertension    • Meningiomatosis (CMS/HCC)    • Myalgia    • Nephrolithiasis      Past Surgical History:   Procedure Laterality Date   • COLONOSCOPY  07/15/2014    2 rectal polyps, hyperplastic, diverticulosis       Review of Systems   Constitutional: Negative.    HENT: Negative.    Eyes: Negative.    Respiratory: Negative.    Cardiovascular: Negative.    Gastrointestinal: Positive for abdominal pain.   Endocrine: Negative.    Genitourinary: Negative.    Musculoskeletal: Negative.    Skin: Negative.    Allergic/Immunologic: Negative.    Neurological: Negative.    Hematological: Negative.    Psychiatric/Behavioral: Negative.        Objective  /84   Pulse 71   Resp 16   Ht 153.7 cm (60.5\")   Wt 84.8 kg (187 lb)   SpO2 97%   BMI 35.92 kg/m²   Physical Exam  Vitals and nursing note reviewed.   Constitutional:       Appearance: Normal appearance. She is normal weight.   HENT:      Head: Normocephalic and atraumatic.      Nose: Nose normal.      Mouth/Throat:      Mouth: Mucous membranes are moist.   Eyes:      Pupils: Pupils are equal, round, and reactive to light.   Cardiovascular:      Rate and Rhythm: Normal rate and regular rhythm.      Pulses: Normal pulses.      Heart sounds: Normal heart sounds.   Pulmonary:      Effort: Pulmonary effort is normal.      Breath sounds: Normal breath sounds.   Abdominal:      General: Abdomen is flat. Bowel sounds are normal.      Palpations: There is no mass.      Tenderness: There is no left CVA tenderness or rebound.      Hernia: No hernia is present.   Musculoskeletal:         General: Normal range of motion.      Cervical back: Normal range of motion and neck supple.   Skin:     General: Skin is warm.      Capillary Refill: Capillary refill takes less than 2 seconds.   Neurological:      General: No focal deficit present.      Mental Status: She is alert and oriented to person, place, and time. Mental status is at baseline.   Psychiatric:        "  Mood and Affect: Mood normal.         Behavior: Behavior normal.         Thought Content: Thought content normal.         Judgment: Judgment normal.         Assessment/Plan   Diagnoses and all orders for this visit:    1. Diverticulitis (Primary)  -     Ambulatory Referral to Gastroenterology    2. Change in stool caliber  -     Ambulatory Referral to Gastroenterology      Finish antbx a nd keep me informed             Orders Placed This Encounter   Procedures   • Ambulatory Referral to Gastroenterology     Referral Priority:   Routine     Referral Type:   Consultation     Referral Reason:   Specialty Services Required     Referred to Provider:   Gonzalez Cordero MD     Requested Specialty:   Gastroenterology     Number of Visits Requested:   1       Follow up: 2 month(s)

## 2021-05-25 NOTE — PROGRESS NOTES
Chief Complaint:   Chief Complaint   Patient presents with   • Diverticulitis     Pt started having LLQ pain and dark stools around the 1st part of May-had CT 5/7/21 at [place] that showed diverticulitis-was treated with Cipro and Flagyl X 10 day; Pt states the pain is gone but her stools are still black          Patient ID: Paige Martinez is a 80 y.o. female     History of Present Illness: This is a very pleasant 80-year-old female who is here today to follow-up for diverticulitis.    The patient's last colonoscopy revealed sigmoid diverticulosis, nonengorged internal hemorrhoids pain performed on 12/4/2018.  The patient does carry history of adenomatous polyps.    Patient had CT of abdomen pelvis performed at [facility] on 5/7/2021 for left lower quadrant abdominal pain which noted sigmoid diverticulitis.  Cholelithiasis.  Bilateral nephrolithiasis.  Cystic lesion in the right ovary.  Bilateral renal lesions.  Small hiatal hernia.  The patient followed up with Dr. Barrington Jaramillo.  She was treated with Cipro and Flagyl.  Around the beginning of May she began to have left lower quadrant abdominal pain and black tarry stools.  As noted above she was seen and treated for this.  She states she is feeling much better.  She states she only has mild left lower quadrant pain at this time.  She states her stools are no longer black.The patient denies any nausea, vomiting, epigastric pain, dysphagia, pyrosis or hematemesis.  The patient denies any fever or chills.  Denies any hematochezia.  Denies any unintentional weight loss or loss of appetite.      Past Medical History:   Diagnosis Date   • Anxiety    • Cholelithiasis    • Depression    • Diverticulosis    • History of adenomatous polyp of colon    • History of colon polyps    • Hyperlipidemia    • Hypertension    • Hypothyroidism    • Meningiomatosis (CMS/HCC)    • Myalgia    • Nephrolithiasis    • Type 2 diabetes mellitus (CMS/HCC)        Past Surgical  History:   Procedure Laterality Date   • COLONOSCOPY  12/04/2018    Sigmoid diverticulosis; Non-engorged internal hemorrhoids vein; Repeat 5 years   • COLONOSCOPY  07/15/2014    Two 3-5mm hyperplastic polyps in the rectum-one destroyed during removal; Mild sigmoid diverticulosis; Repeat 5 years   • COLONOSCOPY  09/01/2009    Two 5mm tubular adenomatous polyps in the transverse colon; Two 5mm hyperplastic polyps in the distal sigmoid colon; Small internal hemorrhoid; Repeat 3 years   • ENDOSCOPY  12/04/2018    Normal endoscopy         Current Outpatient Medications:   •  atorvastatin (LIPITOR) 10 MG tablet, TAKE ONE TABLET DAILY GENERIC FOR LIPITOR, Disp: 30 tablet, Rfl: 5  •  bumetanide (BUMEX) 0.5 MG tablet, Take 1 mg by mouth Daily., Disp: , Rfl:   •  Cholecalciferol 2000 units capsule, Take 2,000 Units by mouth Daily., Disp: , Rfl:   •  citalopram (CeleXA) 20 MG tablet, TAKE ONE TABLET DAILY GENERIC FOR CELEXA, Disp: 90 tablet, Rfl: 1  •  fluocinonide-emollient (LIDEX-E) 0.05 % cream, Apply  topically to the appropriate area as directed 2 (Two) Times a Day., Disp: 30 g, Rfl: 0  •  HYDROcodone-acetaminophen (NORCO) 5-325 MG per tablet, TAKE ONE TABLET DAILY GENERIC FOR NORCO, Disp: 30 tablet, Rfl: 0  •  levothyroxine (SYNTHROID, LEVOTHROID) 75 MCG tablet, TAKE ONE TABLET DAILY GENERIC FOR SYNTHROID, Disp: 90 tablet, Rfl: 1  •  losartan (COZAAR) 100 MG tablet, TAKE ONE TABLET DAILY GENERIC FOR COZAAR, Disp: 90 tablet, Rfl: 1  •  metoprolol succinate XL (TOPROL-XL) 25 MG 24 hr tablet, TAKE ONE TABLET DAILY GENERIC FOR TOPROL, Disp: 30 tablet, Rfl: 5  •  omeprazole (priLOSEC) 20 MG capsule, Take 1 capsule by mouth Daily., Disp: 30 capsule, Rfl: 3  •  Sod Picosulfate-Mag Ox-Cit Acd (Clenpiq) 10-3.5-12 MG-GM -GM/160ML solution, Take 2 bottles by mouth Take As Directed., Disp: 160 mL, Rfl: 0    No Known Allergies    Social History     Socioeconomic History   • Marital status: Single     Spouse name: Not on file   •  "Number of children: Not on file   • Years of education: Not on file   • Highest education level: Not on file   Tobacco Use   • Smoking status: Former Smoker     Quit date:      Years since quittin.4   • Smokeless tobacco: Never Used   Vaping Use   • Vaping Use: Never used   Substance and Sexual Activity   • Alcohol use: No     Comment: Occasionally    • Drug use: No   • Sexual activity: Defer       Family History   Problem Relation Age of Onset   • Arthritis Mother    • Arthritis Father    • Hypertension Sister    • Colon cancer Neg Hx    • Colon polyps Neg Hx    • Esophageal cancer Neg Hx    • Liver cancer Neg Hx    • Liver disease Neg Hx    • Rectal cancer Neg Hx    • Stomach cancer Neg Hx        Vitals:    21 0843   BP: 142/84   BP Location: Left arm   Patient Position: Sitting   Cuff Size: Adult   Pulse: 69   Temp: 97.2 °F (36.2 °C)   TempSrc: Infrared   SpO2: 98%   Weight: 83.5 kg (184 lb)   Height: 154.9 cm (61\")       Review of Systems:    General:    Present -feeling well   Skin:    Not Present-Rash   HEENT:     Not Present-Acute visual changes or Acute hearing changes   Neck :    Not Present- swollen glands   Genitourinary:      Not Present- burning, frequency, urgency hematuria, dysuria,   Cardiovascular:   Not Present-chest pain, palpitations, or pressure   Respiratory:   Not Present- shortness of breath or cough   Gastrointestinal:  Musculoskeletal:  Neurological:  Psychiatric:   Present as mentioned in the HP    Not Present. Recent gait disturbances.    Not Present-Seizures and weakness in extremities.    Not Present- Anxiety or Depression.       Physical Exam:    General Appearance:    Alert, cooperative, in no acute distress   Psych:    Mood appropriate    Eyes:          conjunctivae and sclerae normal, no   icterus, no pallor   ENMT:    Ears appear intact with no abnormalities noted oral mucosa moist   Neck:   No adenopathy, supple, trachea midline, no thyromegaly, no   carotid bruit, " no JVD    Cardiovascular:    Regular rhythm and normal rate, normal S1 and S2, no            murmur, no gallop, no rub, no click   Gastrointestinal:     Inspection normal.  Normal bowel sounds, no masses, no organomegaly, soft round non-tender, non-distended, no guarding, no rebound or tenderness. No hepatosplenomegaly.   Skin:   No bleeding, bruising or rash   Neurologic:   nonfocal       Lab Results - Last 18 Months   Lab Units 03/15/21  1439 02/01/21  0903 09/28/20  0833 05/21/20  0747 03/16/20 0339 01/22/20  0738   GLUCOSE mg/dL  --   --   --   --  156*  --    BUN mg/dL 20  --  20 21 14 17   CREATININE mg/dL 1.11* 1.10 1.01* 1.06* 0.89 1.15*   SODIUM mmol/L 139  --  138 139 138 141   POTASSIUM mmol/L 4.3  --  4.3 4.4 3.8 4.9   CHLORIDE mmol/L 100  --  101 102 100 100   TOTAL CO2 mmol/L 27.7  --  28.1 26.7  --  28.0   CO2 mmol/L  --   --   --   --  24.0  --    TOTAL PROTEIN g/dL  --   --   --   --  7.2  --    ALBUMIN g/dL 4.40  --  4.60 4.50 4.00 4.40   ALT (SGPT) U/L 13  --  15 14 12 9   AST (SGOT) U/L 16  --  15 16 14 14   ALK PHOS U/L 75  --  78 67 70 71   BILIRUBIN mg/dL 0.3  --  0.5 0.9 0.8 0.4   GLOBULIN gm/dL  --   --   --   --  3.2  --        Lab Results - Last 18 Months   Lab Units 03/15/21  1439 05/21/20  0747 03/16/20 0339 01/22/20  0738   HEMOGLOBIN g/dL 12.7 11.3* 11.8* 11.9*   HEMATOCRIT % 38.5 33.0* 34.4 35.7   MCV fL 92.3 90.7 87.5 89.0   WBC 10*3/mm3 8.74 6.28 8.06 7.76   RDW % 12.6 12.6 12.7 12.6   MPV fL  --   --  10.4  --    PLATELETS 10*3/mm3 231 216 199 238       Lab Results - Last 18 Months   Lab Units 03/15/21  1439 09/28/20  0833 05/21/20  0747 01/22/20  0738   TSH uIU/mL 4.580* 2.560 4.200 7.460*        Assessment and Plan:  Assessment/Plan   Diagnoses and all orders for this visit:    1. History of diverticulitis (Primary)  -     Case Request; Standing  -     Case Request    2. Melena  -     Case Request; Standing  -     Case Request    3. LLQ abdominal pain  -     Case Request;  Standing  -     Case Request    4. History of adenomatous polyp of colon    Other orders  -     Follow Anesthesia Guidelines / Protocol; Future  -     Obtain Informed Consent; Future  -     Implement Anesthesia Orders Day of Procedure; Standing  -     Obtain Informed Consent; Standing  -     Verify bowel prep was successful; Standing  -     Sod Picosulfate-Mag Ox-Cit Acd (Clenpiq) 10-3.5-12 MG-GM -GM/160ML solution; Take 2 bottles by mouth Take As Directed.  Dispense: 160 mL; Refill: 0      Schedule patient colonoscopy in about 5 weeks to allow for healing time.       There are no Patient Instructions on file for this visit.    Next follow-up appointment      The risks, benefits, and alternatives of colonoscopy were reviewed with the patient today.  Risks including perforation of the colon possibly requiring surgery or colostomy.  Additional risks include risk of bleeding from biopsies or removal of colon tissue.  There is also the risk of a drug reaction or problems with anesthesia.  This will be discussed with the further by the anesthesia team on the day of the procedure.  Lastly there is a possibility of missing a colon polyp or cancer.  The benefits include the diagnosis and management of disease of the colon and rectum.  Alternatives to colonoscopy include barium enema, laboratory testing, radiographic evaluation, or no intervention.  The patient verbalizes understanding and agrees.      EMR Dragon/Transcription disclaimer:  Much of this encounter note is an electronic transcription/translation of spoken language to printed text. The electronic translation of spoken language may permit erroneous, or at times, nonsensical words or phrases to be inadvertently transcribed; although I have reviewed the note for such errors, some may still exist.

## 2021-05-26 ENCOUNTER — OFFICE VISIT (OUTPATIENT)
Dept: GASTROENTEROLOGY | Facility: CLINIC | Age: 81
End: 2021-05-26

## 2021-05-26 VITALS
HEART RATE: 69 BPM | WEIGHT: 184 LBS | BODY MASS INDEX: 34.74 KG/M2 | SYSTOLIC BLOOD PRESSURE: 142 MMHG | OXYGEN SATURATION: 98 % | TEMPERATURE: 97.2 F | HEIGHT: 61 IN | DIASTOLIC BLOOD PRESSURE: 84 MMHG

## 2021-05-26 DIAGNOSIS — K92.1 MELENA: ICD-10-CM

## 2021-05-26 DIAGNOSIS — Z87.19 HISTORY OF DIVERTICULITIS: Primary | ICD-10-CM

## 2021-05-26 DIAGNOSIS — Z86.010 HISTORY OF ADENOMATOUS POLYP OF COLON: ICD-10-CM

## 2021-05-26 DIAGNOSIS — R10.32 LLQ ABDOMINAL PAIN: ICD-10-CM

## 2021-05-26 PROCEDURE — 99214 OFFICE O/P EST MOD 30 MIN: CPT | Performed by: NURSE PRACTITIONER

## 2021-05-26 RX ORDER — SODIUM PICOSULFATE, MAGNESIUM OXIDE, AND ANHYDROUS CITRIC ACID 10; 3.5; 12 MG/160ML; G/160ML; G/160ML
2 LIQUID ORAL TAKE AS DIRECTED
Qty: 160 ML | Refills: 0 | Status: SHIPPED | OUTPATIENT
Start: 2021-05-26 | End: 2021-06-14

## 2021-06-03 DIAGNOSIS — M19.90 OSTEOARTHRITIS, UNSPECIFIED OSTEOARTHRITIS TYPE, UNSPECIFIED SITE: ICD-10-CM

## 2021-06-03 RX ORDER — HYDROCODONE BITARTRATE AND ACETAMINOPHEN 5; 325 MG/1; MG/1
TABLET ORAL
Qty: 30 TABLET | Refills: 0 | Status: SHIPPED | OUTPATIENT
Start: 2021-06-03 | End: 2021-06-14

## 2021-06-07 DIAGNOSIS — K21.9 GASTROESOPHAGEAL REFLUX DISEASE WITHOUT ESOPHAGITIS: ICD-10-CM

## 2021-06-07 RX ORDER — OMEPRAZOLE 20 MG/1
CAPSULE, DELAYED RELEASE ORAL
Qty: 30 CAPSULE | Refills: 11 | Status: SHIPPED | OUTPATIENT
Start: 2021-06-07

## 2021-06-14 ENCOUNTER — OFFICE VISIT (OUTPATIENT)
Dept: FAMILY MEDICINE CLINIC | Facility: CLINIC | Age: 81
End: 2021-06-14

## 2021-06-14 VITALS
DIASTOLIC BLOOD PRESSURE: 76 MMHG | RESPIRATION RATE: 16 BRPM | WEIGHT: 187 LBS | TEMPERATURE: 97.1 F | OXYGEN SATURATION: 99 % | HEIGHT: 61 IN | HEART RATE: 67 BPM | BODY MASS INDEX: 35.3 KG/M2 | SYSTOLIC BLOOD PRESSURE: 128 MMHG

## 2021-06-14 DIAGNOSIS — I10 ESSENTIAL HYPERTENSION: Primary | ICD-10-CM

## 2021-06-14 PROCEDURE — 99213 OFFICE O/P EST LOW 20 MIN: CPT | Performed by: NURSE PRACTITIONER

## 2021-06-14 RX ORDER — CLONIDINE HYDROCHLORIDE 0.1 MG/1
TABLET ORAL
COMMUNITY
Start: 2021-06-13 | End: 2021-06-25 | Stop reason: SDUPTHER

## 2021-06-14 NOTE — PROGRESS NOTES
Subjective   Chief Complaint:  Reevaluation of high blood pressure    History of Present Illness:  This 80 y.o. female was seen in the office today.  Reports she went to the ER over the weekend with high blood pressure, she reports she was given a bottle of clonidine, she reports she is only had to use it once this weekend.  She presents today with a normal blood pressure.  She reports she has not increased the salt in her diet, she is currently on Bumex, losartan, Toprol-XL, she reports she is compliant with all of her medication.    No Known Allergies   Current Outpatient Medications on File Prior to Visit   Medication Sig   • atorvastatin (LIPITOR) 10 MG tablet TAKE ONE TABLET DAILY GENERIC FOR LIPITOR   • bumetanide (BUMEX) 0.5 MG tablet Take 1 mg by mouth Daily.   • Cholecalciferol 2000 units capsule Take 2,000 Units by mouth Daily.   • citalopram (CeleXA) 20 MG tablet TAKE ONE TABLET DAILY GENERIC FOR CELEXA   • cloNIDine (CATAPRES) 0.1 MG tablet    • fluocinonide-emollient (LIDEX-E) 0.05 % cream Apply  topically to the appropriate area as directed 2 (Two) Times a Day.   • levothyroxine (SYNTHROID, LEVOTHROID) 75 MCG tablet TAKE ONE TABLET DAILY GENERIC FOR SYNTHROID   • losartan (COZAAR) 100 MG tablet TAKE ONE TABLET DAILY GENERIC FOR COZAAR   • metoprolol succinate XL (TOPROL-XL) 25 MG 24 hr tablet TAKE ONE TABLET DAILY GENERIC FOR TOPROL   • omeprazole (priLOSEC) 20 MG capsule TAKE 1 CAPSULE DAILY GENERIC FOR PRILOSEC   • [DISCONTINUED] HYDROcodone-acetaminophen (NORCO) 5-325 MG per tablet TAKE ONE TABLET DAILY GENERIC FOR NORCO   • [DISCONTINUED] Sod Picosulfate-Mag Ox-Cit Acd (Clenpiq) 10-3.5-12 MG-GM -GM/160ML solution Take 2 bottles by mouth Take As Directed.     No current facility-administered medications on file prior to visit.      Past Medical, Surgical, Social, and Family History:  Past Medical History:   Diagnosis Date   • Anxiety    • Cholelithiasis    • Depression    • Diverticulosis    •  History of adenomatous polyp of colon    • History of colon polyps    • Hyperlipidemia    • Hypertension    • Hypothyroidism    • Meningiomatosis (CMS/HCC)    • Myalgia    • Nephrolithiasis    • Type 2 diabetes mellitus (CMS/HCC)      Past Surgical History:   Procedure Laterality Date   • COLONOSCOPY  2018    Sigmoid diverticulosis; Non-engorged internal hemorrhoids vein; Repeat 5 years   • COLONOSCOPY  07/15/2014    Two 3-5mm hyperplastic polyps in the rectum-one destroyed during removal; Mild sigmoid diverticulosis; Repeat 5 years   • COLONOSCOPY  2009    Two 5mm tubular adenomatous polyps in the transverse colon; Two 5mm hyperplastic polyps in the distal sigmoid colon; Small internal hemorrhoid; Repeat 3 years   • ENDOSCOPY  2018    Normal endoscopy     Social History     Socioeconomic History   • Marital status: Single     Spouse name: Not on file   • Number of children: Not on file   • Years of education: Not on file   • Highest education level: Not on file   Tobacco Use   • Smoking status: Former Smoker     Quit date:      Years since quittin.4   • Smokeless tobacco: Never Used   Vaping Use   • Vaping Use: Never used   Substance and Sexual Activity   • Alcohol use: No     Comment: Occasionally    • Drug use: No   • Sexual activity: Defer     Family History   Problem Relation Age of Onset   • Arthritis Mother    • Arthritis Father    • Hypertension Sister    • Colon cancer Neg Hx    • Colon polyps Neg Hx    • Esophageal cancer Neg Hx    • Liver cancer Neg Hx    • Liver disease Neg Hx    • Rectal cancer Neg Hx    • Stomach cancer Neg Hx      Objective   Physical Exam  Vitals reviewed.   Constitutional:       General: She is not in acute distress.     Appearance: Normal appearance.   Cardiovascular:      Rate and Rhythm: Normal rate and regular rhythm.   Pulmonary:      Effort: Pulmonary effort is normal.      Breath sounds: Normal breath sounds.     /76 (BP Location: Left arm)    "Pulse 67   Temp 97.1 °F (36.2 °C)   Resp 16   Ht 154.9 cm (61\")   Wt 84.8 kg (187 lb)   SpO2 99%   BMI 35.33 kg/m²     Assessment/Plan   Diagnoses and all orders for this visit:    1. Essential hypertension (Primary)    Discussion:  Advised and educated plan of care.  At this point with the blood pressure normalized, I do not see any reason to adjust any of the daily medicines, I advised her if she starts taking the clonidine more often to let us know what the readings are and we can make adjustments off of those readings if the clonidine is having to be given more often.    Follow-up:  Return for Next scheduled follow up as already scheduled..    Electronically signed by ASH Elam, 06/14/21, 1:52 PM CDT.  "

## 2021-06-25 RX ORDER — CLONIDINE HYDROCHLORIDE 0.1 MG/1
0.1 TABLET ORAL DAILY
Qty: 30 TABLET | Refills: 0 | Status: SHIPPED | OUTPATIENT
Start: 2021-06-25 | End: 2021-07-19

## 2021-06-28 RX ORDER — CITALOPRAM 20 MG/1
TABLET ORAL
Qty: 90 TABLET | Refills: 1 | Status: SHIPPED | OUTPATIENT
Start: 2021-06-28 | End: 2021-08-02

## 2021-06-29 ENCOUNTER — OUTSIDE FACILITY SERVICE (OUTPATIENT)
Dept: GASTROENTEROLOGY | Facility: CLINIC | Age: 81
End: 2021-06-29

## 2021-06-29 ENCOUNTER — LAB REQUISITION (OUTPATIENT)
Dept: LAB | Facility: HOSPITAL | Age: 81
End: 2021-06-29

## 2021-06-29 DIAGNOSIS — Z00.00 ENCOUNTER FOR GENERAL ADULT MEDICAL EXAMINATION WITHOUT ABNORMAL FINDINGS: ICD-10-CM

## 2021-06-29 PROCEDURE — 88305 TISSUE EXAM BY PATHOLOGIST: CPT | Performed by: INTERNAL MEDICINE

## 2021-06-29 PROCEDURE — 45385 COLONOSCOPY W/LESION REMOVAL: CPT | Performed by: INTERNAL MEDICINE

## 2021-07-01 LAB
CYTO UR: NORMAL
LAB AP CASE REPORT: NORMAL
LAB AP CLINICAL INFORMATION: NORMAL
PATH REPORT.FINAL DX SPEC: NORMAL
PATH REPORT.GROSS SPEC: NORMAL

## 2021-07-12 DIAGNOSIS — M19.90 OSTEOARTHRITIS, UNSPECIFIED OSTEOARTHRITIS TYPE, UNSPECIFIED SITE: ICD-10-CM

## 2021-07-12 RX ORDER — HYDROCODONE BITARTRATE AND ACETAMINOPHEN 5; 325 MG/1; MG/1
TABLET ORAL
Qty: 30 TABLET | Refills: 0 | Status: SHIPPED | OUTPATIENT
Start: 2021-07-12 | End: 2021-08-11

## 2021-07-12 NOTE — TELEPHONE ENCOUNTER
Refill request norco 5-325 mg one daily    Protocol last refill 6-3-21    [place]  [facility]    Has appt on 7-19-21

## 2021-07-19 ENCOUNTER — OFFICE VISIT (OUTPATIENT)
Dept: FAMILY MEDICINE CLINIC | Facility: CLINIC | Age: 81
End: 2021-07-19

## 2021-07-19 VITALS
HEIGHT: 61 IN | RESPIRATION RATE: 16 BRPM | DIASTOLIC BLOOD PRESSURE: 78 MMHG | BODY MASS INDEX: 34.74 KG/M2 | HEART RATE: 71 BPM | SYSTOLIC BLOOD PRESSURE: 126 MMHG | WEIGHT: 184 LBS | OXYGEN SATURATION: 97 %

## 2021-07-19 DIAGNOSIS — I10 ESSENTIAL HYPERTENSION: Primary | ICD-10-CM

## 2021-07-19 DIAGNOSIS — E78.2 MIXED HYPERLIPIDEMIA: ICD-10-CM

## 2021-07-19 PROCEDURE — 99213 OFFICE O/P EST LOW 20 MIN: CPT | Performed by: FAMILY MEDICINE

## 2021-07-19 RX ORDER — CLONIDINE HYDROCHLORIDE 0.1 MG/1
TABLET ORAL
Qty: 30 TABLET | Refills: 5 | Status: SHIPPED | OUTPATIENT
Start: 2021-07-19 | End: 2022-07-25

## 2021-07-19 NOTE — PROGRESS NOTES
Subjective   Paige Martinze is a 80 y.o. female.     Chief Complaint   Patient presents with   • Diverticulitis     2 mo f/u       History of Present Illness     she notes having good bp control without cp or ha--she is toleraigng lipitior witjhout myalgia s      Current Outpatient Medications:   •  atorvastatin (LIPITOR) 10 MG tablet, TAKE ONE TABLET DAILY GENERIC FOR LIPITOR, Disp: 30 tablet, Rfl: 5  •  bumetanide (BUMEX) 0.5 MG tablet, Take 1 mg by mouth Daily., Disp: , Rfl:   •  Cholecalciferol 2000 units capsule, Take 2,000 Units by mouth Daily., Disp: , Rfl:   •  citalopram (CeleXA) 20 MG tablet, TAKE ONE TABLET DAILY GENERIC FOR CELEXA , Disp: 90 tablet, Rfl: 1  •  cloNIDine (CATAPRES) 0.1 MG tablet, Take 1 tablet by mouth Daily., Disp: 30 tablet, Rfl: 0  •  fluocinonide-emollient (LIDEX-E) 0.05 % cream, Apply  topically to the appropriate area as directed 2 (Two) Times a Day., Disp: 30 g, Rfl: 0  •  HYDROcodone-acetaminophen (NORCO) 5-325 MG per tablet, TAKE ONE TABLET DAILY GENERIC FOR NORCO , Disp: 30 tablet, Rfl: 0  •  levothyroxine (SYNTHROID, LEVOTHROID) 75 MCG tablet, TAKE ONE TABLET DAILY GENERIC FOR SYNTHROID, Disp: 90 tablet, Rfl: 1  •  losartan (COZAAR) 100 MG tablet, TAKE ONE TABLET DAILY GENERIC FOR COZAAR, Disp: 90 tablet, Rfl: 1  •  metoprolol succinate XL (TOPROL-XL) 25 MG 24 hr tablet, TAKE ONE TABLET DAILY GENERIC FOR TOPROL, Disp: 30 tablet, Rfl: 5  •  omeprazole (priLOSEC) 20 MG capsule, TAKE 1 CAPSULE DAILY GENERIC FOR PRILOSEC, Disp: 30 capsule, Rfl: 11  No Known Allergies    Past Medical History:   Diagnosis Date   • Anxiety    • Cholelithiasis    • Depression    • Diverticulosis    • History of adenomatous polyp of colon    • History of colon polyps    • Hyperlipidemia    • Hypertension    • Hypothyroidism    • Meningiomatosis (CMS/HCC)    • Myalgia    • Nephrolithiasis    • Type 2 diabetes mellitus (CMS/HCC)      Past Surgical History:   Procedure Laterality Date   • COLONOSCOPY   "12/04/2018    Sigmoid diverticulosis; Non-engorged internal hemorrhoids vein; Repeat 5 years   • COLONOSCOPY  07/15/2014    Two 3-5mm hyperplastic polyps in the rectum-one destroyed during removal; Mild sigmoid diverticulosis; Repeat 5 years   • COLONOSCOPY  09/01/2009    Two 5mm tubular adenomatous polyps in the transverse colon; Two 5mm hyperplastic polyps in the distal sigmoid colon; Small internal hemorrhoid; Repeat 3 years   • ENDOSCOPY  12/04/2018    Normal endoscopy       Review of Systems   Constitutional: Negative.    HENT: Negative.    Eyes: Negative.    Respiratory: Negative.    Cardiovascular: Negative.    Gastrointestinal: Negative.    Endocrine: Negative.    Genitourinary: Negative.    Musculoskeletal: Negative.    Skin: Negative.    Allergic/Immunologic: Negative.    Neurological: Negative.    Hematological: Negative.    Psychiatric/Behavioral: Negative.        Objective  /78   Pulse 71   Resp 16   Ht 154.9 cm (61\")   Wt 83.5 kg (184 lb)   SpO2 97%   BMI 34.77 kg/m²   Physical Exam  Vitals and nursing note reviewed.   Constitutional:       Appearance: Normal appearance. She is normal weight.   HENT:      Head: Normocephalic and atraumatic.      Nose: Nose normal.      Mouth/Throat:      Mouth: Mucous membranes are moist.      Pharynx: Oropharynx is clear.   Eyes:      Extraocular Movements: Extraocular movements intact.      Conjunctiva/sclera: Conjunctivae normal.      Pupils: Pupils are equal, round, and reactive to light.   Cardiovascular:      Rate and Rhythm: Normal rate and regular rhythm.      Pulses: Normal pulses.      Heart sounds: Normal heart sounds.   Pulmonary:      Effort: Pulmonary effort is normal.      Breath sounds: Normal breath sounds.   Abdominal:      General: Abdomen is flat. Bowel sounds are normal.      Palpations: Abdomen is soft.   Musculoskeletal:         General: Normal range of motion.      Cervical back: Normal range of motion.   Skin:     General: Skin is " warm.      Capillary Refill: Capillary refill takes less than 2 seconds.   Neurological:      General: No focal deficit present.      Mental Status: She is alert and oriented to person, place, and time. Mental status is at baseline.   Psychiatric:         Mood and Affect: Mood normal.         Behavior: Behavior normal.         Thought Content: Thought content normal.         Judgment: Judgment normal.         Assessment/Plan   Diagnoses and all orders for this visit:    1. Essential hypertension (Primary)    2. Mixed hyperlipidemia      She will monitor bp and keep me informed.  She will continue statin tx and monitor for m yalgias .           No orders of the defined types were placed in this encounter.      Follow up: 4 month(s)

## 2021-08-02 RX ORDER — CITALOPRAM 20 MG/1
TABLET ORAL
Qty: 90 TABLET | Refills: 1 | Status: SHIPPED | OUTPATIENT
Start: 2021-08-02 | End: 2022-05-31

## 2021-08-11 ENCOUNTER — LAB (OUTPATIENT)
Dept: FAMILY MEDICINE CLINIC | Facility: CLINIC | Age: 81
End: 2021-08-11

## 2021-08-11 DIAGNOSIS — M19.90 OSTEOARTHRITIS, UNSPECIFIED OSTEOARTHRITIS TYPE, UNSPECIFIED SITE: ICD-10-CM

## 2021-08-11 RX ORDER — HYDROCODONE BITARTRATE AND ACETAMINOPHEN 5; 325 MG/1; MG/1
TABLET ORAL
Qty: 30 TABLET | Refills: 0 | Status: SHIPPED | OUTPATIENT
Start: 2021-08-11 | End: 2021-09-14

## 2021-08-12 LAB
25(OH)D3+25(OH)D2 SERPL-MCNC: 51 NG/ML (ref 30–100)
ALBUMIN SERPL-MCNC: 4.2 G/DL (ref 3.5–5.2)
ALBUMIN/GLOB SERPL: 1.5 G/DL
ALP SERPL-CCNC: 72 U/L (ref 39–117)
ALT SERPL-CCNC: 11 U/L (ref 1–33)
APPEARANCE UR: ABNORMAL
AST SERPL-CCNC: 13 U/L (ref 1–32)
BACTERIA #/AREA URNS HPF: ABNORMAL /HPF
BASOPHILS # BLD AUTO: 0.04 10*3/MM3 (ref 0–0.2)
BASOPHILS NFR BLD AUTO: 0.5 % (ref 0–1.5)
BILIRUB SERPL-MCNC: 0.5 MG/DL (ref 0–1.2)
BILIRUB UR QL STRIP: NEGATIVE
BUN SERPL-MCNC: 18 MG/DL (ref 8–23)
BUN/CREAT SERPL: 15.9 (ref 7–25)
CALCIUM SERPL-MCNC: 9.5 MG/DL (ref 8.6–10.5)
CHLORIDE SERPL-SCNC: 100 MMOL/L (ref 98–107)
CO2 SERPL-SCNC: 28.7 MMOL/L (ref 22–29)
COLOR UR: YELLOW
CREAT SERPL-MCNC: 1.13 MG/DL (ref 0.57–1)
CREAT UR-MCNC: 98.4 MG/DL
EOSINOPHIL # BLD AUTO: 0.15 10*3/MM3 (ref 0–0.4)
EOSINOPHIL NFR BLD AUTO: 1.7 % (ref 0.3–6.2)
EPI CELLS #/AREA URNS HPF: ABNORMAL /HPF
ERYTHROCYTE [DISTWIDTH] IN BLOOD BY AUTOMATED COUNT: 12.6 % (ref 12.3–15.4)
GLOBULIN SER CALC-MCNC: 2.8 GM/DL
GLUCOSE SERPL-MCNC: 140 MG/DL (ref 65–99)
GLUCOSE UR QL: NEGATIVE
HCT VFR BLD AUTO: 38.4 % (ref 34–46.6)
HGB BLD-MCNC: 12.5 G/DL (ref 12–15.9)
HGB UR QL STRIP: NEGATIVE
IMM GRANULOCYTES # BLD AUTO: 0.02 10*3/MM3 (ref 0–0.05)
IMM GRANULOCYTES NFR BLD AUTO: 0.2 % (ref 0–0.5)
KETONES UR QL STRIP: NEGATIVE
LEUKOCYTE ESTERASE UR QL STRIP: ABNORMAL
LYMPHOCYTES # BLD AUTO: 2.4 10*3/MM3 (ref 0.7–3.1)
LYMPHOCYTES NFR BLD AUTO: 28 % (ref 19.6–45.3)
MCH RBC QN AUTO: 30.1 PG (ref 26.6–33)
MCHC RBC AUTO-ENTMCNC: 32.6 G/DL (ref 31.5–35.7)
MCV RBC AUTO: 92.5 FL (ref 79–97)
MONOCYTES # BLD AUTO: 0.55 10*3/MM3 (ref 0.1–0.9)
MONOCYTES NFR BLD AUTO: 6.4 % (ref 5–12)
NEUTROPHILS # BLD AUTO: 5.42 10*3/MM3 (ref 1.7–7)
NEUTROPHILS NFR BLD AUTO: 63.2 % (ref 42.7–76)
NITRITE UR QL STRIP: NEGATIVE
NRBC BLD AUTO-RTO: 0 /100 WBC (ref 0–0.2)
PH UR STRIP: 6 [PH] (ref 5–8)
PLATELET # BLD AUTO: 234 10*3/MM3 (ref 140–450)
POTASSIUM SERPL-SCNC: 4.4 MMOL/L (ref 3.5–5.2)
PROT SERPL-MCNC: 7 G/DL (ref 6–8.5)
PROT UR QL STRIP: NEGATIVE
PROT UR-MCNC: 11 MG/DL
PROT/CREAT UR: 111.8 MG/G CREA (ref 0–200)
RBC # BLD AUTO: 4.15 10*6/MM3 (ref 3.77–5.28)
RBC #/AREA URNS HPF: ABNORMAL /HPF
SODIUM SERPL-SCNC: 139 MMOL/L (ref 136–145)
SP GR UR: 1.02 (ref 1–1.03)
UROBILINOGEN UR STRIP-MCNC: ABNORMAL MG/DL
WBC # BLD AUTO: 8.58 10*3/MM3 (ref 3.4–10.8)
WBC #/AREA URNS HPF: ABNORMAL /HPF

## 2021-08-23 RX ORDER — ATORVASTATIN CALCIUM 10 MG/1
TABLET, FILM COATED ORAL
Qty: 30 TABLET | Refills: 5 | Status: SHIPPED | OUTPATIENT
Start: 2021-08-23 | End: 2022-03-15

## 2021-09-14 DIAGNOSIS — M19.90 OSTEOARTHRITIS, UNSPECIFIED OSTEOARTHRITIS TYPE, UNSPECIFIED SITE: ICD-10-CM

## 2021-09-14 RX ORDER — HYDROCODONE BITARTRATE AND ACETAMINOPHEN 5; 325 MG/1; MG/1
TABLET ORAL
Qty: 30 TABLET | Refills: 0 | Status: SHIPPED | OUTPATIENT
Start: 2021-09-14 | End: 2021-11-15

## 2021-09-14 RX ORDER — LOSARTAN POTASSIUM 100 MG/1
TABLET ORAL
Qty: 90 TABLET | Refills: 0 | Status: SHIPPED | OUTPATIENT
Start: 2021-09-14 | End: 2022-02-09

## 2021-09-14 RX ORDER — LEVOTHYROXINE SODIUM 0.07 MG/1
TABLET ORAL
Qty: 90 TABLET | Refills: 0 | Status: SHIPPED | OUTPATIENT
Start: 2021-09-14 | End: 2022-02-09

## 2021-10-18 ENCOUNTER — TELEPHONE (OUTPATIENT)
Dept: FAMILY MEDICINE CLINIC | Facility: CLINIC | Age: 81
End: 2021-10-18

## 2021-10-18 RX ORDER — AMOXICILLIN AND CLAVULANATE POTASSIUM 875; 125 MG/1; MG/1
1 TABLET, FILM COATED ORAL 2 TIMES DAILY
Qty: 20 TABLET | Refills: 0 | Status: SHIPPED | OUTPATIENT
Start: 2021-10-18 | End: 2021-11-16

## 2021-10-20 RX ORDER — METOPROLOL SUCCINATE 25 MG/1
TABLET, EXTENDED RELEASE ORAL
Qty: 30 TABLET | Refills: 5 | Status: SHIPPED | OUTPATIENT
Start: 2021-10-20 | End: 2022-05-31

## 2021-11-15 DIAGNOSIS — M19.90 OSTEOARTHRITIS, UNSPECIFIED OSTEOARTHRITIS TYPE, UNSPECIFIED SITE: ICD-10-CM

## 2021-11-15 RX ORDER — HYDROCODONE BITARTRATE AND ACETAMINOPHEN 5; 325 MG/1; MG/1
TABLET ORAL
Qty: 30 TABLET | Refills: 0 | Status: SHIPPED | OUTPATIENT
Start: 2021-11-15 | End: 2021-12-16

## 2021-11-16 ENCOUNTER — OFFICE VISIT (OUTPATIENT)
Dept: FAMILY MEDICINE CLINIC | Facility: CLINIC | Age: 81
End: 2021-11-16

## 2021-11-16 VITALS
DIASTOLIC BLOOD PRESSURE: 82 MMHG | BODY MASS INDEX: 34.55 KG/M2 | HEART RATE: 88 BPM | WEIGHT: 183 LBS | SYSTOLIC BLOOD PRESSURE: 128 MMHG | RESPIRATION RATE: 16 BRPM | HEIGHT: 61 IN | OXYGEN SATURATION: 97 %

## 2021-11-16 DIAGNOSIS — E78.2 MIXED HYPERLIPIDEMIA: Primary | ICD-10-CM

## 2021-11-16 DIAGNOSIS — I10 PRIMARY HYPERTENSION: ICD-10-CM

## 2021-11-16 DIAGNOSIS — E03.9 HYPOTHYROIDISM, UNSPECIFIED TYPE: ICD-10-CM

## 2021-11-16 PROCEDURE — 99213 OFFICE O/P EST LOW 20 MIN: CPT | Performed by: FAMILY MEDICINE

## 2021-11-16 NOTE — PROGRESS NOTES
Subjective   Paige Martinez is a 81 y.o. female.     Chief Complaint   Patient presents with   • Hypertension     4 mo         History of Present Illness     she nots good bp control without cp or ha---toleinag lipitiro without myaglais       Current Outpatient Medications:   •  atorvastatin (LIPITOR) 10 MG tablet, TAKE ONE TABLET DAILY GENERIC FOR LIPITOR , Disp: 30 tablet, Rfl: 5  •  bumetanide (BUMEX) 0.5 MG tablet, Take 1 mg by mouth Daily., Disp: , Rfl:   •  Cholecalciferol 2000 units capsule, Take 2,000 Units by mouth Daily., Disp: , Rfl:   •  citalopram (CeleXA) 20 MG tablet, TAKE ONE TABLET DAILY GENERIC FOR CELEXA , Disp: 90 tablet, Rfl: 1  •  cloNIDine (CATAPRES) 0.1 MG tablet, TAKE ONE TABLET BY MOUTH DAILY. , Disp: 30 tablet, Rfl: 5  •  fluocinonide-emollient (LIDEX-E) 0.05 % cream, Apply  topically to the appropriate area as directed 2 (Two) Times a Day., Disp: 30 g, Rfl: 0  •  HYDROcodone-acetaminophen (NORCO) 5-325 MG per tablet, TAKE ONE TABLET DAILY GENERIC FOR NORCO, Disp: 30 tablet, Rfl: 0  •  levothyroxine (SYNTHROID, LEVOTHROID) 75 MCG tablet, TAKE ONE TABLET DAILY GENERIC FOR SYNTHROID , Disp: 90 tablet, Rfl: 0  •  losartan (COZAAR) 100 MG tablet, TAKE ONE TABLET DAILY GENERIC FOR COZAAR , Disp: 90 tablet, Rfl: 0  •  metoprolol succinate XL (TOPROL-XL) 25 MG 24 hr tablet, TAKE ONE TABLET DAILY GENERIC FOR TOPROL XL, Disp: 30 tablet, Rfl: 5  •  omeprazole (priLOSEC) 20 MG capsule, TAKE 1 CAPSULE DAILY GENERIC FOR PRILOSEC, Disp: 30 capsule, Rfl: 11  No Known Allergies    Past Medical History:   Diagnosis Date   • Anxiety    • Cholelithiasis    • Depression    • Diverticulosis    • History of adenomatous polyp of colon    • History of colon polyps    • Hyperlipidemia    • Hypertension    • Hypothyroidism    • Meningiomatosis (CMS/HCC)    • Myalgia    • Nephrolithiasis    • Type 2 diabetes mellitus (CMS/HCC)      Past Surgical History:   Procedure Laterality Date   • COLONOSCOPY  12/04/2018     "Sigmoid diverticulosis; Non-engorged internal hemorrhoids vein; Repeat 5 years   • COLONOSCOPY  07/15/2014    Two 3-5mm hyperplastic polyps in the rectum-one destroyed during removal; Mild sigmoid diverticulosis; Repeat 5 years   • COLONOSCOPY  09/01/2009    Two 5mm tubular adenomatous polyps in the transverse colon; Two 5mm hyperplastic polyps in the distal sigmoid colon; Small internal hemorrhoid; Repeat 3 years   • ENDOSCOPY  12/04/2018    Normal endoscopy       Review of Systems   Constitutional: Negative.    HENT: Negative.    Eyes: Negative.    Respiratory: Negative.    Cardiovascular: Negative.    Gastrointestinal: Negative.    Endocrine: Negative.    Genitourinary: Negative.    Musculoskeletal: Negative.    Skin: Negative.    Allergic/Immunologic: Negative.    Neurological: Negative.    Hematological: Negative.    Psychiatric/Behavioral: Negative.        Objective  /82   Pulse 88   Resp 16   Ht 154.9 cm (61\")   Wt 83 kg (183 lb)   SpO2 97%   BMI 34.58 kg/m²   Physical Exam  Vitals and nursing note reviewed.   Constitutional:       Appearance: Normal appearance. She is normal weight.   HENT:      Head: Normocephalic and atraumatic.      Nose: Nose normal.      Mouth/Throat:      Mouth: Mucous membranes are moist.   Eyes:      Extraocular Movements: Extraocular movements intact.      Conjunctiva/sclera: Conjunctivae normal.      Pupils: Pupils are equal, round, and reactive to light.   Cardiovascular:      Rate and Rhythm: Normal rate and regular rhythm.      Pulses: Normal pulses.      Heart sounds: Normal heart sounds.   Pulmonary:      Effort: Pulmonary effort is normal.   Abdominal:      General: Abdomen is flat. Bowel sounds are normal.      Palpations: Abdomen is soft.   Musculoskeletal:         General: Normal range of motion.      Cervical back: Normal range of motion.   Skin:     General: Skin is warm.      Capillary Refill: Capillary refill takes less than 2 seconds.   Neurological:      " General: No focal deficit present.      Mental Status: She is alert and oriented to person, place, and time. Mental status is at baseline.   Psychiatric:         Mood and Affect: Mood normal.         Assessment/Plan   Diagnoses and all orders for this visit:    1. Mixed hyperlipidemia (Primary)    2. Primary hypertension    3. Hypothyroidism, unspecified type  -     TSH      She willmonitor bp and keep me informd         Orders Placed This Encounter   Procedures   • TSH     Order Specific Question:   Release to patient     Answer:   Immediate       Follow up: 4 month(s)

## 2021-11-17 LAB — TSH SERPL DL<=0.005 MIU/L-ACNC: 4.76 UIU/ML (ref 0.45–4.5)

## 2021-12-01 ENCOUNTER — TELEPHONE (OUTPATIENT)
Dept: FAMILY MEDICINE CLINIC | Facility: CLINIC | Age: 81
End: 2021-12-01

## 2021-12-01 NOTE — TELEPHONE ENCOUNTER
PATIENT CALLED IN REQUESTING AN ORDER FOR A COVID TEST SENT TO [facility]     PATIENT STATES HER SISTER CAME FOR [employer] Thursday AND SHE TESTED POSITIVE FOR COVID TODAY   PATIENT STATES SHE HAS NO SYMPTOMS RIGHT NOW BUT SHE DOES BABYSIT HER GREAT GRANDCHILDREN THROUGH THE WEEK.      PLEASE CALL ONCE SENT   [phone]

## 2021-12-02 DIAGNOSIS — R05.9 COUGH: Primary | ICD-10-CM

## 2021-12-16 DIAGNOSIS — M19.90 OSTEOARTHRITIS, UNSPECIFIED OSTEOARTHRITIS TYPE, UNSPECIFIED SITE: ICD-10-CM

## 2021-12-16 RX ORDER — HYDROCODONE BITARTRATE AND ACETAMINOPHEN 5; 325 MG/1; MG/1
TABLET ORAL
Qty: 30 TABLET | Refills: 0 | Status: SHIPPED | OUTPATIENT
Start: 2021-12-16 | End: 2022-01-17

## 2022-01-17 ENCOUNTER — TELEPHONE (OUTPATIENT)
Dept: FAMILY MEDICINE CLINIC | Facility: CLINIC | Age: 82
End: 2022-01-17

## 2022-01-17 DIAGNOSIS — R05.9 COUGHING: Primary | ICD-10-CM

## 2022-01-17 DIAGNOSIS — M19.90 OSTEOARTHRITIS, UNSPECIFIED OSTEOARTHRITIS TYPE, UNSPECIFIED SITE: ICD-10-CM

## 2022-01-17 RX ORDER — HYDROCODONE BITARTRATE AND ACETAMINOPHEN 5; 325 MG/1; MG/1
TABLET ORAL
Qty: 30 TABLET | Refills: 0 | Status: SHIPPED | OUTPATIENT
Start: 2022-01-17 | End: 2022-02-28

## 2022-01-19 DIAGNOSIS — R05.9 COUGH: ICD-10-CM

## 2022-01-31 ENCOUNTER — TELEPHONE (OUTPATIENT)
Dept: NEUROSURGERY | Facility: CLINIC | Age: 82
End: 2022-01-31

## 2022-01-31 NOTE — TELEPHONE ENCOUNTER
Caller: Paige Martinez  Relationship: Self  Best call back number: [phone]    What was the call regarding: PATIENT WAS DIAGNOSED WITH COVID LAST Monday. SHE WILL CALL BACK TO RESCHEDULE APPOINTMENT WITH [facility]OL[facility] ON THE THE MRI HAS BEEN RESCHEDULED.     ROUTING AS FYI AS THE APPOINTMENT WAS LESS THAN 3 DAYS AWAY.

## 2022-01-31 NOTE — TELEPHONE ENCOUNTER
Caller: Paige Martinez  Relationship: Self  Best call back number: [phone]    What was the call regarding:   PATIENT'S MRI WAS RESCHEDULED TO A MONDAY. PATIENT LIVES IN [place] AND WANTED APPOINTMENT THE SAME DAY AS IMAGING. PATIENT IS SCHEDULED FOR 10:45 THAT DAY AS THERE WERE ONLY TWO FOLLOW UP EXTENDED APPOINTMENTS LEFT ON THAT DAY FOR 10:45 AND 11AM.     WILL THIS BE ENOUGH TIME FOR IMAGING TO BE UPLOADED IN SYSTEM?

## 2022-02-02 ENCOUNTER — APPOINTMENT (OUTPATIENT)
Dept: MRI IMAGING | Facility: HOSPITAL | Age: 82
End: 2022-02-02

## 2022-02-09 RX ORDER — LOSARTAN POTASSIUM 100 MG/1
TABLET ORAL
Qty: 90 TABLET | Refills: 0 | Status: SHIPPED | OUTPATIENT
Start: 2022-02-09 | End: 2022-04-26

## 2022-02-09 RX ORDER — LEVOTHYROXINE SODIUM 0.07 MG/1
TABLET ORAL
Qty: 90 TABLET | Refills: 0 | Status: SHIPPED | OUTPATIENT
Start: 2022-02-09 | End: 2022-04-26

## 2022-02-10 ENCOUNTER — LAB (OUTPATIENT)
Dept: FAMILY MEDICINE CLINIC | Facility: CLINIC | Age: 82
End: 2022-02-10

## 2022-02-11 LAB
25(OH)D3+25(OH)D2 SERPL-MCNC: 28.3 NG/ML (ref 30–100)
ALBUMIN SERPL-MCNC: 4.7 G/DL (ref 3.5–5.2)
ALBUMIN/GLOB SERPL: 1.9 G/DL
ALP SERPL-CCNC: 78 U/L (ref 39–117)
ALT SERPL-CCNC: 9 U/L (ref 1–33)
APPEARANCE UR: CLEAR
AST SERPL-CCNC: 13 U/L (ref 1–32)
BACTERIA #/AREA URNS HPF: ABNORMAL /HPF
BASOPHILS # BLD AUTO: 0.03 10*3/MM3 (ref 0–0.2)
BASOPHILS NFR BLD AUTO: 0.4 % (ref 0–1.5)
BILIRUB SERPL-MCNC: 0.9 MG/DL (ref 0–1.2)
BILIRUB UR QL STRIP: NEGATIVE
BUN SERPL-MCNC: 16 MG/DL (ref 8–23)
BUN/CREAT SERPL: 15 (ref 7–25)
CALCIUM SERPL-MCNC: 9.3 MG/DL (ref 8.6–10.5)
CASTS URNS MICRO: ABNORMAL
CHLORIDE SERPL-SCNC: 100 MMOL/L (ref 98–107)
CO2 SERPL-SCNC: 30.7 MMOL/L (ref 22–29)
COLOR UR: YELLOW
CREAT SERPL-MCNC: 1.07 MG/DL (ref 0.57–1)
CREAT UR-MCNC: 121.7 MG/DL
EOSINOPHIL # BLD AUTO: 0.21 10*3/MM3 (ref 0–0.4)
EOSINOPHIL NFR BLD AUTO: 2.5 % (ref 0.3–6.2)
EPI CELLS #/AREA URNS HPF: ABNORMAL /HPF
ERYTHROCYTE [DISTWIDTH] IN BLOOD BY AUTOMATED COUNT: 12.4 % (ref 12.3–15.4)
GLOBULIN SER CALC-MCNC: 2.5 GM/DL
GLUCOSE SERPL-MCNC: 126 MG/DL (ref 65–99)
GLUCOSE UR QL STRIP: NEGATIVE
HCT VFR BLD AUTO: 36.1 % (ref 34–46.6)
HGB BLD-MCNC: 12.2 G/DL (ref 12–15.9)
HGB UR QL STRIP: NEGATIVE
IMM GRANULOCYTES # BLD AUTO: 0.03 10*3/MM3 (ref 0–0.05)
IMM GRANULOCYTES NFR BLD AUTO: 0.4 % (ref 0–0.5)
KETONES UR QL STRIP: NEGATIVE
LEUKOCYTE ESTERASE UR QL STRIP: NEGATIVE
LYMPHOCYTES # BLD AUTO: 2.31 10*3/MM3 (ref 0.7–3.1)
LYMPHOCYTES NFR BLD AUTO: 27.2 % (ref 19.6–45.3)
MCH RBC QN AUTO: 30.5 PG (ref 26.6–33)
MCHC RBC AUTO-ENTMCNC: 33.8 G/DL (ref 31.5–35.7)
MCV RBC AUTO: 90.3 FL (ref 79–97)
MONOCYTES # BLD AUTO: 0.61 10*3/MM3 (ref 0.1–0.9)
MONOCYTES NFR BLD AUTO: 7.2 % (ref 5–12)
NEUTROPHILS # BLD AUTO: 5.29 10*3/MM3 (ref 1.7–7)
NEUTROPHILS NFR BLD AUTO: 62.3 % (ref 42.7–76)
NITRITE UR QL STRIP: NEGATIVE
NRBC BLD AUTO-RTO: 0 /100 WBC (ref 0–0.2)
PH UR STRIP: 5.5 [PH] (ref 5–8)
PLATELET # BLD AUTO: 219 10*3/MM3 (ref 140–450)
POTASSIUM SERPL-SCNC: 4 MMOL/L (ref 3.5–5.2)
PROT SERPL-MCNC: 7.2 G/DL (ref 6–8.5)
PROT UR QL STRIP: ABNORMAL
PROT UR-MCNC: 26.4 MG/DL
PROT/CREAT UR: 216.9 MG/G CREA (ref 0–200)
RBC # BLD AUTO: 4 10*6/MM3 (ref 3.77–5.28)
RBC #/AREA URNS HPF: ABNORMAL /HPF
SODIUM SERPL-SCNC: 137 MMOL/L (ref 136–145)
SP GR UR STRIP: 1.02 (ref 1–1.03)
UROBILINOGEN UR STRIP-MCNC: ABNORMAL MG/DL
WBC # BLD AUTO: 8.48 10*3/MM3 (ref 3.4–10.8)
WBC #/AREA URNS HPF: ABNORMAL /HPF

## 2022-02-15 ENCOUNTER — APPOINTMENT (OUTPATIENT)
Dept: MRI IMAGING | Facility: HOSPITAL | Age: 82
End: 2022-02-15

## 2022-02-21 ENCOUNTER — OFFICE VISIT (OUTPATIENT)
Dept: NEUROSURGERY | Facility: CLINIC | Age: 82
End: 2022-02-21

## 2022-02-21 ENCOUNTER — HOSPITAL ENCOUNTER (OUTPATIENT)
Dept: MRI IMAGING | Facility: HOSPITAL | Age: 82
Discharge: HOME | End: 2022-02-21
Admitting: NEUROLOGICAL SURGERY

## 2022-02-21 VITALS — HEIGHT: 61 IN | BODY MASS INDEX: 34.93 KG/M2 | WEIGHT: 185 LBS

## 2022-02-21 DIAGNOSIS — D32.9 MENINGIOMA: Primary | ICD-10-CM

## 2022-02-21 DIAGNOSIS — D32.9 MENINGIOMA: ICD-10-CM

## 2022-02-21 DIAGNOSIS — E66.09 CLASS 1 OBESITY DUE TO EXCESS CALORIES WITH SERIOUS COMORBIDITY AND BODY MASS INDEX (BMI) OF 34.0 TO 34.9 IN ADULT: ICD-10-CM

## 2022-02-21 PROCEDURE — 70553 MRI BRAIN STEM W/O & W/DYE: CPT

## 2022-02-21 PROCEDURE — 0 GADOBENATE DIMEGLUMINE 529 MG/ML SOLUTION: Performed by: NEUROLOGICAL SURGERY

## 2022-02-21 PROCEDURE — A9577 INJ MULTIHANCE: HCPCS | Performed by: NEUROLOGICAL SURGERY

## 2022-02-21 PROCEDURE — 99214 OFFICE O/P EST MOD 30 MIN: CPT | Performed by: NURSE PRACTITIONER

## 2022-02-21 RX ADMIN — GADOBENATE DIMEGLUMINE 17 ML: 529 INJECTION, SOLUTION INTRAVENOUS at 09:40

## 2022-02-21 NOTE — PATIENT INSTRUCTIONS
"https://www.nhlbi.nih.gov/files/docs/public/heart/dash_brief.pdf\">   DASH Eating Plan  DASH stands for Dietary Approaches to Stop Hypertension. The DASH eating plan is a healthy eating plan that has been shown to:  · Reduce high blood pressure (hypertension).  · Reduce your risk for type 2 diabetes, heart disease, and stroke.  · Help with weight loss.  What are tips for following this plan?  Reading food labels  · Check food labels for the amount of salt (sodium) per serving. Choose foods with less than 5 percent of the Daily Value of sodium. Generally, foods with less than 300 milligrams (mg) of sodium per serving fit into this eating plan.  · To find whole grains, look for the word \"whole\" as the first word in the ingredient list.  Shopping  · Buy products labeled as \"low-sodium\" or \"no salt added.\"  · Buy fresh foods. Avoid canned foods and pre-made or frozen meals.  Cooking  · Avoid adding salt when cooking. Use salt-free seasonings or herbs instead of table salt or sea salt. Check with your health care provider or pharmacist before using salt substitutes.  · Do not cortez foods. Cook foods using healthy methods such as baking, boiling, grilling, roasting, and broiling instead.  · Cook with heart-healthy oils, such as olive, canola, avocado, soybean, or sunflower oil.  Meal planning    · Eat a balanced diet that includes:  ? 4 or more servings of fruits and 4 or more servings of vegetables each day. Try to fill one-half of your plate with fruits and vegetables.  ? 6-8 servings of whole grains each day.  ? Less than 6 oz (170 g) of lean meat, poultry, or fish each day. A 3-oz (85-g) serving of meat is about the same size as a deck of cards. One egg equals 1 oz (28 g).  ? 2-3 servings of low-fat dairy each day. One serving is 1 cup (237 mL).  ? 1 serving of nuts, seeds, or beans 5 times each week.  ? 2-3 servings of heart-healthy fats. Healthy fats called omega-3 fatty acids are found in foods such as walnuts, " flaxseeds, fortified milks, and eggs. These fats are also found in cold-water fish, such as sardines, salmon, and mackerel.  · Limit how much you eat of:  ? Canned or prepackaged foods.  ? Food that is high in trans fat, such as some fried foods.  ? Food that is high in saturated fat, such as fatty meat.  ? Desserts and other sweets, sugary drinks, and other foods with added sugar.  ? Full-fat dairy products.  · Do not salt foods before eating.  · Do not eat more than 4 egg yolks a week.  · Try to eat at least 2 vegetarian meals a week.  · Eat more home-cooked food and less restaurant, buffet, and fast food.    Lifestyle  · When eating at a restaurant, ask that your food be prepared with less salt or no salt, if possible.  · If you drink alcohol:  ? Limit how much you use to:  § 0-1 drink a day for women who are not pregnant.  § 0-2 drinks a day for men.  ? Be aware of how much alcohol is in your drink. In the [place] one drink equals one 12 oz bottle of beer (355 mL), one 5 oz glass of wine (148 mL), or one 1½ oz glass of hard liquor (44 mL).  General information  · Avoid eating more than 2,300 mg of salt a day. If you have hypertension, you may need to reduce your sodium intake to 1,500 mg a day.  · Work with your health care provider to maintain a healthy body weight or to lose weight. Ask what an ideal weight is for you.  · Get at least 30 minutes of exercise that causes your heart to beat faster (aerobic exercise) most days of the week. Activities may include walking, swimming, or biking.  · Work with your health care provider or dietitian to adjust your eating plan to your individual calorie needs.  What foods should I eat?  Fruits  All fresh, dried, or frozen fruit. Canned fruit in natural juice (without added sugar).  Vegetables  Fresh or frozen vegetables (raw, steamed, roasted, or grilled). Low-sodium or reduced-sodium tomato and vegetable juice. Low-sodium or reduced-sodium tomato sauce and tomato paste.  Low-sodium or reduced-sodium canned vegetables.  Grains  Whole-grain or whole-wheat bread. Whole-grain or whole-wheat pasta. Brown rice. Oatmeal. Quinoa. Bulgur. Whole-grain and low-sodium cereals. Amira bread. Low-fat, low-sodium crackers. Whole-wheat flour tortillas.  Meats and other proteins  Skinless chicken or turkey. Ground chicken or turkey. Pork with fat trimmed off. Fish and seafood. Egg whites. Dried beans, peas, or lentils. Unsalted nuts, nut butters, and seeds. Unsalted canned beans. Lean cuts of beef with fat trimmed off. Low-sodium, lean precooked or cured meat, such as sausages or meat loaves.  Dairy  Low-fat (1%) or fat-free (skim) milk. Reduced-fat, low-fat, or fat-free cheeses. Nonfat, low-sodium ricotta or cottage cheese. Low-fat or nonfat yogurt. Low-fat, low-sodium cheese.  Fats and oils  Soft margarine without trans fats. Vegetable oil. Reduced-fat, low-fat, or light mayonnaise and salad dressings (reduced-sodium). Canola, safflower, olive, avocado, soybean, and sunflower oils. Avocado.  Seasonings and condiments  Herbs. Spices. Seasoning mixes without salt.  Other foods  Unsalted popcorn and pretzels. Fat-free sweets.  The items listed above may not be a complete list of foods and beverages you can eat. Contact a dietitian for more information.  What foods should I avoid?  Fruits  Canned fruit in a light or heavy syrup. Fried fruit. Fruit in cream or butter sauce.  Vegetables  Creamed or fried vegetables. Vegetables in a cheese sauce. Regular canned vegetables (not low-sodium or reduced-sodium). Regular canned tomato sauce and paste (not low-sodium or reduced-sodium). Regular tomato and vegetable juice (not low-sodium or reduced-sodium). Pickles. Olives.  Grains  Baked goods made with fat, such as croissants, muffins, or some breads. Dry pasta or rice meal packs.  Meats and other proteins  Fatty cuts of meat. Ribs. Fried meat. Corey. Bologna, salami, and other precooked or cured meats, such as  sausages or meat loaves. Fat from the back of a pig (fatback). Bratwurst. Salted nuts and seeds. Canned beans with added salt. Canned or smoked fish. Whole eggs or egg yolks. Chicken or turkey with skin.  Dairy  Whole or 2% milk, cream, and half-and-half. Whole or full-fat cream cheese. Whole-fat or sweetened yogurt. Full-fat cheese. Nondairy creamers. Whipped toppings. Processed cheese and cheese spreads.  Fats and oils  Butter. Stick margarine. Lard. Shortening. Ghee. Corey fat. Tropical oils, such as coconut, palm kernel, or palm oil.  Seasonings and condiments  Onion salt, garlic salt, seasoned salt, table salt, and sea salt. Worcestershire sauce. Tartar sauce. Barbecue sauce. Teriyaki sauce. Soy sauce, including reduced-sodium. Steak sauce. Canned and packaged gravies. Fish sauce. Oyster sauce. Cocktail sauce. Store-bought horseradish. Ketchup. Mustard. Meat flavorings and tenderizers. Bouillon cubes. Hot sauces. Pre-made or packaged marinades. Pre-made or packaged taco seasonings. Relishes. Regular salad dressings.  Other foods  Salted popcorn and pretzels.  The items listed above may not be a complete list of foods and beverages you should avoid. Contact a dietitian for more information.  Where to find more information  · National Heart, Lung, and Blood [place]: www.nhlbi.nih.gov  · American Heart Association: www.heart.org  · Academy of Nutrition and Dietetics: www.eatright.org  · National Kidney Foundation: www.kidney.org  Summary  · The DASH eating plan is a healthy eating plan that has been shown to reduce high blood pressure (hypertension). It may also reduce your risk for type 2 diabetes, heart disease, and stroke.  · When on the DASH eating plan, aim to eat more fresh fruits and vegetables, whole grains, lean proteins, low-fat dairy, and heart-healthy fats.  · With the DASH eating plan, you should limit salt (sodium) intake to 2,300 mg a day. If you have hypertension, you may need to reduce your  sodium intake to 1,500 mg a day.  · Work with your health care provider or dietitian to adjust your eating plan to your individual calorie needs.  This information is not intended to replace advice given to you by your health care provider. Make sure you discuss any questions you have with your health care provider.  Document Revised: 11/20/2020 Document Reviewed: 11/20/2020  [employer] Patient Education © 2021 [employer]      Tobacco Use Disorder  Tobacco use disorder (TUD) occurs when a person craves, seeks, and uses tobacco, regardless of the consequences. This disorder can cause problems with mental and physical health. It can affect your ability to have healthy relationships, and it can keep you from meeting your responsibilities at work, home, or school.  Tobacco may be:  · Smoked as a cigarette or cigar.  · Inhaled using e-cigarettes.  · Smoked in a pipe or hookah.  · Chewed as smokeless tobacco.  · Inhaled into the nostrils as snuff.  Tobacco products contain a dangerous chemical called nicotine, which is very addictive. Nicotine triggers hormones that make the body feel stimulated and works on areas of the brain that make you feel good. These effects can make it hard for people to quit nicotine.  Tobacco contains many other unsafe chemicals that can damage almost every organ in the body. Smoking tobacco also puts others in danger due to fire risk and possible health problems caused by breathing in secondhand smoke.  What are the signs or symptoms?  Symptoms of TUD may include:  · Being unable to slow down or stop your tobacco use.  · Spending an abnormal amount of time getting or using tobacco.  · Craving tobacco. Cravings may last for up to 6 months after quitting.  · Tobacco use that:  ? Interferes with your work, school, or home life.  ? Interferes with your personal and social relationships.  ? Makes you give up activities that you once enjoyed or found important.  · Using tobacco even though you know  that it is:  ? Dangerous or bad for your health or someone else's health.  ? Causing problems in your life.  · Needing more and more of the substance to get the same effect (developing tolerance).  · Experiencing unpleasant symptoms if you do not use the substance (withdrawal). Withdrawal symptoms may include:  ? Depressed, anxious, or irritable mood.  ? Difficulty concentrating.  ? Increased appetite.  ? Restlessness or trouble sleeping.  · Using the substance to avoid withdrawal.  How is this diagnosed?  This condition may be diagnosed based on:  · Your current and past tobacco use. Your health care provider may ask questions about how your tobacco use affects your life.  · A physical exam.  You may be diagnosed with TUD if you have at least two symptoms within a 12-month period.  How is this treated?  This condition is treated by stopping tobacco use. Many people are unable to quit on their own and need help. Treatment may include:  · Nicotine replacement therapy (NRT). NRT provides nicotine without the other harmful chemicals in tobacco. NRT gradually lowers the dosage of nicotine in the body and reduces withdrawal symptoms. NRT is available as:  ? Over-the-counter gums, lozenges, and skin patches.  ? Prescription mouth inhalers and nasal sprays.  · Medicine that acts on the brain to reduce cravings and withdrawal symptoms.  · A type of talk therapy that examines your triggers for tobacco use, how to avoid them, and how to cope with cravings (behavioral therapy).  · Hypnosis. This may help with withdrawal symptoms.  · Joining a support group for others coping with TUD.  The best treatment for TUD is usually a combination of medicine, talk therapy, and support groups. Recovery can be a long process. Many people start using tobacco again after stopping (relapse). If you relapse, it does not mean that treatment will not work.  Follow these instructions at home:    Lifestyle  · Do not use any products that contain  nicotine or tobacco, such as cigarettes and e-cigarettes.  · Avoid things that trigger tobacco use as much as you can. Triggers include people and situations that usually cause you to use tobacco.  · Avoid drinks that contain caffeine, including coffee. These may worsen some withdrawal symptoms.  · Find ways to manage stress. Wanting to smoke may cause stress, and stress can make you want to smoke. Relaxation techniques such as deep breathing, meditation, and yoga may help.  · Attend support groups as needed. These groups are an important part of long-term recovery for many people.  General instructions  · Take over-the-counter and prescription medicines only as told by your health care provider.  · Check with your health care provider before taking any new prescription or over-the-counter medicines.  · Decide on a friend, family member, or smoking quit-line (such as 1-800-QUIT-NOW in the [place] that you can call or text when you feel the urge to smoke or when you need help coping with cravings.  · Keep all follow-up visits as told by your health care provider and therapist. This is important.  Contact a health care provider if:  · You are not able to take your medicines as prescribed.  · Your symptoms get worse, even with treatment.  Summary  · Tobacco use disorder (TUD) occurs when a person craves, seeks, and uses tobacco regardless of the consequences.  · This condition may be diagnosed based on your current and past tobacco use and a physical exam.  · Many people are unable to quit on their own and need help. Recovery can be a long process.  · The most effective treatment for TUD is usually a combination of medicine, talk therapy, and support groups.  This information is not intended to replace advice given to you by your health care provider. Make sure you discuss any questions you have with your health care provider.  Document Revised: 12/05/2018 Document Reviewed: 12/05/2018  [employer]vier Patient Education © 2021  [employer]      Advance Care Planning and Advance Directives     You make decisions on a daily basis - decisions about where you want to live, your career, your home, your life. Perhaps one of the most important decisions you face is your choice for future medical care. Take time to talk with your family and your healthcare team and start planning today.  Advance Care Planning is a process that can help you:  · Understand possible future healthcare decisions in light of your own experiences  · Reflect on those decision in light of your goals and values  · Discuss your decisions with those closest to you and the healthcare professionals that care for you  · Make a plan by creating a document that reflects your wishes    Surrogate Decision Maker  In the event of a medical emergency, which has left you unable to communicate or to make your own decisions, you would need someone to make decisions for you.  It is important to discuss your preferences for medical treatment with this person while you are in good health.     Qualities of a surrogate decision maker:  • Willing to take on this role and responsibility  • Knows what you want for future medical care  • Willing to follow your wishes even if they don't agree with them  • Able to make difficult medical decisions under stressful circumstances    Advance Directives  These are legal documents you can create that will guide your healthcare team and decision maker(s) when needed. These documents can be stored in the electronic medical record.    · Living Will - a legal document to guide your care if you have a terminal condition or a serious illness and are unable to communicate. States vary by statute in document names/types, but most forms may include one or more of the following:        -  Directions regarding life-prolonging treatments        -  Directions regarding artificially provided nutrition/hydration        -  Choosing a healthcare decision maker        -   Direction regarding organ/tissue donation    · Durable Power of  for Healthcare - this document names an -in-fact to make medical decisions for you, but it may also allow this person to make personal and financial decisions for you. Please seek the advice of an  if you need this type of document.    **Advance Directives are not required and no one may discriminate against you if you do not sign one.    Medical Orders  Many states allow specific forms/orders signed by your physician to record your wishes for medical treatment in your current state of health. This form, signed in personal communication with your physician, addresses resuscitation and other medical interventions that you may or may not want.      For more information or to schedule a time with a [facility] Advance Care Planning Facilitator contact: [email or web address] or call [phone] and someone will contact you directly.

## 2022-02-21 NOTE — PROGRESS NOTES
Chief complaint:   Chief Complaint   Patient presents with   • Meningioma     Pt is here for followup for her Meningioma.       Subjective     HPI:   Previous encounter: 2/1/2021    Interval History: Paige Martinez is a 81 y.o.  female who presents today for an annual follow-up with imaging for multiple small meningiomas.  Ms. Martinez is asymptomatic, without complaints or concerns.  She rates the severity of her symptoms 0/10.  No additional concerns at this time.    ROS  Review of Systems   Constitutional: Negative.    HENT: Negative.    Eyes: Negative.    Respiratory: Negative.    Cardiovascular: Negative.    Gastrointestinal: Negative.    Endocrine: Negative.    Genitourinary: Negative.    Musculoskeletal: Negative.    Skin: Negative.    Allergic/Immunologic: Negative.    Neurological: Negative.    Hematological: Negative.    Psychiatric/Behavioral: Negative.    All other systems reviewed and are negative.    PFSH:  Past Medical History:   Diagnosis Date   • Anxiety    • Cholelithiasis    • Depression    • Diverticulosis    • History of adenomatous polyp of colon    • History of colon polyps    • Hyperlipidemia    • Hypertension    • Hypothyroidism    • Meningiomatosis (HCC)    • Myalgia    • Nephrolithiasis    • Type 2 diabetes mellitus (HCC)      Past Surgical History:   Procedure Laterality Date   • COLONOSCOPY  12/04/2018    Sigmoid diverticulosis; Non-engorged internal hemorrhoids vein; Repeat 5 years   • COLONOSCOPY  07/15/2014    Two 3-5mm hyperplastic polyps in the rectum-one destroyed during removal; Mild sigmoid diverticulosis; Repeat 5 years   • COLONOSCOPY  09/01/2009    Two 5mm tubular adenomatous polyps in the transverse colon; Two 5mm hyperplastic polyps in the distal sigmoid colon; Small internal hemorrhoid; Repeat 3 years   • ENDOSCOPY  12/04/2018    Normal endoscopy     Objective      Current Outpatient Medications   Medication Sig Dispense Refill   • atorvastatin (LIPITOR) 10 MG tablet  "TAKE ONE TABLET DAILY GENERIC FOR LIPITOR  30 tablet 5   • bumetanide (BUMEX) 0.5 MG tablet Take 1 mg by mouth Daily.     • Cholecalciferol 2000 units capsule Take 2,000 Units by mouth Daily.     • citalopram (CeleXA) 20 MG tablet TAKE ONE TABLET DAILY GENERIC FOR CELEXA  90 tablet 1   • cloNIDine (CATAPRES) 0.1 MG tablet TAKE ONE TABLET BY MOUTH DAILY.  30 tablet 5   • fluocinonide-emollient (LIDEX-E) 0.05 % cream Apply  topically to the appropriate area as directed 2 (Two) Times a Day. 30 g 0   • HYDROcodone-acetaminophen (NORCO) 5-325 MG per tablet TAKE ONE TABLET DAILY GENERIC FOR NORCO 30 tablet 0   • levothyroxine (SYNTHROID, LEVOTHROID) 75 MCG tablet TAKE ONE TABLET DAILY GENERIC FOR SYNTHROID 90 tablet 0   • losartan (COZAAR) 100 MG tablet TAKE ONE TABLET DAILY GENERIC FOR COZAAR 90 tablet 0   • metoprolol succinate XL (TOPROL-XL) 25 MG 24 hr tablet TAKE ONE TABLET DAILY GENERIC FOR TOPROL XL 30 tablet 5   • omeprazole (priLOSEC) 20 MG capsule TAKE 1 CAPSULE DAILY GENERIC FOR PRILOSEC 30 capsule 11     No current facility-administered medications for this visit.     Vital Signs  Ht 154.9 cm (61\")   Wt 83.9 kg (185 lb)   Breastfeeding No   BMI 34.96 kg/m²   Physical Exam  Vitals and nursing note reviewed.   Constitutional:       General: She is not in acute distress.     Appearance: Normal appearance. She is well-developed and well-groomed. She is obese. She is not ill-appearing, toxic-appearing or diaphoretic.      Comments: BMI 34.96   HENT:      Head: Normocephalic and atraumatic.      Right Ear: Hearing normal.      Left Ear: Hearing normal.   Eyes:      Extraocular Movements: EOM normal.      Conjunctiva/sclera: Conjunctivae normal.      Pupils: Pupils are equal, round, and reactive to light.   Neck:      Trachea: Trachea normal.   Cardiovascular:      Rate and Rhythm: Normal rate and regular rhythm.   Pulmonary:      Effort: Pulmonary effort is normal. No tachypnea, bradypnea, accessory muscle usage " or respiratory distress.   Abdominal:      Palpations: Abdomen is soft.   Musculoskeletal:      Cervical back: Full passive range of motion without pain and neck supple.   Skin:     General: Skin is warm and dry.   Neurological:      Mental Status: She is alert and oriented to person, place, and time.      GCS: GCS eye subscore is 4. GCS verbal subscore is 5. GCS motor subscore is 6.      Gait: Gait is intact.      Deep Tendon Reflexes:      Reflex Scores:       Tricep reflexes are 2+ on the right side and 2+ on the left side.       Bicep reflexes are 2+ on the right side and 2+ on the left side.       Brachioradialis reflexes are 2+ on the right side and 2+ on the left side.       Patellar reflexes are 3+ on the right side and 3+ on the left side.       Achilles reflexes are 2+ on the right side and 2+ on the left side.  Psychiatric:         Speech: Speech normal.         Behavior: Behavior normal. Behavior is cooperative.       Neurologic Exam     Mental Status   Oriented to person, place, and time.   Attention: normal. Concentration: normal.   Speech: speech is normal   Level of consciousness: alert    Cranial Nerves     CN II   Visual fields full to confrontation.     CN III, IV, VI   Pupils are equal, round, and reactive to light.  Extraocular motions are normal.     CN V   Facial sensation intact.     CN VII   Facial expression full, symmetric.     CN VIII   CN VIII normal.     CN IX, X   CN IX normal.     CN XI   CN XI normal.     Motor Exam   Right arm tone: normal  Left arm tone: normal  Right arm pronator drift: absent  Left arm pronator drift: absent  Right leg tone: normal  Left leg tone: normal    Strength   Right deltoid: 5/5  Left deltoid: 5/5  Right biceps: 5/5  Left biceps: 5/5  Right triceps: 5/5  Left triceps: 5/5  Right wrist extension: 5/5  Left wrist extension: 5/5  Right iliopsoas: 5/5  Left iliopsoas: 5/5  Right quadriceps: 5/5  Left quadriceps: 5/5  Right anterior tibial: 5/5  Left anterior  tibial: 5/5  Right gastroc: 5/5  Left gastroc: 5/5  Right EHL 5/5  Left EHL 5/5       Sensory Exam   Right arm light touch: normal  Left arm light touch: normal  Right leg light touch: normal  Left leg light touch: normal    Gait, Coordination, and Reflexes     Gait  Gait: normal    Tremor   Resting tremor: absent  Intention tremor: absent  Action tremor: absent    Reflexes   Right brachioradialis: 2+  Left brachioradialis: 2+  Right biceps: 2+  Left biceps: 2+  Right triceps: 2+  Left triceps: 2+  Right patellar: 3+  Left patellar: 3+  Right achilles: 2+  Left achilles: 2+  Right Faulkner: absent  Left Faulkner: absent  Right ankle clonus: absent  Left ankle clonus: absent  Right pendular knee jerk: absent  Left pendular knee jerk: absent  (12 bullet pts)    Results Review:   10/2/19 -MRI of the brain with and without contrast.  Small contrast-enhancing lesion that is bright on T1.  Mild periventricular flair signal.       11/14/19 -noncontrast head CT.  Redemonstration of right temporal lobe lesion.  Evidence of calcification.  No significant compression.       2/3/2020 -repeat CT of the head showing right lateral temporal lobe lesion.  Calcified.  No evidence of underlying encephalomalacia.  No evidence of significant compression.  Most consistent with calcified cortical lesion or small calcified meningioma.       2/2021 -MRI of the brain today is reviewed.  Right temporal lobe contrast-enhancing lesion is stable.  As is the flair signal in the right PCA distribution suggestive of remote PCA infarct.     Mri Brain With & Without Contrast     Result Date: 2/1/2021  1. Stable homogeneously enhancing extra-axial lesions along the posterior left cavernous sinus and adjacent the right temporal lobe favoring meningiomas. No new abnormal intracranial enhancement. 2. Mild cerebral volume loss and probable chronic small vessel ischemic change. 3. 2.4 cm left maxillary retention cyst or polyp. This report was finalized on  02/01/2021 11:20 by Dr. Kamilla Odonnell MD.               2/21/2022          MRI Brain With & Without Contrast    Result Date: 2/21/2022  Impression:  1. There are 3 meningiomas identified. This includes an en plaque meningioma left of midline near the skull base; straddling the petroclival synchondrosis, infiltrating the adjacent clivus and petrous temporal bone, and extending out through the left hypoglossal canal into the upper  space. This appears stable. No significant mass effect identified at the foramen magnum. 2. Additional left posterior paraclinoid and right temporal convexity meningiomas are also stable. This report was finalized on 02/21/2022 10:29 by Dr Jc Arreguin, .    Assessment/Plan: Paige Martinez is a 81 y.o. female with a significant medical history of cataracts, hypertension, hyperlipidemia, anxiety, depression, nephrolithiasis, and obesity.  She presents today for annual follow-up for multiple small meningiomas.  Physical exam findings of mild gross bilateral patellar hyperreflexia, otherwise neurologically intact.  Her imaging was reviewed in comparison to prior imaging and shows multiple small stable and relatively unchanged lesions at the right temporal lobe, posterior left cavernous sinus, and left of midline near the skull base, most likely meningiomas.  No evidence of mass, mass-effect, or underlying blood products.    Recommendations:  Multiple small intracranial masses favoring meningioma  Differential diagnosis includes meningioma, hemangiopericytoma, or much less likely metastatic disease.  Without biopsy cannot exclude other neoplasms including hemangiopericytoma.  These are typically slow growing extra-axial tumors.  They are usually benign and arise from the arachnoid.  32% of incidentally discovered meningiomas do not grow over a 3-year follow-up.  Surgical indications include documented growth on serial imaging and or symptoms referrable to the lesion, or suspicion of  increased grade (WHO grade 2: Choroidal, clear cell, atypical, WHO grade 3: Papillary, rapidly, anaplastic) in the tumor as determined by surrounding edema.    Currently, Ms. Martinez is asymptomatic without complaints or concerns and her imaging shows no evidence of growth or concerning imaging characteristics.  Dr. Chilel reviewed these images and agree these findings.     We will have her return for reassessment with Dr. Chilel in 1 year.  MRI of the brain with and without contrast prior to arrival.  Ms. Martinez knows to call or return sooner for any new or additional concerns.    Class 1 obesity (BMI 30.0-34.9) due to excessive calories with serious comorbidities BMI of 34.0-34.9 in adult  Body mass index is 34.96 kg/m². Information on the DASH diet provided in the AVS.  We will continue to provided diet and exercise information with the goal of weight loss at each scheduled appointment.     Diagnoses and all orders for this visit:    1. Meningioma (HCC) (Primary)  -     MRI Cervical Spine With & Without Contrast; Future    2. Class 1 obesity due to excess calories with serious comorbidity and body mass index (BMI) of 34.0 to 34.9 in adult      Return in about 1 year (around 2/21/2023) for Followup with Dr. Chilel in 1 yr with MRI PTA.    Level of Risk: Moderate due to: two stable chronic illnesses  MDM: Moderate  (Mod = 17991, High = 53168)    Thank you, for allowing me to continue to participate in the care of this patient.    Sincerely,  ASH Thornton

## 2022-02-28 DIAGNOSIS — M19.90 OSTEOARTHRITIS, UNSPECIFIED OSTEOARTHRITIS TYPE, UNSPECIFIED SITE: ICD-10-CM

## 2022-02-28 RX ORDER — HYDROCODONE BITARTRATE AND ACETAMINOPHEN 5; 325 MG/1; MG/1
TABLET ORAL
Qty: 30 TABLET | Refills: 0 | Status: SHIPPED | OUTPATIENT
Start: 2022-02-28 | End: 2022-04-05

## 2022-03-15 RX ORDER — ATORVASTATIN CALCIUM 10 MG/1
TABLET, FILM COATED ORAL
Qty: 30 TABLET | Refills: 5 | Status: SHIPPED | OUTPATIENT
Start: 2022-03-15 | End: 2022-10-31

## 2022-03-16 ENCOUNTER — OFFICE VISIT (OUTPATIENT)
Dept: FAMILY MEDICINE CLINIC | Facility: CLINIC | Age: 82
End: 2022-03-16

## 2022-03-16 VITALS
OXYGEN SATURATION: 98 % | SYSTOLIC BLOOD PRESSURE: 126 MMHG | RESPIRATION RATE: 16 BRPM | BODY MASS INDEX: 33.79 KG/M2 | WEIGHT: 179 LBS | DIASTOLIC BLOOD PRESSURE: 84 MMHG | HEART RATE: 79 BPM | HEIGHT: 61 IN

## 2022-03-16 DIAGNOSIS — N18.32 STAGE 3B CHRONIC KIDNEY DISEASE: ICD-10-CM

## 2022-03-16 DIAGNOSIS — E03.9 ACQUIRED HYPOTHYROIDISM: ICD-10-CM

## 2022-03-16 DIAGNOSIS — I10 PRIMARY HYPERTENSION: ICD-10-CM

## 2022-03-16 DIAGNOSIS — E78.2 MIXED HYPERLIPIDEMIA: Primary | ICD-10-CM

## 2022-03-16 PROCEDURE — G0439 PPPS, SUBSEQ VISIT: HCPCS | Performed by: FAMILY MEDICINE

## 2022-03-16 PROCEDURE — 99213 OFFICE O/P EST LOW 20 MIN: CPT | Performed by: FAMILY MEDICINE

## 2022-03-16 PROCEDURE — 1170F FXNL STATUS ASSESSED: CPT | Performed by: FAMILY MEDICINE

## 2022-03-16 PROCEDURE — 1159F MED LIST DOCD IN RCRD: CPT | Performed by: FAMILY MEDICINE

## 2022-03-16 NOTE — PROGRESS NOTES
Subjective   Paige Martinez is a 81 y.o. female.     Chief Complaint   Patient presents with   • Hyperlipidemia     4 mo f/u   • Medicare Wellness-subsequent        History of Present Illness     shenos good bp control without cp or ha--she is toeliang lipitor wituout myalgis..she is tolleranting synthroid wutout heat or cold tinacnes      Current Outpatient Medications:   •  atorvastatin (LIPITOR) 10 MG tablet, TAKE ONE TABLET DAILY GENERIC FOR LIPITOR, Disp: 30 tablet, Rfl: 5  •  bumetanide (BUMEX) 0.5 MG tablet, Take 1 mg by mouth Daily., Disp: , Rfl:   •  Cholecalciferol 2000 units capsule, Take 2,000 Units by mouth Daily., Disp: , Rfl:   •  citalopram (CeleXA) 20 MG tablet, TAKE ONE TABLET DAILY GENERIC FOR CELEXA , Disp: 90 tablet, Rfl: 1  •  cloNIDine (CATAPRES) 0.1 MG tablet, TAKE ONE TABLET BY MOUTH DAILY. , Disp: 30 tablet, Rfl: 5  •  fluocinonide-emollient (LIDEX-E) 0.05 % cream, Apply  topically to the appropriate area as directed 2 (Two) Times a Day., Disp: 30 g, Rfl: 0  •  HYDROcodone-acetaminophen (NORCO) 5-325 MG per tablet, TAKE ONE TABLET DAILY GENERIC FOR NORCO, Disp: 30 tablet, Rfl: 0  •  levothyroxine (SYNTHROID, LEVOTHROID) 75 MCG tablet, TAKE ONE TABLET DAILY GENERIC FOR SYNTHROID, Disp: 90 tablet, Rfl: 0  •  losartan (COZAAR) 100 MG tablet, TAKE ONE TABLET DAILY GENERIC FOR COZAAR, Disp: 90 tablet, Rfl: 0  •  metoprolol succinate XL (TOPROL-XL) 25 MG 24 hr tablet, TAKE ONE TABLET DAILY GENERIC FOR TOPROL XL, Disp: 30 tablet, Rfl: 5  •  omeprazole (priLOSEC) 20 MG capsule, TAKE 1 CAPSULE DAILY GENERIC FOR PRILOSEC, Disp: 30 capsule, Rfl: 11  No Known Allergies    Patient's Body mass index is 33.82 kg/m². indicating that she is obese (BMI >30). Obesity-related health conditions include the following: hypertension. Obesity is unchanged. BMI is is above average; BMI management plan is completed. We discussed portion control and increasing exercise..      Past Medical History:   Diagnosis Date   •  "Anxiety    • Cholelithiasis    • Depression    • Diverticulosis    • History of adenomatous polyp of colon    • History of colon polyps    • Hyperlipidemia    • Hypertension    • Hypothyroidism    • Meningiomatosis (HCC)    • Myalgia    • Nephrolithiasis    • Type 2 diabetes mellitus (HCC)      Past Surgical History:   Procedure Laterality Date   • COLONOSCOPY  12/04/2018    Sigmoid diverticulosis; Non-engorged internal hemorrhoids vein; Repeat 5 years   • COLONOSCOPY  07/15/2014    Two 3-5mm hyperplastic polyps in the rectum-one destroyed during removal; Mild sigmoid diverticulosis; Repeat 5 years   • COLONOSCOPY  09/01/2009    Two 5mm tubular adenomatous polyps in the transverse colon; Two 5mm hyperplastic polyps in the distal sigmoid colon; Small internal hemorrhoid; Repeat 3 years   • ENDOSCOPY  12/04/2018    Normal endoscopy       Review of Systems   Constitutional: Negative.    HENT: Negative.    Eyes: Negative.    Respiratory: Negative.    Cardiovascular: Negative.    Gastrointestinal: Negative.    Endocrine: Negative.    Genitourinary: Negative.    Musculoskeletal: Negative.    Skin: Negative.    Allergic/Immunologic: Negative.    Neurological: Negative.    Hematological: Negative.    Psychiatric/Behavioral: Negative.        Objective  /84   Pulse 79   Resp 16   Ht 154.9 cm (61\")   Wt 81.2 kg (179 lb)   SpO2 98%   BMI 33.82 kg/m²   Physical Exam  Vitals and nursing note reviewed.   Constitutional:       Appearance: She is normal weight.   HENT:      Head: Normocephalic and atraumatic.      Nose: Nose normal.      Mouth/Throat:      Mouth: Mucous membranes are moist.      Pharynx: Oropharynx is clear.   Eyes:      Extraocular Movements: Extraocular movements intact.      Conjunctiva/sclera: Conjunctivae normal.      Pupils: Pupils are equal, round, and reactive to light.   Cardiovascular:      Rate and Rhythm: Normal rate and regular rhythm.      Pulses: Normal pulses.      Heart sounds: Normal " heart sounds.   Pulmonary:      Effort: Pulmonary effort is normal.      Breath sounds: Normal breath sounds.   Abdominal:      General: Abdomen is flat. Bowel sounds are normal.      Palpations: Abdomen is soft.   Musculoskeletal:         General: Normal range of motion.      Cervical back: Normal range of motion and neck supple. No rigidity.   Skin:     General: Skin is warm and dry.      Capillary Refill: Capillary refill takes less than 2 seconds.   Neurological:      General: No focal deficit present.      Mental Status: She is alert and oriented to person, place, and time. Mental status is at baseline.   Psychiatric:         Mood and Affect: Mood normal.         Behavior: Behavior normal.         Thought Content: Thought content normal.         Judgment: Judgment normal.         Assessment/Plan   Diagnoses and all orders for this visit:    1. Mixed hyperlipidemia (Primary)    2. Acquired hypothyroidism    3. Primary hypertension    4. Stage 3b chronic kidney disease (HCC)      She will contiuue to see dr alston nephrologisti  She will monitor bp and keep me infojrd  She will mohjitr for heat and cold intorelances  shfadumo montanez for myagis            No orders of the defined types were placed in this encounter.      Follow up: 5 month(s)

## 2022-03-16 NOTE — PROGRESS NOTES
The ABCs of the Annual Wellness Visit  Subsequent Medicare Wellness Visit    Chief Complaint   Patient presents with   • Hyperlipidemia     4 mo f/u   • Medicare Wellness-subsequent      Subjective    History of Present Illness:  Paige Martinez is a 81 y.o. female who presents for a Subsequent Medicare Wellness Visit.    The following portions of the patient's history were reviewed and   updated as appropriate: allergies, current medications, past family history, past medical history, past social history, past surgical history and problem list.    Compared to one year ago, the patient feels her physical   health is the same.    Compared to one year ago, the patient feels her mental   health is the same.    Recent Hospitalizations:  She was not admitted to the hospital during the last year.       Current Medical Providers:  Patient Care Team:  Barrington Jaramillo MD as PCP - General  Gonzalez Cordero MD as Consulting Physician (Gastroenterology)    Outpatient Medications Prior to Visit   Medication Sig Dispense Refill   • atorvastatin (LIPITOR) 10 MG tablet TAKE ONE TABLET DAILY GENERIC FOR LIPITOR 30 tablet 5   • bumetanide (BUMEX) 0.5 MG tablet Take 1 mg by mouth Daily.     • Cholecalciferol 2000 units capsule Take 2,000 Units by mouth Daily.     • citalopram (CeleXA) 20 MG tablet TAKE ONE TABLET DAILY GENERIC FOR CELEXA  90 tablet 1   • cloNIDine (CATAPRES) 0.1 MG tablet TAKE ONE TABLET BY MOUTH DAILY.  30 tablet 5   • fluocinonide-emollient (LIDEX-E) 0.05 % cream Apply  topically to the appropriate area as directed 2 (Two) Times a Day. 30 g 0   • HYDROcodone-acetaminophen (NORCO) 5-325 MG per tablet TAKE ONE TABLET DAILY GENERIC FOR NORCO 30 tablet 0   • levothyroxine (SYNTHROID, LEVOTHROID) 75 MCG tablet TAKE ONE TABLET DAILY GENERIC FOR SYNTHROID 90 tablet 0   • losartan (COZAAR) 100 MG tablet TAKE ONE TABLET DAILY GENERIC FOR COZAAR 90 tablet 0   • metoprolol succinate XL (TOPROL-XL) 25 MG 24 hr tablet  "TAKE ONE TABLET DAILY GENERIC FOR TOPROL XL 30 tablet 5   • omeprazole (priLOSEC) 20 MG capsule TAKE 1 CAPSULE DAILY GENERIC FOR PRILOSEC 30 capsule 11     No facility-administered medications prior to visit.       Opioid medication/s are on active medication list.  and I have evaluated her active treatment plan and pain score trends (see table).  There were no vitals filed for this visit.  I have reviewed the chart for potential of high risk medication and harmful drug interactions in the elderly.            Aspirin is not on active medication list.  Aspirin use is not indicated based on review of current medical condition/s. Risk of harm outweighs potential benefits.  .    Patient Active Problem List   Diagnosis   • Chronic kidney disease, stage III (moderate) (HCC)   • Acquired hypothyroidism   • Anxiety   • Mixed hyperlipidemia   • Edema   • Anemia   • Heme positive stool   • Dark stools   • Gastroesophageal reflux disease   • Hip pain, acute, left   • Syncope   • Obesity (BMI 30-39.9)   • Arthritis   • Meningioma (HCC)   • Dry skin dermatitis   • Seizure (HCC)   • Screening for breast cancer   • Left lower quadrant abdominal pain   • Diverticulitis     Advance Care Planning  Advance Directive is not on file.  ACP discussion was held with the patient during this visit. Patient does not have an advance directive, information provided.          Objective    Vitals:    22 1030   BP: 126/84   Pulse: 79   Resp: 16   SpO2: 98%   Weight: 81.2 kg (179 lb)   Height: 154.9 cm (61\")     BMI Readings from Last 1 Encounters:   22 33.82 kg/m²   BMI is above normal parameters. Recommendations include: nutrition counseling    Does the patient have evidence of cognitive impairment? No    Physical Exam            HEALTH RISK ASSESSMENT    Smoking Status:  Social History     Tobacco Use   Smoking Status Former Smoker   • Quit date:    • Years since quittin.2   Smokeless Tobacco Never Used     Alcohol " Consumption:  Social History     Substance and Sexual Activity   Alcohol Use No    Comment: Occasionally      Fall Risk Screen:    BECKADI Fall Risk Assessment was completed, and patient is at LOW risk for falls.Assessment completed on:3/16/2022    Depression Screening:  PHQ-2/PHQ-9 Depression Screening 3/15/2021   Retired Total Score 1       Health Habits and Functional and Cognitive Screening:  Functional & Cognitive Status 3/16/2022   Do you have difficulty preparing food and eating? No   Do you have difficulty bathing yourself, getting dressed or grooming yourself? No   Do you have difficulty using the toilet? No   Do you have difficulty moving around from place to place? No   Do you have trouble with steps or getting out of a bed or a chair? No   Current Diet Well Balanced Diet   Dental Exam Not up to date   Eye Exam Not up to date   Exercise (times per week) 0 times per week   Current Exercises Include No Regular Exercise   Current Exercise Activities Include -   Do you need help using the phone?  No   Are you deaf or do you have serious difficulty hearing?  No   Do you need help with transportation? No   Do you need help shopping? No   Do you need help preparing meals?  No   Do you need help with housework?  No   Do you need help with laundry? No   Do you need help taking your medications? No   Do you need help managing money? No   Do you ever drive or ride in a car without wearing a seat belt? No   Have you felt unusual stress, anger or loneliness in the last month? No   Who do you live with? Alone   If you need help, do you have trouble finding someone available to you? No   Have you been bothered in the last four weeks by sexual problems? No   Do you have difficulty concentrating, remembering or making decisions? No       Age-appropriate Screening Schedule:  Refer to the list below for future screening recommendations based on patient's age, sex and/or medical conditions. Orders for these recommended tests  are listed in the plan section. The patient has been provided with a written plan.    Health Maintenance   Topic Date Due   • DXA SCAN  Never done   • TDAP/TD VACCINES (1 - Tdap) Never done   • ZOSTER VACCINE (1 of 2) Never done   • LIPID PANEL  03/15/2022   • INFLUENZA VACCINE  11/16/2022 (Originally 8/1/2021)   • MAMMOGRAM  10/22/2023              Assessment/Plan   CMS Preventative Services Quick Reference  Risk Factors Identified During Encounter  Cardiovascular Disease  The above risks/problems have been discussed with the patient.  Follow up actions/plans if indicated are seen below in the Assessment/Plan Section.  Pertinent information has been shared with the patient in the After Visit Summary.    Diagnoses and all orders for this visit:    1. Mixed hyperlipidemia (Primary)    2. Acquired hypothyroidism    3. Primary hypertension    4. Stage 3b chronic kidney disease (HCC)        Follow Up:   No follow-ups on file.     An After Visit Summary and PPPS were made available to the patient.        I spent 5  minutes caring for Paige on this date of service. This time includes time spent by me in the following activities:reviewing tests, ordering medications, tests, or procedures, referring and communicating with other health care professionals  and independently interpreting results and communicating that information with the patient/family/caregiver

## 2022-03-24 ENCOUNTER — OFFICE VISIT (OUTPATIENT)
Dept: FAMILY MEDICINE CLINIC | Facility: CLINIC | Age: 82
End: 2022-03-24

## 2022-03-24 VITALS
HEART RATE: 77 BPM | OXYGEN SATURATION: 97 % | DIASTOLIC BLOOD PRESSURE: 86 MMHG | SYSTOLIC BLOOD PRESSURE: 138 MMHG | RESPIRATION RATE: 20 BRPM | HEIGHT: 61 IN | WEIGHT: 179 LBS | BODY MASS INDEX: 33.79 KG/M2 | TEMPERATURE: 97.3 F

## 2022-03-24 DIAGNOSIS — E66.09 CLASS 1 OBESITY DUE TO EXCESS CALORIES WITH SERIOUS COMORBIDITY AND BODY MASS INDEX (BMI) OF 30.0 TO 30.9 IN ADULT: ICD-10-CM

## 2022-03-24 DIAGNOSIS — J40 BRONCHITIS: Primary | ICD-10-CM

## 2022-03-24 PROCEDURE — 99213 OFFICE O/P EST LOW 20 MIN: CPT | Performed by: NURSE PRACTITIONER

## 2022-03-24 RX ORDER — AMOXICILLIN AND CLAVULANATE POTASSIUM 875; 125 MG/1; MG/1
1 TABLET, FILM COATED ORAL 2 TIMES DAILY
Qty: 20 TABLET | Refills: 0 | Status: SHIPPED | OUTPATIENT
Start: 2022-03-24 | End: 2022-04-03

## 2022-03-24 RX ORDER — PREDNISONE 20 MG/1
20 TABLET ORAL DAILY
Qty: 5 TABLET | Refills: 0 | Status: SHIPPED | OUTPATIENT
Start: 2022-03-24 | End: 2022-03-29

## 2022-03-24 NOTE — PROGRESS NOTES
Subjective   Chief Complaint:  Cough, congestion, wheezing    History of Present Illness:  This 81 y.o. female was seen in the office today.  She reports cough, congestion, wheezing times a day, denies any flulike symptoms including body aches, fevers or chills.    No Known Allergies   Current Outpatient Medications on File Prior to Visit   Medication Sig   • atorvastatin (LIPITOR) 10 MG tablet TAKE ONE TABLET DAILY GENERIC FOR LIPITOR   • bumetanide (BUMEX) 0.5 MG tablet Take 1 mg by mouth Daily.   • Cholecalciferol 2000 units capsule Take 2,000 Units by mouth Daily.   • citalopram (CeleXA) 20 MG tablet TAKE ONE TABLET DAILY GENERIC FOR CELEXA    • cloNIDine (CATAPRES) 0.1 MG tablet TAKE ONE TABLET BY MOUTH DAILY.    • fluocinonide-emollient (LIDEX-E) 0.05 % cream Apply  topically to the appropriate area as directed 2 (Two) Times a Day.   • HYDROcodone-acetaminophen (NORCO) 5-325 MG per tablet TAKE ONE TABLET DAILY GENERIC FOR NORCO   • levothyroxine (SYNTHROID, LEVOTHROID) 75 MCG tablet TAKE ONE TABLET DAILY GENERIC FOR SYNTHROID   • losartan (COZAAR) 100 MG tablet TAKE ONE TABLET DAILY GENERIC FOR COZAAR   • metoprolol succinate XL (TOPROL-XL) 25 MG 24 hr tablet TAKE ONE TABLET DAILY GENERIC FOR TOPROL XL   • omeprazole (priLOSEC) 20 MG capsule TAKE 1 CAPSULE DAILY GENERIC FOR PRILOSEC     No current facility-administered medications on file prior to visit.      Past Medical, Surgical, Social, and Family History:  Past Medical History:   Diagnosis Date   • Anxiety    • Cholelithiasis    • Depression    • Diverticulosis    • History of adenomatous polyp of colon    • History of colon polyps    • Hyperlipidemia    • Hypertension    • Hypothyroidism    • Meningiomatosis (HCC)    • Myalgia    • Nephrolithiasis    • Type 2 diabetes mellitus (HCC)      Past Surgical History:   Procedure Laterality Date   • COLONOSCOPY  12/04/2018    Sigmoid diverticulosis; Non-engorged internal hemorrhoids vein; Repeat 5 years   •  "COLONOSCOPY  07/15/2014    Two 3-5mm hyperplastic polyps in the rectum-one destroyed during removal; Mild sigmoid diverticulosis; Repeat 5 years   • COLONOSCOPY  2009    Two 5mm tubular adenomatous polyps in the transverse colon; Two 5mm hyperplastic polyps in the distal sigmoid colon; Small internal hemorrhoid; Repeat 3 years   • ENDOSCOPY  2018    Normal endoscopy     Social History     Socioeconomic History   • Marital status: Single   Tobacco Use   • Smoking status: Former Smoker     Quit date:      Years since quittin.2   • Smokeless tobacco: Never Used   Vaping Use   • Vaping Use: Never used   Substance and Sexual Activity   • Alcohol use: No     Comment: Occasionally    • Drug use: No   • Sexual activity: Defer     Family History   Problem Relation Age of Onset   • Arthritis Mother    • Arthritis Father    • Hypertension Sister    • Colon cancer Neg Hx    • Colon polyps Neg Hx    • Esophageal cancer Neg Hx    • Liver cancer Neg Hx    • Liver disease Neg Hx    • Rectal cancer Neg Hx    • Stomach cancer Neg Hx      Objective   Physical Exam  Vitals reviewed.   Constitutional:       General: She is not in acute distress.     Appearance: Normal appearance. She is ill-appearing.   Cardiovascular:      Rate and Rhythm: Normal rate and regular rhythm.   Pulmonary:      Effort: Pulmonary effort is normal.      Breath sounds: Wheezing and rhonchi present.     /86 (BP Location: Left arm)   Pulse 77   Temp 97.3 °F (36.3 °C)   Resp 20   Ht 154.9 cm (61\")   Wt 81.2 kg (179 lb)   SpO2 97%   BMI 33.82 kg/m²     Assessment/Plan   Diagnoses and all orders for this visit:    1. Bronchitis (Primary)  -     amoxicillin-clavulanate (Augmentin) 875-125 MG per tablet; Take 1 tablet by mouth 2 (Two) Times a Day for 10 days.  Dispense: 20 tablet; Refill: 0  -     predniSONE (DELTASONE) 20 MG tablet; Take 1 tablet by mouth Daily for 5 days.  Dispense: 5 tablet; Refill: 0    2. Class 1 obesity due to " excess calories with serious comorbidity and body mass index (BMI) of 30.0 to 30.9 in adult    Discussion:  Advised and educated plan of care.      Patient's Body mass index is 33.82 kg/m². indicating that she is obese (BMI >30). Obesity-related health conditions include the following: hypertension, dyslipidemias and GERD. Obesity is unchanged. BMI is is above average; no BMI management plan is appropriate. We discussed n/a - > 65..    Follow-up:  Return if symptoms worsen or fail to improve.    Electronically signed by ASH Elam, 03/24/22, 9:08 AM CDT.

## 2022-04-05 DIAGNOSIS — M19.90 OSTEOARTHRITIS, UNSPECIFIED OSTEOARTHRITIS TYPE, UNSPECIFIED SITE: ICD-10-CM

## 2022-04-05 RX ORDER — HYDROCODONE BITARTRATE AND ACETAMINOPHEN 5; 325 MG/1; MG/1
TABLET ORAL
Qty: 30 TABLET | Refills: 0 | Status: SHIPPED | OUTPATIENT
Start: 2022-04-05 | End: 2022-05-09

## 2022-04-26 RX ORDER — LEVOTHYROXINE SODIUM 0.07 MG/1
TABLET ORAL
Qty: 90 TABLET | Refills: 0 | Status: SHIPPED | OUTPATIENT
Start: 2022-04-26 | End: 2022-09-26

## 2022-04-26 RX ORDER — LOSARTAN POTASSIUM 100 MG/1
TABLET ORAL
Qty: 90 TABLET | Refills: 0 | Status: SHIPPED | OUTPATIENT
Start: 2022-04-26 | End: 2022-09-26

## 2022-05-09 DIAGNOSIS — M19.90 OSTEOARTHRITIS, UNSPECIFIED OSTEOARTHRITIS TYPE, UNSPECIFIED SITE: ICD-10-CM

## 2022-05-09 RX ORDER — HYDROCODONE BITARTRATE AND ACETAMINOPHEN 5; 325 MG/1; MG/1
TABLET ORAL
Qty: 30 TABLET | Refills: 0 | Status: SHIPPED | OUTPATIENT
Start: 2022-05-09 | End: 2022-06-14

## 2022-05-09 NOTE — TELEPHONE ENCOUNTER
Rx Refill Note  Requested Prescriptions     Pending Prescriptions Disp Refills   • HYDROcodone-acetaminophen (NORCO) 5-325 MG per tablet [Pharmacy Med Name: HYDROCODONE BITARTRATE/ACETAMINOPHEN 5-325MG TABLET] 30 tablet 0     Sig: TAKE ONE TABLET DAILY GENERIC FOR NORCO      Last office visit with prescribing clinician: 3/16/2022      Next office visit with prescribing clinician: 8/17/2022            Corinne Myers MA  05/09/22, 09:05 CDT

## 2022-05-31 RX ORDER — METOPROLOL SUCCINATE 25 MG/1
TABLET, EXTENDED RELEASE ORAL
Qty: 30 TABLET | Refills: 5 | Status: SHIPPED | OUTPATIENT
Start: 2022-05-31 | End: 2023-02-06

## 2022-05-31 RX ORDER — CITALOPRAM 20 MG/1
TABLET ORAL
Qty: 90 TABLET | Refills: 1 | Status: SHIPPED | OUTPATIENT
Start: 2022-05-31 | End: 2022-10-28

## 2022-06-13 DIAGNOSIS — M19.90 OSTEOARTHRITIS, UNSPECIFIED OSTEOARTHRITIS TYPE, UNSPECIFIED SITE: ICD-10-CM

## 2022-06-14 RX ORDER — HYDROCODONE BITARTRATE AND ACETAMINOPHEN 5; 325 MG/1; MG/1
TABLET ORAL
Qty: 30 TABLET | Refills: 0 | Status: SHIPPED | OUTPATIENT
Start: 2022-06-14 | End: 2022-07-14

## 2022-07-05 ENCOUNTER — OFFICE VISIT (OUTPATIENT)
Dept: FAMILY MEDICINE CLINIC | Facility: CLINIC | Age: 82
End: 2022-07-05

## 2022-07-05 VITALS
SYSTOLIC BLOOD PRESSURE: 132 MMHG | WEIGHT: 173 LBS | OXYGEN SATURATION: 96 % | RESPIRATION RATE: 14 BRPM | HEART RATE: 71 BPM | DIASTOLIC BLOOD PRESSURE: 80 MMHG | BODY MASS INDEX: 32.66 KG/M2 | HEIGHT: 61 IN

## 2022-07-05 DIAGNOSIS — J18.9 PNEUMONIA DUE TO INFECTIOUS ORGANISM, UNSPECIFIED LATERALITY, UNSPECIFIED PART OF LUNG: Primary | ICD-10-CM

## 2022-07-05 PROCEDURE — 99213 OFFICE O/P EST LOW 20 MIN: CPT | Performed by: FAMILY MEDICINE

## 2022-07-05 RX ORDER — ALBUTEROL SULFATE 90 UG/1
AEROSOL, METERED RESPIRATORY (INHALATION)
COMMUNITY
Start: 2022-06-26 | End: 2022-08-16 | Stop reason: ALTCHOICE

## 2022-07-05 NOTE — PROGRESS NOTES
Subjective   Paige Martinez is a 81 y.o. female.     Chief Complaint   Patient presents with   • Hospital Follow Up Visit     Pneumonia.   Pt states that she continues to have a productive cough       History of Present Illness     she said she was tx fof pneumonia and screenign for covid      Current Outpatient Medications:   •  albuterol sulfate  (90 Base) MCG/ACT inhaler, INHALE 2 PUFFS BY MOUTH EVERY 6 HOURS AS NEEDED FOR SHORTNESS OF BREATH OR WHEEZING, Disp: , Rfl:   •  atorvastatin (LIPITOR) 10 MG tablet, TAKE ONE TABLET DAILY GENERIC FOR LIPITOR, Disp: 30 tablet, Rfl: 5  •  bumetanide (BUMEX) 0.5 MG tablet, Take 1 mg by mouth Daily., Disp: , Rfl:   •  Cholecalciferol 2000 units capsule, Take 2,000 Units by mouth Daily., Disp: , Rfl:   •  citalopram (CeleXA) 20 MG tablet, TAKE ONE TABLET DAILY GENERIC FOR CELEXA, Disp: 90 tablet, Rfl: 1  •  cloNIDine (CATAPRES) 0.1 MG tablet, TAKE ONE TABLET BY MOUTH DAILY. , Disp: 30 tablet, Rfl: 5  •  fluocinonide-emollient (LIDEX-E) 0.05 % cream, Apply  topically to the appropriate area as directed 2 (Two) Times a Day., Disp: 30 g, Rfl: 0  •  HYDROcodone-acetaminophen (NORCO) 5-325 MG per tablet, TAKE ONE TABLET DAILY GENERIC FOR NORCO, Disp: 30 tablet, Rfl: 0  •  levothyroxine (SYNTHROID, LEVOTHROID) 75 MCG tablet, TAKE ONE TABLET DAILY GENERIC FOR SYNTHROID, Disp: 90 tablet, Rfl: 0  •  losartan (COZAAR) 100 MG tablet, TAKE ONE TABLET DAILY GENERIC FOR COZAAR, Disp: 90 tablet, Rfl: 0  •  metoprolol succinate XL (TOPROL-XL) 25 MG 24 hr tablet, TAKE ONE TABLET DAILY GENERIC FOR TOPROL XL, Disp: 30 tablet, Rfl: 5  •  omeprazole (priLOSEC) 20 MG capsule, TAKE 1 CAPSULE DAILY GENERIC FOR PRILOSEC, Disp: 30 capsule, Rfl: 11  No Known Allergies    BMI is >= 30 and <35. (Class 1 Obesity). The following options were offered after discussion;: nutrition counseling/recommendations      Past Medical History:   Diagnosis Date   • Anxiety    • Cholelithiasis    • Depression    •  "Diverticulosis    • History of adenomatous polyp of colon    • History of colon polyps    • Hyperlipidemia    • Hypertension    • Hypothyroidism    • Meningiomatosis (HCC)    • Myalgia    • Nephrolithiasis    • Type 2 diabetes mellitus (HCC)      Past Surgical History:   Procedure Laterality Date   • COLONOSCOPY  12/04/2018    Sigmoid diverticulosis; Non-engorged internal hemorrhoids vein; Repeat 5 years   • COLONOSCOPY  07/15/2014    Two 3-5mm hyperplastic polyps in the rectum-one destroyed during removal; Mild sigmoid diverticulosis; Repeat 5 years   • COLONOSCOPY  09/01/2009    Two 5mm tubular adenomatous polyps in the transverse colon; Two 5mm hyperplastic polyps in the distal sigmoid colon; Small internal hemorrhoid; Repeat 3 years   • ENDOSCOPY  12/04/2018    Normal endoscopy       Review of Systems   Constitutional: Negative.    HENT: Negative.    Eyes: Negative.    Respiratory: Positive for cough.    Cardiovascular: Negative.    Gastrointestinal: Negative.    Endocrine: Negative.    Genitourinary: Negative.    Musculoskeletal: Negative.    Skin: Negative.    Allergic/Immunologic: Negative.    Neurological: Negative.    Hematological: Negative.    Psychiatric/Behavioral: Negative.        Objective  /80   Pulse 71   Resp 14   Ht 154.9 cm (61\")   Wt 78.5 kg (173 lb)   SpO2 96%   BMI 32.69 kg/m²   Physical Exam  Vitals and nursing note reviewed.   Constitutional:       Appearance: Normal appearance. She is normal weight.   HENT:      Head: Normocephalic and atraumatic.      Nose: Nose normal.      Mouth/Throat:      Mouth: Mucous membranes are moist.   Eyes:      Pupils: Pupils are equal, round, and reactive to light.   Cardiovascular:      Rate and Rhythm: Normal rate and regular rhythm.      Pulses: Normal pulses.      Heart sounds: Normal heart sounds.   Pulmonary:      Breath sounds: Rhonchi present.   Abdominal:      General: Abdomen is flat. Bowel sounds are normal.      Palpations: Abdomen is " soft.   Musculoskeletal:         General: Normal range of motion.      Cervical back: Normal range of motion and neck supple.   Skin:     General: Skin is warm.      Capillary Refill: Capillary refill takes less than 2 seconds.   Neurological:      Mental Status: She is alert.   Psychiatric:         Mood and Affect: Mood normal.         Assessment & Plan   Diagnoses and all orders for this visit:    1. Pneumonia due to infectious organism, unspecified laterality, unspecified part of lung (Primary)  -     XR Chest PA & Lateral; Future                 Orders Placed This Encounter   Procedures   • XR Chest PA & Lateral     Standing Status:   Future     Standing Expiration Date:   7/5/2023     Order Specific Question:   Reason for Exam:     Answer:   follow up pneumonia     Order Specific Question:   Does this patient have a diabetic monitoring/medication delivering device on?     Answer:   No     Order Specific Question:   Release to patient     Answer:   Immediate       Follow up: 4 week(s)

## 2022-07-06 ENCOUNTER — TELEPHONE (OUTPATIENT)
Dept: FAMILY MEDICINE CLINIC | Facility: CLINIC | Age: 82
End: 2022-07-06

## 2022-07-06 DIAGNOSIS — J18.9 PNEUMONIA DUE TO INFECTIOUS ORGANISM, UNSPECIFIED LATERALITY, UNSPECIFIED PART OF LUNG: ICD-10-CM

## 2022-07-06 RX ORDER — LEVOFLOXACIN 500 MG/1
500 TABLET, FILM COATED ORAL DAILY
Qty: 10 TABLET | Refills: 0 | Status: SHIPPED | OUTPATIENT
Start: 2022-07-06 | End: 2022-07-18

## 2022-07-06 NOTE — TELEPHONE ENCOUNTER
----- Message from Barrington Jaramillo MD sent at 7/6/2022  8:38 AM CDT -----  Still has ;pneumonia---start levaquin 500mg q dialy x 10 days---see me next ot next week for recheck

## 2022-07-11 ENCOUNTER — OFFICE VISIT (OUTPATIENT)
Dept: FAMILY MEDICINE CLINIC | Facility: CLINIC | Age: 82
End: 2022-07-11

## 2022-07-11 VITALS
HEART RATE: 80 BPM | RESPIRATION RATE: 18 BRPM | TEMPERATURE: 98.6 F | WEIGHT: 173 LBS | DIASTOLIC BLOOD PRESSURE: 68 MMHG | HEIGHT: 61 IN | BODY MASS INDEX: 32.66 KG/M2 | SYSTOLIC BLOOD PRESSURE: 120 MMHG

## 2022-07-11 DIAGNOSIS — J18.9 PNEUMONIA OF BOTH LUNGS DUE TO INFECTIOUS ORGANISM, UNSPECIFIED PART OF LUNG: Primary | ICD-10-CM

## 2022-07-11 PROCEDURE — 99213 OFFICE O/P EST LOW 20 MIN: CPT | Performed by: FAMILY MEDICINE

## 2022-07-11 NOTE — PROGRESS NOTES
"Subjective   Paige Martinez is a 81 y.o. female.     No chief complaint on file.   CC:\"im still coughing\"    History of Present Illness     her cough is persistent --      Current Outpatient Medications:   •  albuterol sulfate  (90 Base) MCG/ACT inhaler, INHALE 2 PUFFS BY MOUTH EVERY 6 HOURS AS NEEDED FOR SHORTNESS OF BREATH OR WHEEZING, Disp: , Rfl:   •  atorvastatin (LIPITOR) 10 MG tablet, TAKE ONE TABLET DAILY GENERIC FOR LIPITOR, Disp: 30 tablet, Rfl: 5  •  bumetanide (BUMEX) 0.5 MG tablet, Take 1 mg by mouth Daily., Disp: , Rfl:   •  Cholecalciferol 2000 units capsule, Take 2,000 Units by mouth Daily., Disp: , Rfl:   •  citalopram (CeleXA) 20 MG tablet, TAKE ONE TABLET DAILY GENERIC FOR CELEXA, Disp: 90 tablet, Rfl: 1  •  cloNIDine (CATAPRES) 0.1 MG tablet, TAKE ONE TABLET BY MOUTH DAILY. , Disp: 30 tablet, Rfl: 5  •  fluocinonide-emollient (LIDEX-E) 0.05 % cream, Apply  topically to the appropriate area as directed 2 (Two) Times a Day., Disp: 30 g, Rfl: 0  •  HYDROcodone-acetaminophen (NORCO) 5-325 MG per tablet, TAKE ONE TABLET DAILY GENERIC FOR NORCO, Disp: 30 tablet, Rfl: 0  •  levoFLOXacin (Levaquin) 500 MG tablet, Take 1 tablet by mouth Daily., Disp: 10 tablet, Rfl: 0  •  levothyroxine (SYNTHROID, LEVOTHROID) 75 MCG tablet, TAKE ONE TABLET DAILY GENERIC FOR SYNTHROID, Disp: 90 tablet, Rfl: 0  •  losartan (COZAAR) 100 MG tablet, TAKE ONE TABLET DAILY GENERIC FOR COZAAR, Disp: 90 tablet, Rfl: 0  •  metoprolol succinate XL (TOPROL-XL) 25 MG 24 hr tablet, TAKE ONE TABLET DAILY GENERIC FOR TOPROL XL, Disp: 30 tablet, Rfl: 5  •  omeprazole (priLOSEC) 20 MG capsule, TAKE 1 CAPSULE DAILY GENERIC FOR PRILOSEC, Disp: 30 capsule, Rfl: 11  No Known Allergies    BMI is >= 30 and <35. (Class 1 Obesity). The following options were offered after discussion;: nutrition counseling/recommendations      Past Medical History:   Diagnosis Date   • Anxiety    • Cholelithiasis    • Depression    • Diverticulosis    • " History of adenomatous polyp of colon    • History of colon polyps    • Hyperlipidemia    • Hypertension    • Hypothyroidism    • Meningiomatosis (HCC)    • Myalgia    • Nephrolithiasis    • Type 2 diabetes mellitus (HCC)      Past Surgical History:   Procedure Laterality Date   • COLONOSCOPY  12/04/2018    Sigmoid diverticulosis; Non-engorged internal hemorrhoids vein; Repeat 5 years   • COLONOSCOPY  07/15/2014    Two 3-5mm hyperplastic polyps in the rectum-one destroyed during removal; Mild sigmoid diverticulosis; Repeat 5 years   • COLONOSCOPY  09/01/2009    Two 5mm tubular adenomatous polyps in the transverse colon; Two 5mm hyperplastic polyps in the distal sigmoid colon; Small internal hemorrhoid; Repeat 3 years   • ENDOSCOPY  12/04/2018    Normal endoscopy       Review of Systems   Constitutional: Negative.    HENT: Positive for congestion.    Eyes: Negative.    Respiratory: Positive for cough.    Cardiovascular: Negative.    Gastrointestinal: Negative.    Endocrine: Negative.    Genitourinary: Negative.    Musculoskeletal: Negative.    Skin: Negative.    Allergic/Immunologic: Negative.    Neurological: Negative.    Hematological: Negative.    Psychiatric/Behavioral: Negative.        Objective  There were no vitals taken for this visit.  Physical Exam  Vitals and nursing note reviewed.   Constitutional:       Appearance: She is normal weight.   HENT:      Head: Normocephalic and atraumatic.      Nose: Nose normal.      Mouth/Throat:      Mouth: Mucous membranes are moist.   Eyes:      Extraocular Movements: Extraocular movements intact.      Conjunctiva/sclera: Conjunctivae normal.      Pupils: Pupils are equal, round, and reactive to light.   Cardiovascular:      Rate and Rhythm: Normal rate and regular rhythm.      Pulses: Normal pulses.      Heart sounds: Normal heart sounds.   Pulmonary:      Breath sounds: Wheezing, rhonchi and rales present.   Abdominal:      General: Abdomen is flat. Bowel sounds are  normal.      Palpations: Abdomen is soft.   Musculoskeletal:         General: Normal range of motion.      Cervical back: Normal range of motion.   Skin:     General: Skin is warm.      Capillary Refill: Capillary refill takes less than 2 seconds.   Neurological:      General: No focal deficit present.      Mental Status: She is alert and oriented to person, place, and time. Mental status is at baseline.   Psychiatric:         Mood and Affect: Mood normal.         Assessment & Plan   Diagnoses and all orders for this visit:    1. Pneumonia of both lungs due to infectious organism, unspecified part of lung (Primary)      She has failed out tx and needs inpatient tx but she refuses to go till tomoroowo--I said I would see her tomorrow and if worsens to go to the er of choice           No orders of the defined types were placed in this encounter.      Follow up: 4 week(s)

## 2022-07-12 DIAGNOSIS — R05.9 COUGH: Primary | ICD-10-CM

## 2022-07-13 DIAGNOSIS — M19.90 OSTEOARTHRITIS, UNSPECIFIED OSTEOARTHRITIS TYPE, UNSPECIFIED SITE: ICD-10-CM

## 2022-07-14 RX ORDER — HYDROCODONE BITARTRATE AND ACETAMINOPHEN 5; 325 MG/1; MG/1
TABLET ORAL
Qty: 30 TABLET | Refills: 0 | Status: SHIPPED | OUTPATIENT
Start: 2022-07-14 | End: 2022-08-15

## 2022-07-18 ENCOUNTER — OFFICE VISIT (OUTPATIENT)
Dept: FAMILY MEDICINE CLINIC | Facility: CLINIC | Age: 82
End: 2022-07-18

## 2022-07-18 VITALS
HEIGHT: 61 IN | WEIGHT: 173 LBS | OXYGEN SATURATION: 98 % | DIASTOLIC BLOOD PRESSURE: 64 MMHG | BODY MASS INDEX: 32.66 KG/M2 | SYSTOLIC BLOOD PRESSURE: 124 MMHG | HEART RATE: 74 BPM

## 2022-07-18 DIAGNOSIS — R93.89 ABNORMAL CT OF THE CHEST: Primary | ICD-10-CM

## 2022-07-18 PROCEDURE — 99213 OFFICE O/P EST LOW 20 MIN: CPT | Performed by: FAMILY MEDICINE

## 2022-07-18 RX ORDER — CEFDINIR 300 MG/1
300 CAPSULE ORAL
COMMUNITY
Start: 2022-07-15 | End: 2022-08-16 | Stop reason: ALTCHOICE

## 2022-07-18 RX ORDER — PREDNISONE 10 MG/1
10 TABLET ORAL
COMMUNITY
Start: 2022-07-15 | End: 2022-08-16 | Stop reason: ALTCHOICE

## 2022-07-18 NOTE — PROGRESS NOTES
Subjective   Paige Martinez is a 81 y.o. female.     Chief Complaint   Patient presents with   • Hospital Follow Up Visit      History of Present Illness     recently was admitted to [facility] for pneumonia--ct scan noted---denies any sob or chst pain      Current Outpatient Medications:   •  albuterol sulfate  (90 Base) MCG/ACT inhaler, INHALE 2 PUFFS BY MOUTH EVERY 6 HOURS AS NEEDED FOR SHORTNESS OF BREATH OR WHEEZING, Disp: , Rfl:   •  atorvastatin (LIPITOR) 10 MG tablet, TAKE ONE TABLET DAILY GENERIC FOR LIPITOR, Disp: 30 tablet, Rfl: 5  •  bumetanide (BUMEX) 0.5 MG tablet, Take 1 mg by mouth Daily., Disp: , Rfl:   •  Cholecalciferol 2000 units capsule, Take 2,000 Units by mouth Daily., Disp: , Rfl:   •  citalopram (CeleXA) 20 MG tablet, TAKE ONE TABLET DAILY GENERIC FOR CELEXA, Disp: 90 tablet, Rfl: 1  •  cloNIDine (CATAPRES) 0.1 MG tablet, TAKE ONE TABLET BY MOUTH DAILY. , Disp: 30 tablet, Rfl: 5  •  fluocinonide-emollient (LIDEX-E) 0.05 % cream, Apply  topically to the appropriate area as directed 2 (Two) Times a Day., Disp: 30 g, Rfl: 0  •  HYDROcodone-acetaminophen (NORCO) 5-325 MG per tablet, TAKE ONE TABLET DAILY GENERIC FOR NORCO, Disp: 30 tablet, Rfl: 0  •  levothyroxine (SYNTHROID, LEVOTHROID) 75 MCG tablet, TAKE ONE TABLET DAILY GENERIC FOR SYNTHROID, Disp: 90 tablet, Rfl: 0  •  losartan (COZAAR) 100 MG tablet, TAKE ONE TABLET DAILY GENERIC FOR COZAAR, Disp: 90 tablet, Rfl: 0  •  metoprolol succinate XL (TOPROL-XL) 25 MG 24 hr tablet, TAKE ONE TABLET DAILY GENERIC FOR TOPROL XL, Disp: 30 tablet, Rfl: 5  •  omeprazole (priLOSEC) 20 MG capsule, TAKE 1 CAPSULE DAILY GENERIC FOR PRILOSEC, Disp: 30 capsule, Rfl: 11  •  cefdinir (OMNICEF) 300 MG capsule, Take 300 mg by mouth., Disp: , Rfl:   •  predniSONE (DELTASONE) 10 MG tablet, Take 10 mg by mouth., Disp: , Rfl:   No Known Allergies    BMI is >= 30 and <35. (Class 1 Obesity). The following options were offered after discussion;: nutrition  "counseling/recommendations      Past Medical History:   Diagnosis Date   • Anxiety    • Cholelithiasis    • Depression    • Diverticulosis    • History of adenomatous polyp of colon    • History of colon polyps    • Hyperlipidemia    • Hypertension    • Hypothyroidism    • Meningiomatosis (HCC)    • Myalgia    • Nephrolithiasis    • Type 2 diabetes mellitus (HCC)      Past Surgical History:   Procedure Laterality Date   • COLONOSCOPY  12/04/2018    Sigmoid diverticulosis; Non-engorged internal hemorrhoids vein; Repeat 5 years   • COLONOSCOPY  07/15/2014    Two 3-5mm hyperplastic polyps in the rectum-one destroyed during removal; Mild sigmoid diverticulosis; Repeat 5 years   • COLONOSCOPY  09/01/2009    Two 5mm tubular adenomatous polyps in the transverse colon; Two 5mm hyperplastic polyps in the distal sigmoid colon; Small internal hemorrhoid; Repeat 3 years   • ENDOSCOPY  12/04/2018    Normal endoscopy       Review of Systems   Constitutional: Negative.    HENT: Negative.    Eyes: Negative.    Respiratory: Positive for cough and shortness of breath.    Cardiovascular: Negative.    Gastrointestinal: Negative.    Endocrine: Negative.    Genitourinary: Negative.    Musculoskeletal: Negative.    Skin: Negative.    Allergic/Immunologic: Negative.    Neurological: Negative.    Hematological: Negative.    Psychiatric/Behavioral: Negative.        Objective  /64   Pulse 74   Ht 154.9 cm (61\")   Wt 78.5 kg (173 lb)   SpO2 98%   BMI 32.69 kg/m²   Physical Exam  Vitals and nursing note reviewed.   Constitutional:       Appearance: Normal appearance. She is normal weight.   HENT:      Head: Normocephalic and atraumatic.      Nose: Nose normal.      Mouth/Throat:      Mouth: Mucous membranes are moist.   Eyes:      Extraocular Movements: Extraocular movements intact.      Conjunctiva/sclera: Conjunctivae normal.      Pupils: Pupils are equal, round, and reactive to light.   Cardiovascular:      Rate and Rhythm: Normal " rate and regular rhythm.      Pulses: Normal pulses.      Heart sounds: Normal heart sounds.   Pulmonary:      Effort: Pulmonary effort is normal.      Breath sounds: Normal breath sounds.   Abdominal:      General: Abdomen is flat. Bowel sounds are normal.      Palpations: Abdomen is soft.   Musculoskeletal:         General: Normal range of motion.      Cervical back: Normal range of motion and neck supple.   Skin:     General: Skin is warm and dry.      Capillary Refill: Capillary refill takes less than 2 seconds.   Neurological:      General: No focal deficit present.      Mental Status: She is alert and oriented to person, place, and time. Mental status is at baseline.   Psychiatric:         Mood and Affect: Mood normal.         Behavior: Behavior normal.         Thought Content: Thought content normal.         Judgment: Judgment normal.         Assessment & Plan   Diagnoses and all orders for this visit:    1. Abnormal CT of the chest (Primary)  -     Ambulatory Referral to Pulmonology                 Orders Placed This Encounter   Procedures   • Ambulatory Referral to Pulmonology     Referral Priority:   Routine     Referral Type:   Consultation     Referral Reason:   Specialty Services Required     Requested Specialty:   Pulmonary Disease     Number of Visits Requested:   1       Follow up: 2 month(s)

## 2022-07-25 RX ORDER — CLONIDINE HYDROCHLORIDE 0.1 MG/1
TABLET ORAL
Qty: 30 TABLET | Refills: 5 | Status: SHIPPED | OUTPATIENT
Start: 2022-07-25 | End: 2023-03-27

## 2022-08-15 DIAGNOSIS — M19.90 OSTEOARTHRITIS, UNSPECIFIED OSTEOARTHRITIS TYPE, UNSPECIFIED SITE: ICD-10-CM

## 2022-08-15 RX ORDER — HYDROCODONE BITARTRATE AND ACETAMINOPHEN 5; 325 MG/1; MG/1
TABLET ORAL
Qty: 30 TABLET | Refills: 0 | Status: SHIPPED | OUTPATIENT
Start: 2022-08-15 | End: 2022-09-23

## 2022-08-16 ENCOUNTER — OFFICE VISIT (OUTPATIENT)
Dept: PULMONOLOGY | Facility: CLINIC | Age: 82
End: 2022-08-16

## 2022-08-16 VITALS
DIASTOLIC BLOOD PRESSURE: 74 MMHG | WEIGHT: 174.8 LBS | HEART RATE: 70 BPM | SYSTOLIC BLOOD PRESSURE: 126 MMHG | HEIGHT: 61 IN | OXYGEN SATURATION: 98 % | BODY MASS INDEX: 33 KG/M2

## 2022-08-16 DIAGNOSIS — F41.9 ANXIETY: ICD-10-CM

## 2022-08-16 DIAGNOSIS — R06.02 SOB (SHORTNESS OF BREATH): Primary | ICD-10-CM

## 2022-08-16 DIAGNOSIS — E66.9 OBESITY (BMI 30-39.9): ICD-10-CM

## 2022-08-16 DIAGNOSIS — J44.9 CHRONIC OBSTRUCTIVE PULMONARY DISEASE, UNSPECIFIED COPD TYPE: ICD-10-CM

## 2022-08-16 DIAGNOSIS — J30.9 ALLERGIC RHINITIS, UNSPECIFIED SEASONALITY, UNSPECIFIED TRIGGER: ICD-10-CM

## 2022-08-16 DIAGNOSIS — J98.4 PULMONARY SCARRING: ICD-10-CM

## 2022-08-16 DIAGNOSIS — R91.1 LUNG NODULE: ICD-10-CM

## 2022-08-16 DIAGNOSIS — Z87.01 HISTORY OF PNEUMONIA: ICD-10-CM

## 2022-08-16 PROCEDURE — 99204 OFFICE O/P NEW MOD 45 MIN: CPT | Performed by: INTERNAL MEDICINE

## 2022-08-16 RX ORDER — ALBUTEROL SULFATE 90 UG/1
2 AEROSOL, METERED RESPIRATORY (INHALATION) EVERY 4 HOURS PRN
Qty: 6.7 G | Refills: 5 | Status: SHIPPED | OUTPATIENT
Start: 2022-08-16 | End: 2022-11-21 | Stop reason: SDUPTHER

## 2022-08-16 RX ORDER — AZELASTINE HYDROCHLORIDE 137 UG/1
2 SPRAY, METERED NASAL 2 TIMES DAILY
Qty: 30 ML | Refills: 5 | Status: SHIPPED | OUTPATIENT
Start: 2022-08-16 | End: 2022-11-21 | Stop reason: SDUPTHER

## 2022-08-16 RX ORDER — LORATADINE 10 MG/1
10 TABLET ORAL DAILY
Qty: 90 TABLET | Refills: 3 | Status: SHIPPED | OUTPATIENT
Start: 2022-08-16 | End: 2022-11-21 | Stop reason: SDUPTHER

## 2022-08-16 RX ORDER — FLUTICASONE PROPIONATE 50 MCG
2 SPRAY, SUSPENSION (ML) NASAL DAILY
Qty: 16 G | Refills: 5 | Status: SHIPPED | OUTPATIENT
Start: 2022-08-16 | End: 2022-11-21 | Stop reason: SDUPTHER

## 2022-08-16 NOTE — PROGRESS NOTES
RESPIRATORY DISEASE CLINIC NEW CONSULT NOTE     Patient: Paige Martinez      :  1940   Age: 81 y.o.    Date of Service: 2022      Subjective:   Requesting Physician: Barrington Jaramillo MD     Reason for Consultation:    Diagnosis Plan   1. SOB (shortness of breath)     2. Chronic obstructive pulmonary disease, unspecified COPD type (HCC)     3. History of pneumonia     4. Pulmonary scarring     5. Lung nodule     6. Obesity (BMI 30-39.9)     7. Allergic rhinitis, unspecified seasonality, unspecified trigger     8. Anxiety          Chief Complaint:     Chief Complaint   Patient presents with   • Abnormal Imaging     Chest CT cd brought       History of present illness: Paige Martinez is a 81 y.o. female who presents to the office today to be seen for    Diagnosis Plan   1. SOB (shortness of breath)     2. Chronic obstructive pulmonary disease, unspecified COPD type (HCC)     3. History of pneumonia     4. Pulmonary scarring     5. Lung nodule     6. Obesity (BMI 30-39.9)     7. Allergic rhinitis, unspecified seasonality, unspecified trigger     8. Anxiety        Other problems per record.  And is a very pleasant elderly  female who was seen in the pulmonary clinic as a new consult today.  She was born in [place] and moved to the [facility] as a young girl.  He still has family members living in [place].    Patient presented to the clinic with abnormal imaging studies and a recent history of respiratory illness.  She presented to the [facility] last month with respiratory problems and was diagnosed with bilateral patchy infiltrate.  She has several chest x-ray and CT scan done in outside hospital which shows persistent bilateral infiltrate and ectasis with scarring.  She was also noted to have emphysema and a stable 0.5 cm right midlung nodule.  She was sent to the pulmonary clinic for further evaluation.  Patient reported she was a light smoker and quit smoking 14 years ago  but she was exposed to passive smoking from her family members.  She has 2 sons several grandchildren and great-grandchildren she is very active for her age she is going to be 82 in November.  She needed hospitalization only for few days last month and received antibiotic but later discharged home and she was not on any home oxygen.    She reported she gets short of breath on exertion.  She has some cough with CLEAR EXPECTORATION.  She is not on any oxygen.  She has obesity but denies any sleep problems and told me she sleeps well.  She has allergy problems with postnasal drainage but she is not on any allergy medications.  She also has hypertension hyperlipidemia and hypothyroidism.  She had a single dose COVID-vaccine Marilyn last year but did not have any further vaccination and she did not receive pneumonia and influenza vaccine recently.     Past History  Past Medical History:   Diagnosis Date   • Anxiety    • Cholelithiasis    • Depression    • Diverticulosis    • History of adenomatous polyp of colon    • History of colon polyps    • Hyperlipidemia    • Hypertension    • Hypothyroidism    • Meningiomatosis (HCC)    • Myalgia    • Nephrolithiasis    • Type 2 diabetes mellitus (HCC)      Past Surgical History:   Procedure Laterality Date   • COLONOSCOPY  12/04/2018    Sigmoid diverticulosis; Non-engorged internal hemorrhoids vein; Repeat 5 years   • COLONOSCOPY  07/15/2014    Two 3-5mm hyperplastic polyps in the rectum-one destroyed during removal; Mild sigmoid diverticulosis; Repeat 5 years   • COLONOSCOPY  09/01/2009    Two 5mm tubular adenomatous polyps in the transverse colon; Two 5mm hyperplastic polyps in the distal sigmoid colon; Small internal hemorrhoid; Repeat 3 years   • ENDOSCOPY  12/04/2018    Normal endoscopy     No Known Allergies  Current Outpatient Medications   Medication Sig Dispense Refill   • atorvastatin (LIPITOR) 10 MG tablet TAKE ONE TABLET DAILY GENERIC FOR LIPITOR 30 tablet 5   •  bumetanide (BUMEX) 0.5 MG tablet Take 1 mg by mouth Daily.     • Cholecalciferol 2000 units capsule Take 2,000 Units by mouth Daily.     • citalopram (CeleXA) 20 MG tablet TAKE ONE TABLET DAILY GENERIC FOR CELEXA 90 tablet 1   • cloNIDine (CATAPRES) 0.1 MG tablet TAKE ONE TABLET BY MOUTH DAILY. 30 tablet 5   • fluocinonide-emollient (LIDEX-E) 0.05 % cream Apply  topically to the appropriate area as directed 2 (Two) Times a Day. 30 g 0   • HYDROcodone-acetaminophen (NORCO) 5-325 MG per tablet TAKE ONE TABLET DAILY GENERIC FOR NORCO 30 tablet 0   • levothyroxine (SYNTHROID, LEVOTHROID) 75 MCG tablet TAKE ONE TABLET DAILY GENERIC FOR SYNTHROID 90 tablet 0   • losartan (COZAAR) 100 MG tablet TAKE ONE TABLET DAILY GENERIC FOR COZAAR 90 tablet 0   • metoprolol succinate XL (TOPROL-XL) 25 MG 24 hr tablet TAKE ONE TABLET DAILY GENERIC FOR TOPROL XL 30 tablet 5   • omeprazole (priLOSEC) 20 MG capsule TAKE 1 CAPSULE DAILY GENERIC FOR PRILOSEC 30 capsule 11     No current facility-administered medications for this visit.     Social History     Socioeconomic History   • Marital status: Single   Tobacco Use   • Smoking status: Former Smoker     Packs/day: 1.00     Years: 51.00     Pack years: 51.00     Types: Cigarettes     Start date:      Quit date:      Years since quittin.6   • Smokeless tobacco: Never Used   Vaping Use   • Vaping Use: Never used   Substance and Sexual Activity   • Alcohol use: No     Comment: Occasionally    • Drug use: No   • Sexual activity: Defer     Family History   Problem Relation Age of Onset   • Arthritis Mother    • Arthritis Father    • Hypertension Sister    • Colon cancer Neg Hx    • Colon polyps Neg Hx    • Esophageal cancer Neg Hx    • Liver cancer Neg Hx    • Liver disease Neg Hx    • Rectal cancer Neg Hx    • Stomach cancer Neg Hx      Immunization History   Administered Date(s) Administered   • COVID-19 (JACOBY) 2021   • PPD Test 2017       Review of Systems  A  "complete review of systems is performed and all other systems were reviewed and negative except as noted above in the HPI.  Review of Systems   Constitutional: Positive for fatigue.   HENT: Positive for congestion, postnasal drip and sinus pressure.    Eyes: Negative.    Respiratory: Positive for cough and chest tightness.    Cardiovascular: Negative.    Gastrointestinal: Negative.    Endocrine: Negative.    Genitourinary: Negative.    Musculoskeletal: Positive for arthralgias.   Skin: Negative.    Allergic/Immunologic: Positive for environmental allergies.   Neurological: Negative.    Hematological: Negative.    Psychiatric/Behavioral: Negative.          Objective:     Vital Signs:  /74   Pulse 70   Ht 154.9 cm (61\")   Wt 79.3 kg (174 lb 12.8 oz)   LMP  (LMP Unknown)   SpO2 98%   Breastfeeding No   BMI 33.03 kg/m²     Pulmonary Functions Testing Results:    No results found for this or any previous visit.        Physical Exam  General:  Patient is a 81 y.o. pleasant  female. Looks states age. Appears to be in no acute distress.  Eyes:  EOMI.  PERRLA.  Vision intact.  No scleral icterus.    Ear, Nose, Mouth and Throat:  External inspection of the ears and nose appear to be normal.  No obvious scars or lesions.  Hearing is grossly intact.  No leukoplakia, pharyngitis, stomatitis or thrush. Swollen nasal mucosa with post nasal drip.   Neck:  Range of motion of neck normal.  No thyromegaly or masses. Malanpati Class 3, no jugular venous distention, no thyroid swelling  Respiratory:  Clear to auscultation bilaterally.  No use of accessory muscles. Decreased breath sounds.      Cardiovascular:  Regularly regular rhythm without S3, S4 or murmur.  No hepatojugular reflux nor carotid bruits.  Pulses intact in four extremities.  No obvious signs of edema.    Gastrointestinal:  Nontender, nondistended, soft.  Bowel sounds positive in all four quadrants.  No organomegaly or masses nor bruit.    Lymphatic: "  No significant adenopathy appreciated in the neck, axilla or elsewhere.    Musculoskeletal:  Gait was grossly intact.  Range of motion, strength and sensitivity of all four extremities appear to be intact.  Distal tendon reflexes intact.   Skin:  No obvious rashes, lesions, ulcers or large amount of bruising.    Neurological:  Refer to Eye, Ear, Nose and Throat and musculoskeletal exams for additional details.  Distal tendon reflexes intact.  No clonus or Babinski.  Sensation to touch is grossly intact.    Psychiatric:  Patient is alert and oriented to person, place and time.  Recent and remote memory appear to be intact.  Patient does not show outward signs of depression.      Diagnostic Findings:   Pertinent findings noted and abnormal findings also noted. Reviewed .    Chest Imaging    Recent CT scan of the chest done on 7/15/2022 showed    · Right middle lobe and right lower lobe pneumonia and/or atelectasis versus scarring  · Stable 0.5 cm right midlung nodule  · Emphysema  · Cholelithiasis  · Nonobstructing right nephrolithiasis.    And a few more chest x-rays done before the CT scan also showed bilateral pulmonary infiltrate.    Assessment      1. SOB (shortness of breath)    2. Chronic obstructive pulmonary disease, unspecified COPD type (HCC)    3. History of pneumonia    4. Pulmonary scarring    5. Lung nodule    6. Obesity (BMI 30-39.9)    7. Allergic rhinitis, unspecified seasonality, unspecified trigger    8. Anxiety        Plan/Recommendations     1.  I reviewed the CT scan of the chest.  This was in the CD-ROM brought from outside hospital.  It showed bilateral pulmonary infiltrate and scarring with atelectasis but no definite mass a few indistinct lung nodules where identified there was emphysematous changes noted in both lungs.  2.  Shortness of breath is multifactorial and is most likely from underlying COPD, pulmonary scarring and postnasal drainage.  She is a former smoker and did quit smoking  10 years ago but did not have any work-up done so far but I ordered a PFT before the next clinic visit in 3 months time.  3.  Her current abnormal CT scan finding stability to the post pneumonia changes and scarring and I advised her to get another CT scan in 3 months time before her next follow-up visit.  4.  For her underlying emphysema and she was started on Breo Ellipta and albuterol rescue inhaler for shortness of breath and she will also need fluticasone nasal spray and loratadine for nasal allergy all prescriptions were sent to the pharmacy.  5.  She did not take COVID-vaccine since last year and I recommended to get a COVID-vaccine booster dose pneumonia and influenza vaccine.  Although she has obesity she told me she is sleeping well and did not have much morning and she sleeps well at night so no sleep study is ordered at this time.  6.  Patient will return to pulmonary clinic for follow-up visit in 3 months time or earlier if needed.  The CT scan of the chest and PFT to be done before the next clinic visit.    Follow Up    3 months    Time Spent   45 minutes      I appreciate the opportunity of participating in this patients care. I would like to thank the PCP for the referral. Please feel free to contact me with any other questions.       Tushar Thrasher MD   Pulmonologist/Intensivist     11:07 CDT

## 2022-09-01 ENCOUNTER — TRANSCRIBE ORDERS (OUTPATIENT)
Dept: FAMILY MEDICINE CLINIC | Facility: CLINIC | Age: 82
End: 2022-09-01

## 2022-09-01 DIAGNOSIS — N18.31 CHRONIC KIDNEY DISEASE (CKD) STAGE G3A/A1, MODERATELY DECREASED GLOMERULAR FILTRATION RATE (GFR) BETWEEN 45-59 ML/MIN/1.73 SQUARE METER AND ALBUMINURIA CREATININE RATIO LESS THAN 30 MG/G (CMS/H*: Primary | ICD-10-CM

## 2022-09-19 ENCOUNTER — OFFICE VISIT (OUTPATIENT)
Dept: FAMILY MEDICINE CLINIC | Facility: CLINIC | Age: 82
End: 2022-09-19

## 2022-09-19 VITALS
WEIGHT: 177 LBS | OXYGEN SATURATION: 97 % | DIASTOLIC BLOOD PRESSURE: 78 MMHG | HEIGHT: 61 IN | HEART RATE: 84 BPM | SYSTOLIC BLOOD PRESSURE: 136 MMHG | BODY MASS INDEX: 33.42 KG/M2

## 2022-09-19 DIAGNOSIS — N18.32 STAGE 3B CHRONIC KIDNEY DISEASE: ICD-10-CM

## 2022-09-19 DIAGNOSIS — E03.9 ACQUIRED HYPOTHYROIDISM: ICD-10-CM

## 2022-09-19 DIAGNOSIS — E78.2 MIXED HYPERLIPIDEMIA: Primary | ICD-10-CM

## 2022-09-19 PROCEDURE — 99213 OFFICE O/P EST LOW 20 MIN: CPT | Performed by: FAMILY MEDICINE

## 2022-09-20 LAB
CHOLEST SERPL-MCNC: 163 MG/DL (ref 0–200)
CHOLEST/HDLC SERPL: 2.76 {RATIO}
HDLC SERPL-MCNC: 59 MG/DL (ref 40–60)
LDLC SERPL CALC-MCNC: 91 MG/DL (ref 0–100)
TRIGL SERPL-MCNC: 65 MG/DL (ref 0–150)
TSH SERPL DL<=0.005 MIU/L-ACNC: 3.34 UIU/ML (ref 0.27–4.2)
VLDLC SERPL CALC-MCNC: 13 MG/DL (ref 5–40)

## 2022-09-23 DIAGNOSIS — M19.90 OSTEOARTHRITIS, UNSPECIFIED OSTEOARTHRITIS TYPE, UNSPECIFIED SITE: ICD-10-CM

## 2022-09-23 RX ORDER — HYDROCODONE BITARTRATE AND ACETAMINOPHEN 5; 325 MG/1; MG/1
TABLET ORAL
Qty: 30 TABLET | Refills: 0 | Status: SHIPPED | OUTPATIENT
Start: 2022-09-23 | End: 2022-10-31

## 2022-09-26 RX ORDER — LOSARTAN POTASSIUM 100 MG/1
TABLET ORAL
Qty: 90 TABLET | Refills: 0 | Status: SHIPPED | OUTPATIENT
Start: 2022-09-26 | End: 2023-01-23

## 2022-09-26 RX ORDER — LEVOTHYROXINE SODIUM 0.07 MG/1
TABLET ORAL
Qty: 90 TABLET | Refills: 0 | Status: SHIPPED | OUTPATIENT
Start: 2022-09-26 | End: 2023-01-23

## 2022-10-28 RX ORDER — CITALOPRAM 20 MG/1
TABLET ORAL
Qty: 90 TABLET | Refills: 1 | Status: SHIPPED | OUTPATIENT
Start: 2022-10-28

## 2022-10-31 DIAGNOSIS — M19.90 OSTEOARTHRITIS, UNSPECIFIED OSTEOARTHRITIS TYPE, UNSPECIFIED SITE: ICD-10-CM

## 2022-10-31 RX ORDER — HYDROCODONE BITARTRATE AND ACETAMINOPHEN 5; 325 MG/1; MG/1
TABLET ORAL
Qty: 30 TABLET | Refills: 0 | Status: SHIPPED | OUTPATIENT
Start: 2022-10-31 | End: 2022-12-01

## 2022-10-31 RX ORDER — ATORVASTATIN CALCIUM 10 MG/1
TABLET, FILM COATED ORAL
Qty: 30 TABLET | Refills: 5 | Status: SHIPPED | OUTPATIENT
Start: 2022-10-31

## 2022-11-21 ENCOUNTER — OFFICE VISIT (OUTPATIENT)
Dept: PULMONOLOGY | Facility: CLINIC | Age: 82
End: 2022-11-21

## 2022-11-21 ENCOUNTER — HOSPITAL ENCOUNTER (OUTPATIENT)
Dept: CT IMAGING | Facility: HOSPITAL | Age: 82
Discharge: HOME | End: 2022-11-21
Admitting: INTERNAL MEDICINE

## 2022-11-21 VITALS
BODY MASS INDEX: 34.95 KG/M2 | WEIGHT: 178 LBS | DIASTOLIC BLOOD PRESSURE: 74 MMHG | HEIGHT: 60 IN | OXYGEN SATURATION: 98 % | SYSTOLIC BLOOD PRESSURE: 122 MMHG | HEART RATE: 74 BPM

## 2022-11-21 DIAGNOSIS — J44.9 CHRONIC OBSTRUCTIVE PULMONARY DISEASE, UNSPECIFIED COPD TYPE: Primary | ICD-10-CM

## 2022-11-21 DIAGNOSIS — Z87.891 FORMER SMOKER: ICD-10-CM

## 2022-11-21 DIAGNOSIS — J98.4 RESTRICTIVE LUNG DISEASE: ICD-10-CM

## 2022-11-21 DIAGNOSIS — Z87.01 HISTORY OF PNEUMONIA: ICD-10-CM

## 2022-11-21 DIAGNOSIS — J98.4 PULMONARY SCARRING: ICD-10-CM

## 2022-11-21 DIAGNOSIS — R91.1 LUNG NODULE: ICD-10-CM

## 2022-11-21 DIAGNOSIS — J30.9 ALLERGIC RHINITIS, UNSPECIFIED SEASONALITY, UNSPECIFIED TRIGGER: ICD-10-CM

## 2022-11-21 DIAGNOSIS — E66.9 OBESITY (BMI 30-39.9): ICD-10-CM

## 2022-11-21 DIAGNOSIS — J44.9 CHRONIC OBSTRUCTIVE PULMONARY DISEASE, UNSPECIFIED COPD TYPE: ICD-10-CM

## 2022-11-21 PROCEDURE — 94727 GAS DIL/WSHOT DETER LNG VOL: CPT | Performed by: INTERNAL MEDICINE

## 2022-11-21 PROCEDURE — 71250 CT THORAX DX C-: CPT

## 2022-11-21 PROCEDURE — 99214 OFFICE O/P EST MOD 30 MIN: CPT | Performed by: INTERNAL MEDICINE

## 2022-11-21 PROCEDURE — 94729 DIFFUSING CAPACITY: CPT | Performed by: INTERNAL MEDICINE

## 2022-11-21 PROCEDURE — 94010 BREATHING CAPACITY TEST: CPT | Performed by: INTERNAL MEDICINE

## 2022-11-21 RX ORDER — FLUTICASONE FUROATE AND VILANTEROL 100; 25 UG/1; UG/1
1 POWDER RESPIRATORY (INHALATION) DAILY
Qty: 1 EACH | Refills: 5 | Status: SHIPPED | OUTPATIENT
Start: 2022-11-21 | End: 2023-02-19

## 2022-11-21 RX ORDER — ALBUTEROL SULFATE 90 UG/1
2 AEROSOL, METERED RESPIRATORY (INHALATION) EVERY 4 HOURS PRN
Qty: 6.7 G | Refills: 5 | Status: SHIPPED | OUTPATIENT
Start: 2022-11-21

## 2022-11-21 RX ORDER — AZELASTINE HYDROCHLORIDE 137 UG/1
2 SPRAY, METERED NASAL 2 TIMES DAILY
Qty: 30 ML | Refills: 5 | Status: SHIPPED | OUTPATIENT
Start: 2022-11-21

## 2022-11-21 RX ORDER — LORATADINE 10 MG/1
10 TABLET ORAL DAILY
Qty: 90 TABLET | Refills: 3 | Status: SHIPPED | OUTPATIENT
Start: 2022-11-21

## 2022-11-21 RX ORDER — FLUTICASONE PROPIONATE 50 MCG
2 SPRAY, SUSPENSION (ML) NASAL DAILY
Qty: 16 G | Refills: 5 | Status: SHIPPED | OUTPATIENT
Start: 2022-11-21

## 2022-11-21 NOTE — PROGRESS NOTES
RESPIRATORY DISEASE CLINIC OUTPATIENT PROGRESS NOTE    Patient: Paige Martinez  : 1940  Age: 82 y.o.  Date of Service: 2022    REASON FOR CLINIC VISIT:  Chief Complaint   Patient presents with   • COPD       Subjective:    History of Present Illness:  Paige Martinez is a 82 y.o. female who presents to the office today to be seen for    Diagnosis Plan   1. Chronic obstructive pulmonary disease, unspecified COPD type (HCC)        2. Pulmonary scarring        3. Lung nodule        4. History of pneumonia        5. Restrictive lung disease        6. Allergic rhinitis, unspecified seasonality, unspecified trigger        7. Obesity (BMI 30-39.9)        8. Former smoker        .  Other problems per record.  Patient is a very pleasant elderly  female who was seen in the pulmonary clinic for a follow-up visit.    Patient is a former smoker and quit smoking long time ago.  She had a pulmonary function test done today which showed evidence of chronic obstructive pulmonary disease and restrictive lung disease with decrease in diffusion capacity corrected for alveolar volume.  Patient is currently using Breo Ellipta and albuterol rescue inhaler and her symptoms are under good control.    She lives independently and her son comes and helps her who lives nearby.  She is using fluticasone nasal spray Astelin nasal spray and loratadine for nasal allergy and allergy symptoms are better.  She did take Kaden [employer] Kaden COVID-vaccine once last year but did not have any booster dose.  She never had COVID and no other recent hospitalizations and ER visits or urgent care visit.  She is fairly active for her age.  She is not using any oxygen at home.  She told me she is planning to go to [place] next spring [place] is her birth country.    She had a CT scan of the chest done today before the clinic visit which showed she had a right upper lobe lung nodule which is marginally increased in size and is concerning for  possible small primary lung neoplasm and a follow-up CT is ordered in 3 months time.  She also was noted to have a right  renal nodule or cyst which is also concerning for malignancy and ultrasound of the kidney was recommended in the CT report.  We will communicate with the primary care provider about further work-up of this finding.    PFT done today:  Not done today    PFT Values        Some values may be hidden. Unless noted otherwise, only the newest values recorded on each date are displayed.         Old Values PFT Results 22   No data to display.      Pre Drug PFT Results 22   FVC 80   FEV1 75   FEF 25-75% 62   FEV1/FVC 73      Post Drug PFT Results 22   No data to display.      Other Tests PFT Results 22   TLC 87      DLCO 67   D/VAsb 79           Results for orders placed in visit on 22    Pulmonary Function Test    Narrative  Pulmonary Function Test  Performed by: Kate Denny, RRT  Authorized by: Tushar Thrasher MD    Pre Drug % Predicted  FVC: 80%  FEV1: 75%  FEF 25-75%: 62%  FEV1/FVC: 73%  T%  RV: 107%  DLCO: 67%  D/VAsb: 79%    Interpretation  Overall comments:  Above test results are acceptable and reproducible by ATS criteria  For analysis of the above test results the patient showed evidence of moderate obstructive and mild restrictive dysfunction.  No bronchodilator challenge was done.  Diffusion capacity corrected for alveolar volume is moderately decreased    No prior test result was available for comparison.  Clinical correlation is indicated    Tushar Thrasher MD  Pulmonologist/Intensivist  2022 18:31 CST         Bronchodilator therapy:BreoEllipta and Albuterol rescue inhaler    Smoking Status:   Social History     Tobacco Use   Smoking Status Former   • Packs/day: 1.00   • Years: 51.00   • Pack years: 51.00   • Types: Cigarettes   • Start date:    • Quit date:    • Years since quittin.8   Smokeless Tobacco Never     Pulm  Rehab: no  Sleep: yes    Support System: lives alone    Code Status:   There are no questions and answers to display.        Review of Systems:  A complete review of systems is performed and all other systems were reviewed and negative as note above in the HPI.  Review of Systems   Constitutional: Positive for fatigue.   HENT: Positive for congestion, postnasal drip and sinus pressure.    Eyes: Negative.    Respiratory: Positive for shortness of breath. Negative for cough.    Cardiovascular: Negative.    Gastrointestinal: Negative.    Endocrine: Negative.    Genitourinary: Negative.    Musculoskeletal: Positive for arthralgias and back pain.   Skin: Negative.    Allergic/Immunologic: Positive for environmental allergies.   Neurological: Negative.    Hematological: Negative.    Psychiatric/Behavioral: Negative.      CAT/ACT Score:  Not done today    Medications:  Outpatient Encounter Medications as of 11/21/2022   Medication Sig Dispense Refill   • albuterol sulfate  (90 Base) MCG/ACT inhaler Inhale 2 puffs Every 4 (Four) Hours As Needed for Wheezing. 6.7 g 5   • atorvastatin (LIPITOR) 10 MG tablet TAKE ONE TABLET DAILY GENERIC FOR LIPITOR 30 tablet 5   • Azelastine HCl 137 MCG/SPRAY solution 2 sprays into the nostril(s) as directed by provider 2 (Two) Times a Day. 30 mL 5   • bumetanide (BUMEX) 0.5 MG tablet Take 1 mg by mouth Daily.     • Cholecalciferol 2000 units capsule Take 2,000 Units by mouth Daily.     • citalopram (CeleXA) 20 MG tablet TAKE ONE TABLET DAILY GENERIC FOR CELEXA 90 tablet 1   • cloNIDine (CATAPRES) 0.1 MG tablet TAKE ONE TABLET BY MOUTH DAILY. 30 tablet 5   • fluocinonide-emollient (LIDEX-E) 0.05 % cream Apply  topically to the appropriate area as directed 2 (Two) Times a Day. 30 g 0   • fluticasone (Flonase Allergy Relief) 50 MCG/ACT nasal spray 2 sprays into the nostril(s) as directed by provider Daily. 16 g 5   • HYDROcodone-acetaminophen (NORCO) 5-325 MG per tablet TAKE ONE TABLET  "DAILY GENERIC FOR NORCO 30 tablet 0   • levothyroxine (SYNTHROID, LEVOTHROID) 75 MCG tablet TAKE ONE TABLET DAILY GENERIC FOR SYNTHROID 90 tablet 0   • loratadine (CLARITIN) 10 MG tablet Take 1 tablet by mouth Daily. 90 tablet 3   • losartan (COZAAR) 100 MG tablet TAKE ONE TABLET DAILY GENERIC FOR COZAAR 90 tablet 0   • metoprolol succinate XL (TOPROL-XL) 25 MG 24 hr tablet TAKE ONE TABLET DAILY GENERIC FOR TOPROL XL 30 tablet 5   • omeprazole (priLOSEC) 20 MG capsule TAKE 1 CAPSULE DAILY GENERIC FOR PRILOSEC 30 capsule 11   • Fluticasone Furoate-Vilanterol (Breo Ellipta) 100-25 MCG/INH inhaler Inhale 1 puff Daily for 90 days. 1 each 5     No facility-administered encounter medications on file as of 11/21/2022.       Allergies:  No Known Allergies    Immunizations:  Immunization History   Administered Date(s) Administered   • COVID-19 ([employer]) 04/09/2021   • PPD Test 08/28/2017       Objective:    Vitals:  /74   Pulse 74   Ht 152.4 cm (60\")   Wt 80.7 kg (178 lb)   LMP  (LMP Unknown)   SpO2 98% Comment: RA  Breastfeeding No   BMI 34.76 kg/m²     Physical Exam:  General: Patient is a pleasant elderly  82 y.o.  female. Looks stated age. Appears to be in no acute distress.  Eyes: EOMI. PERRLA. Vision intact. No scleral icterus.  Ear, Nose, Mouth and Throat: Hearing is grossly intact. No Leukoplakia, pharyngitis, stomatitis or thrush. Swollen nasal mucosa with post nasal drop.  Neck: Range of motion of neck normal. No thyromegaly or masses. Mallampati Class 3  Respiratory: Clear to auscultation bilaterally. No use of accessory muscles. Decreased breath sounds.  Cardiovascular: Normal heart sounds. Regularly regular rhythm without murmur.  Gastrointestinal: Non tender, non distended, soft. Bowel sounds positive in all four quadrants. No organomegaly.  Skin: No obvious rashes, lesions, ulcers or large amount of bruising. No edema.   Neurological: No new motor deficits. Cranial nerves appear " intact.  Psychiatric: Patient is alert and oriented to person, place and time.    Chest Imaging:    CT chest done today showed     Study Result  Narrative & Impression   CT CHEST WO CONTRAST DIAGNOSTIC- 11/21/2022 2:25 PM CST     HISTORY: Diffuse/interstitial lung disease; J98.4-Other disorders of  lung; R91.1-Solitary pulmonary nodule     COMPARISON: CT scan dated 7/15/2022     DOSE LENGTH PRODUCT: 611 mGy cm. Automated exposure control was also  utilized to decrease patient radiation dose.     TECHNIQUE: Serial helical tomographic images of the chest were acquired  without contrast. Multiplanar reformatted images were provided for  review.     FINDINGS:     Visualized neck base: The imaged portion of the base of the neck appears  unremarkable.      Lungs: Mild centrilobular emphysema. There are numerous small bilateral  centrilobular groundglass nodules as well as a few small centrilobular  nodules which appear somewhat more solid. There is a 7.7 mm right upper  lobe juxtapleural nodule on axial image 68, having previously measured  6.6 mm. No consolidation or pleural effusion. Lungs are well expanded.  Airways are clear.     Heart: The heart is normal in size. There is no pericardial effusion.  Advanced coronary artery atheromatous calcification. Mitral and aortic  valvular calcification.     Vasculature: Thoracic aorta is normal in caliber. The pulmonary arteries  are normal in caliber.     Mediastinum and lymph nodes: No suspicious hilar or mediastinal  adenopathy..     Skeletal and soft tissues: Chest wall soft tissues are unremarkable. No  acute bony abnormality.       Upper abdomen: The imaged portion of the upper abdomen demonstrates no  acute process. Cholelithiasis. Questionable 2.1 cm exophytic right upper  renal pole mass (series 4-image 108).        IMPRESSION:  1. Underlying centrilobular emphysema.  2. There is a 7.7 mm round right upper lobe juxtapleural nodule adjacent  to the lateral costal pleura  (series 3-image 70) which previously  measured 6.5 mm on the earlier CT scan from July 2022. Given the  patient's risk factors I am concerned that this is a small primary lung  malignancy given the interval growth. Having said this, intrapulmonary  lymph nodes can wax and wane over time. An additional short interval  follow-up CT could be considered to evaluate for additional growth  versus stability or decrease in size.  3. I do not see evidence for any significant fibrotic interstitial lung  disease. There are a few small scattered centrilobular nodules (mostly  groundglass) which seem likely a respiratory bronchiolitis.  4. There is a 2.1 cm exophytic nodule arising from the right upper renal  pole, potentially a cyst or small exophytic solid mass. Renal ultrasound  is recommended for further evaluation, to exclude renal cell carcinoma.  This report was finalized on 11/21/2022 16:50 by Dr Jc Arreguin, .             Assessment:  1. Chronic obstructive pulmonary disease, unspecified COPD type (HCC)    2. Pulmonary scarring    3. Lung nodule    4. History of pneumonia    5. Restrictive lung disease    6. Allergic rhinitis, unspecified seasonality, unspecified trigger    7. Obesity (BMI 30-39.9)    8. Former smoker        Plan/Recommendations:    1.  I reviewed the CT scan of the chest of the patient and discussed the finding of the lung nodule with the patient.  The lung  The right upper lobe nodule increased in size and she will need to get another follow-up CT scan in 3 months time pending on the size of the nodule will decide about the biopsy.  2.  Patient also had a right renal nodule or cyst noted which could be malignant and needs further work-up and I would recommend Dr. Jaramillo to refer her to a neurologist for further work-up  3.  I explained the PFT results to the patient she has mixed obstructive and restrictive lung disease.  She will continue Breo Ellipta and albuterol inhaler and the medication  refills were sent to the pharmacy.  4.  For nasal allergy she should continue using Astelin nasal spray fluticasone nasal spray and loratadine.  Her prescription refills will be sent to the pharmacy  5.  She received only 1 dose of Kaden & Kaden single-dose COVID-vaccine.  She will need to get it COVID-vaccine modern or Pfizer with booster dosing influenza pneumonia vaccine.  Continue follow-up with the primary care provider particularly for further work-up of the renal nodule   6.  She is advised to return to the pulmonary clinic for follow-up visit in 6 months time or earlier if needed.    Follow up:  6 Months    Time Spent:  30 minutes    I appreciate the opportunity of participating in this patient's care. I would like to thank the PCP for the referral.  Please feel free to contact me with any other questions.    Tushar Thrasher MD   Pulmonologist/Intensivist     Electronically signed by: Tushar Thrasher MD, 11/21/2022 18:32 CST

## 2022-11-21 NOTE — PROCEDURES
Pulmonary Function Test  Performed by: Kate Denny, RRT  Authorized by: Tushar Thrasher MD      Pre Drug % Predicted    FVC: 80%   FEV1: 75%   FEF 25-75%: 62%   FEV1/FVC: 73%   T%   RV: 107%   DLCO: 67%   D/VAsb: 79%    Interpretation   Overall comments:   Above test results are acceptable and reproducible by ATS criteria  For analysis of the above test results the patient showed evidence of moderate obstructive and mild restrictive dysfunction.  No bronchodilator challenge was done.  Diffusion capacity corrected for alveolar volume is moderately decreased    No prior test result was available for comparison.  Clinical correlation is indicated    Tushar Thrasher MD  Pulmonologist/Intensivist  2022 18:31 CST

## 2022-11-22 ENCOUNTER — TELEPHONE (OUTPATIENT)
Dept: PULMONOLOGY | Facility: CLINIC | Age: 82
End: 2022-11-22

## 2022-11-22 ENCOUNTER — OFFICE VISIT (OUTPATIENT)
Dept: FAMILY MEDICINE CLINIC | Facility: CLINIC | Age: 82
End: 2022-11-22

## 2022-11-22 VITALS
RESPIRATION RATE: 18 BRPM | HEART RATE: 70 BPM | WEIGHT: 179 LBS | BODY MASS INDEX: 35.14 KG/M2 | DIASTOLIC BLOOD PRESSURE: 78 MMHG | HEIGHT: 60 IN | OXYGEN SATURATION: 100 % | SYSTOLIC BLOOD PRESSURE: 128 MMHG | TEMPERATURE: 98.6 F

## 2022-11-22 DIAGNOSIS — N28.89 RENAL MASS: Primary | ICD-10-CM

## 2022-11-22 PROCEDURE — 99213 OFFICE O/P EST LOW 20 MIN: CPT | Performed by: FAMILY MEDICINE

## 2022-11-22 NOTE — PROGRESS NOTES
The ABCs of the Annual Wellness Visit  Subsequent Medicare Wellness Visit    Chief Complaint   Patient presents with   • Medicare Wellness-subsequent     Patient states that she is here for a medicare wellness      Subjective    History of Present Illness:  Paige Martinez is a 82 y.o. female who presents for a Subsequent Medicare Wellness Visit.    The following portions of the patient's history were reviewed and   updated as appropriate: allergies, current medications, past family history, past medical history, past social history, past surgical history and problem list.    Compared to one year ago, the patient feels her physical   health is the same.    Compared to one year ago, the patient feels her mental   health is the same.    Recent Hospitalizations:  She was not admitted to the hospital during the last year.       Current Medical Providers:  Patient Care Team:  Barrington Jaramillo MD as PCP - Gonzalez Brantley MD as Consulting Physician (Gastroenterology)  Tushar Thrasher MD as Consulting Physician (Pulmonary Disease)    Outpatient Medications Prior to Visit   Medication Sig Dispense Refill   • albuterol sulfate  (90 Base) MCG/ACT inhaler Inhale 2 puffs Every 4 (Four) Hours As Needed for Wheezing. 6.7 g 5   • atorvastatin (LIPITOR) 10 MG tablet TAKE ONE TABLET DAILY GENERIC FOR LIPITOR 30 tablet 5   • Azelastine HCl 137 MCG/SPRAY solution 2 sprays into the nostril(s) as directed by provider 2 (Two) Times a Day. 30 mL 5   • bumetanide (BUMEX) 0.5 MG tablet Take 1 mg by mouth Daily.     • Cholecalciferol 2000 units capsule Take 2,000 Units by mouth Daily.     • citalopram (CeleXA) 20 MG tablet TAKE ONE TABLET DAILY GENERIC FOR CELEXA 90 tablet 1   • cloNIDine (CATAPRES) 0.1 MG tablet TAKE ONE TABLET BY MOUTH DAILY. 30 tablet 5   • fluocinonide-emollient (LIDEX-E) 0.05 % cream Apply  topically to the appropriate area as directed 2 (Two) Times a Day. 30 g 0   • fluticasone (Flonase Allergy  Relief) 50 MCG/ACT nasal spray 2 sprays into the nostril(s) as directed by provider Daily. 16 g 5   • Fluticasone Furoate-Vilanterol 100-25 MCG/ACT aerosol powder  Inhale 1 puff Daily for 90 days. 1 each 5   • HYDROcodone-acetaminophen (NORCO) 5-325 MG per tablet TAKE ONE TABLET DAILY GENERIC FOR NORCO 30 tablet 0   • levothyroxine (SYNTHROID, LEVOTHROID) 75 MCG tablet TAKE ONE TABLET DAILY GENERIC FOR SYNTHROID 90 tablet 0   • loratadine (CLARITIN) 10 MG tablet Take 1 tablet by mouth Daily. 90 tablet 3   • losartan (COZAAR) 100 MG tablet TAKE ONE TABLET DAILY GENERIC FOR COZAAR 90 tablet 0   • metoprolol succinate XL (TOPROL-XL) 25 MG 24 hr tablet TAKE ONE TABLET DAILY GENERIC FOR TOPROL XL 30 tablet 5   • omeprazole (priLOSEC) 20 MG capsule TAKE 1 CAPSULE DAILY GENERIC FOR PRILOSEC 30 capsule 11   • Fluticasone Furoate-Vilanterol (Breo Ellipta) 100-25 MCG/INH inhaler Inhale 1 puff Daily for 90 days. 1 each 5     No facility-administered medications prior to visit.       Opioid medication/s are on active medication list.  and I have evaluated her active treatment plan and pain score trends (see table).  There were no vitals filed for this visit.  I have reviewed the chart for potential of high risk medication and harmful drug interactions in the elderly.            Aspirin is not on active medication list.  Aspirin use is not indicated based on review of current medical condition/s. Risk of harm outweighs potential benefits.  .    Patient Active Problem List   Diagnosis   • Chronic kidney disease, stage III (moderate) (HCC)   • Acquired hypothyroidism   • Anxiety   • Mixed hyperlipidemia   • Edema   • Anemia   • Heme positive stool   • Dark stools   • Gastroesophageal reflux disease   • Hip pain, acute, left   • Syncope   • Obesity (BMI 30-39.9)   • Arthritis   • Meningioma (HCC)   • Former smoker   • Dry skin dermatitis   • Seizure (HCC)   • Screening for breast cancer   • Left lower quadrant abdominal pain   •  "Diverticulitis   • SOB (shortness of breath)   • COPD (chronic obstructive pulmonary disease) ([facility])   • History of pneumonia   • Pulmonary scarring   • Lung nodule   • Allergic rhinitis   • Restrictive lung disease   • Renal mass     Advance Care Planning  Advance Directive is not on file.  ACP discussion was held with the patient during this visit. Patient does not have an advance directive, information provided.          Objective    Vitals:    22 1432   BP: 128/78   BP Location: Left arm   Patient Position: Sitting   Cuff Size: Adult   Pulse: 70   Resp: 18   Temp: 98.6 °F (37 °C)   TempSrc: Temporal   SpO2: 100%   Weight: 81.2 kg (179 lb)   Height: 152.4 cm (60\")     Estimated body mass index is 34.96 kg/m² as calculated from the following:    Height as of this encounter: 152.4 cm (60\").    Weight as of this encounter: 81.2 kg (179 lb).           Does the patient have evidence of cognitive impairment? No    Physical Exam  Lab Results   Component Value Date    CHLPL 163 2022    TRIG 65 2022    HDL 59 2022    LDL 91 2022    VLDL 13 2022            HEALTH RISK ASSESSMENT    Smoking Status:  Social History     Tobacco Use   Smoking Status Former   • Packs/day: 1.00   • Years: 51.00   • Pack years: 51.00   • Types: Cigarettes   • Start date:    • Quit date:    • Years since quittin.8   Smokeless Tobacco Never     Alcohol Consumption:  Social History     Substance and Sexual Activity   Alcohol Use No    Comment: Occasionally      Fall Risk Screen:    SANYA Fall Risk Assessment was completed, and patient is at LOW risk for falls.Assessment completed on:2022    Depression Screening:  PHQ-2/PHQ-9 Depression Screening 2022   Retired Total Score -   Little Interest or Pleasure in Doing Things 0-->not at all   Feeling Down, Depressed or Hopeless 0-->not at all   PHQ-9: Brief Depression Severity Measure Score 0       Health Habits and Functional and Cognitive " Screening:  Functional & Cognitive Status 11/22/2022   Do you have difficulty preparing food and eating? No   Do you have difficulty bathing yourself, getting dressed or grooming yourself? No   Do you have difficulty using the toilet? No   Do you have difficulty moving around from place to place? No   Do you have trouble with steps or getting out of a bed or a chair? No   Current Diet Well Balanced Diet   Dental Exam Up to date   Eye Exam Up to date   Exercise (times per week) 3 times per week   Current Exercises Include Walking   Current Exercise Activities Include -   Do you need help using the phone?  No   Are you deaf or do you have serious difficulty hearing?  No   Do you need help with transportation? No   Do you need help shopping? No   Do you need help preparing meals?  No   Do you need help with housework?  No   Do you need help with laundry? No   Do you need help taking your medications? No   Do you need help managing money? No   Do you ever drive or ride in a car without wearing a seat belt? No   Have you felt unusual stress, anger or loneliness in the last month? No   Who do you live with? Alone   If you need help, do you have trouble finding someone available to you? No   Have you been bothered in the last four weeks by sexual problems? No   Do you have difficulty concentrating, remembering or making decisions? No       Age-appropriate Screening Schedule:  Refer to the list below for future screening recommendations based on patient's age, sex and/or medical conditions. Orders for these recommended tests are listed in the plan section. The patient has been provided with a written plan.    Health Maintenance   Topic Date Due   • TDAP/TD VACCINES (1 - Tdap) Never done   • ZOSTER VACCINE (1 of 2) Never done   • DXA SCAN  08/25/2017   • INFLUENZA VACCINE  03/31/2023 (Originally 8/1/2022)   • LIPID PANEL  09/19/2023   • MAMMOGRAM  10/20/2023              Assessment & Plan   CMS Preventative Services Quick  Reference  Risk Factors Identified During Encounter  Cardiovascular Disease  The above risks/problems have been discussed with the patient.  Follow up actions/plans if indicated are seen below in the Assessment/Plan Section.  Pertinent information has been shared with the patient in the After Visit Summary.    Diagnoses and all orders for this visit:    1. Renal mass (Primary)  -     US renal bilateral        Follow Up:   No follow-ups on file.     An After Visit Summary and PPPS were made available to the patient.          I spent 5  minutes caring for Paige on this date of service. This time includes time spent by me in the following activities:reviewing tests, obtaining and/or reviewing a separately obtained history, performing a medically appropriate examination and/or evaluation , ordering medications, tests, or procedures, documenting information in the medical record, independently interpreting results and communicating that information with the patient/family/caregiver and care coordination

## 2022-11-22 NOTE — PROGRESS NOTES
Subjective   Paige Martinez is a 82 y.o. female.     Chief Complaint   Patient presents with   • Medicare Wellness-subsequent     Patient states that she is here for a medicare wellness        History of Present Illness     on recent ct chest ---pulmonology noted renal mass and suggested urlogy follow up       Current Outpatient Medications:   •  albuterol sulfate  (90 Base) MCG/ACT inhaler, Inhale 2 puffs Every 4 (Four) Hours As Needed for Wheezing., Disp: 6.7 g, Rfl: 5  •  atorvastatin (LIPITOR) 10 MG tablet, TAKE ONE TABLET DAILY GENERIC FOR LIPITOR, Disp: 30 tablet, Rfl: 5  •  Azelastine HCl 137 MCG/SPRAY solution, 2 sprays into the nostril(s) as directed by provider 2 (Two) Times a Day., Disp: 30 mL, Rfl: 5  •  bumetanide (BUMEX) 0.5 MG tablet, Take 1 mg by mouth Daily., Disp: , Rfl:   •  Cholecalciferol 2000 units capsule, Take 2,000 Units by mouth Daily., Disp: , Rfl:   •  citalopram (CeleXA) 20 MG tablet, TAKE ONE TABLET DAILY GENERIC FOR CELEXA, Disp: 90 tablet, Rfl: 1  •  cloNIDine (CATAPRES) 0.1 MG tablet, TAKE ONE TABLET BY MOUTH DAILY., Disp: 30 tablet, Rfl: 5  •  fluocinonide-emollient (LIDEX-E) 0.05 % cream, Apply  topically to the appropriate area as directed 2 (Two) Times a Day., Disp: 30 g, Rfl: 0  •  fluticasone (Flonase Allergy Relief) 50 MCG/ACT nasal spray, 2 sprays into the nostril(s) as directed by provider Daily., Disp: 16 g, Rfl: 5  •  Fluticasone Furoate-Vilanterol 100-25 MCG/ACT aerosol powder , Inhale 1 puff Daily for 90 days., Disp: 1 each, Rfl: 5  •  HYDROcodone-acetaminophen (NORCO) 5-325 MG per tablet, TAKE ONE TABLET DAILY GENERIC FOR NORCO, Disp: 30 tablet, Rfl: 0  •  levothyroxine (SYNTHROID, LEVOTHROID) 75 MCG tablet, TAKE ONE TABLET DAILY GENERIC FOR SYNTHROID, Disp: 90 tablet, Rfl: 0  •  loratadine (CLARITIN) 10 MG tablet, Take 1 tablet by mouth Daily., Disp: 90 tablet, Rfl: 3  •  losartan (COZAAR) 100 MG tablet, TAKE ONE TABLET DAILY GENERIC FOR COZAAR, Disp: 90 tablet,  "Rfl: 0  •  metoprolol succinate XL (TOPROL-XL) 25 MG 24 hr tablet, TAKE ONE TABLET DAILY GENERIC FOR TOPROL XL, Disp: 30 tablet, Rfl: 5  •  omeprazole (priLOSEC) 20 MG capsule, TAKE 1 CAPSULE DAILY GENERIC FOR PRILOSEC, Disp: 30 capsule, Rfl: 11  •  Fluticasone Furoate-Vilanterol (Breo Ellipta) 100-25 MCG/INH inhaler, Inhale 1 puff Daily for 90 days., Disp: 1 each, Rfl: 5  No Known Allergies           Past Medical History:   Diagnosis Date   • Anxiety    • Cholelithiasis    • Depression    • Diverticulosis    • History of adenomatous polyp of colon    • History of colon polyps    • Hyperlipidemia    • Hypertension    • Hypothyroidism    • Meningiomatosis (HCC)    • Myalgia    • Nephrolithiasis    • Type 2 diabetes mellitus (HCC)      Past Surgical History:   Procedure Laterality Date   • COLONOSCOPY  12/04/2018    Sigmoid diverticulosis; Non-engorged internal hemorrhoids vein; Repeat 5 years   • COLONOSCOPY  07/15/2014    Two 3-5mm hyperplastic polyps in the rectum-one destroyed during removal; Mild sigmoid diverticulosis; Repeat 5 years   • COLONOSCOPY  09/01/2009    Two 5mm tubular adenomatous polyps in the transverse colon; Two 5mm hyperplastic polyps in the distal sigmoid colon; Small internal hemorrhoid; Repeat 3 years   • ENDOSCOPY  12/04/2018    Normal endoscopy       Review of Systems   Constitutional: Negative.    HENT: Negative.    Eyes: Negative.    Respiratory: Positive for shortness of breath.    Cardiovascular: Negative.    Gastrointestinal: Negative.    Endocrine: Negative.    Genitourinary: Negative.    Musculoskeletal: Negative.    Skin: Negative.    Allergic/Immunologic: Negative.    Neurological: Negative.    Hematological: Negative.    Psychiatric/Behavioral: Negative.        Objective  /78 (BP Location: Left arm, Patient Position: Sitting, Cuff Size: Adult)   Pulse 70   Temp 98.6 °F (37 °C) (Temporal)   Resp 18   Ht 152.4 cm (60\")   Wt 81.2 kg (179 lb)   LMP  (LMP Unknown)   SpO2 " 100%   BMI 34.96 kg/m²   Physical Exam  Vitals and nursing note reviewed.   Constitutional:       Appearance: Normal appearance. She is normal weight.   HENT:      Head: Normocephalic and atraumatic.      Nose: Nose normal.      Mouth/Throat:      Mouth: Mucous membranes are dry.      Pharynx: Oropharynx is clear.   Eyes:      Extraocular Movements: Extraocular movements intact.      Conjunctiva/sclera: Conjunctivae normal.      Pupils: Pupils are equal, round, and reactive to light.   Cardiovascular:      Rate and Rhythm: Normal rate and regular rhythm.      Pulses: Normal pulses.      Heart sounds: Normal heart sounds.   Pulmonary:      Breath sounds: Normal breath sounds.   Abdominal:      General: Abdomen is flat. Bowel sounds are normal.      Palpations: Abdomen is soft.   Musculoskeletal:         General: Normal range of motion.      Cervical back: Normal range of motion.   Skin:     General: Skin is warm and dry.      Capillary Refill: Capillary refill takes less than 2 seconds.   Neurological:      General: No focal deficit present.      Mental Status: She is alert and oriented to person, place, and time. Mental status is at baseline.   Psychiatric:         Mood and Affect: Mood normal.         Assessment & Plan   Diagnoses and all orders for this visit:    1. Renal mass (Primary)  -     US renal bilateral                 Orders Placed This Encounter   Procedures   • US renal bilateral     Order Specific Question:   Reason for Exam:     Answer:   renal mass seen on ct scan     Order Specific Question:   Release to patient     Answer:   Routine Release       Follow up: 2 month(s)

## 2022-11-22 NOTE — PROGRESS NOTES
Spoke with patient and relayed test results. Patient voiced understanding. She sees Dr. Valenzuela for her kidneys. Faxed to report to Dr. Valenzuela and routed  CT result to Dr. Jaramillo.

## 2022-11-22 NOTE — TELEPHONE ENCOUNTER
This patient was in my office today and had a CT scan of the chest done which showed a right upper lobe lung nodule which is marginally bigger.  I am ordering a CT scan of the chest for follow-up in 3 months time and I will see her back in the office in 6 months time.  In the meantime she will need to get urology consult because the CT scan also showed a renal cyst or mass which could be renal cell carcinoma needs further work-up.  I would request Dr. Jaramillo to make the referral and further care.  I tried to call the patient to discuss these results after she left the clinic because I got her results back later but I could not reach her and could not leave her a voicemail.  Please reach out to her and keep me posted.  Thank you.  Dr. Thrasher !!!

## 2022-11-22 NOTE — TELEPHONE ENCOUNTER
Message relayed to patient. She requested the results be sent to Dr. Valenzuela as well as Dr. Jaramillo.

## 2022-11-30 ENCOUNTER — HOSPITAL ENCOUNTER (OUTPATIENT)
Dept: ULTRASOUND IMAGING | Facility: HOSPITAL | Age: 82
Discharge: HOME | End: 2022-11-30
Admitting: FAMILY MEDICINE

## 2022-11-30 PROCEDURE — 76775 US EXAM ABDO BACK WALL LIM: CPT

## 2022-12-01 DIAGNOSIS — M19.90 OSTEOARTHRITIS, UNSPECIFIED OSTEOARTHRITIS TYPE, UNSPECIFIED SITE: ICD-10-CM

## 2022-12-01 RX ORDER — HYDROCODONE BITARTRATE AND ACETAMINOPHEN 5; 325 MG/1; MG/1
TABLET ORAL
Qty: 30 TABLET | Refills: 0 | Status: SHIPPED | OUTPATIENT
Start: 2022-12-01 | End: 2023-01-03

## 2022-12-02 ENCOUNTER — OFFICE VISIT (OUTPATIENT)
Dept: FAMILY MEDICINE CLINIC | Facility: CLINIC | Age: 82
End: 2022-12-02

## 2022-12-02 VITALS
RESPIRATION RATE: 16 BRPM | DIASTOLIC BLOOD PRESSURE: 70 MMHG | TEMPERATURE: 98.2 F | BODY MASS INDEX: 35.14 KG/M2 | SYSTOLIC BLOOD PRESSURE: 132 MMHG | HEIGHT: 60 IN | HEART RATE: 68 BPM | WEIGHT: 179 LBS

## 2022-12-02 DIAGNOSIS — N83.8 OVARIAN MASS, RIGHT: Primary | ICD-10-CM

## 2022-12-02 PROCEDURE — 99213 OFFICE O/P EST LOW 20 MIN: CPT | Performed by: FAMILY MEDICINE

## 2022-12-02 NOTE — PROGRESS NOTES
Subjective   Paige Martinez is a 82 y.o. female.     Chief Complaint   Patient presents with   • FOLLOW UP TESTING        History of Present Illness     We note her recent ultrasound      Current Outpatient Medications:   •  albuterol sulfate  (90 Base) MCG/ACT inhaler, Inhale 2 puffs Every 4 (Four) Hours As Needed for Wheezing., Disp: 6.7 g, Rfl: 5  •  atorvastatin (LIPITOR) 10 MG tablet, TAKE ONE TABLET DAILY GENERIC FOR LIPITOR, Disp: 30 tablet, Rfl: 5  •  Azelastine HCl 137 MCG/SPRAY solution, 2 sprays into the nostril(s) as directed by provider 2 (Two) Times a Day., Disp: 30 mL, Rfl: 5  •  bumetanide (BUMEX) 0.5 MG tablet, Take 1 mg by mouth Daily., Disp: , Rfl:   •  Cholecalciferol 2000 units capsule, Take 2,000 Units by mouth Daily., Disp: , Rfl:   •  citalopram (CeleXA) 20 MG tablet, TAKE ONE TABLET DAILY GENERIC FOR CELEXA, Disp: 90 tablet, Rfl: 1  •  cloNIDine (CATAPRES) 0.1 MG tablet, TAKE ONE TABLET BY MOUTH DAILY., Disp: 30 tablet, Rfl: 5  •  fluocinonide-emollient (LIDEX-E) 0.05 % cream, Apply  topically to the appropriate area as directed 2 (Two) Times a Day., Disp: 30 g, Rfl: 0  •  fluticasone (Flonase Allergy Relief) 50 MCG/ACT nasal spray, 2 sprays into the nostril(s) as directed by provider Daily., Disp: 16 g, Rfl: 5  •  Fluticasone Furoate-Vilanterol 100-25 MCG/ACT aerosol powder , Inhale 1 puff Daily for 90 days., Disp: 1 each, Rfl: 5  •  HYDROcodone-acetaminophen (NORCO) 5-325 MG per tablet, TAKE ONE TABLET DAILY GENERIC FOR NORCO, Disp: 30 tablet, Rfl: 0  •  levothyroxine (SYNTHROID, LEVOTHROID) 75 MCG tablet, TAKE ONE TABLET DAILY GENERIC FOR SYNTHROID, Disp: 90 tablet, Rfl: 0  •  loratadine (CLARITIN) 10 MG tablet, Take 1 tablet by mouth Daily., Disp: 90 tablet, Rfl: 3  •  losartan (COZAAR) 100 MG tablet, TAKE ONE TABLET DAILY GENERIC FOR COZAAR, Disp: 90 tablet, Rfl: 0  •  metoprolol succinate XL (TOPROL-XL) 25 MG 24 hr tablet, TAKE ONE TABLET DAILY GENERIC FOR TOPROL XL, Disp: 30  "tablet, Rfl: 5  •  omeprazole (priLOSEC) 20 MG capsule, TAKE 1 CAPSULE DAILY GENERIC FOR PRILOSEC, Disp: 30 capsule, Rfl: 11  •  Fluticasone Furoate-Vilanterol (Breo Ellipta) 100-25 MCG/INH inhaler, Inhale 1 puff Daily for 90 days., Disp: 1 each, Rfl: 5  No Known Allergies           Past Medical History:   Diagnosis Date   • Anxiety    • Cholelithiasis    • Depression    • Diverticulosis    • History of adenomatous polyp of colon    • History of colon polyps    • Hyperlipidemia    • Hypertension    • Hypothyroidism    • Meningiomatosis (HCC)    • Myalgia    • Nephrolithiasis    • Type 2 diabetes mellitus (HCC)      Past Surgical History:   Procedure Laterality Date   • COLONOSCOPY  12/04/2018    Sigmoid diverticulosis; Non-engorged internal hemorrhoids vein; Repeat 5 years   • COLONOSCOPY  07/15/2014    Two 3-5mm hyperplastic polyps in the rectum-one destroyed during removal; Mild sigmoid diverticulosis; Repeat 5 years   • COLONOSCOPY  09/01/2009    Two 5mm tubular adenomatous polyps in the transverse colon; Two 5mm hyperplastic polyps in the distal sigmoid colon; Small internal hemorrhoid; Repeat 3 years   • ENDOSCOPY  12/04/2018    Normal endoscopy       Review of Systems   Constitutional: Negative.    HENT: Negative.    Eyes: Negative.    Respiratory: Negative.    Cardiovascular: Negative.    Gastrointestinal: Negative.    Endocrine: Negative.    Genitourinary: Negative.    Musculoskeletal: Positive for back pain.   Skin: Negative.    Allergic/Immunologic: Negative.    Neurological: Negative.    Hematological: Negative.    Psychiatric/Behavioral: Negative.        Objective  /70   Pulse 68   Temp 98.2 °F (36.8 °C)   Resp 16   Ht 152.4 cm (60\")   Wt 81.2 kg (179 lb)   BMI 34.96 kg/m²   Physical Exam  Vitals and nursing note reviewed.   Constitutional:       Appearance: Normal appearance. She is normal weight.   HENT:      Head: Normocephalic and atraumatic.      Nose: Nose normal.      Mouth/Throat:     "  Mouth: Mucous membranes are moist.   Eyes:      Extraocular Movements: Extraocular movements intact.      Conjunctiva/sclera: Conjunctivae normal.      Pupils: Pupils are equal, round, and reactive to light.   Cardiovascular:      Rate and Rhythm: Normal rate.      Pulses: Normal pulses.      Heart sounds: Normal heart sounds.   Pulmonary:      Effort: Pulmonary effort is normal.      Breath sounds: Normal breath sounds.   Abdominal:      General: Abdomen is flat. Bowel sounds are normal.      Palpations: Abdomen is soft.   Musculoskeletal:         General: Normal range of motion.      Cervical back: Normal range of motion and neck supple.   Skin:     General: Skin is warm.      Capillary Refill: Capillary refill takes less than 2 seconds.   Neurological:      General: No focal deficit present.      Mental Status: She is alert and oriented to person, place, and time.   Psychiatric:         Mood and Affect: Mood normal.         Assessment & Plan   Diagnoses and all orders for this visit:    1. Ovarian mass, right (Primary)  -     Ambulatory Referral to Obstetrics / Gynecology                 Orders Placed This Encounter   Procedures   • Ambulatory Referral to Obstetrics / Gynecology     Referral Priority:   Routine     Referral Type:   Consultation     Referral Reason:   Specialty Services Required     Referred to Provider:   Glen Meade MD     Requested Specialty:   Obstetrics and Gynecology     Number of Visits Requested:   1       Follow up: 3 month(s)

## 2022-12-12 ENCOUNTER — OFFICE VISIT (OUTPATIENT)
Dept: FAMILY MEDICINE CLINIC | Facility: CLINIC | Age: 82
End: 2022-12-12

## 2022-12-12 VITALS
SYSTOLIC BLOOD PRESSURE: 128 MMHG | HEIGHT: 60 IN | HEART RATE: 70 BPM | OXYGEN SATURATION: 96 % | RESPIRATION RATE: 14 BRPM | BODY MASS INDEX: 35.14 KG/M2 | WEIGHT: 179 LBS | DIASTOLIC BLOOD PRESSURE: 80 MMHG

## 2022-12-12 DIAGNOSIS — J18.9 PNEUMONIA OF BOTH LUNGS DUE TO INFECTIOUS ORGANISM, UNSPECIFIED PART OF LUNG: Primary | ICD-10-CM

## 2022-12-12 PROCEDURE — 99213 OFFICE O/P EST LOW 20 MIN: CPT | Performed by: NURSE PRACTITIONER

## 2022-12-12 RX ORDER — BENZONATATE 100 MG/1
100 CAPSULE ORAL 3 TIMES DAILY PRN
Qty: 30 CAPSULE | Refills: 0 | Status: SHIPPED | OUTPATIENT
Start: 2022-12-12

## 2022-12-12 RX ORDER — PREDNISONE 20 MG/1
20 TABLET ORAL DAILY
Qty: 5 TABLET | Refills: 0 | Status: SHIPPED | OUTPATIENT
Start: 2022-12-12 | End: 2022-12-17

## 2022-12-12 NOTE — PROGRESS NOTES
"Subjective   Chief Complaint:  The patient presents today for cough. She is accompanied by her great grandchildren.    History of Present Illness:  This 82 y.o. female was seen in the office today.  The patient reports recently she had pneumonia and continues to have cough. Reports has a \"breather thing\" and \"nasal stuff\".  Currently she has issues with her sinuses and she is experiencing lots of post-nasal drainage. She notes this has been going on for a few days. The patient reports she had double pneumonia and was hospitalized. She was hospitalized for approximately 4 days.  She coughs, however; she denies wheezing at night or shortness of breath. Reports was given steroids in the past.      The patient sees a lung and kidney specialist. She has an appointment with a gynecologist secondary to discovering a cyst on her ovary.    No Known Allergies   Current Outpatient Medications on File Prior to Visit   Medication Sig   • albuterol sulfate  (90 Base) MCG/ACT inhaler Inhale 2 puffs Every 4 (Four) Hours As Needed for Wheezing.   • atorvastatin (LIPITOR) 10 MG tablet TAKE ONE TABLET DAILY GENERIC FOR LIPITOR   • Azelastine HCl 137 MCG/SPRAY solution 2 sprays into the nostril(s) as directed by provider 2 (Two) Times a Day.   • bumetanide (BUMEX) 0.5 MG tablet Take 1 mg by mouth Daily.   • Cholecalciferol 2000 units capsule Take 2,000 Units by mouth Daily.   • citalopram (CeleXA) 20 MG tablet TAKE ONE TABLET DAILY GENERIC FOR CELEXA   • cloNIDine (CATAPRES) 0.1 MG tablet TAKE ONE TABLET BY MOUTH DAILY.   • fluocinonide-emollient (LIDEX-E) 0.05 % cream Apply  topically to the appropriate area as directed 2 (Two) Times a Day.   • fluticasone (Flonase Allergy Relief) 50 MCG/ACT nasal spray 2 sprays into the nostril(s) as directed by provider Daily.   • Fluticasone Furoate-Vilanterol (Breo Ellipta) 100-25 MCG/INH inhaler Inhale 1 puff Daily for 90 days.   • Fluticasone Furoate-Vilanterol 100-25 MCG/ACT aerosol " powder  Inhale 1 puff Daily for 90 days.   • HYDROcodone-acetaminophen (NORCO) 5-325 MG per tablet TAKE ONE TABLET DAILY GENERIC FOR NORCO   • levothyroxine (SYNTHROID, LEVOTHROID) 75 MCG tablet TAKE ONE TABLET DAILY GENERIC FOR SYNTHROID   • loratadine (CLARITIN) 10 MG tablet Take 1 tablet by mouth Daily.   • losartan (COZAAR) 100 MG tablet TAKE ONE TABLET DAILY GENERIC FOR COZAAR   • metoprolol succinate XL (TOPROL-XL) 25 MG 24 hr tablet TAKE ONE TABLET DAILY GENERIC FOR TOPROL XL   • omeprazole (priLOSEC) 20 MG capsule TAKE 1 CAPSULE DAILY GENERIC FOR PRILOSEC     No current facility-administered medications on file prior to visit.      Past Medical, Surgical, Social, and Family History:  Past Medical History:   Diagnosis Date   • Anxiety    • Cholelithiasis    • Depression    • Diverticulosis    • History of adenomatous polyp of colon    • History of colon polyps    • Hyperlipidemia    • Hypertension    • Hypothyroidism    • Meningiomatosis (HCC)    • Myalgia    • Nephrolithiasis    • Type 2 diabetes mellitus (HCC)      Past Surgical History:   Procedure Laterality Date   • COLONOSCOPY  2018    Sigmoid diverticulosis; Non-engorged internal hemorrhoids vein; Repeat 5 years   • COLONOSCOPY  07/15/2014    Two 3-5mm hyperplastic polyps in the rectum-one destroyed during removal; Mild sigmoid diverticulosis; Repeat 5 years   • COLONOSCOPY  2009    Two 5mm tubular adenomatous polyps in the transverse colon; Two 5mm hyperplastic polyps in the distal sigmoid colon; Small internal hemorrhoid; Repeat 3 years   • ENDOSCOPY  2018    Normal endoscopy     Social History     Socioeconomic History   • Marital status: Single   Tobacco Use   • Smoking status: Former     Packs/day: 1.00     Years: 51.00     Pack years: 51.00     Types: Cigarettes     Start date:      Quit date:      Years since quittin.9   • Smokeless tobacco: Never   Vaping Use   • Vaping Use: Never used   Substance and Sexual  Activity   • Alcohol use: No     Comment: Occasionally    • Drug use: No   • Sexual activity: Defer     Family History   Problem Relation Age of Onset   • Arthritis Mother    • Arthritis Father    • Hypertension Sister    • Colon cancer Neg Hx    • Colon polyps Neg Hx    • Esophageal cancer Neg Hx    • Liver cancer Neg Hx    • Liver disease Neg Hx    • Rectal cancer Neg Hx    • Stomach cancer Neg Hx        Prior Visit Notes/Records, Lab, Imaging, and Diagnostic Results Reviewed:  A1C:No results for input(s): HGBA1C in the last 02286 hours.  GLUCOSE:  Lab Results - Last 18 Months   Lab Units 09/08/22  0845 02/10/22  0814 08/11/21  1232   GLUCOSE mg/dL 125* 126* 140*     LIPID:  Lab Results - Last 18 Months   Lab Units 09/19/22  0853   CHOLESTEROL mg/dL 163   LDL CHOL mg/dL 91   HDL CHOL mg/dL 59   TRIGLYCERIDES mg/dL 65     PSA:No results for input(s): PSA in the last 86822 hours.  CBC:  Lab Results - Last 18 Months   Lab Units 09/08/22  0845 02/10/22  0814 08/11/21  1232   WBC 10*3/mm3 8.97 8.48 8.58   HEMOGLOBIN g/dL 11.0* 12.2 12.5   HEMATOCRIT % 33.1* 36.1 38.4   PLATELETS 10*3/mm3 210 219 234      BMP/CMP:  Lab Results - Last 18 Months   Lab Units 09/08/22  0845 02/10/22  0814 08/11/21  1232   SODIUM mmol/L 141 137 139   POTASSIUM mmol/L 4.5 4.0 4.4   CHLORIDE mmol/L 103 100 100   TOTAL CO2 mmol/L 26.0 30.7* 28.7   GLUCOSE mg/dL 125* 126* 140*   BUN mg/dL 27* 16 18   CREATININE mg/dL 1.08* 1.07* 1.13*   EGFR IF NONAFRICN AM mL/min/1.73  --  49* 47*   EGFR IF AFRICN AM mL/min/1.73  --  60* 56*   EGFR RESULT mL/min/1.73 52.4*  --   --    CALCIUM mg/dL 9.1 9.3 9.5     HEPATIC:  Lab Results - Last 18 Months   Lab Units 09/08/22  0845 02/10/22  0814 08/11/21  1232   ALT (SGPT) U/L 9 9 11   AST (SGOT) U/L 14 13 13   ALK PHOS U/L 73 78 72     Vit D:  Lab Results - Last 18 Months   Lab Units 09/08/22  0845 02/10/22  0814 08/11/21  1232   VIT D 25 HYDROXY ng/ml 60.6 28.3* 51.0     THYROID:  Lab Results - Last 18 Months  "  Lab Units 09/19/22  0853 11/16/21  0915   TSH uIU/mL 3.340 4.760*       Objective   Physical Exam  Vitals reviewed.   Constitutional:       General: She is not in acute distress.     Appearance: Normal appearance.   Cardiovascular:      Rate and Rhythm: Normal rate and regular rhythm.   Pulmonary:      Effort: Pulmonary effort is normal.      Breath sounds: Normal breath sounds.     /80   Pulse 70   Resp 14   Ht 152.4 cm (60\")   Wt 81.2 kg (179 lb)   LMP  (LMP Unknown)   SpO2 96%   BMI 34.96 kg/m²     Assessment & Plan   Diagnoses and all orders for this visit:    1. Pneumonia of both lungs due to infectious organism, unspecified part of lung (Primary)    Other orders  -     predniSONE (DELTASONE) 20 MG tablet; Take 1 tablet by mouth Daily for 5 days.  Dispense: 5 tablet; Refill: 0  -     benzonatate (Tessalon Perles) 100 MG capsule; Take 1 capsule by mouth 3 (Three) Times a Day As Needed for Cough.  Dispense: 30 capsule; Refill: 0    Discussion:  Advised and educated plan of care.  The patient is advised to take Tessalon Perles as well as prednisone.    Follow-up:  Return if symptoms worsen or fail to improve.    Electronically signed by John Berry APRN, 12/12/22, 5:43 PM CST.    "

## 2022-12-20 DIAGNOSIS — K21.9 GASTROESOPHAGEAL REFLUX DISEASE WITHOUT ESOPHAGITIS: ICD-10-CM

## 2022-12-20 RX ORDER — OMEPRAZOLE 20 MG/1
CAPSULE, DELAYED RELEASE ORAL
Qty: 30 CAPSULE | Refills: 11 | OUTPATIENT
Start: 2022-12-20

## 2022-12-29 ENCOUNTER — TELEPHONE (OUTPATIENT)
Dept: OBSTETRICS AND GYNECOLOGY | Facility: CLINIC | Age: 82
End: 2022-12-29

## 2022-12-30 DIAGNOSIS — K21.9 GASTROESOPHAGEAL REFLUX DISEASE WITHOUT ESOPHAGITIS: ICD-10-CM

## 2022-12-30 RX ORDER — OMEPRAZOLE 20 MG/1
CAPSULE, DELAYED RELEASE ORAL
Qty: 30 CAPSULE | Refills: 11 | OUTPATIENT
Start: 2022-12-30

## 2023-01-03 DIAGNOSIS — M19.90 OSTEOARTHRITIS, UNSPECIFIED OSTEOARTHRITIS TYPE, UNSPECIFIED SITE: ICD-10-CM

## 2023-01-03 RX ORDER — HYDROCODONE BITARTRATE AND ACETAMINOPHEN 5; 325 MG/1; MG/1
TABLET ORAL
Qty: 30 TABLET | Refills: 0 | Status: SHIPPED | OUTPATIENT
Start: 2023-01-03 | End: 2023-02-09 | Stop reason: SDUPTHER

## 2023-01-05 ENCOUNTER — OFFICE VISIT (OUTPATIENT)
Dept: OBSTETRICS AND GYNECOLOGY | Facility: CLINIC | Age: 83
End: 2023-01-05
Payer: MEDICARE

## 2023-01-05 VITALS
DIASTOLIC BLOOD PRESSURE: 80 MMHG | WEIGHT: 174.2 LBS | HEIGHT: 60 IN | SYSTOLIC BLOOD PRESSURE: 130 MMHG | BODY MASS INDEX: 34.2 KG/M2

## 2023-01-05 DIAGNOSIS — N39.41 URGE INCONTINENCE: ICD-10-CM

## 2023-01-05 DIAGNOSIS — N83.201 CYST OF RIGHT OVARY: Primary | ICD-10-CM

## 2023-01-05 PROBLEM — R06.09 DYSPNEA ON EXERTION: Status: ACTIVE | Noted: 2022-08-16

## 2023-01-05 PROCEDURE — 99204 OFFICE O/P NEW MOD 45 MIN: CPT | Performed by: OBSTETRICS & GYNECOLOGY

## 2023-01-05 NOTE — PROGRESS NOTES
Glen Meade MD  [facility]  [address]  [place] [address]  Office [phone]  Fax [phone]      [facility]  Paige Martinez  : 1940  MRN: [id number]    Subjective   Subjective     Chief Complaint   Patient presents with   • Ovarian Cyst     Patient here for ultrasound and consult for an ovarian mass. Patient had ultrasound this morning. Patient denies pain, bleeding. Patient unaware about cyst until it was found. Patient does have some urge incontinence, unable to hold urine to make it to the bathroom. Patient denies family history of any cancers.       History of Present Illness  Paige Martinez is a 82 y.o. female , , who comes to the office today for consultation for ovarian cyst. Patient with incidental ovarian cyst while being evaluated for kidney stones. Patient reports no pelvic pain, vaginal bleeding or vaginal discharge. Only other complaint is some urge incontinence.       Review of Systems   Genitourinary: Positive for urgency. Negative for decreased urine volume, difficulty urinating, dyspareunia, dysuria, enuresis, flank pain, frequency, genital sores, hematuria, menstrual problem, pelvic pain, vaginal bleeding, vaginal discharge and vaginal pain.   All other systems reviewed and are negative.       Personal History     The following portions of the patient's history were reviewed and updated as appropriate: allergies, current medications, past family history, past medical history, past social history, past surgical history and problem list.    History Review Reviewed Comments   Past Medical History:  [x]     Past Surgical History: [x]     Family History: [x]     Social History: [x]       Current Outpatient Medications   Medication Instructions   • albuterol sulfate  (90 Base) MCG/ACT inhaler 2 puffs, Inhalation, Every 4 Hours PRN   • atorvastatin (LIPITOR) 10 MG tablet TAKE ONE TABLET DAILY GENERIC FOR LIPITOR   • Azelastine HCl 274 mcg, Nasal, 2 Times  Daily   • benzonatate (TESSALON PERLES) 100 mg, Oral, 3 Times Daily PRN   • bumetanide (BUMEX) 1 mg, Oral, Daily   • Cholecalciferol 2,000 Units, Oral, Daily   • citalopram (CeleXA) 20 MG tablet TAKE ONE TABLET DAILY GENERIC FOR CELEXA   • cloNIDine (CATAPRES) 0.1 MG tablet TAKE ONE TABLET BY MOUTH DAILY.   • fluocinonide-emollient (LIDEX-E) 0.05 % cream Topical, 2 Times Daily   • fluticasone (Flonase Allergy Relief) 50 MCG/ACT nasal spray 2 sprays, Nasal, Daily   • Fluticasone Furoate-Vilanterol (Breo Ellipta) 100-25 MCG/INH inhaler 1 puff, Inhalation, Daily   • Fluticasone Furoate-Vilanterol 100-25 MCG/ACT aerosol powder  1 puff, Inhalation, Daily   • HYDROcodone-acetaminophen (NORCO) 5-325 MG per tablet TAKE ONE TABLET DAILY GENERIC FOR NORCO   • levothyroxine (SYNTHROID, LEVOTHROID) 75 MCG tablet TAKE ONE TABLET DAILY GENERIC FOR SYNTHROID   • loratadine (CLARITIN) 10 mg, Oral, Daily   • losartan (COZAAR) 100 MG tablet TAKE ONE TABLET DAILY GENERIC FOR COZAAR   • metoprolol succinate XL (TOPROL-XL) 25 MG 24 hr tablet TAKE ONE TABLET DAILY GENERIC FOR TOPROL XL   • omeprazole (priLOSEC) 20 MG capsule TAKE 1 CAPSULE DAILY GENERIC FOR PRILOSEC       No Known Allergies    Objective    Objective     Vitals:   Visit Vitals  /80   Ht 152.4 cm (60\")   Wt 79 kg (174 lb 3.2 oz)   LMP  (LMP Unknown)   BMI 34.02 kg/m²        Physical Exam  Vitals reviewed. Exam conducted with a chaperone present.   Constitutional:       General: She is not in acute distress.     Appearance: Normal appearance. She is not ill-appearing.   HENT:      Head: Normocephalic and atraumatic.      Nose: No congestion or rhinorrhea.   Eyes:      General: No scleral icterus.        Right eye: No discharge.         Left eye: No discharge.      Extraocular Movements: Extraocular movements intact.      Conjunctiva/sclera: Conjunctivae normal.   Pulmonary:      Effort: Pulmonary effort is normal. No accessory muscle usage or respiratory distress.    Genitourinary:     General: Normal vulva.      Exam position: Lithotomy position.      Pubic Area: No rash or pubic lice.       Labia:         Right: No rash, tenderness or lesion.         Left: No rash, tenderness or lesion.       Urethra: No prolapse or urethral lesion.      Vagina: Normal.      Cervix: Normal.      Uterus: Normal.       Adnexa: Right adnexa normal and left adnexa normal.      Rectum: No external hemorrhoid.      Comments: Consent for exam obtained verbally from patient.  Musculoskeletal:      Right lower leg: No edema.      Left lower leg: No edema.   Skin:     General: Skin is warm and dry.      Coloration: Skin is not ashen, cyanotic or jaundiced.   Neurological:      General: No focal deficit present.      Mental Status: She is alert and oriented to person, place, and time.   Psychiatric:         Mood and Affect: Mood normal.         Behavior: Behavior is cooperative.         Result Review    US Non-ob Transvaginal (01/05/2023 10:52)  Uterus 5 cm in length, retroverted and retroflexed  Endometrial thickness 1.1 cm with cystic changes  Right ovary with ovarian cysts, largest measures at least 5 cm in length    SCANNED - IMAGING (09/30/2019 00:00)  CT abdomen pelvis  There is a right adnexal cystic mass measuring 3.4 cm that represents likely be ovary    SCANNED - IMAGING (12/01/2016)  CT abdomen pelvis  Uterus unremarkable.  No mention of cysts of the ovaries on report        Assessment & Plan   Assessment / Plan     Diagnoses and all orders for this visit:    1. Cyst of right ovary (Primary)  -     Ovarian Malignancy Risk-ANABEL  -     MRI pelvis wo contrast; Future    2. Urge incontinence  -     Ambulatory Referral to Physical Therapy        Discussion:   Ultrasound today shows simple appearing cysts of the right ovary - no papillae or nodularity noted, multicystic with smooth appearing cyst walls, <10 cm size, no asciteis . Exam negative for parametrial nodularity. Unlikely to be neoplasm of  the ovary.    Check ANABEL. Has MRI for her brain/skull coming up on 2/22/2023. Will add on pelvic MRI as well. Cyst has been present since 2019. Possibly increased in size since then. She is currently asymptomatic from this. Further evaluation of urinary symptoms pending ovarian cyst evaluation. This was reviewed with the patient. Discussed if tumor marker is elevated, will need to consider GYN Oncology evaluation.     Follow-up: Return in about 2 months (around 3/5/2023) for GYN f/u post MRI.    Glen Meade MD

## 2023-01-06 LAB
CANCER AG125 SERPL-ACNC: 17.3 U/ML (ref 0–38.1)
HE4 SERPL-SCNC: 130 PMOL/L (ref 0–96.9)
LABORATORY COMMENT REPORT: ABNORMAL
POSTMENOPAUSAL INTERP: LOW: NORMAL
PREMENOPAUSAL INTERP: HIGH: NORMAL
ROMA SCORE POSTMEN SERPL: 2.81
ROMA SCORE PREMEN SERPL: 4.41

## 2023-01-23 RX ORDER — LOSARTAN POTASSIUM 100 MG/1
TABLET ORAL
Qty: 90 TABLET | Refills: 0 | Status: SHIPPED | OUTPATIENT
Start: 2023-01-23

## 2023-01-23 RX ORDER — LEVOTHYROXINE SODIUM 0.07 MG/1
TABLET ORAL
Qty: 90 TABLET | Refills: 0 | Status: SHIPPED | OUTPATIENT
Start: 2023-01-23

## 2023-01-30 ENCOUNTER — OFFICE VISIT (OUTPATIENT)
Dept: FAMILY MEDICINE CLINIC | Facility: CLINIC | Age: 83
End: 2023-01-30
Payer: MEDICARE

## 2023-01-30 VITALS — BODY MASS INDEX: 34.16 KG/M2 | HEIGHT: 60 IN | HEART RATE: 69 BPM | WEIGHT: 174 LBS | OXYGEN SATURATION: 99 %

## 2023-01-30 DIAGNOSIS — R05.9 COUGH, UNSPECIFIED TYPE: ICD-10-CM

## 2023-01-30 DIAGNOSIS — J44.1 COPD WITH EXACERBATION: Primary | ICD-10-CM

## 2023-01-30 LAB
EXPIRATION DATE: NORMAL
INTERNAL CONTROL: NORMAL
Lab: NORMAL
SARS-COV-2 AG UPPER RESP QL IA.RAPID: NOT DETECTED

## 2023-01-30 PROCEDURE — 87426 SARSCOV CORONAVIRUS AG IA: CPT | Performed by: FAMILY MEDICINE

## 2023-01-30 PROCEDURE — 99213 OFFICE O/P EST LOW 20 MIN: CPT | Performed by: FAMILY MEDICINE

## 2023-01-30 RX ORDER — AZITHROMYCIN 250 MG/1
TABLET, FILM COATED ORAL
Qty: 6 TABLET | Refills: 0 | Status: SHIPPED | OUTPATIENT
Start: 2023-01-30

## 2023-01-30 RX ORDER — METHYLPREDNISOLONE 4 MG/1
TABLET ORAL
Qty: 21 TABLET | Refills: 0 | Status: SHIPPED | OUTPATIENT
Start: 2023-01-30

## 2023-01-30 NOTE — PROGRESS NOTES
Subjective   Paige Martinez is a 82 y.o. female.     Chief Complaint   Patient presents with   • Cough        History of Present Illness     she notes congestion in her chest and cough prod of yellow sputum without fevre or hemoptahysi      Current Outpatient Medications:   •  albuterol sulfate  (90 Base) MCG/ACT inhaler, Inhale 2 puffs Every 4 (Four) Hours As Needed for Wheezing., Disp: 6.7 g, Rfl: 5  •  atorvastatin (LIPITOR) 10 MG tablet, TAKE ONE TABLET DAILY GENERIC FOR LIPITOR, Disp: 30 tablet, Rfl: 5  •  Azelastine HCl 137 MCG/SPRAY solution, 2 sprays into the nostril(s) as directed by provider 2 (Two) Times a Day., Disp: 30 mL, Rfl: 5  •  bumetanide (BUMEX) 0.5 MG tablet, Take 1 mg by mouth Daily., Disp: , Rfl:   •  Cholecalciferol 2000 units capsule, Take 2,000 Units by mouth Daily., Disp: , Rfl:   •  citalopram (CeleXA) 20 MG tablet, TAKE ONE TABLET DAILY GENERIC FOR CELEXA, Disp: 90 tablet, Rfl: 1  •  cloNIDine (CATAPRES) 0.1 MG tablet, TAKE ONE TABLET BY MOUTH DAILY., Disp: 30 tablet, Rfl: 5  •  fluocinonide-emollient (LIDEX-E) 0.05 % cream, Apply  topically to the appropriate area as directed 2 (Two) Times a Day., Disp: 30 g, Rfl: 0  •  fluticasone (Flonase Allergy Relief) 50 MCG/ACT nasal spray, 2 sprays into the nostril(s) as directed by provider Daily., Disp: 16 g, Rfl: 5  •  Fluticasone Furoate-Vilanterol 100-25 MCG/ACT aerosol powder , Inhale 1 puff Daily for 90 days., Disp: 1 each, Rfl: 5  •  HYDROcodone-acetaminophen (NORCO) 5-325 MG per tablet, TAKE ONE TABLET DAILY GENERIC FOR NORCO, Disp: 30 tablet, Rfl: 0  •  levothyroxine (SYNTHROID, LEVOTHROID) 75 MCG tablet, TAKE ONE TABLET DAILY GENERIC FOR SYNTHROID, Disp: 90 tablet, Rfl: 0  •  loratadine (CLARITIN) 10 MG tablet, Take 1 tablet by mouth Daily., Disp: 90 tablet, Rfl: 3  •  losartan (COZAAR) 100 MG tablet, TAKE ONE TABLET DAILY GENERIC FOR COZAAR, Disp: 90 tablet, Rfl: 0  •  metoprolol succinate XL (TOPROL-XL) 25 MG 24 hr tablet, TAKE  ONE TABLET DAILY GENERIC FOR TOPROL XL, Disp: 30 tablet, Rfl: 5  •  omeprazole (priLOSEC) 20 MG capsule, TAKE 1 CAPSULE DAILY GENERIC FOR PRILOSEC, Disp: 30 capsule, Rfl: 11  •  azithromycin (Zithromax Z-Scott) 250 MG tablet, Take 2 tablets by mouth on day 1, then 1 tablet daily on days 2-5, Disp: 6 tablet, Rfl: 0  •  benzonatate (Tessalon Perles) 100 MG capsule, Take 1 capsule by mouth 3 (Three) Times a Day As Needed for Cough., Disp: 30 capsule, Rfl: 0  •  Fluticasone Furoate-Vilanterol (Breo Ellipta) 100-25 MCG/INH inhaler, Inhale 1 puff Daily for 90 days., Disp: 1 each, Rfl: 5  •  methylPREDNISolone (MEDROL) 4 MG dose pack, Take as directed on package instructions., Disp: 21 tablet, Rfl: 0  No Known Allergies    BMI is >= 30 and <35. (Class 1 Obesity). The following options were offered after discussion;: nutrition counseling/recommendations      Past Medical History:   Diagnosis Date   • Anxiety    • Cholelithiasis    • Depression    • Diverticulosis    • History of adenomatous polyp of colon    • History of colon polyps    • Hyperlipidemia    • Hypertension    • Hypothyroidism    • Meningiomatosis (HCC)    • Myalgia    • Nephrolithiasis    • Type 2 diabetes mellitus (HCC)      Past Surgical History:   Procedure Laterality Date   • COLONOSCOPY  12/04/2018    Sigmoid diverticulosis; Non-engorged internal hemorrhoids vein; Repeat 5 years   • COLONOSCOPY  07/15/2014    Two 3-5mm hyperplastic polyps in the rectum-one destroyed during removal; Mild sigmoid diverticulosis; Repeat 5 years   • COLONOSCOPY  09/01/2009    Two 5mm tubular adenomatous polyps in the transverse colon; Two 5mm hyperplastic polyps in the distal sigmoid colon; Small internal hemorrhoid; Repeat 3 years   • ENDOSCOPY  12/04/2018    Normal endoscopy       Review of Systems   Constitutional: Negative.    HENT: Negative.    Eyes: Negative.    Respiratory: Positive for cough and wheezing.    Cardiovascular: Negative.    Gastrointestinal: Negative.   "  Endocrine: Negative.    Genitourinary: Negative.    Musculoskeletal: Negative.    Skin: Negative.    Allergic/Immunologic: Negative.    Neurological: Negative.    Hematological: Negative.    Psychiatric/Behavioral: Negative.        Objective  Pulse 69   Ht 152.4 cm (60\")   Wt 78.9 kg (174 lb)   LMP  (LMP Unknown)   SpO2 99%   BMI 33.98 kg/m²   Physical Exam  Vitals and nursing note reviewed.   Constitutional:       Appearance: Normal appearance. She is normal weight.   HENT:      Head: Normocephalic and atraumatic.      Nose: Congestion and rhinorrhea present.   Eyes:      Pupils: Pupils are equal, round, and reactive to light.   Cardiovascular:      Rate and Rhythm: Normal rate and regular rhythm.      Pulses: Normal pulses.      Heart sounds: Normal heart sounds.   Pulmonary:      Effort: Pulmonary effort is normal.      Breath sounds: Normal breath sounds.   Abdominal:      General: Abdomen is flat. Bowel sounds are normal.      Palpations: Abdomen is soft.   Musculoskeletal:         General: Normal range of motion.      Cervical back: Normal range of motion and neck supple.   Skin:     General: Skin is warm and dry.      Capillary Refill: Capillary refill takes less than 2 seconds.   Neurological:      General: No focal deficit present.      Mental Status: She is alert and oriented to person, place, and time. Mental status is at baseline.   Psychiatric:         Mood and Affect: Mood normal.         Assessment & Plan   Diagnoses and all orders for this visit:    1. COPD with exacerbation (HCC) (Primary)    Other orders  -     azithromycin (Zithromax Z-Scott) 250 MG tablet; Take 2 tablets by mouth on day 1, then 1 tablet daily on days 2-5  Dispense: 6 tablet; Refill: 0  -     methylPREDNISolone (MEDROL) 4 MG dose pack; Take as directed on package instructions.  Dispense: 21 tablet; Refill: 0      Keep appt with pulmonary in march         No orders of the defined types were placed in this " encounter.      Follow up: 4 month(s)

## 2023-02-06 RX ORDER — METOPROLOL SUCCINATE 25 MG/1
TABLET, EXTENDED RELEASE ORAL
Qty: 30 TABLET | Refills: 5 | Status: SHIPPED | OUTPATIENT
Start: 2023-02-06

## 2023-02-06 NOTE — TELEPHONE ENCOUNTER
Rx Refill Note  Requested Prescriptions     Pending Prescriptions Disp Refills   • metoprolol succinate XL (TOPROL-XL) 25 MG 24 hr tablet [Pharmacy Med Name: METOPROLOL SUCCINATE ER 25MG ER TABLET ER 24HR] 30 tablet 5     Sig: TAKE ONE TABLET DAILY GENERIC FOR TOPROL XL      Last office visit with prescribing clinician: 1/30/2023      Next office visit with prescribing clinician: 3/21/2023            Chelle Muñiz LPN  02/06/23, 08:51 CST

## 2023-02-08 DIAGNOSIS — M19.90 OSTEOARTHRITIS, UNSPECIFIED OSTEOARTHRITIS TYPE, UNSPECIFIED SITE: ICD-10-CM

## 2023-02-08 RX ORDER — HYDROCODONE BITARTRATE AND ACETAMINOPHEN 5; 325 MG/1; MG/1
TABLET ORAL
Qty: 30 TABLET | Refills: 0 | OUTPATIENT
Start: 2023-02-08

## 2023-02-09 DIAGNOSIS — M19.90 OSTEOARTHRITIS, UNSPECIFIED OSTEOARTHRITIS TYPE, UNSPECIFIED SITE: ICD-10-CM

## 2023-02-09 RX ORDER — HYDROCODONE BITARTRATE AND ACETAMINOPHEN 5; 325 MG/1; MG/1
1 TABLET ORAL DAILY PRN
Qty: 30 TABLET | Refills: 0 | Status: SHIPPED | OUTPATIENT
Start: 2023-02-09 | End: 2023-03-09

## 2023-02-20 ENCOUNTER — APPOINTMENT (OUTPATIENT)
Dept: CT IMAGING | Facility: HOSPITAL | Age: 83
End: 2023-02-20

## 2023-02-22 ENCOUNTER — HOSPITAL ENCOUNTER (OUTPATIENT)
Dept: MRI IMAGING | Facility: HOSPITAL | Age: 83
Discharge: HOME | End: 2023-02-22
Payer: MEDICARE

## 2023-02-22 ENCOUNTER — DOCUMENTATION (OUTPATIENT)
Dept: NEUROSURGERY | Facility: CLINIC | Age: 83
End: 2023-02-22
Payer: MEDICARE

## 2023-02-22 ENCOUNTER — APPOINTMENT (OUTPATIENT)
Dept: MRI IMAGING | Facility: HOSPITAL | Age: 83
End: 2023-02-22
Payer: MEDICARE

## 2023-02-22 ENCOUNTER — OFFICE VISIT (OUTPATIENT)
Dept: NEUROSURGERY | Facility: CLINIC | Age: 83
End: 2023-02-22
Payer: MEDICARE

## 2023-02-22 ENCOUNTER — HOSPITAL ENCOUNTER (OUTPATIENT)
Dept: CT IMAGING | Facility: HOSPITAL | Age: 83
Discharge: HOME | End: 2023-02-22
Payer: MEDICARE

## 2023-02-22 VITALS — BODY MASS INDEX: 34.95 KG/M2 | HEIGHT: 60 IN | WEIGHT: 178 LBS

## 2023-02-22 DIAGNOSIS — D32.9 MENINGIOMA: Primary | ICD-10-CM

## 2023-02-22 DIAGNOSIS — E66.09 CLASS 1 OBESITY DUE TO EXCESS CALORIES WITH SERIOUS COMORBIDITY AND BODY MASS INDEX (BMI) OF 34.0 TO 34.9 IN ADULT: ICD-10-CM

## 2023-02-22 DIAGNOSIS — R91.1 LUNG NODULE: ICD-10-CM

## 2023-02-22 DIAGNOSIS — D32.9 MENINGIOMA: ICD-10-CM

## 2023-02-22 DIAGNOSIS — Z87.891 FORMER SMOKER: ICD-10-CM

## 2023-02-22 DIAGNOSIS — N83.201 CYST OF RIGHT OVARY: ICD-10-CM

## 2023-02-22 PROBLEM — E66.811 CLASS 1 OBESITY DUE TO EXCESS CALORIES WITH SERIOUS COMORBIDITY AND BODY MASS INDEX (BMI) OF 34.0 TO 34.9 IN ADULT: Status: ACTIVE | Noted: 2023-02-22

## 2023-02-22 LAB — CREAT BLDA-MCNC: 0.9 MG/DL (ref 0.6–1.3)

## 2023-02-22 PROCEDURE — 82565 ASSAY OF CREATININE: CPT

## 2023-02-22 PROCEDURE — 71250 CT THORAX DX C-: CPT

## 2023-02-22 PROCEDURE — 0 GADOBENATE DIMEGLUMINE 529 MG/ML SOLUTION: Performed by: NURSE PRACTITIONER

## 2023-02-22 PROCEDURE — 70553 MRI BRAIN STEM W/O & W/DYE: CPT

## 2023-02-22 PROCEDURE — 99214 OFFICE O/P EST MOD 30 MIN: CPT | Performed by: NEUROLOGICAL SURGERY

## 2023-02-22 PROCEDURE — A9577 INJ MULTIHANCE: HCPCS | Performed by: NURSE PRACTITIONER

## 2023-02-22 RX ADMIN — GADOBENATE DIMEGLUMINE 15 ML: 529 INJECTION, SOLUTION INTRAVENOUS at 09:18

## 2023-02-24 ENCOUNTER — HOSPITAL ENCOUNTER (OUTPATIENT)
Dept: MRI IMAGING | Facility: HOSPITAL | Age: 83
Discharge: HOME | End: 2023-02-24
Admitting: OBSTETRICS & GYNECOLOGY
Payer: MEDICARE

## 2023-02-24 PROCEDURE — A9577 INJ MULTIHANCE: HCPCS | Performed by: OBSTETRICS & GYNECOLOGY

## 2023-02-24 PROCEDURE — 72197 MRI PELVIS W/O & W/DYE: CPT

## 2023-02-24 PROCEDURE — 0 GADOBENATE DIMEGLUMINE 529 MG/ML SOLUTION: Performed by: OBSTETRICS & GYNECOLOGY

## 2023-02-24 RX ADMIN — GADOBENATE DIMEGLUMINE 15 ML: 529 INJECTION, SOLUTION INTRAVENOUS at 10:13

## 2023-02-27 DIAGNOSIS — R91.1 LUNG NODULE: Primary | ICD-10-CM

## 2023-03-06 ENCOUNTER — OFFICE VISIT (OUTPATIENT)
Dept: OBSTETRICS AND GYNECOLOGY | Facility: CLINIC | Age: 83
End: 2023-03-06
Payer: MEDICARE

## 2023-03-06 VITALS
HEIGHT: 60 IN | SYSTOLIC BLOOD PRESSURE: 134 MMHG | DIASTOLIC BLOOD PRESSURE: 82 MMHG | WEIGHT: 178 LBS | BODY MASS INDEX: 34.95 KG/M2

## 2023-03-06 DIAGNOSIS — N83.201 CYST OF RIGHT OVARY: Primary | ICD-10-CM

## 2023-03-06 DIAGNOSIS — R93.89 ENDOMETRIAL THICKENING ON ULTRASOUND: ICD-10-CM

## 2023-03-06 PROCEDURE — 99214 OFFICE O/P EST MOD 30 MIN: CPT | Performed by: OBSTETRICS & GYNECOLOGY

## 2023-03-06 NOTE — PROGRESS NOTES
"    Glen Meade MD  [facility]  [address]  [place] [address]  Office [phone]  Fax [phone]      [facility]  Paige Martinez  : 1940  MRN: [id number]    Subjective   Subjective     Chief Complaint   Patient presents with   • Ovarian Cyst     Patient here to follow up after MRI of pelvis on 23 for ovarian mass to check for size increase. ROV has simple cystic lesions. MRI also showed thickened endometrium. Patient denies questions or concerns. Patient denies vaginal bleeding or pelvic pain.        History of Present Illness  Paige Martinez is a 82 y.o. female , , who comes to the office today for follow-up of MRI of the pelvis.  Patient was noted to have right ovarian simple cyst.  She has no complaints at this time of the pelvis or abdomen.  She denies any pelvic pain or any vaginal bleeding since menopause.    Review of Systems   Genitourinary: Negative for decreased urine volume, difficulty urinating, dyspareunia, dysuria, enuresis, flank pain, frequency, genital sores, hematuria, menstrual problem, pelvic pain, urgency, vaginal bleeding, vaginal discharge and vaginal pain.   All other systems reviewed and are negative.       The following portions of the patient's history were reviewed and updated as appropriate: allergies, current medications, past family history, past medical history, past social history, past surgical history and problem list.    Objective    Objective     Vitals:   Visit Vitals  /82   Ht 152.4 cm (60\")   Wt 80.7 kg (178 lb)   LMP  (LMP Unknown)   BMI 34.76 kg/m²        Physical Exam  Vitals reviewed.   Constitutional:       General: She is not in acute distress.     Appearance: Normal appearance. She is not ill-appearing.   HENT:      Head: Normocephalic and atraumatic.      Nose: No congestion or rhinorrhea.   Eyes:      General: No scleral icterus.        Right eye: No discharge.         Left eye: No discharge.      Extraocular " Movements: Extraocular movements intact.      Conjunctiva/sclera: Conjunctivae normal.   Pulmonary:      Effort: Pulmonary effort is normal. No accessory muscle usage or respiratory distress.   Musculoskeletal:      Right lower leg: No edema.      Left lower leg: No edema.   Skin:     General: Skin is warm and dry.      Coloration: Skin is not ashen, cyanotic or jaundiced.   Neurological:      General: No focal deficit present.      Mental Status: She is alert and oriented to person, place, and time.   Psychiatric:         Mood and Affect: Mood normal.         Behavior: Behavior is cooperative.           Result Review    MRI Pelvis With & Without Contrast (02/24/2023 10:34)  1. There are 2 right ovarian simple cystic lesions, the largest  measuring 5 cm without septation or mural nodularity. Yearly follow-up  ultrasound imaging recommended to establish long-term stability.  2. Heterogeneous thickened partially enhancing endometrium measuring up  to 11 mm in thickness. Hysteroscopy should be considered.  3. Incidental perineural sacral cysts.      US Non-ob Transvaginal (01/05/2023 10:52)  1.  Uterus: Small/atrophic , with uterine volume of 28 ml and Retroverted  2.  Endometrium: 11 mm , fluid-filled on image review, lining of 1 side of the endometrium is 3 mm  3.  Myometrium: Normal homogenous texture   4.  Ovaries  Left:    Normal/unremarkable   Right:  Simple cyst 5.7 cm       Postmenopausal Interp: LOW (01/05/2023 13:52)  Ovarian Malignancy Risk-KELLEY (01/05/2023 13:52)  Premenopausal Interp: HIGH (01/05/2023 13:52)   = 17.3        Assessment & Plan   Assessment / Plan     Diagnoses and all orders for this visit:    1. Cyst of right ovary (Primary)    2. Endometrial thickening on ultrasound        Discussion: Patient has a right ovarian cyst that is about 5 cm in size.  MRI of the cyst does not show septation or nodularity.  Ca125 and Kelley was also low risk for malignancy.  Endometrial lining is thickened.   Her history notes no post menopausal bleeding.  I reviewed the above with the patient.  In discussion, discussed that she has a low risk of malignancy for this right ovarian cyst.  That said, I cannot advise her that her uterus does not contain a premalignancy.  She is not having any pain or vaginal bleeding with this whatsoever.  I discussed 2 options including expectant management versus surgical management, including endometrial biopsy.  In discussion, she wishes to pursue expectant management.  She did state that even if it was ovarian or uterine cancer, she would not pursue treatment stating she has lived a good life and she does not want to undergo the adverse effects of treatment including surgery and chemotherapy.  Russians were answered and she expressed understanding of the above.  I will have her return in about 6 months for repeat ultrasound.    Follow-up: Return in about 6 months (around 9/6/2023) for GYN US, GYN f/u.    Glen Meade MD

## 2023-03-09 DIAGNOSIS — M19.90 OSTEOARTHRITIS, UNSPECIFIED OSTEOARTHRITIS TYPE, UNSPECIFIED SITE: ICD-10-CM

## 2023-03-09 RX ORDER — HYDROCODONE BITARTRATE AND ACETAMINOPHEN 5; 325 MG/1; MG/1
TABLET ORAL
Qty: 30 TABLET | Refills: 0 | Status: SHIPPED | OUTPATIENT
Start: 2023-03-09

## 2023-03-27 RX ORDER — CLONIDINE HYDROCHLORIDE 0.1 MG/1
TABLET ORAL
Qty: 30 TABLET | Refills: 5 | Status: SHIPPED | OUTPATIENT
Start: 2023-03-27

## 2023-03-28 ENCOUNTER — OFFICE VISIT (OUTPATIENT)
Dept: FAMILY MEDICINE CLINIC | Facility: CLINIC | Age: 83
End: 2023-03-28
Payer: MEDICARE

## 2023-03-28 VITALS
BODY MASS INDEX: 35.34 KG/M2 | DIASTOLIC BLOOD PRESSURE: 78 MMHG | OXYGEN SATURATION: 97 % | HEIGHT: 60 IN | WEIGHT: 180 LBS | HEART RATE: 72 BPM | SYSTOLIC BLOOD PRESSURE: 136 MMHG

## 2023-03-28 DIAGNOSIS — E66.9 OBESITY (BMI 35.0-39.9 WITHOUT COMORBIDITY): ICD-10-CM

## 2023-03-28 DIAGNOSIS — E03.9 ACQUIRED HYPOTHYROIDISM: ICD-10-CM

## 2023-03-28 DIAGNOSIS — I10 ESSENTIAL HYPERTENSION: Primary | ICD-10-CM

## 2023-03-28 DIAGNOSIS — E78.2 MIXED HYPERLIPIDEMIA: ICD-10-CM

## 2023-03-28 DIAGNOSIS — J98.4 RESTRICTIVE LUNG DISEASE: ICD-10-CM

## 2023-03-28 DIAGNOSIS — N18.32 STAGE 3B CHRONIC KIDNEY DISEASE: ICD-10-CM

## 2023-03-28 NOTE — PROGRESS NOTES
Subjective   Paige Martinez is a 82 y.o. female.     Chief Complaint   Patient presents with   • Hypertension   • Hyperlipidemia   • COPD       History of Present Illness     she note good bp control without  or ha--she is tolera ting statin witout myasl-her copd is being tx and notes sob wih tmoderate exertion...-her arthrisi sytmpsoms are stable       Current Outpatient Medications:   •  albuterol sulfate  (90 Base) MCG/ACT inhaler, Inhale 2 puffs Every 4 (Four) Hours As Needed for Wheezing., Disp: 6.7 g, Rfl: 5  •  atorvastatin (LIPITOR) 10 MG tablet, TAKE ONE TABLET DAILY GENERIC FOR LIPITOR, Disp: 30 tablet, Rfl: 5  •  Azelastine HCl 137 MCG/SPRAY solution, 2 sprays into the nostril(s) as directed by provider 2 (Two) Times a Day., Disp: 30 mL, Rfl: 5  •  azithromycin (Zithromax Z-Scott) 250 MG tablet, Take 2 tablets by mouth on day 1, then 1 tablet daily on days 2-5, Disp: 6 tablet, Rfl: 0  •  benzonatate (Tessalon Perles) 100 MG capsule, Take 1 capsule by mouth 3 (Three) Times a Day As Needed for Cough., Disp: 30 capsule, Rfl: 0  •  bumetanide (BUMEX) 0.5 MG tablet, Take 1 mg by mouth Daily., Disp: , Rfl:   •  Cholecalciferol 2000 units capsule, Take 2,000 Units by mouth Daily., Disp: , Rfl:   •  citalopram (CeleXA) 20 MG tablet, TAKE ONE TABLET DAILY GENERIC FOR CELEXA, Disp: 90 tablet, Rfl: 1  •  cloNIDine (CATAPRES) 0.1 MG tablet, TAKE ONE TABLET BY MOUTH DAILY., Disp: 30 tablet, Rfl: 5  •  fluocinonide-emollient (LIDEX-E) 0.05 % cream, Apply  topically to the appropriate area as directed 2 (Two) Times a Day., Disp: 30 g, Rfl: 0  •  fluticasone (Flonase Allergy Relief) 50 MCG/ACT nasal spray, 2 sprays into the nostril(s) as directed by provider Daily., Disp: 16 g, Rfl: 5  •  Fluticasone Furoate-Vilanterol (Breo Ellipta) 100-25 MCG/INH inhaler, Inhale 1 puff Daily for 90 days., Disp: 1 each, Rfl: 5  •  Fluticasone Furoate-Vilanterol 100-25 MCG/ACT aerosol powder , Inhale 1 puff Daily for 90 days.,  Disp: 1 each, Rfl: 5  •  HYDROcodone-acetaminophen (NORCO) 5-325 MG per tablet, TAKE ONE TABLET DAILY AS NEEDED FOR PAIN GENERIC FOR NORCO, Disp: 30 tablet, Rfl: 0  •  levothyroxine (SYNTHROID, LEVOTHROID) 75 MCG tablet, TAKE ONE TABLET DAILY GENERIC FOR SYNTHROID, Disp: 90 tablet, Rfl: 0  •  loratadine (CLARITIN) 10 MG tablet, Take 1 tablet by mouth Daily., Disp: 90 tablet, Rfl: 3  •  losartan (COZAAR) 100 MG tablet, TAKE ONE TABLET DAILY GENERIC FOR COZAAR, Disp: 90 tablet, Rfl: 0  •  methylPREDNISolone (MEDROL) 4 MG dose pack, Take as directed on package instructions., Disp: 21 tablet, Rfl: 0  •  metoprolol succinate XL (TOPROL-XL) 25 MG 24 hr tablet, TAKE ONE TABLET DAILY GENERIC FOR TOPROL XL, Disp: 30 tablet, Rfl: 5  •  omeprazole (priLOSEC) 20 MG capsule, TAKE 1 CAPSULE DAILY GENERIC FOR PRILOSEC, Disp: 30 capsule, Rfl: 11  No Known Allergies    Class 2 Severe Obesity (BMI >=35 and <=39.9). Obesity-related health conditions include the following: hypertension and dyslipidemias. Obesity is unchanged. BMI is is above average; BMI management plan is completed. We discussed portion control and increasing exercise.      Past Medical History:   Diagnosis Date   • Anxiety    • Cholelithiasis    • Depression    • Diverticulosis    • History of adenomatous polyp of colon    • History of colon polyps    • Hyperlipidemia    • Hypertension    • Hypothyroidism    • Meningiomatosis (HCC)    • Myalgia    • Nephrolithiasis    • Type 2 diabetes mellitus (HCC)      Past Surgical History:   Procedure Laterality Date   • COLONOSCOPY  12/04/2018    Sigmoid diverticulosis; Non-engorged internal hemorrhoids vein; Repeat 5 years   • COLONOSCOPY  07/15/2014    Two 3-5mm hyperplastic polyps in the rectum-one destroyed during removal; Mild sigmoid diverticulosis; Repeat 5 years   • COLONOSCOPY  09/01/2009    Two 5mm tubular adenomatous polyps in the transverse colon; Two 5mm hyperplastic polyps in the distal sigmoid colon; Small  "internal hemorrhoid; Repeat 3 years   • ENDOSCOPY  12/04/2018    Normal endoscopy       Review of Systems   Constitutional: Negative.    HENT: Negative.    Eyes: Negative.    Respiratory: Negative.    Cardiovascular: Negative.    Gastrointestinal: Negative.    Endocrine: Negative.    Genitourinary: Negative.    Musculoskeletal: Positive for back pain and gait problem.   Skin: Negative.    Allergic/Immunologic: Negative.    Hematological: Negative.    Psychiatric/Behavioral: Negative.        Objective  /78   Pulse 72   Ht 152.4 cm (60\")   Wt 81.6 kg (180 lb)   LMP  (LMP Unknown)   SpO2 97%   BMI 35.15 kg/m²   Physical Exam  Vitals and nursing note reviewed.   Constitutional:       Appearance: Normal appearance. She is normal weight.   HENT:      Head: Normocephalic and atraumatic.      Nose: Nose normal.      Mouth/Throat:      Mouth: Mucous membranes are moist.   Eyes:      Extraocular Movements: Extraocular movements intact.      Conjunctiva/sclera: Conjunctivae normal.      Pupils: Pupils are equal, round, and reactive to light.   Cardiovascular:      Rate and Rhythm: Normal rate and regular rhythm.      Pulses: Normal pulses.      Heart sounds: Normal heart sounds.   Pulmonary:      Effort: Pulmonary effort is normal.   Abdominal:      General: Abdomen is flat. Bowel sounds are normal.      Palpations: Abdomen is soft.   Musculoskeletal:         General: Normal range of motion.      Cervical back: Normal range of motion and neck supple.   Skin:     General: Skin is warm and dry.      Capillary Refill: Capillary refill takes less than 2 seconds.   Neurological:      General: No focal deficit present.      Mental Status: She is alert and oriented to person, place, and time. Mental status is at baseline.   Psychiatric:         Mood and Affect: Mood normal.         Assessment & Plan   Diagnoses and all orders for this visit:    1. Essential hypertension (Primary)  -     CBC & Differential  -     " Comprehensive metabolic panel  -     TSH  -     Lipid Panel With / Chol / HDL Ratio    2. Mixed hyperlipidemia  -     CBC & Differential  -     Comprehensive metabolic panel  -     TSH  -     Lipid Panel With / Chol / HDL Ratio    3. Acquired hypothyroidism  -     CBC & Differential  -     Comprehensive metabolic panel  -     TSH  -     Lipid Panel With / Chol / HDL Ratio    4. Stage 3b chronic kidney disease (HCC)    5. Restrictive lung disease    6. Obesity (BMI 35.0-39.9 without comorbidity)    she will moniro bp and keep me infoend  She will keep appt with pulmonary.             Orders Placed This Encounter   Procedures   • Comprehensive metabolic panel     Order Specific Question:   Release to patient     Answer:   Routine Release   • TSH     Order Specific Question:   Release to patient     Answer:   Routine Release   • Lipid Panel With / Chol / HDL Ratio     Order Specific Question:   Release to patient     Answer:   Routine Release   • CBC & Differential       Follow up: 6 month(s)

## 2023-03-29 LAB
ALBUMIN SERPL-MCNC: 4.8 G/DL (ref 3.5–5.2)
ALBUMIN/GLOB SERPL: 2.4 G/DL
ALP SERPL-CCNC: 68 U/L (ref 39–117)
ALT SERPL-CCNC: 8 U/L (ref 1–33)
AST SERPL-CCNC: 12 U/L (ref 1–32)
BASOPHILS # BLD AUTO: 0.06 10*3/MM3 (ref 0–0.2)
BASOPHILS NFR BLD AUTO: 0.7 % (ref 0–1.5)
BILIRUB SERPL-MCNC: 0.6 MG/DL (ref 0–1.2)
BUN SERPL-MCNC: 14 MG/DL (ref 8–23)
BUN/CREAT SERPL: 12.5 (ref 7–25)
CALCIUM SERPL-MCNC: 9.6 MG/DL (ref 8.6–10.5)
CHLORIDE SERPL-SCNC: 102 MMOL/L (ref 98–107)
CHOLEST SERPL-MCNC: 189 MG/DL (ref 0–200)
CHOLEST/HDLC SERPL: 3.05 {RATIO}
CO2 SERPL-SCNC: 25.9 MMOL/L (ref 22–29)
CREAT SERPL-MCNC: 1.12 MG/DL (ref 0.57–1)
EGFRCR SERPLBLD CKD-EPI 2021: 49.8 ML/MIN/1.73
EOSINOPHIL # BLD AUTO: 0.15 10*3/MM3 (ref 0–0.4)
EOSINOPHIL NFR BLD AUTO: 1.7 % (ref 0.3–6.2)
ERYTHROCYTE [DISTWIDTH] IN BLOOD BY AUTOMATED COUNT: 12.9 % (ref 12.3–15.4)
GLOBULIN SER CALC-MCNC: 2 GM/DL
GLUCOSE SERPL-MCNC: 126 MG/DL (ref 65–99)
HCT VFR BLD AUTO: 38.8 % (ref 34–46.6)
HDLC SERPL-MCNC: 62 MG/DL (ref 40–60)
HGB BLD-MCNC: 12.4 G/DL (ref 12–15.9)
IMM GRANULOCYTES # BLD AUTO: 0.02 10*3/MM3 (ref 0–0.05)
IMM GRANULOCYTES NFR BLD AUTO: 0.2 % (ref 0–0.5)
LDLC SERPL CALC-MCNC: 108 MG/DL (ref 0–100)
LYMPHOCYTES # BLD AUTO: 2.35 10*3/MM3 (ref 0.7–3.1)
LYMPHOCYTES NFR BLD AUTO: 27.2 % (ref 19.6–45.3)
MCH RBC QN AUTO: 29 PG (ref 26.6–33)
MCHC RBC AUTO-ENTMCNC: 32 G/DL (ref 31.5–35.7)
MCV RBC AUTO: 90.7 FL (ref 79–97)
MONOCYTES # BLD AUTO: 0.71 10*3/MM3 (ref 0.1–0.9)
MONOCYTES NFR BLD AUTO: 8.2 % (ref 5–12)
NEUTROPHILS # BLD AUTO: 5.34 10*3/MM3 (ref 1.7–7)
NEUTROPHILS NFR BLD AUTO: 62 % (ref 42.7–76)
NRBC BLD AUTO-RTO: 0 /100 WBC (ref 0–0.2)
PLATELET # BLD AUTO: 223 10*3/MM3 (ref 140–450)
POTASSIUM SERPL-SCNC: 4.6 MMOL/L (ref 3.5–5.2)
PROT SERPL-MCNC: 6.8 G/DL (ref 6–8.5)
RBC # BLD AUTO: 4.28 10*6/MM3 (ref 3.77–5.28)
SODIUM SERPL-SCNC: 140 MMOL/L (ref 136–145)
TRIGL SERPL-MCNC: 108 MG/DL (ref 0–150)
TSH SERPL DL<=0.005 MIU/L-ACNC: 4 UIU/ML (ref 0.27–4.2)
VLDLC SERPL CALC-MCNC: 19 MG/DL (ref 5–40)
WBC # BLD AUTO: 8.63 10*3/MM3 (ref 3.4–10.8)

## 2023-04-12 DIAGNOSIS — M19.90 OSTEOARTHRITIS, UNSPECIFIED OSTEOARTHRITIS TYPE, UNSPECIFIED SITE: ICD-10-CM

## 2023-04-12 DIAGNOSIS — K21.9 GASTROESOPHAGEAL REFLUX DISEASE WITHOUT ESOPHAGITIS: ICD-10-CM

## 2023-04-12 RX ORDER — OMEPRAZOLE 20 MG/1
CAPSULE, DELAYED RELEASE ORAL
Qty: 30 CAPSULE | Refills: 11 | OUTPATIENT
Start: 2023-04-12

## 2023-04-12 RX ORDER — HYDROCODONE BITARTRATE AND ACETAMINOPHEN 5; 325 MG/1; MG/1
TABLET ORAL
Qty: 30 TABLET | Refills: 0 | Status: SHIPPED | OUTPATIENT
Start: 2023-04-12

## 2023-04-25 DIAGNOSIS — K21.9 GASTROESOPHAGEAL REFLUX DISEASE WITHOUT ESOPHAGITIS: ICD-10-CM

## 2023-04-25 RX ORDER — OMEPRAZOLE 20 MG/1
CAPSULE, DELAYED RELEASE ORAL
Qty: 30 CAPSULE | Refills: 1 | Status: SHIPPED | OUTPATIENT
Start: 2023-04-25

## 2023-05-01 RX ORDER — LEVOTHYROXINE SODIUM 0.07 MG/1
TABLET ORAL
Qty: 90 TABLET | Refills: 0 | Status: SHIPPED | OUTPATIENT
Start: 2023-05-01

## 2023-05-01 RX ORDER — LOSARTAN POTASSIUM 100 MG/1
TABLET ORAL
Qty: 90 TABLET | Refills: 0 | Status: SHIPPED | OUTPATIENT
Start: 2023-05-01

## 2023-05-09 DIAGNOSIS — M19.90 OSTEOARTHRITIS, UNSPECIFIED OSTEOARTHRITIS TYPE, UNSPECIFIED SITE: ICD-10-CM

## 2023-05-09 RX ORDER — HYDROCODONE BITARTRATE AND ACETAMINOPHEN 5; 325 MG/1; MG/1
TABLET ORAL
Qty: 30 TABLET | Refills: 0 | Status: SHIPPED | OUTPATIENT
Start: 2023-05-09

## 2023-05-23 ENCOUNTER — OFFICE VISIT (OUTPATIENT)
Dept: PULMONOLOGY | Facility: CLINIC | Age: 83
End: 2023-05-23
Payer: MEDICARE

## 2023-05-23 VITALS
HEART RATE: 69 BPM | OXYGEN SATURATION: 97 % | HEIGHT: 60 IN | BODY MASS INDEX: 35.14 KG/M2 | SYSTOLIC BLOOD PRESSURE: 140 MMHG | DIASTOLIC BLOOD PRESSURE: 80 MMHG | WEIGHT: 179 LBS

## 2023-05-23 DIAGNOSIS — J98.4 PULMONARY SCARRING: ICD-10-CM

## 2023-05-23 DIAGNOSIS — J30.9 ALLERGIC RHINITIS, UNSPECIFIED SEASONALITY, UNSPECIFIED TRIGGER: ICD-10-CM

## 2023-05-23 DIAGNOSIS — Z87.01 HISTORY OF PNEUMONIA: ICD-10-CM

## 2023-05-23 DIAGNOSIS — R91.1 LUNG NODULE: ICD-10-CM

## 2023-05-23 DIAGNOSIS — J98.4 RESTRICTIVE LUNG DISEASE: ICD-10-CM

## 2023-05-23 DIAGNOSIS — J44.9 CHRONIC OBSTRUCTIVE PULMONARY DISEASE, UNSPECIFIED COPD TYPE: Primary | ICD-10-CM

## 2023-05-23 DIAGNOSIS — R55 SYNCOPE, UNSPECIFIED SYNCOPE TYPE: ICD-10-CM

## 2023-05-23 DIAGNOSIS — Z87.891 FORMER SMOKER: ICD-10-CM

## 2023-05-23 PROCEDURE — 3077F SYST BP >= 140 MM HG: CPT | Performed by: INTERNAL MEDICINE

## 2023-05-23 PROCEDURE — 3079F DIAST BP 80-89 MM HG: CPT | Performed by: INTERNAL MEDICINE

## 2023-05-23 PROCEDURE — 99214 OFFICE O/P EST MOD 30 MIN: CPT | Performed by: INTERNAL MEDICINE

## 2023-05-23 RX ORDER — AZELASTINE HYDROCHLORIDE 137 UG/1
2 SPRAY, METERED NASAL 2 TIMES DAILY
Qty: 30 ML | Refills: 5 | Status: SHIPPED | OUTPATIENT
Start: 2023-05-23

## 2023-05-23 RX ORDER — LORATADINE 10 MG/1
10 TABLET ORAL DAILY
Qty: 90 TABLET | Refills: 3 | Status: SHIPPED | OUTPATIENT
Start: 2023-05-23

## 2023-05-23 RX ORDER — ALBUTEROL SULFATE 2.5 MG/3ML
2.5 SOLUTION RESPIRATORY (INHALATION) EVERY 4 HOURS PRN
Qty: 150 ML | Refills: 5 | Status: SHIPPED | OUTPATIENT
Start: 2023-05-23

## 2023-05-23 RX ORDER — FLUTICASONE PROPIONATE 50 MCG
2 SPRAY, SUSPENSION (ML) NASAL DAILY
Qty: 16 G | Refills: 5 | Status: SHIPPED | OUTPATIENT
Start: 2023-05-23

## 2023-05-23 RX ORDER — FLUTICASONE FUROATE AND VILANTEROL 100; 25 UG/1; UG/1
1 POWDER RESPIRATORY (INHALATION) DAILY
Qty: 1 EACH | Refills: 35 | Status: SHIPPED | OUTPATIENT
Start: 2023-05-23 | End: 2023-08-21

## 2023-05-23 NOTE — PROGRESS NOTES
[facility] OUTPATIENT PROGRESS NOTE    Patient: Paige Martinez  : 1940  Age: 82 y.o.  Date of Service: May 23, 2023    REASON FOR CLINIC VISIT:  No chief complaint on file.      Subjective:    History of Present Illness:  Paige Martinez is a 82 y.o. female who presents to the office today to be seen for    Diagnosis Plan   1. Chronic obstructive pulmonary disease, unspecified COPD type        2. Restrictive lung disease        3. Pulmonary scarring        4. Lung nodule        5. History of pneumonia        6. Former smoker        7. Allergic rhinitis, unspecified seasonality, unspecified trigger        8. Syncope, unspecified syncope type        .  Other problems per record.    History:    Patient is a very pleasant elderly  female was seen in the pulmonary clinic for follow-up visit.    Patient is a former smoker and has chronic obstructive pulmonary disease and she also has restrictive lung disease.  Her last imaging study showed some pulmonary scarring and lung nodules.  She had no recent imaging studies done.  She recently had a fall and has been bruising on the left side of the chest near the chest wall on the back.  She is going to see the primary care provider Dr. Jaramillo after the fall.  She denies any lightheaded or dizzy spells.  She has not use the Breo Ellipta but used albuterol nebulizer and is currently asking for the refills.  She gets short of breath on exertion.  For pneumonia she has fluticasone nasal spray and loratadine from time to time but not regularly.    Is already vaccinated for COVID and did not have any COVID infection.  She is planning to visit a tele[place] is her country [place] birth she goes there frequently every few years.  Her family lives nearby and she already had grandchildren and great-grandchildren living at her also.  She had no recent hospitalizations and ER visit and urgent care visit or any other acute complaints.  On any home oxygen or any  CPAP.    PFT done today:  Not done today    PFT Values          2022    16:00   Pre Drug PFT Results   FVC 80   FEV1 75   FEF 25-75% 62   FEV1/FVC 73   Other Tests PFT Results   TLC 87      DLCO 67   D/VAsb 79     Results for orders placed in visit on 22    Pulmonary Function Test    Narrative  Pulmonary Function Test  Performed by: Kate Denny, RRT  Authorized by: Tushar Thrasher MD    Pre Drug % Predicted  FVC: 80%  FEV1: 75%  FEF 25-75%: 62%  FEV1/FVC: 73%  T%  RV: 107%  DLCO: 67%  D/VAsb: 79%    Interpretation  Overall comments:  Above test results are acceptable and reproducible by ATS criteria  For analysis of the above test results the patient showed evidence of moderate obstructive and mild restrictive dysfunction.  No bronchodilator challenge was done.  Diffusion capacity corrected for alveolar volume is moderately decreased    No prior test result was available for comparison.  Clinical correlation is indicated    Tushar Thrasher MD  Pulmonologist/Intensivist  2022 18:31 CST         Bronchodilator therapy: Albuterol rescue inhaler and Nebulizer    Smoking Status:   Social History     Tobacco Use   Smoking Status Former    Packs/day: 1.00    Years: 51.00    Pack years: 51.00    Types: Cigarettes    Start date:     Quit date:     Years since quittin.3   Smokeless Tobacco Never     Pulm Rehab: no  Sleep: yes    Support System: lives with their family    Code Status:   There are no questions and answers to display.        Review of Systems:  A complete review of systems is performed and all other systems were reviewed and negative as note above in the HPI.  Review of Systems   Constitutional:  Positive for fatigue.   HENT:  Positive for congestion, postnasal drip and sinus pressure.    Eyes: Negative.    Respiratory:  Positive for chest tightness and shortness of breath.    Cardiovascular: Negative.    Gastrointestinal: Negative.    Endocrine: Negative.     Genitourinary: Negative.    Musculoskeletal:  Positive for arthralgias and back pain.   Skin: Negative.    Allergic/Immunologic: Positive for environmental allergies.   Neurological:  Positive for weakness.   Hematological: Negative.    Psychiatric/Behavioral: Negative.       CAT/ACT Score:  Not done today    Medications:  Outpatient Encounter Medications as of 5/23/2023   Medication Sig Dispense Refill    albuterol sulfate  (90 Base) MCG/ACT inhaler Inhale 2 puffs Every 4 (Four) Hours As Needed for Wheezing. 6.7 g 5    atorvastatin (LIPITOR) 10 MG tablet TAKE ONE TABLET DAILY GENERIC FOR LIPITOR 30 tablet 5    Azelastine HCl 137 MCG/SPRAY solution 2 sprays into the nostril(s) as directed by provider 2 (Two) Times a Day. 30 mL 5    bumetanide (BUMEX) 0.5 MG tablet Take 2 tablets by mouth Daily.      Cholecalciferol 2000 units capsule Take 1 capsule by mouth Daily.      citalopram (CeleXA) 20 MG tablet TAKE ONE TABLET DAILY GENERIC FOR CELEXA 90 tablet 1    cloNIDine (CATAPRES) 0.1 MG tablet TAKE ONE TABLET BY MOUTH DAILY. 30 tablet 5    fluticasone (Flonase Allergy Relief) 50 MCG/ACT nasal spray 2 sprays into the nostril(s) as directed by provider Daily. 16 g 5    HYDROcodone-acetaminophen (NORCO) 5-325 MG per tablet TAKE ONE TABLET DAILY AS NEEDED FOR PAIN GENERIC FOR NORCO 30 tablet 0    levothyroxine (SYNTHROID, LEVOTHROID) 75 MCG tablet TAKE ONE TABLET DAILY GENERIC FOR SYNTHROID 90 tablet 0    losartan (COZAAR) 100 MG tablet TAKE ONE TABLET DAILY GENERIC FOR COZAAR 90 tablet 0    metoprolol succinate XL (TOPROL-XL) 25 MG 24 hr tablet TAKE ONE TABLET DAILY GENERIC FOR TOPROL XL 30 tablet 5    Nebulizer misc As Needed.      omeprazole (priLOSEC) 20 MG capsule TAKE 1 CAPSULE DAILY GENERIC FOR PRILOSEC 30 capsule 1    [DISCONTINUED] azithromycin (Zithromax Z-Scott) 250 MG tablet Take 2 tablets by mouth on day 1, then 1 tablet daily on days 2-5 (Patient not taking: Reported on 5/23/2023) 6 tablet 0     "[DISCONTINUED] benzonatate (Tessalon Perles) 100 MG capsule Take 1 capsule by mouth 3 (Three) Times a Day As Needed for Cough. (Patient not taking: Reported on 5/23/2023) 30 capsule 0    [DISCONTINUED] fluocinonide-emollient (LIDEX-E) 0.05 % cream Apply  topically to the appropriate area as directed 2 (Two) Times a Day. (Patient not taking: Reported on 5/23/2023) 30 g 0    [DISCONTINUED] Fluticasone Furoate-Vilanterol (Breo Ellipta) 100-25 MCG/INH inhaler Inhale 1 puff Daily for 90 days. 1 each 5    [DISCONTINUED] Fluticasone Furoate-Vilanterol 100-25 MCG/ACT aerosol powder  Inhale 1 puff Daily for 90 days. 1 each 5    [DISCONTINUED] loratadine (CLARITIN) 10 MG tablet Take 1 tablet by mouth Daily. (Patient not taking: Reported on 5/23/2023) 90 tablet 3    [DISCONTINUED] methylPREDNISolone (MEDROL) 4 MG dose pack Take as directed on package instructions. (Patient not taking: Reported on 5/23/2023) 21 tablet 0     No facility-administered encounter medications on file as of 5/23/2023.       Allergies:  No Known Allergies    Immunizations:  Immunization History   Administered Date(s) Administered    COVID-19 ([employer]) 04/09/2021    PPD Test 08/28/2017       Objective:    Vitals:  /80 (BP Location: Right arm, Patient Position: Sitting, Cuff Size: Adult)   Pulse 69   Ht 152.4 cm (60\")   Wt 81.2 kg (179 lb)   LMP  (LMP Unknown)   SpO2 97%   BMI 34.96 kg/m²     Physical Exam:  General: Patient is a 82 y.o. pleasant elderly  female. Looks stated age. Appears to be in no acute distress.  Eyes: EOMI. PERRLA. Vision intact. No scleral icterus.  Ear, Nose, Mouth and Throat: Hearing is grossly intact. No Leukoplakia, pharyngitis, stomatitis or thrush. Swollen nasal mucosa with post nasal drop.  Neck: Range of motion of neck normal. No thyromegaly or masses. Mallampati Class 3  Respiratory: Clear to auscultation bilaterally. No use of accessory muscles. Decreased breath sounds.  Cardiovascular: Normal heart " sounds. Regularly regular rhythm without murmur.  Gastrointestinal: Non tender, non distended, soft. Bowel sounds positive in all four quadrants. No organomegaly.  Skin: No obvious rashes, lesions, ulcers or large amount of bruising. No edema.   Neurological: No new motor deficits. Cranial nerves appear intact.  Psychiatric: Patient is alert and oriented to person, place and time.    Chest Imaging:    Study Result    Narrative & Impression   CT CHEST without contrast dated 2/22/2023 9:33 AM CST     HISTORY: Lung nodule, 6-8mm; R91.1-Solitary pulmonary nodule     COMPARISON: 11/21/2022     DLP: 217 mGy cm     All CT scans are performed using dose optimization techniques as  appropriate to the performed exam and including at least one of the  following: Automated exposure control, adjustment of the mA and/or kV  according to size, and the use of the iterative reconstruction  technique.     TECHNIQUE: Serial helical tomographic images of the chest were acquired.  Bone and soft tissue algorithms were provided. Coronal reformatted  images were also provided for review.     FINDINGS:  The imaged portion of the neck and thyroid gland is unremarkable.      A focus of nodular scarring is again noted within the medial right lung  apex unchanged from the previous exam there are changes of centrilobular  emphysema. Scattered small centrilobular nodules in the upper lobes are  stable from the previous exam. A 7.3 mm subpleural nodule is noted  laterally within the right upper lobe on image 60 of series 4  essentially stable from the previous examination. This is in close  proximity to the minor fissure. No pleural effusion is present. The  trachea and bronchial tree are patent.     Mitral annulus calcifications are present. There is no evidence of  cardiomegaly. Coronary calcifications are demonstrated. There is  atheromatous calcification of the thoracic aorta and great vessels. No  evidence of aneurysm.. There is no  pericardial effusion. No enlarged  axillary or mediastinal lymph nodes are present.      The osseous structures of the thorax and surrounding soft tissues are  unremarkable.     There is a pedunculated lesion involving the upper pole of the right  kidney measuring 1.2 cm in size. This measures Hounsfield units of 8  suggesting a cyst. However, there is also some lobularity involving the  upper pole of the right kidney if not previously evaluated I would  suggest ultrasound for further assessment..     IMPRESSION:  1. Well-circumscribed nodule which is subpleural in location in the  right upper lobe. This is unchanged from the previous exam of  11/21/2022. This finding previously demonstrated slight interval  increase. Repeat nonenhanced CT imaging of the chest in 6 months is  suggested.  2. Changes of centrilobular emphysema. Scattered small groundglass  nodules are noted within the upper lobes stable from the previous exam.  There is scarring in the apices. No evidence of acute consolidative  pneumonia or effusion. No developing mediastinal or axillary  lymphadenopathy..  3. The upper pole of the right kidney is somewhat lobular in appearance.  There is a discrete small cyst present. Ultrasound will be suggested for  further characterization if not previously undertaken.        This report was finalized on 02/22/2023 13:25 by Dr. Simon Pringle MD.         Assessment:  1. Chronic obstructive pulmonary disease, unspecified COPD type    2. Restrictive lung disease    3. Pulmonary scarring    4. Lung nodule    5. History of pneumonia    6. Former smoker    7. Allergic rhinitis, unspecified seasonality, unspecified trigger    8. Syncope, unspecified syncope type        Plan/Recommendations:    1.  Patient had no recent imaging study done but the last CT scan of the chest done in February 2023 showed stable lung nodule in the right upper lobe and there is centrilobular emphysema.  I explained the CT scan chest  results with patient and ordered another CT scan for follow-up in 6 months time.  2.  She is not using her Breo Ellipta but encouraged her to continue using the Breo Ellipta and she should use albuterol nebulizer and rescue inhaler as before.  Prescription refill sent to the pharmacy.  3.  For nasal allergy she should continue using Fluticasone Nasal Spray Loratadine Astelin Nasal Spray.  All the Prescription Refills Were Sent to the Pharmacy.  She is already vaccinated for COVID and already had other vaccinations up-to-date.  4.  She will continue follow-up with the primary care provider and advised her to return to the pulmonary clinic in 6 months time for a follow-up visit with a CT scan of the chest before the next visit.  She continues the pulmonary clinic earlier if needed.    Follow up:  6 Months    Time Spent:  30 minutes    I appreciate the opportunity of participating in this patient's care. I would like to thank the PCP for the referral.  Please feel free to contact me with any other questions.    Tushar Thrasher MD   Pulmonologist/Intensivist     Electronically signed by: Tushar Thrasher MD, 5/23/2023 11:47 CDT

## 2023-06-06 DIAGNOSIS — M19.90 OSTEOARTHRITIS, UNSPECIFIED OSTEOARTHRITIS TYPE, UNSPECIFIED SITE: ICD-10-CM

## 2023-06-06 RX ORDER — HYDROCODONE BITARTRATE AND ACETAMINOPHEN 5; 325 MG/1; MG/1
TABLET ORAL
Qty: 30 TABLET | Refills: 0 | Status: SHIPPED | OUTPATIENT
Start: 2023-06-06

## 2023-06-09 ENCOUNTER — OFFICE VISIT (OUTPATIENT)
Dept: FAMILY MEDICINE CLINIC | Facility: CLINIC | Age: 83
End: 2023-06-09
Payer: MEDICARE

## 2023-06-09 VITALS
DIASTOLIC BLOOD PRESSURE: 76 MMHG | SYSTOLIC BLOOD PRESSURE: 138 MMHG | BODY MASS INDEX: 35.73 KG/M2 | OXYGEN SATURATION: 97 % | HEIGHT: 60 IN | HEART RATE: 85 BPM | TEMPERATURE: 97.3 F | WEIGHT: 182 LBS

## 2023-06-09 DIAGNOSIS — M25.562 ACUTE PAIN OF LEFT KNEE: Primary | ICD-10-CM

## 2023-06-09 PROCEDURE — 99213 OFFICE O/P EST LOW 20 MIN: CPT | Performed by: FAMILY MEDICINE

## 2023-06-09 PROCEDURE — 3078F DIAST BP <80 MM HG: CPT | Performed by: FAMILY MEDICINE

## 2023-06-09 PROCEDURE — 3075F SYST BP GE 130 - 139MM HG: CPT | Performed by: FAMILY MEDICINE

## 2023-06-09 NOTE — PROGRESS NOTES
Subjective   Paige Martinez is a 82 y.o. female.     Chief Complaint   Patient presents with    Fall    Joint Swelling     Left Knee     Ankle Injury       History of Present Illness     She recently took a fall at home--      Current Outpatient Medications:     albuterol (PROVENTIL) (2.5 MG/3ML) 0.083% nebulizer solution, Take 2.5 mg by nebulization Every 4 (Four) Hours As Needed for Wheezing., Disp: 150 mL, Rfl: 5    albuterol sulfate  (90 Base) MCG/ACT inhaler, Inhale 2 puffs Every 4 (Four) Hours As Needed for Wheezing., Disp: 6.7 g, Rfl: 5    atorvastatin (LIPITOR) 10 MG tablet, TAKE ONE TABLET DAILY GENERIC FOR LIPITOR, Disp: 30 tablet, Rfl: 5    Azelastine HCl 137 MCG/SPRAY solution, 2 sprays into the nostril(s) as directed by provider 2 (Two) Times a Day., Disp: 30 mL, Rfl: 5    Azelastine HCl 137 MCG/SPRAY solution, 2 sprays into the nostril(s) as directed by provider 2 (Two) Times a Day., Disp: 30 mL, Rfl: 5    bumetanide (BUMEX) 0.5 MG tablet, Take 2 tablets by mouth Daily., Disp: , Rfl:     Cholecalciferol 2000 units capsule, Take 1 capsule by mouth Daily., Disp: , Rfl:     citalopram (CeleXA) 20 MG tablet, TAKE ONE TABLET DAILY GENERIC FOR CELEXA, Disp: 90 tablet, Rfl: 1    cloNIDine (CATAPRES) 0.1 MG tablet, TAKE ONE TABLET BY MOUTH DAILY., Disp: 30 tablet, Rfl: 5    fluticasone (Flonase Allergy Relief) 50 MCG/ACT nasal spray, 2 sprays into the nostril(s) as directed by provider Daily., Disp: 16 g, Rfl: 5    fluticasone (Flonase Allergy Relief) 50 MCG/ACT nasal spray, 2 sprays into the nostril(s) as directed by provider Daily., Disp: 16 g, Rfl: 5    Fluticasone Furoate-Vilanterol 100-25 MCG/ACT aerosol powder , Inhale 1 puff Daily for 90 days., Disp: 1 each, Rfl: 35    HYDROcodone-acetaminophen (NORCO) 5-325 MG per tablet, TAKE ONE TABLET DAILY AS NEEDED FOR PAIN GENERIC FOR NORCO, Disp: 30 tablet, Rfl: 0    levothyroxine (SYNTHROID, LEVOTHROID) 75 MCG tablet, TAKE ONE TABLET DAILY GENERIC FOR  "SYNTHROID, Disp: 90 tablet, Rfl: 0    loratadine (CLARITIN) 10 MG tablet, Take 1 tablet by mouth Daily., Disp: 90 tablet, Rfl: 3    losartan (COZAAR) 100 MG tablet, TAKE ONE TABLET DAILY GENERIC FOR COZAAR, Disp: 90 tablet, Rfl: 0    metoprolol succinate XL (TOPROL-XL) 25 MG 24 hr tablet, TAKE ONE TABLET DAILY GENERIC FOR TOPROL XL, Disp: 30 tablet, Rfl: 5    Nebulizer misc, As Needed., Disp: , Rfl:     omeprazole (priLOSEC) 20 MG capsule, TAKE 1 CAPSULE DAILY GENERIC FOR PRILOSEC, Disp: 30 capsule, Rfl: 1  No Known Allergies           Past Medical History:   Diagnosis Date    Anxiety     Cholelithiasis     Depression     Diverticulosis     History of adenomatous polyp of colon     History of colon polyps     Hyperlipidemia     Hypertension     Hypothyroidism     Meningiomatosis     Myalgia     Nephrolithiasis     Type 2 diabetes mellitus      Past Surgical History:   Procedure Laterality Date    COLONOSCOPY  12/04/2018    Sigmoid diverticulosis; Non-engorged internal hemorrhoids vein; Repeat 5 years    COLONOSCOPY  07/15/2014    Two 3-5mm hyperplastic polyps in the rectum-one destroyed during removal; Mild sigmoid diverticulosis; Repeat 5 years    COLONOSCOPY  09/01/2009    Two 5mm tubular adenomatous polyps in the transverse colon; Two 5mm hyperplastic polyps in the distal sigmoid colon; Small internal hemorrhoid; Repeat 3 years    ENDOSCOPY  12/04/2018    Normal endoscopy       Review of Systems   HENT: Negative.     Eyes: Negative.    Respiratory: Negative.     Cardiovascular: Negative.    Gastrointestinal: Negative.    Endocrine: Negative.    Genitourinary: Negative.    Musculoskeletal:  Positive for arthralgias, gait problem and joint swelling.   Skin:  Positive for color change.   Allergic/Immunologic: Negative.    Neurological:  Positive for weakness.   Hematological: Negative.    Psychiatric/Behavioral: Negative.       Objective /76   Pulse 85   Temp 97.3 °F (36.3 °C)   Ht 152.4 cm (60\")   Wt 82.6 " kg (182 lb)   LMP  (LMP Unknown)   SpO2 97%   BMI 35.54 kg/m²    Physical Exam  Vitals and nursing note reviewed.   Constitutional:       Appearance: Normal appearance.   HENT:      Head: Normocephalic and atraumatic.      Nose: Nose normal.      Mouth/Throat:      Mouth: Mucous membranes are moist.   Cardiovascular:      Rate and Rhythm: Normal rate and regular rhythm.      Pulses: Normal pulses.      Heart sounds: Normal heart sounds.   Pulmonary:      Effort: Pulmonary effort is normal.      Breath sounds: Normal breath sounds.   Abdominal:      General: Abdomen is flat. Bowel sounds are normal.      Palpations: Abdomen is soft.   Musculoskeletal:         General: Swelling and tenderness present.      Cervical back: Normal range of motion and neck supple.   Skin:     General: Skin is warm.      Capillary Refill: Capillary refill takes less than 2 seconds.   Neurological:      General: No focal deficit present.      Mental Status: She is alert.   Psychiatric:         Mood and Affect: Mood normal.       Assessment & Plan   Diagnoses and all orders for this visit:    1. Acute pain of left knee (Primary)  -     XR Knee 3 View Left; Future                 Orders Placed This Encounter   Procedures    XR Knee 3 View Left     Standing Status:   Future     Standing Expiration Date:   6/9/2024     Order Specific Question:   Reason for Exam:     Answer:   knee injury     Order Specific Question:   Does this patient have a diabetic monitoring/medication delivering device on?     Answer:   No     Order Specific Question:   Release to patient     Answer:   Routine Release       Follow up: 2 month(s)

## 2023-06-13 DIAGNOSIS — M25.562 ACUTE PAIN OF LEFT KNEE: ICD-10-CM

## 2023-08-04 DIAGNOSIS — M19.90 OSTEOARTHRITIS, UNSPECIFIED OSTEOARTHRITIS TYPE, UNSPECIFIED SITE: ICD-10-CM

## 2023-08-04 RX ORDER — HYDROCODONE BITARTRATE AND ACETAMINOPHEN 5; 325 MG/1; MG/1
TABLET ORAL
Qty: 30 TABLET | Refills: 0 | Status: SHIPPED | OUTPATIENT
Start: 2023-08-04

## 2023-08-21 RX ORDER — LEVOTHYROXINE SODIUM 0.07 MG/1
TABLET ORAL
Qty: 90 TABLET | Refills: 0 | Status: SHIPPED | OUTPATIENT
Start: 2023-08-21

## 2023-08-21 RX ORDER — LOSARTAN POTASSIUM 100 MG/1
TABLET ORAL
Qty: 90 TABLET | Refills: 0 | Status: SHIPPED | OUTPATIENT
Start: 2023-08-21

## 2023-08-28 ENCOUNTER — HOSPITAL ENCOUNTER (OUTPATIENT)
Dept: CT IMAGING | Facility: HOSPITAL | Age: 83
Discharge: HOME | End: 2023-08-28
Payer: MEDICARE

## 2023-09-05 DIAGNOSIS — M19.90 OSTEOARTHRITIS, UNSPECIFIED OSTEOARTHRITIS TYPE, UNSPECIFIED SITE: ICD-10-CM

## 2023-09-05 RX ORDER — HYDROCODONE BITARTRATE AND ACETAMINOPHEN 5; 325 MG/1; MG/1
TABLET ORAL
Qty: 30 TABLET | Refills: 0 | Status: SHIPPED | OUTPATIENT
Start: 2023-09-05

## 2023-09-06 ENCOUNTER — OFFICE VISIT (OUTPATIENT)
Dept: OBSTETRICS AND GYNECOLOGY | Facility: CLINIC | Age: 83
End: 2023-09-06
Payer: MEDICARE

## 2023-09-06 VITALS
SYSTOLIC BLOOD PRESSURE: 134 MMHG | WEIGHT: 178.5 LBS | DIASTOLIC BLOOD PRESSURE: 84 MMHG | HEIGHT: 60 IN | BODY MASS INDEX: 35.05 KG/M2

## 2023-09-06 DIAGNOSIS — N83.201 CYST OF RIGHT OVARY: ICD-10-CM

## 2023-09-06 DIAGNOSIS — R93.89 ENDOMETRIAL THICKENING ON ULTRASOUND: Primary | ICD-10-CM

## 2023-09-06 NOTE — PROGRESS NOTES
"    Glen Meade MD  [facility]  [address]  [place] [address]  Office [phone]  Fax [phone]      [facility]  Paige Martinez  : 1940  MRN: [id number]    Subjective   Subjective     Chief Complaint   Patient presents with    Follow-up     Patient here for follow up on endometrial thickening and right ov cyst. Patient had ultrasound today.        History of Present Illness  Paige Martinez is a 82 y.o. female , , who comes to the office today for follow-up. Has thickened endometrium and a right ovarian cysts on imaging that was incidentally noted.  She has no complaints at this time of the pelvis or abdomen. She denies any pelvic pain or any vaginal bleeding since menopause. She denies any changes in weight, abdominal bloating, early satiety, nausea/vomiting, changes to her bowel or bladder habits. At last visit, she was not interested in pursuing surgery or therapy for these findings even if malignancy was present.      Review of Systems   Genitourinary:  Negative for decreased urine volume, difficulty urinating, dyspareunia, dysuria, enuresis, flank pain, frequency, genital sores, hematuria, menstrual problem, pelvic pain, urgency, vaginal bleeding, vaginal discharge and vaginal pain.   All other systems reviewed and are negative.       Objective    Objective     Vitals:   Visit Vitals  /84   Ht 152.4 cm (60\")   Wt 81 kg (178 lb 8 oz)   LMP  (LMP Unknown)   BMI 34.86 kg/m²        Physical Exam  Vitals reviewed.   Constitutional:       General: She is not in acute distress.     Appearance: Normal appearance. She is not ill-appearing.   HENT:      Head: Normocephalic and atraumatic.      Nose: No congestion or rhinorrhea.   Eyes:      General: No scleral icterus.        Right eye: No discharge.         Left eye: No discharge.      Extraocular Movements: Extraocular movements intact.      Conjunctiva/sclera: Conjunctivae normal.   Pulmonary:      Effort: Pulmonary effort " is normal. No accessory muscle usage or respiratory distress.   Musculoskeletal:      Right lower leg: No edema.      Left lower leg: No edema.   Skin:     General: Skin is warm and dry.      Coloration: Skin is not ashen, cyanotic or jaundiced.   Neurological:      General: No focal deficit present.      Mental Status: She is alert and oriented to person, place, and time.   Psychiatric:         Mood and Affect: Mood normal.         Behavior: Behavior is cooperative.         Result Review    Postmenopausal Interp: LOW (01/05/2023 13:52)  Premenopausal Interp: HIGH (01/05/2023 13:52)  Ovarian Malignancy Risk-ANABEL (01/05/2023 13:52)   = 17.3    US Non-ob Transvaginal (09/06/2023 08:33)  Uterus 5 cm, retroverted  Endometrium 1.1cm, cystic changes noted  Right ovary: simple 5.5 cm cyst, stable from last ultrasound, smaller simple cyst adjacent as well 1.7cm  Left ovary appears normal    MRI Pelvis With & Without Contrast (02/24/2023 10:34)  1. There are 2 right ovarian simple cystic lesions, the largest  measuring 5 cm without septation or mural nodularity. Yearly follow-up  ultrasound imaging recommended to establish long-term stability.  2. Heterogeneous thickened partially enhancing endometrium measuring up  to 11 mm in thickness. Hysteroscopy should be considered.  3. Incidental perineural sacral cysts.    US Non-ob Transvaginal (01/05/2023 10:52)  1.  Uterus: Small/atrophic , with uterine volume of 28 ml and Retroverted     2.  Endometrium: 11 mm      3.  Myometrium: Normal homogenous texture      4.  Ovaries  Left:    Normal/unremarkable   Right:  Simple cyst 5.7 cm         Assessment & Plan   Assessment / Plan     Diagnoses and all orders for this visit:    1. Endometrial thickening on ultrasound (Primary)    2. Cyst of right ovary        Discussion:   Stable appearing right ovarian cyst. Endometrium stable appearance and thickness from prior ultrasound. She wants to continue with expectant management at  this time. She has expressed that she does not want to pursue surgical or treatment options of these findings even if malignancy were present. Recommended continue surveillance at this time if agreeable. Her questions were answered. She expressed understanding.     Follow-up: Return in about 6 months (around 3/6/2024) for GYN US, GYN f/u.    Glen Meade MD

## 2023-09-18 RX ORDER — CITALOPRAM 20 MG/1
TABLET ORAL
Qty: 90 TABLET | Refills: 1 | Status: SHIPPED | OUTPATIENT
Start: 2023-09-18

## 2023-09-21 DIAGNOSIS — E03.9 ACQUIRED HYPOTHYROIDISM: ICD-10-CM

## 2023-09-21 DIAGNOSIS — E78.2 MIXED HYPERLIPIDEMIA: ICD-10-CM

## 2023-09-21 DIAGNOSIS — I10 ESSENTIAL HYPERTENSION: Primary | ICD-10-CM

## 2023-09-22 LAB
ALBUMIN SERPL-MCNC: 4.8 G/DL (ref 3.5–5.2)
ALBUMIN/GLOB SERPL: 2.3 G/DL
ALP SERPL-CCNC: 69 U/L (ref 39–117)
ALT SERPL-CCNC: 12 U/L (ref 1–33)
AST SERPL-CCNC: 16 U/L (ref 1–32)
BASOPHILS # BLD AUTO: 0.04 10*3/MM3 (ref 0–0.2)
BASOPHILS NFR BLD AUTO: 0.5 % (ref 0–1.5)
BILIRUB SERPL-MCNC: 0.4 MG/DL (ref 0–1.2)
BUN SERPL-MCNC: 25 MG/DL (ref 8–23)
BUN/CREAT SERPL: 19.4 (ref 7–25)
CALCIUM SERPL-MCNC: 9.6 MG/DL (ref 8.6–10.5)
CHLORIDE SERPL-SCNC: 103 MMOL/L (ref 98–107)
CHOLEST SERPL-MCNC: 179 MG/DL (ref 0–200)
CO2 SERPL-SCNC: 26.6 MMOL/L (ref 22–29)
CREAT SERPL-MCNC: 1.29 MG/DL (ref 0.57–1)
EGFRCR SERPLBLD CKD-EPI 2021: 42 ML/MIN/1.73
EOSINOPHIL # BLD AUTO: 0.2 10*3/MM3 (ref 0–0.4)
EOSINOPHIL NFR BLD AUTO: 2.5 % (ref 0.3–6.2)
ERYTHROCYTE [DISTWIDTH] IN BLOOD BY AUTOMATED COUNT: 12.3 % (ref 12.3–15.4)
GLOBULIN SER CALC-MCNC: 2.1 GM/DL
GLUCOSE SERPL-MCNC: 111 MG/DL (ref 65–99)
HCT VFR BLD AUTO: 36.7 % (ref 34–46.6)
HDLC SERPL-MCNC: 55 MG/DL (ref 40–60)
HGB BLD-MCNC: 12.1 G/DL (ref 12–15.9)
IMM GRANULOCYTES # BLD AUTO: 0.01 10*3/MM3 (ref 0–0.05)
IMM GRANULOCYTES NFR BLD AUTO: 0.1 % (ref 0–0.5)
LDLC SERPL CALC-MCNC: 103 MG/DL (ref 0–100)
LYMPHOCYTES # BLD AUTO: 2.71 10*3/MM3 (ref 0.7–3.1)
LYMPHOCYTES NFR BLD AUTO: 33.2 % (ref 19.6–45.3)
MCH RBC QN AUTO: 30.3 PG (ref 26.6–33)
MCHC RBC AUTO-ENTMCNC: 33 G/DL (ref 31.5–35.7)
MCV RBC AUTO: 92 FL (ref 79–97)
MONOCYTES # BLD AUTO: 0.82 10*3/MM3 (ref 0.1–0.9)
MONOCYTES NFR BLD AUTO: 10 % (ref 5–12)
NEUTROPHILS # BLD AUTO: 4.38 10*3/MM3 (ref 1.7–7)
NEUTROPHILS NFR BLD AUTO: 53.7 % (ref 42.7–76)
NRBC BLD AUTO-RTO: 0 /100 WBC (ref 0–0.2)
PLATELET # BLD AUTO: 224 10*3/MM3 (ref 140–450)
POTASSIUM SERPL-SCNC: 4.4 MMOL/L (ref 3.5–5.2)
PROT SERPL-MCNC: 6.9 G/DL (ref 6–8.5)
RBC # BLD AUTO: 3.99 10*6/MM3 (ref 3.77–5.28)
SODIUM SERPL-SCNC: 140 MMOL/L (ref 136–145)
TRIGL SERPL-MCNC: 116 MG/DL (ref 0–150)
TSH SERPL DL<=0.005 MIU/L-ACNC: 5.14 UIU/ML (ref 0.27–4.2)
VLDLC SERPL CALC-MCNC: 21 MG/DL (ref 5–40)
WBC # BLD AUTO: 8.16 10*3/MM3 (ref 3.4–10.8)

## 2023-09-27 ENCOUNTER — OFFICE VISIT (OUTPATIENT)
Dept: FAMILY MEDICINE CLINIC | Facility: CLINIC | Age: 83
End: 2023-09-27
Payer: MEDICARE

## 2023-09-27 VITALS
DIASTOLIC BLOOD PRESSURE: 74 MMHG | WEIGHT: 181 LBS | BODY MASS INDEX: 35.53 KG/M2 | OXYGEN SATURATION: 96 % | TEMPERATURE: 97.2 F | HEART RATE: 80 BPM | HEIGHT: 60 IN | RESPIRATION RATE: 18 BRPM | SYSTOLIC BLOOD PRESSURE: 130 MMHG

## 2023-09-27 DIAGNOSIS — L98.9 SKIN LESION: ICD-10-CM

## 2023-09-27 DIAGNOSIS — I10 ESSENTIAL HYPERTENSION: Primary | ICD-10-CM

## 2023-09-27 DIAGNOSIS — N18.32 STAGE 3B CHRONIC KIDNEY DISEASE: ICD-10-CM

## 2023-09-27 DIAGNOSIS — E03.9 ACQUIRED HYPOTHYROIDISM: ICD-10-CM

## 2023-09-27 RX ORDER — LEVOTHYROXINE SODIUM 0.1 MG/1
100 TABLET ORAL DAILY
Qty: 90 TABLET | Refills: 1 | Status: SHIPPED | OUTPATIENT
Start: 2023-09-27

## 2023-09-27 NOTE — PROGRESS NOTES
Subjective   Paige Martinez is a 82 y.o. female.     Chief Complaint   Patient presents with    Hypertension       Hypertension       She thinks her bp is stable --she sees nephrology for ckd..jose de jesus avila       Current Outpatient Medications:     albuterol (PROVENTIL) (2.5 MG/3ML) 0.083% nebulizer solution, Take 2.5 mg by nebulization Every 4 (Four) Hours As Needed for Wheezing., Disp: 150 mL, Rfl: 5    albuterol sulfate  (90 Base) MCG/ACT inhaler, Inhale 2 puffs Every 4 (Four) Hours As Needed for Wheezing., Disp: 6.7 g, Rfl: 5    atorvastatin (LIPITOR) 10 MG tablet, TAKE ONE TABLET DAILY GENERIC FOR LIPITOR, Disp: 30 tablet, Rfl: 5    Azelastine HCl 137 MCG/SPRAY solution, 2 sprays into the nostril(s) as directed by provider 2 (Two) Times a Day., Disp: 30 mL, Rfl: 5    Cholecalciferol 2000 units capsule, Take 1 capsule by mouth Daily., Disp: , Rfl:     citalopram (CeleXA) 20 MG tablet, TAKE ONE TABLET DAILY GENERIC FOR CELEXA, Disp: 90 tablet, Rfl: 1    cloNIDine (CATAPRES) 0.1 MG tablet, TAKE ONE TABLET BY MOUTH DAILY., Disp: 30 tablet, Rfl: 5    fluticasone (Flonase Allergy Relief) 50 MCG/ACT nasal spray, 2 sprays into the nostril(s) as directed by provider Daily., Disp: 16 g, Rfl: 5    HYDROcodone-acetaminophen (NORCO) 5-325 MG per tablet, TAKE ONE TABLET DAILY AS NEEDED FOR PAIN GENERIC FOR NORCO, Disp: 30 tablet, Rfl: 0    levothyroxine (SYNTHROID, LEVOTHROID) 75 MCG tablet, TAKE ONE TABLET DAILY GENERIC FOR SYNTHROID, Disp: 90 tablet, Rfl: 0    loratadine (CLARITIN) 10 MG tablet, Take 1 tablet by mouth Daily., Disp: 90 tablet, Rfl: 3    losartan (COZAAR) 100 MG tablet, TAKE ONE TABLET DAILY GENERIC FOR COZAAR, Disp: 90 tablet, Rfl: 0    metoprolol succinate XL (TOPROL-XL) 25 MG 24 hr tablet, TAKE ONE TABLET DAILY GENERIC FOR TOPROL XL, Disp: 30 tablet, Rfl: 5    Nebulizer misc, As Needed., Disp: , Rfl:     omeprazole (priLOSEC) 20 MG capsule, TAKE 1 CAPSULE DAILY GENERIC FOR  "PRILOSEC, Disp: 30 capsule, Rfl: 1    Fluticasone Furoate-Vilanterol 100-25 MCG/ACT aerosol powder , Inhale 1 puff Daily for 90 days., Disp: 1 each, Rfl: 35  No Known Allergies           Facility age limit for growth percentiles is 20 years.    Past Medical History:   Diagnosis Date    Anxiety     Cholelithiasis     Depression     Diverticulosis     History of adenomatous polyp of colon     History of colon polyps     Hyperlipidemia     Hypertension     Hypothyroidism     Meningiomatosis     Myalgia     Nephrolithiasis     Type 2 diabetes mellitus      Past Surgical History:   Procedure Laterality Date    COLONOSCOPY  12/04/2018    Sigmoid diverticulosis; Non-engorged internal hemorrhoids vein; Repeat 5 years    COLONOSCOPY  07/15/2014    Two 3-5mm hyperplastic polyps in the rectum-one destroyed during removal; Mild sigmoid diverticulosis; Repeat 5 years    COLONOSCOPY  09/01/2009    Two 5mm tubular adenomatous polyps in the transverse colon; Two 5mm hyperplastic polyps in the distal sigmoid colon; Small internal hemorrhoid; Repeat 3 years    ENDOSCOPY  12/04/2018    Normal endoscopy       Review of Systems   Constitutional: Negative.    HENT: Negative.     Eyes: Negative.    Respiratory: Negative.     Cardiovascular: Negative.    Gastrointestinal: Negative.    Endocrine: Negative.    Genitourinary: Negative.    Musculoskeletal: Negative.    Skin: Negative.    Allergic/Immunologic: Negative.    Neurological: Negative.    Hematological: Negative.    Psychiatric/Behavioral: Negative.       Objective /74   Pulse 80   Temp 97.2 °F (36.2 °C)   Resp 18   Ht 152.4 cm (60\")   Wt 82.1 kg (181 lb)   LMP  (LMP Unknown)   SpO2 96%   BMI 35.35 kg/m²    Physical Exam  Vitals and nursing note reviewed.   Constitutional:       Appearance: Normal appearance.   HENT:      Head: Normocephalic and atraumatic.      Nose: Nose normal.      Mouth/Throat:      Mouth: Mucous membranes are moist.   Eyes:      Extraocular " Movements: Extraocular movements intact.      Pupils: Pupils are equal, round, and reactive to light.   Cardiovascular:      Rate and Rhythm: Normal rate and regular rhythm.      Pulses: Normal pulses.      Heart sounds: Normal heart sounds.   Pulmonary:      Effort: Pulmonary effort is normal.   Abdominal:      General: Abdomen is flat. Bowel sounds are normal.      Palpations: Abdomen is soft.   Musculoskeletal:         General: Normal range of motion.      Cervical back: Normal range of motion and neck supple.   Skin:     General: Skin is warm.      Capillary Refill: Capillary refill takes less than 2 seconds.   Neurological:      General: No focal deficit present.      Mental Status: She is alert.   Psychiatric:         Mood and Affect: Mood normal.       Assessment & Plan   Diagnoses and all orders for this visit:    1. Essential hypertension (Primary)    2. Acquired hypothyroidism    3. Stage 3b chronic kidney disease    4. Skin lesion  -     Ambulatory Referral to Dermatology    She deann monriro bp and keep me oinfoejmd  She will keep apt wit hnephrology             Orders Placed This Encounter   Procedures    Ambulatory Referral to Dermatology     Referral Priority:   Routine     Referral Type:   Consultation     Referral Reason:   Specialty Services Required     Referred to Provider:   Franko, Gregg Mckoy MD     Requested Specialty:   Dermatology     Number of Visits Requested:   1       Follow up: 3 month(s)

## 2023-10-04 DIAGNOSIS — M19.90 OSTEOARTHRITIS, UNSPECIFIED OSTEOARTHRITIS TYPE, UNSPECIFIED SITE: ICD-10-CM

## 2023-10-04 RX ORDER — ATORVASTATIN CALCIUM 10 MG/1
TABLET, FILM COATED ORAL
Qty: 30 TABLET | Refills: 5 | Status: SHIPPED | OUTPATIENT
Start: 2023-10-04

## 2023-10-04 RX ORDER — HYDROCODONE BITARTRATE AND ACETAMINOPHEN 5; 325 MG/1; MG/1
TABLET ORAL
Qty: 30 TABLET | Refills: 0 | Status: SHIPPED | OUTPATIENT
Start: 2023-10-04

## 2023-11-13 DIAGNOSIS — M19.90 OSTEOARTHRITIS, UNSPECIFIED OSTEOARTHRITIS TYPE, UNSPECIFIED SITE: ICD-10-CM

## 2023-11-13 RX ORDER — HYDROCODONE BITARTRATE AND ACETAMINOPHEN 5; 325 MG/1; MG/1
1 TABLET ORAL EVERY 8 HOURS PRN
Qty: 30 TABLET | Refills: 0 | Status: SHIPPED | OUTPATIENT
Start: 2023-11-13

## 2023-11-28 ENCOUNTER — HOSPITAL ENCOUNTER (OUTPATIENT)
Dept: CT IMAGING | Facility: HOSPITAL | Age: 83
Discharge: HOME | End: 2023-11-28
Admitting: INTERNAL MEDICINE
Payer: MEDICARE

## 2023-11-28 ENCOUNTER — OFFICE VISIT (OUTPATIENT)
Dept: PULMONOLOGY | Facility: CLINIC | Age: 83
End: 2023-11-28
Payer: MEDICARE

## 2023-11-28 VITALS
BODY MASS INDEX: 35.34 KG/M2 | HEIGHT: 60 IN | DIASTOLIC BLOOD PRESSURE: 92 MMHG | WEIGHT: 180 LBS | OXYGEN SATURATION: 98 % | SYSTOLIC BLOOD PRESSURE: 156 MMHG | HEART RATE: 101 BPM

## 2023-11-28 DIAGNOSIS — J98.4 RESTRICTIVE LUNG DISEASE: ICD-10-CM

## 2023-11-28 DIAGNOSIS — R06.02 SOB (SHORTNESS OF BREATH): ICD-10-CM

## 2023-11-28 DIAGNOSIS — Z87.01 HISTORY OF PNEUMONIA: ICD-10-CM

## 2023-11-28 DIAGNOSIS — M19.90 OSTEOARTHRITIS, UNSPECIFIED OSTEOARTHRITIS TYPE, UNSPECIFIED SITE: ICD-10-CM

## 2023-11-28 DIAGNOSIS — R91.1 LUNG NODULE: ICD-10-CM

## 2023-11-28 DIAGNOSIS — J98.4 PULMONARY SCARRING: ICD-10-CM

## 2023-11-28 DIAGNOSIS — J40 BRONCHITIS: ICD-10-CM

## 2023-11-28 DIAGNOSIS — R91.1 LUNG NODULE: Primary | ICD-10-CM

## 2023-11-28 DIAGNOSIS — J44.9 CHRONIC OBSTRUCTIVE PULMONARY DISEASE, UNSPECIFIED COPD TYPE: Primary | ICD-10-CM

## 2023-11-28 DIAGNOSIS — Z87.891 FORMER SMOKER: ICD-10-CM

## 2023-11-28 DIAGNOSIS — J30.9 ALLERGIC RHINITIS, UNSPECIFIED SEASONALITY, UNSPECIFIED TRIGGER: ICD-10-CM

## 2023-11-28 DIAGNOSIS — J44.1 COPD WITH ACUTE EXACERBATION: ICD-10-CM

## 2023-11-28 PROBLEM — R06.2 WHEEZING: Status: ACTIVE | Noted: 2023-11-28

## 2023-11-28 PROCEDURE — 71250 CT THORAX DX C-: CPT

## 2023-11-28 RX ORDER — ALBUTEROL SULFATE 2.5 MG/3ML
2.5 SOLUTION RESPIRATORY (INHALATION) EVERY 4 HOURS PRN
Qty: 150 ML | Refills: 5 | Status: SHIPPED | OUTPATIENT
Start: 2023-11-28

## 2023-11-28 RX ORDER — FLUTICASONE PROPIONATE 50 MCG
2 SPRAY, SUSPENSION (ML) NASAL DAILY
Qty: 16 G | Refills: 5 | Status: SHIPPED | OUTPATIENT
Start: 2023-11-28

## 2023-11-28 RX ORDER — FLUTICASONE FUROATE AND VILANTEROL 100; 25 UG/1; UG/1
1 POWDER RESPIRATORY (INHALATION) DAILY
Qty: 1 EACH | Refills: 35 | Status: SHIPPED | OUTPATIENT
Start: 2023-11-28 | End: 2024-02-26

## 2023-11-28 RX ORDER — LORATADINE 10 MG/1
10 TABLET ORAL DAILY
Qty: 90 TABLET | Refills: 3 | Status: SHIPPED | OUTPATIENT
Start: 2023-11-28

## 2023-11-28 RX ORDER — MONTELUKAST SODIUM 10 MG/1
10 TABLET ORAL NIGHTLY
Qty: 90 TABLET | Refills: 3 | Status: SHIPPED | OUTPATIENT
Start: 2023-11-28

## 2023-11-28 RX ORDER — METOPROLOL SUCCINATE 25 MG/1
TABLET, EXTENDED RELEASE ORAL
Qty: 30 TABLET | Refills: 5 | Status: SHIPPED | OUTPATIENT
Start: 2023-11-28

## 2023-11-28 RX ORDER — METHYLPREDNISOLONE 4 MG/1
TABLET ORAL
Qty: 1 EACH | Refills: 0 | Status: SHIPPED | OUTPATIENT
Start: 2023-11-28

## 2023-11-28 RX ORDER — ALBUTEROL SULFATE 90 UG/1
2 AEROSOL, METERED RESPIRATORY (INHALATION) EVERY 4 HOURS PRN
Qty: 6.7 G | Refills: 5 | Status: SHIPPED | OUTPATIENT
Start: 2023-11-28

## 2023-11-28 RX ORDER — ALBUTEROL SULFATE 90 UG/1
2 AEROSOL, METERED RESPIRATORY (INHALATION) EVERY 4 HOURS PRN
Qty: 6.7 G | Refills: 5 | Status: SHIPPED | OUTPATIENT
Start: 2023-11-28 | End: 2023-11-28 | Stop reason: SDUPTHER

## 2023-11-28 RX ORDER — ALBUTEROL SULFATE 2.5 MG/3ML
2.5 SOLUTION RESPIRATORY (INHALATION) EVERY 4 HOURS PRN
Qty: 150 ML | Refills: 5 | Status: SHIPPED | OUTPATIENT
Start: 2023-11-28 | End: 2023-11-28 | Stop reason: SDUPTHER

## 2023-11-28 RX ORDER — LOSARTAN POTASSIUM 100 MG/1
TABLET ORAL
Qty: 90 TABLET | Refills: 0 | Status: SHIPPED | OUTPATIENT
Start: 2023-11-28

## 2023-11-28 RX ORDER — AZITHROMYCIN 250 MG/1
TABLET, FILM COATED ORAL
Qty: 6 TABLET | Refills: 0 | Status: SHIPPED | OUTPATIENT
Start: 2023-11-28

## 2023-11-28 RX ORDER — AZELASTINE HYDROCHLORIDE 137 UG/1
2 SPRAY, METERED NASAL 2 TIMES DAILY
Qty: 30 ML | Refills: 5 | Status: SHIPPED | OUTPATIENT
Start: 2023-11-28

## 2023-11-28 NOTE — PROGRESS NOTES
RESPIRATORY DISEASE CLINIC OUTPATIENT PROGRESS NOTE    Patient: Paige Martinez  : 1940  Age: 83 y.o.  Date of Service: 2023    REASON FOR CLINIC VISIT:  Chief Complaint   Patient presents with    COPD    Shortness of Breath       Subjective:    History of Present Illness:  Paige Martinez is a 83 y.o. female who presents to the office today to be seen for    Diagnosis Plan   1. Chronic obstructive pulmonary disease, unspecified COPD type        2. Former smoker        3. History of pneumonia        4. Lung nodule        5. Pulmonary scarring        6. Restrictive lung disease        7. Allergic rhinitis, unspecified seasonality, unspecified trigger        8. SOB (shortness of breath)        9. COPD with acute exacerbation        .  Other problems per record.    History:    Patient is a very pleasant elderly  female was seen in the pulmonary clinic for follow-up visit.  She attended the pulmonary clinic with her son and great granddaughter.    The patient had history of COPD and she is a former smoker.  She uses Advair and albuterol nebulizer and rescue inhaler but not on any oxygen or CPAP at home.  She had not been feeling well for the last few days and reported having cough chest congestion and increased wheezing.  She is also feeling little tired and exhausted but did not have any fever at home.  She has not been tested for COVID or any other viral infection.  She reported her great granddaughter stays with her and she had some respiratory illness lately.  She was expected to do a CT scan of the chest before the clinic visit this morning but she could not do that and wanted to get it done if possible.    She has is all allergy and also uses fluticasone nasal spray and loratadine.  He is using Astelin nasal spray occasionally but not regularly.  She had no recent hospital admissions and ER visit and urgent visit with a new complaint.  She told me she is up-to-date on her vaccinations.   She was little tired and exhausted at time of my visit.  Last CT scan showed some pulmonary scarring and lung nodules.    PFT done today:  Not done today    PFT Values          2022    16:00   Pre Drug PFT Results   FVC 80   FEV1 75   FEF 25-75% 62   FEV1/FVC 73   Other Tests PFT Results   TLC 87      DLCO 67   D/VAsb 79     Results for orders placed in visit on 22    Pulmonary Function Test    Narrative  Pulmonary Function Test  Performed by: Kate Denny, RRT  Authorized by: Tushar Thrasher MD    Pre Drug % Predicted  FVC: 80%  FEV1: 75%  FEF 25-75%: 62%  FEV1/FVC: 73%  T%  RV: 107%  DLCO: 67%  D/VAsb: 79%    Interpretation  Overall comments:  Above test results are acceptable and reproducible by ATS criteria  For analysis of the above test results the patient showed evidence of moderate obstructive and mild restrictive dysfunction.  No bronchodilator challenge was done.  Diffusion capacity corrected for alveolar volume is moderately decreased    No prior test result was available for comparison.  Clinical correlation is indicated    Tushar Thrasher MD  Pulmonologist/Intensivist  2022 18:31 CST         Bronchodilator therapy: Advair a Albuterol rescue inhaler and Nebulizer    Smoking Status:   Social History     Tobacco Use   Smoking Status Former    Packs/day: 1.00    Years: 51.00    Additional pack years: 0.00    Total pack years: 51.00    Types: Cigarettes    Start date:     Quit date:     Years since quittin.9    Passive exposure: Past   Smokeless Tobacco Never     Pulm Rehab: no  Sleep: yes    Support System: lives alone    Code Status:   There are no questions and answers to display.        Review of Systems:  A complete review of systems is performed and all other systems were reviewed and negative as note above in the HPI.  Review of Systems   Constitutional:  Positive for fatigue. Negative for fever.   HENT:  Positive for congestion, postnasal drip,  rhinorrhea, sinus pressure, sneezing and sore throat.    Eyes: Negative.    Respiratory:  Positive for cough, chest tightness and shortness of breath.    Cardiovascular:  Positive for leg swelling.   Gastrointestinal: Negative.    Endocrine: Negative.    Genitourinary: Negative.    Musculoskeletal: Negative.    Skin: Negative.    Allergic/Immunologic: Positive for environmental allergies.   Neurological:  Positive for weakness.   Hematological: Negative.    Psychiatric/Behavioral: Negative.         CAT/ACT Score:  Not done today    Medications:  Outpatient Encounter Medications as of 11/28/2023   Medication Sig Dispense Refill    albuterol (PROVENTIL) (2.5 MG/3ML) 0.083% nebulizer solution Take 2.5 mg by nebulization Every 4 (Four) Hours As Needed for Wheezing. 150 mL 5    albuterol sulfate  (90 Base) MCG/ACT inhaler Inhale 2 puffs Every 4 (Four) Hours As Needed for Wheezing. 6.7 g 5    atorvastatin (LIPITOR) 10 MG tablet TAKE ONE TABLET DAILY GENERIC FOR LIPITOR 30 tablet 5    Azelastine HCl 137 MCG/SPRAY solution 2 sprays into the nostril(s) as directed by provider 2 (Two) Times a Day. 30 mL 5    Cholecalciferol 2000 units capsule Take 1 capsule by mouth Daily.      citalopram (CeleXA) 20 MG tablet TAKE ONE TABLET DAILY GENERIC FOR CELEXA 90 tablet 1    cloNIDine (CATAPRES) 0.1 MG tablet TAKE ONE TABLET BY MOUTH DAILY. 30 tablet 5    fluticasone (Flonase Allergy Relief) 50 MCG/ACT nasal spray 2 sprays into the nostril(s) as directed by provider Daily. 16 g 5    Fluticasone Furoate-Vilanterol 100-25 MCG/ACT aerosol powder  Inhale 1 puff Daily for 90 days. 1 each 35    HYDROcodone-acetaminophen (NORCO) 5-325 MG per tablet Take 1 tablet by mouth Every 8 (Eight) Hours As Needed for Moderate Pain. 30 tablet 0    levothyroxine (Synthroid) 100 MCG tablet Take 1 tablet by mouth Daily. 90 tablet 1    levothyroxine (SYNTHROID, LEVOTHROID) 75 MCG tablet TAKE ONE TABLET DAILY GENERIC FOR SYNTHROID 90 tablet 0     "loratadine (CLARITIN) 10 MG tablet Take 1 tablet by mouth Daily. 90 tablet 3    losartan (COZAAR) 100 MG tablet TAKE ONE TABLET DAILY GENERIC FOR COZAAR 90 tablet 0    metoprolol succinate XL (TOPROL-XL) 25 MG 24 hr tablet TAKE ONE TABLET DAILY GENERIC FOR TOPROL XL 30 tablet 5    Nebulizer misc As Needed.      omeprazole (priLOSEC) 20 MG capsule TAKE 1 CAPSULE DAILY GENERIC FOR PRILOSEC 30 capsule 1     No facility-administered encounter medications on file as of 11/28/2023.       Allergies:  No Known Allergies    Immunizations:  Immunization History   Administered Date(s) Administered    COVID-19 ([employer]) 04/09/2021    PPD Test 08/28/2017       Objective:    Vitals:  /92   Pulse 101   Ht 152.4 cm (60\")   Wt 81.6 kg (180 lb)   LMP  (LMP Unknown)   SpO2 98% Comment: RA  Breastfeeding No   BMI 35.15 kg/m²     Physical Exam:  General: Patient is a 83 y.o. pleasant elderly  female. Looks stated age. Appears to be in no acute distress.  Eyes: EOMI. PERRLA. Vision intact. No scleral icterus.  Ear, Nose, Mouth and Throat: Hearing is grossly intact. No Leukoplakia, pharyngitis, stomatitis or thrush. Swollen nasal mucosa with post nasal drop.  Neck: Range of motion of neck normal. No thyromegaly or masses. Mallampati Class 3  Respiratory: Diffuse wheezing heard on auscultation bilaterally. No use of accessory muscles. Decreased breath sounds bilaterally.  Cardiovascular: Normal heart sounds. Regularly regular rhythm without murmur.  Gastrointestinal: Non tender, non distended, soft. Bowel sounds positive in all four quadrants. No organomegaly.  Skin: No obvious rashes, lesions, ulcers or large amount of bruising. No edema.   Neurological: No new motor deficits. Cranial nerves appear intact.  Psychiatric: Patient is alert and oriented to person, place and time.    Chest Imaging:    Study Result    Narrative & Impression   CT CHEST without contrast dated 2/22/2023 9:33 AM CST     HISTORY: Lung nodule, " 6-8mm; R91.1-Solitary pulmonary nodule     COMPARISON: 11/21/2022     DLP: 217 mGy cm     All CT scans are performed using dose optimization techniques as  appropriate to the performed exam and including at least one of the  following: Automated exposure control, adjustment of the mA and/or kV  according to size, and the use of the iterative reconstruction  technique.     TECHNIQUE: Serial helical tomographic images of the chest were acquired.  Bone and soft tissue algorithms were provided. Coronal reformatted  images were also provided for review.     FINDINGS:  The imaged portion of the neck and thyroid gland is unremarkable.      A focus of nodular scarring is again noted within the medial right lung  apex unchanged from the previous exam there are changes of centrilobular  emphysema. Scattered small centrilobular nodules in the upper lobes are  stable from the previous exam. A 7.3 mm subpleural nodule is noted  laterally within the right upper lobe on image 60 of series 4  essentially stable from the previous examination. This is in close  proximity to the minor fissure. No pleural effusion is present. The  trachea and bronchial tree are patent.     Mitral annulus calcifications are present. There is no evidence of  cardiomegaly. Coronary calcifications are demonstrated. There is  atheromatous calcification of the thoracic aorta and great vessels. No  evidence of aneurysm.. There is no pericardial effusion. No enlarged  axillary or mediastinal lymph nodes are present.      The osseous structures of the thorax and surrounding soft tissues are  unremarkable.     There is a pedunculated lesion involving the upper pole of the right  kidney measuring 1.2 cm in size. This measures Hounsfield units of 8  suggesting a cyst. However, there is also some lobularity involving the  upper pole of the right kidney if not previously evaluated I would  suggest ultrasound for further assessment..     IMPRESSION:  1.  Well-circumscribed nodule which is subpleural in location in the  right upper lobe. This is unchanged from the previous exam of  11/21/2022. This finding previously demonstrated slight interval  increase. Repeat nonenhanced CT imaging of the chest in 6 months is  suggested.  2. Changes of centrilobular emphysema. Scattered small groundglass  nodules are noted within the upper lobes stable from the previous exam.  There is scarring in the apices. No evidence of acute consolidative  pneumonia or effusion. No developing mediastinal or axillary  lymphadenopathy..  3. The upper pole of the right kidney is somewhat lobular in appearance.  There is a discrete small cyst present. Ultrasound will be suggested for  further characterization if not previously undertaken.     This report was finalized on 02/22/2023 13:25 by Dr. Simon Pringle MD.     Chest CT scan done today showed lung nodule and chronic scarring but no definite consolidation noted but final report is pending at the time of my dictation    Assessment:  1. Chronic obstructive pulmonary disease, unspecified COPD type    2. Former smoker    3. History of pneumonia    4. Lung nodule    5. Pulmonary scarring    6. Restrictive lung disease    7. Allergic rhinitis, unspecified seasonality, unspecified trigger    8. SOB (shortness of breath)    9. COPD with acute exacerbation        Plan/Recommendations:    1.  Patient was not looking very well at the time of my clinic visit.  She is going to an acute examination of COPD and was given a course of Medrol Dosepak and azithromycin.  Also ordered respiratory viral plan today which she needs to give the sample before she goes home today.  She may have upper respiratory viral infection including COVID RSV or rhinovirus or metapneumovirus which is quite common in this time.  2.  Patient will continue Advair and albuterol nebulizer and rescue inhaler.  Prescription refill was sent to the pharmacy.  I advised her if your  conditions worsen she should come to the ER.  3.  She has nasal allergy and was advised to continue fluticasone nasal spray and loratadine as before.  Prescription refill was sent to the pharmacy.  4.  Continue follow-up with the primary care physician. she is up-to-date on vaccinations.  Due to respiratory limits advised her to use mask all the time to avoid contact with her great granddaughter and other people to minimize the risk of any viral respiratory infection.  She will return to pulmonary clinic for a follow-up visit in 3 months time or earlier if needed    Follow up:  3 Months    Time Spent:  30 minutes    I appreciate the opportunity of participating in this patient's care. I would like to thank the PCP for the referral.  Please feel free to contact me with any other questions.    Tushar Thrasher MD   Pulmonologist/Intensivist     Electronically signed by: Tushar Thrasher MD, 11/28/2023 10:22 CST

## 2023-11-30 ENCOUNTER — TELEPHONE (OUTPATIENT)
Dept: PULMONOLOGY | Facility: CLINIC | Age: 83
End: 2023-11-30
Payer: MEDICARE

## 2023-11-30 NOTE — TELEPHONE ENCOUNTER
Per :    Please let the patient know the CT scan did not show any pneumonia there was some chronic emphysematous changes and bronchial changes noted but there is a lung nodule noted which needs further workup and I ordered a PET scan in 2 weeks time after she recovers from her respiratory infection. Thank you.

## 2023-11-30 NOTE — TELEPHONE ENCOUNTER
Called and gave patient results, gave results, gave number to schedule PET scan. Patient said she did not have the viral swab done, she plans on doing an at home covid swab.

## 2023-12-01 ENCOUNTER — HOSPITAL ENCOUNTER (OUTPATIENT)
Dept: CT IMAGING | Facility: HOSPITAL | Age: 83
Discharge: HOME | End: 2023-12-01
Admitting: INTERNAL MEDICINE
Payer: MEDICARE

## 2023-12-01 DIAGNOSIS — R91.1 LUNG NODULE: ICD-10-CM

## 2023-12-01 PROCEDURE — 71250 CT THORAX DX C-: CPT

## 2023-12-14 RX ORDER — CLONIDINE HYDROCHLORIDE 0.1 MG/1
TABLET ORAL
Qty: 30 TABLET | Refills: 5 | Status: SHIPPED | OUTPATIENT
Start: 2023-12-14

## 2023-12-20 DIAGNOSIS — M19.90 OSTEOARTHRITIS, UNSPECIFIED OSTEOARTHRITIS TYPE, UNSPECIFIED SITE: ICD-10-CM

## 2023-12-20 RX ORDER — HYDROCODONE BITARTRATE AND ACETAMINOPHEN 5; 325 MG/1; MG/1
1 TABLET ORAL EVERY 8 HOURS PRN
Qty: 30 TABLET | Refills: 0 | Status: SHIPPED | OUTPATIENT
Start: 2023-12-20

## 2023-12-22 DIAGNOSIS — E03.9 ACQUIRED HYPOTHYROIDISM: Primary | ICD-10-CM

## 2023-12-23 LAB — TSH SERPL DL<=0.005 MIU/L-ACNC: 2.27 UIU/ML (ref 0.27–4.2)

## 2023-12-29 ENCOUNTER — OFFICE VISIT (OUTPATIENT)
Dept: FAMILY MEDICINE CLINIC | Facility: CLINIC | Age: 83
End: 2023-12-29
Payer: MEDICARE

## 2023-12-29 VITALS
DIASTOLIC BLOOD PRESSURE: 82 MMHG | RESPIRATION RATE: 18 BRPM | SYSTOLIC BLOOD PRESSURE: 124 MMHG | OXYGEN SATURATION: 99 % | BODY MASS INDEX: 36.24 KG/M2 | WEIGHT: 184.6 LBS | HEIGHT: 60 IN | TEMPERATURE: 97.1 F | HEART RATE: 79 BPM

## 2023-12-29 DIAGNOSIS — I10 ESSENTIAL HYPERTENSION: Primary | ICD-10-CM

## 2023-12-29 RX ORDER — DAPAGLIFLOZIN 10 MG/1
TABLET, FILM COATED ORAL
COMMUNITY
Start: 2023-12-06

## 2023-12-29 NOTE — PROGRESS NOTES
Subjective   Paige Martinez is a 83 y.o. female.     Chief Complaint   Patient presents with    Medicare Wellness-subsequent        History of Present Illness     She thinks her bp is stable without cp or ha      Current Outpatient Medications:     albuterol (PROVENTIL) (2.5 MG/3ML) 0.083% nebulizer solution, Take 2.5 mg by nebulization Every 4 (Four) Hours As Needed for Wheezing., Disp: 150 mL, Rfl: 5    albuterol sulfate  (90 Base) MCG/ACT inhaler, Inhale 2 puffs Every 4 (Four) Hours As Needed for Wheezing., Disp: 6.7 g, Rfl: 5    atorvastatin (LIPITOR) 10 MG tablet, TAKE ONE TABLET DAILY GENERIC FOR LIPITOR, Disp: 30 tablet, Rfl: 5    Azelastine HCl 137 MCG/SPRAY solution, 2 sprays into the nostril(s) as directed by provider 2 (Two) Times a Day., Disp: 30 mL, Rfl: 5    azithromycin (ZITHROMAX) 250 MG tablet, Take 2 by mouth today then 1 daily for 4 days, Disp: 6 tablet, Rfl: 0    Cholecalciferol 2000 units capsule, Take 1 capsule by mouth Daily., Disp: , Rfl:     citalopram (CeleXA) 20 MG tablet, TAKE ONE TABLET DAILY GENERIC FOR CELEXA, Disp: 90 tablet, Rfl: 1    cloNIDine (CATAPRES) 0.1 MG tablet, TAKE ONE TABLET BY MOUTH DAILY., Disp: 30 tablet, Rfl: 5    Farxiga 10 MG tablet, , Disp: , Rfl:     fluticasone (Flonase Allergy Relief) 50 MCG/ACT nasal spray, 2 sprays into the nostril(s) as directed by provider Daily., Disp: 16 g, Rfl: 5    Fluticasone Furoate-Vilanterol 100-25 MCG/ACT aerosol powder , Inhale 1 puff Daily for 90 days., Disp: 1 each, Rfl: 35    HYDROcodone-acetaminophen (NORCO) 5-325 MG per tablet, TAKE 1 TABLET BY MOUTH EVERY 8 (EIGHT) HOURS AS NEEDED FOR MODERATE PAIN., Disp: 30 tablet, Rfl: 0    levothyroxine (Synthroid) 100 MCG tablet, Take 1 tablet by mouth Daily., Disp: 90 tablet, Rfl: 1    levothyroxine (SYNTHROID, LEVOTHROID) 75 MCG tablet, TAKE ONE TABLET DAILY GENERIC FOR SYNTHROID, Disp: 90 tablet, Rfl: 0    loratadine (CLARITIN) 10 MG tablet, Take 1 tablet by mouth Daily., Disp:  90 tablet, Rfl: 3    losartan (COZAAR) 100 MG tablet, TAKE ONE TABLET DAILY GENERIC FOR COZAAR, Disp: 90 tablet, Rfl: 0    methylPREDNISolone (MEDROL) 4 MG dose pack, Take as directed on package instructions., Disp: 1 each, Rfl: 0    metoprolol succinate XL (TOPROL-XL) 25 MG 24 hr tablet, TAKE ONE TABLET DAILY GENERIC FOR TOPROL XL, Disp: 30 tablet, Rfl: 5    montelukast (SINGULAIR) 10 MG tablet, Take 1 tablet by mouth Every Night., Disp: 90 tablet, Rfl: 3    Nebulizer misc, As Needed., Disp: , Rfl:     omeprazole (priLOSEC) 20 MG capsule, TAKE 1 CAPSULE DAILY GENERIC FOR PRILOSEC, Disp: 30 capsule, Rfl: 1  No Known Allergies           Facility age limit for growth %kurt is 20 years.    Past Medical History:   Diagnosis Date    Anxiety     Cholelithiasis     Depression     Diverticulosis     History of adenomatous polyp of colon     History of colon polyps     Hyperlipidemia     Hypertension     Hypothyroidism     Meningiomatosis     Myalgia     Nephrolithiasis     Type 2 diabetes mellitus      Past Surgical History:   Procedure Laterality Date    COLONOSCOPY  12/04/2018    Sigmoid diverticulosis; Non-engorged internal hemorrhoids vein; Repeat 5 years    COLONOSCOPY  07/15/2014    Two 3-5mm hyperplastic polyps in the rectum-one destroyed during removal; Mild sigmoid diverticulosis; Repeat 5 years    COLONOSCOPY  09/01/2009    Two 5mm tubular adenomatous polyps in the transverse colon; Two 5mm hyperplastic polyps in the distal sigmoid colon; Small internal hemorrhoid; Repeat 3 years    ENDOSCOPY  12/04/2018    Normal endoscopy       Review of Systems   Constitutional: Negative.    HENT: Negative.     Eyes: Negative.    Respiratory: Negative.     Cardiovascular: Negative.    Gastrointestinal: Negative.    Endocrine: Negative.    Genitourinary: Negative.    Musculoskeletal: Negative.    Skin: Negative.    Allergic/Immunologic: Negative.    Neurological: Negative.    Hematological: Negative.    Psychiatric/Behavioral:  "Negative.         Objective /82   Pulse 79   Temp 97.1 °F (36.2 °C) (Temporal)   Resp 18   Ht 152.4 cm (60\")   Wt 83.7 kg (184 lb 9.6 oz)   LMP  (LMP Unknown)   SpO2 99%   BMI 36.05 kg/m²    Physical Exam  Vitals and nursing note reviewed.   Constitutional:       Appearance: Normal appearance. She is normal weight.   HENT:      Head: Normocephalic and atraumatic.      Nose: Nose normal.      Mouth/Throat:      Mouth: Mucous membranes are moist.   Eyes:      Pupils: Pupils are equal, round, and reactive to light.   Cardiovascular:      Rate and Rhythm: Normal rate and regular rhythm.      Pulses: Normal pulses.      Heart sounds: Normal heart sounds.   Abdominal:      General: Abdomen is flat.   Musculoskeletal:         General: Normal range of motion.      Cervical back: Normal range of motion and neck supple.   Skin:     General: Skin is warm.      Capillary Refill: Capillary refill takes less than 2 seconds.   Neurological:      General: No focal deficit present.      Mental Status: She is alert.   Psychiatric:         Mood and Affect: Mood normal.         Assessment & Plan   Diagnoses and all orders for this visit:    1. Essential hypertension (Primary)    She saw dr alston last week              No orders of the defined types were placed in this encounter.      Follow up: 6 month(s)  "

## 2023-12-29 NOTE — PROGRESS NOTES
The ABCs of the Annual Wellness Visit  Subsequent Medicare Wellness Visit    Subjective      Paige Martinez is a 83 y.o. female who presents for a Subsequent Medicare Wellness Visit.    The following portions of the patient's history were reviewed and   updated as appropriate: allergies, current medications, past family history, past medical history, past social history, past surgical history, and problem list.    Compared to one year ago, the patient feels her physical   health is the same.    Compared to one year ago, the patient feels her mental   health is the same.    Recent Hospitalizations:  She was not admitted to the hospital during the last year.       Current Medical Providers:  Patient Care Team:  Barrington Jaramillo MD as PCP - General  Gonzalez Cordero MD as Consulting Physician (Gastroenterology)  Tushar Thrasher MD as Consulting Physician (Pulmonary Disease)  Dennys Chilel MD as Surgeon (Neurosurgery)    Outpatient Medications Prior to Visit   Medication Sig Dispense Refill    albuterol (PROVENTIL) (2.5 MG/3ML) 0.083% nebulizer solution Take 2.5 mg by nebulization Every 4 (Four) Hours As Needed for Wheezing. 150 mL 5    albuterol sulfate  (90 Base) MCG/ACT inhaler Inhale 2 puffs Every 4 (Four) Hours As Needed for Wheezing. 6.7 g 5    atorvastatin (LIPITOR) 10 MG tablet TAKE ONE TABLET DAILY GENERIC FOR LIPITOR 30 tablet 5    Azelastine HCl 137 MCG/SPRAY solution 2 sprays into the nostril(s) as directed by provider 2 (Two) Times a Day. 30 mL 5    azithromycin (ZITHROMAX) 250 MG tablet Take 2 by mouth today then 1 daily for 4 days 6 tablet 0    Cholecalciferol 2000 units capsule Take 1 capsule by mouth Daily.      citalopram (CeleXA) 20 MG tablet TAKE ONE TABLET DAILY GENERIC FOR CELEXA 90 tablet 1    cloNIDine (CATAPRES) 0.1 MG tablet TAKE ONE TABLET BY MOUTH DAILY. 30 tablet 5    Farxiga 10 MG tablet       fluticasone (Flonase Allergy Relief) 50 MCG/ACT nasal spray 2 sprays  into the nostril(s) as directed by provider Daily. 16 g 5    Fluticasone Furoate-Vilanterol 100-25 MCG/ACT aerosol powder  Inhale 1 puff Daily for 90 days. 1 each 35    HYDROcodone-acetaminophen (NORCO) 5-325 MG per tablet TAKE 1 TABLET BY MOUTH EVERY 8 (EIGHT) HOURS AS NEEDED FOR MODERATE PAIN. 30 tablet 0    levothyroxine (Synthroid) 100 MCG tablet Take 1 tablet by mouth Daily. 90 tablet 1    levothyroxine (SYNTHROID, LEVOTHROID) 75 MCG tablet TAKE ONE TABLET DAILY GENERIC FOR SYNTHROID 90 tablet 0    loratadine (CLARITIN) 10 MG tablet Take 1 tablet by mouth Daily. 90 tablet 3    losartan (COZAAR) 100 MG tablet TAKE ONE TABLET DAILY GENERIC FOR COZAAR 90 tablet 0    methylPREDNISolone (MEDROL) 4 MG dose pack Take as directed on package instructions. 1 each 0    metoprolol succinate XL (TOPROL-XL) 25 MG 24 hr tablet TAKE ONE TABLET DAILY GENERIC FOR TOPROL XL 30 tablet 5    montelukast (SINGULAIR) 10 MG tablet Take 1 tablet by mouth Every Night. 90 tablet 3    Nebulizer misc As Needed.      omeprazole (priLOSEC) 20 MG capsule TAKE 1 CAPSULE DAILY GENERIC FOR PRILOSEC 30 capsule 1     No facility-administered medications prior to visit.       Opioid medication/s are on active medication list.  and I have evaluated her active treatment plan and pain score trends (see table).  There were no vitals filed for this visit.  I have reviewed the chart for potential of high risk medication and harmful drug interactions in the elderly.          Aspirin is not on active medication list.  Aspirin use is not indicated based on review of current medical condition/s. Risk of harm outweighs potential benefits.  .    Patient Active Problem List   Diagnosis    Essential hypertension    Chronic kidney disease, stage III (moderate)    Acquired hypothyroidism    Anxiety    Mixed hyperlipidemia    Edema    Anemia    Heme positive stool    Dark stools    Gastroesophageal reflux disease    Hip pain, acute, left    Syncope    Obesity (BMI  "35.0-39.9 without comorbidity)    Arthritis    Meningioma    Former smoker    Dry skin dermatitis    Seizure    Screening for breast cancer    Left lower quadrant abdominal pain    Diverticulitis    Dyspnea on exertion    COPD (chronic obstructive pulmonary disease)    History of pneumonia    Pulmonary scarring    Lung nodule    Allergic rhinitis    Restrictive lung disease    Renal mass    Ovarian mass, right    Class 1 obesity due to excess calories with serious comorbidity and body mass index (BMI) of 34.0 to 34.9 in adult    Acute pain of left knee    SOB (shortness of breath)    Wheezing    COPD with acute exacerbation     Advance Care Planning   Advance Care Planning     Advance Directive is not on file.  ACP discussion was held with the patient during this visit. Patient does not have an advance directive, information provided.     Objective    Vitals:    23 0750   BP: 124/82   Pulse: 79   Resp: 18   Temp: 97.1 °F (36.2 °C)   TempSrc: Temporal   SpO2: 99%   Weight: 83.7 kg (184 lb 9.6 oz)   Height: 152.4 cm (60\")     Estimated body mass index is 36.05 kg/m² as calculated from the following:    Height as of this encounter: 152.4 cm (60\").    Weight as of this encounter: 83.7 kg (184 lb 9.6 oz).           Does the patient have evidence of cognitive impairment?   No            HEALTH RISK ASSESSMENT    Smoking Status:  Social History     Tobacco Use   Smoking Status Former    Packs/day: 1.00    Years: 51.00    Additional pack years: 0.00    Total pack years: 51.00    Types: Cigarettes    Start date:     Quit date:     Years since quittin.9    Passive exposure: Past   Smokeless Tobacco Never     Alcohol Consumption:  Social History     Substance and Sexual Activity   Alcohol Use No    Comment: Occasionally      Fall Risk Screen:    STEADI Fall Risk Assessment was completed, and patient is at LOW risk for falls.Assessment completed on:2023    Depression Screenin/29/2023     7:51 " AM   PHQ-2/PHQ-9 Depression Screening   Little Interest or Pleasure in Doing Things 0-->not at all   Feeling Down, Depressed or Hopeless 0-->not at all   PHQ-9: Brief Depression Severity Measure Score 0       Health Habits and Functional and Cognitive Screenin/29/2023     7:51 AM   Functional & Cognitive Status   Do you have difficulty preparing food and eating? No   Do you have difficulty bathing yourself, getting dressed or grooming yourself? No   Do you have difficulty using the toilet? No   Do you have difficulty moving around from place to place? No   Do you have trouble with steps or getting out of a bed or a chair? No   Current Diet Well Balanced Diet   Dental Exam Not up to date   Eye Exam Not up to date   Exercise (times per week) 1 times per week   Current Exercises Include House Cleaning   Do you need help using the phone?  No   Are you deaf or do you have serious difficulty hearing?  Yes   Do you need help to go to places out of walking distance? No   Do you need help shopping? No   Do you need help preparing meals?  No   Do you need help with housework?  No   Do you need help with laundry? No   Do you need help taking your medications? No   Do you need help managing money? No   Do you ever drive or ride in a car without wearing a seat belt? No   Have you felt unusual stress, anger or loneliness in the last month? No   Who do you live with? Alone   If you need help, do you have trouble finding someone available to you? No   Have you been bothered in the last four weeks by sexual problems? No   Do you have difficulty concentrating, remembering or making decisions? No       Age-appropriate Screening Schedule:  Refer to the list below for future screening recommendations based on patient's age, sex and/or medical conditions. Orders for these recommended tests are listed in the plan section. The patient has been provided with a written plan.    Health Maintenance   Topic Date Due    TDAP/TD  VACCINES (1 - Tdap) Never done    ZOSTER VACCINE (1 of 2) Never done    DXA SCAN  08/25/2017    ANNUAL WELLNESS VISIT  11/22/2023    INFLUENZA VACCINE  03/31/2024 (Originally 8/1/2023)    Pneumococcal Vaccine 65+ (1 of 2 - PCV) 12/02/2024 (Originally 11/14/1946)    BMI FOLLOWUP  03/28/2024    LIPID PANEL  09/21/2024    COLORECTAL CANCER SCREENING  06/29/2026    COVID-19 Vaccine  Discontinued                  CMS Preventative Services Quick Reference  Risk Factors Identified During Encounter:    Fall Risk-High or Moderate: Discussed Fall Prevention in the home    The above risks/problems have been discussed with the patient.  Pertinent information has been shared with the patient in the After Visit Summary.    Diagnoses and all orders for this visit:    1. Essential hypertension (Primary)        Follow Up:   Next Medicare Wellness visit to be scheduled in 1 year.      An After Visit Summary and PPPS were made available to the patient.

## 2024-01-02 RX ORDER — LEVOTHYROXINE SODIUM 0.1 MG/1
100 TABLET ORAL DAILY
Qty: 90 TABLET | Refills: 1 | Status: SHIPPED | OUTPATIENT
Start: 2024-01-02

## 2024-01-02 RX ORDER — CITALOPRAM 20 MG/1
TABLET ORAL
Qty: 90 TABLET | Refills: 1 | Status: SHIPPED | OUTPATIENT
Start: 2024-01-02

## 2024-01-03 ENCOUNTER — HOSPITAL ENCOUNTER (OUTPATIENT)
Dept: CT IMAGING | Facility: HOSPITAL | Age: 84
Discharge: HOME | End: 2024-01-03
Payer: MEDICARE

## 2024-01-03 DIAGNOSIS — R91.1 LUNG NODULE: ICD-10-CM

## 2024-01-03 DIAGNOSIS — R91.1 LUNG NODULE: Primary | ICD-10-CM

## 2024-01-03 PROCEDURE — A9552 F18 FDG: HCPCS | Performed by: INTERNAL MEDICINE

## 2024-01-03 PROCEDURE — 0 FLUDEOXYGLUCOSE F18 SOLUTION: Performed by: INTERNAL MEDICINE

## 2024-01-03 PROCEDURE — 78815 PET IMAGE W/CT SKULL-THIGH: CPT

## 2024-01-03 RX ADMIN — FLUDEOXYGLUCOSE F 18 1 DOSE: 200 INJECTION, SOLUTION INTRAVENOUS at 10:41

## 2024-01-20 DIAGNOSIS — M19.90 OSTEOARTHRITIS, UNSPECIFIED OSTEOARTHRITIS TYPE, UNSPECIFIED SITE: ICD-10-CM

## 2024-01-22 RX ORDER — HYDROCODONE BITARTRATE AND ACETAMINOPHEN 5; 325 MG/1; MG/1
1 TABLET ORAL EVERY 8 HOURS PRN
Qty: 30 TABLET | Refills: 0 | Status: SHIPPED | OUTPATIENT
Start: 2024-01-22

## 2024-02-22 ENCOUNTER — HOSPITAL ENCOUNTER (OUTPATIENT)
Dept: MRI IMAGING | Facility: HOSPITAL | Age: 84
Discharge: HOME | End: 2024-02-22
Admitting: NEUROLOGICAL SURGERY
Payer: MEDICARE

## 2024-02-22 DIAGNOSIS — D32.9 MENINGIOMA: ICD-10-CM

## 2024-02-22 LAB — CREAT BLDA-MCNC: 1.3 MG/DL (ref 0.6–1.3)

## 2024-02-22 PROCEDURE — A9577 INJ MULTIHANCE: HCPCS | Performed by: NEUROLOGICAL SURGERY

## 2024-02-22 PROCEDURE — 0 GADOBENATE DIMEGLUMINE 529 MG/ML SOLUTION: Performed by: NEUROLOGICAL SURGERY

## 2024-02-22 PROCEDURE — 82565 ASSAY OF CREATININE: CPT

## 2024-02-22 PROCEDURE — 70553 MRI BRAIN STEM W/O & W/DYE: CPT

## 2024-02-22 RX ADMIN — GADOBENATE DIMEGLUMINE 15 ML: 529 INJECTION, SOLUTION INTRAVENOUS at 13:03

## 2024-02-26 ENCOUNTER — OFFICE VISIT (OUTPATIENT)
Dept: NEUROSURGERY | Facility: CLINIC | Age: 84
End: 2024-02-26
Payer: MEDICARE

## 2024-02-26 VITALS — BODY MASS INDEX: 35.93 KG/M2 | WEIGHT: 183 LBS | HEIGHT: 60 IN

## 2024-02-26 DIAGNOSIS — D32.9 MENINGIOMA: Primary | ICD-10-CM

## 2024-02-26 DIAGNOSIS — Z87.891 FORMER SMOKER: ICD-10-CM

## 2024-02-26 DIAGNOSIS — M19.90 OSTEOARTHRITIS, UNSPECIFIED OSTEOARTHRITIS TYPE, UNSPECIFIED SITE: ICD-10-CM

## 2024-02-26 DIAGNOSIS — E66.01 CLASS 2 SEVERE OBESITY DUE TO EXCESS CALORIES WITH SERIOUS COMORBIDITY AND BODY MASS INDEX (BMI) OF 35.0 TO 35.9 IN ADULT: ICD-10-CM

## 2024-02-26 PROCEDURE — 99213 OFFICE O/P EST LOW 20 MIN: CPT | Performed by: NEUROLOGICAL SURGERY

## 2024-02-26 PROCEDURE — 1159F MED LIST DOCD IN RCRD: CPT | Performed by: NEUROLOGICAL SURGERY

## 2024-02-26 PROCEDURE — 1160F RVW MEDS BY RX/DR IN RCRD: CPT | Performed by: NEUROLOGICAL SURGERY

## 2024-02-26 RX ORDER — HYDROCODONE BITARTRATE AND ACETAMINOPHEN 5; 325 MG/1; MG/1
1 TABLET ORAL EVERY 8 HOURS PRN
Qty: 30 TABLET | Refills: 0 | Status: SHIPPED | OUTPATIENT
Start: 2024-02-26

## 2024-02-26 NOTE — PATIENT INSTRUCTIONS
"ALL 3 MENINGIOMAS LOOK STABLE IN THE BRAIN.  FOLLOW UP IN 1 YEAR WITH ANOTHER MRI.     Dennys Chilel MD      PATIENT TO CONTINUE TO FOLLOW UP WITH HER PRIMARY CARE PROVIDER FOR YEARLY PHYSICAL EXAMS TO ENSURE COMPLETE HEALTH MAINTENANCE    BMI for Adults  Body mass index (BMI) is a number found using a person's weight and height. BMI can help tell how much of a person's weight is made up of fat. BMI does not measure body fat directly. It is used instead of tests that directly measure body fat, which can be difficult and expensive.  What are BMI measurements used for?  BMI is useful to:  Find out if your weight puts you at higher risk for medical problems.  Help recommend changes, such as in diet and exercise. This can help you reach a healthy weight. BMI screening can be done again to see if these changes are working.  How is BMI calculated?  Your height and weight are measured. The BMI is found from those numbers. This can be done with [place] or metric measurements. Note that charts and online BMI calculators are available to help you find your BMI quickly and easily without doing these calculations.  To calculate your BMI in [place] measurements:  Measure your weight in pounds (lb).  Multiply the number of pounds by 703.  So, for an adult who weighs 150 lb, multiply that number by 703: 150 x 703, which equals 105,450.  Measure your height in inches. Then multiply that number by itself to get a measurement called \"inches squared.\"  So, for an adult who is 70 inches tall, the \"inches squared\" measurement is 70 inches x 70 inches, which equals 4,900 inches squared.  Divide the total from step 2 (number of lb x 703) by the total from step 3 (inches squared): 105,450 ÷ 4,900 = 21.5. This is your BMI.  To calculate your BMI in metric measurements:    Measure your weight in kilograms (kg).  For this example, the weight is 70 kg.  Measure your height in meters (m). Then multiply that number by itself to get a " "measurement called \"meters squared.\"  So, for an adult who is 1.75 m tall, the \"meters squared\" measurement is 1.75 m x 1.75 m, which equals 3.1 meters squared.  Divide the number of kilograms (your weight) by the meters squared number. In this example: 70 ÷ 3.1 = 22.6. This is your BMI.  What do the results mean?  BMI charts are used to see if you are underweight, normal weight, overweight, or obese. The following guidelines will be used:  Underweight: BMI less than 18.5.  Normal weight: BMI between 18.5 and 24.9.  Overweight: BMI between 25 and 29.9.  Obese: BMI of 30 or above.  BMI is a tool and cannot diagnose a condition. Talk with your health care provider about what your BMI means for you. Keep these notes in mind:  Weight includes fat and muscle. Someone with a muscular build, such as an athlete, may have a BMI that is higher than 24.9. In cases like these, BMI is not a correct measure of body fat.  If you have a BMI of 25 or higher, your provider may need to do more testing to find out if excess body fat is the cause.  BMI is measured the same way for males and females. Females usually have more body fat than males of the same height and weight.  Where to find more information  For more information about BMI, including tools to quickly find your BMI, go to:  Centers for Disease Control and Prevention: cdc.gov  American Heart Association: heart.org  National Heart, Lung, and Blood [place]: nhlbi.nih.gov  This information is not intended to replace advice given to you by your health care provider. Make sure you discuss any questions you have with your health care provider.  Document Revised: 09/07/2023 Document Reviewed: 08/31/2023  Elsevier Patient Education © 2023 [employer]  DASH Eating Plan  DASH stands for Dietary Approaches to Stop Hypertension. The DASH eating plan is a healthy eating plan that has been shown to:  Reduce high blood pressure (hypertension).  Reduce your risk for type 2 diabetes, " "heart disease, and stroke.  Help with weight loss.  What are tips for following this plan?  Reading food labels  Check food labels for the amount of salt (sodium) per serving. Choose foods with less than 5 percent of the Daily Value of sodium. Generally, foods with less than 300 milligrams (mg) of sodium per serving fit into this eating plan.  To find whole grains, look for the word \"whole\" as the first word in the ingredient list.  Shopping  Buy products labeled as \"low-sodium\" or \"no salt added.\"  Buy fresh foods. Avoid canned foods and pre-made or frozen meals.  Cooking  Avoid adding salt when cooking. Use salt-free seasonings or herbs instead of table salt or sea salt. Check with your health care provider or pharmacist before using salt substitutes.  Do not cortez foods. Cook foods using healthy methods such as baking, boiling, grilling, roasting, and broiling instead.  Cook with heart-healthy oils, such as olive, canola, avocado, soybean, or sunflower oil.  Meal planning    Eat a balanced diet that includes:  4 or more servings of fruits and 4 or more servings of vegetables each day. Try to fill one-half of your plate with fruits and vegetables.  6-8 servings of whole grains each day.  Less than 6 oz (170 g) of lean meat, poultry, or fish each day. A 3-oz (85-g) serving of meat is about the same size as a deck of cards. One egg equals 1 oz (28 g).  2-3 servings of low-fat dairy each day. One serving is 1 cup (237 mL).  1 serving of nuts, seeds, or beans 5 times each week.  2-3 servings of heart-healthy fats. Healthy fats called omega-3 fatty acids are found in foods such as walnuts, flaxseeds, fortified milks, and eggs. These fats are also found in cold-water fish, such as sardines, salmon, and mackerel.  Limit how much you eat of:  Canned or prepackaged foods.  Food that is high in trans fat, such as some fried foods.  Food that is high in saturated fat, such as fatty meat.  Desserts and other sweets, sugary " drinks, and other foods with added sugar.  Full-fat dairy products.  Do not salt foods before eating.  Do not eat more than 4 egg yolks a week.  Try to eat at least 2 vegetarian meals a week.  Eat more home-cooked food and less restaurant, buffet, and fast food.  Lifestyle  When eating at a restaurant, ask that your food be prepared with less salt or no salt, if possible.  If you drink alcohol:  Limit how much you use to:  0-1 drink a day for women who are not pregnant.  0-2 drinks a day for men.  Be aware of how much alcohol is in your drink. In the [place] one drink equals one 12 oz bottle of beer (355 mL), one 5 oz glass of wine (148 mL), or one 1½ oz glass of hard liquor (44 mL).  General information  Avoid eating more than 2,300 mg of salt a day. If you have hypertension, you may need to reduce your sodium intake to 1,500 mg a day.  Work with your health care provider to maintain a healthy body weight or to lose weight. Ask what an ideal weight is for you.  Get at least 30 minutes of exercise that causes your heart to beat faster (aerobic exercise) most days of the week. Activities may include walking, swimming, or biking.  Work with your health care provider or dietitian to adjust your eating plan to your individual calorie needs.  What foods should I eat?  Fruits  All fresh, dried, or frozen fruit. Canned fruit in natural juice (without added sugar).  Vegetables  Fresh or frozen vegetables (raw, steamed, roasted, or grilled). Low-sodium or reduced-sodium tomato and vegetable juice. Low-sodium or reduced-sodium tomato sauce and tomato paste. Low-sodium or reduced-sodium canned vegetables.  Grains  Whole-grain or whole-wheat bread. Whole-grain or whole-wheat pasta. Brown rice. Oatmeal. Quinoa. Bulgur. Whole-grain and low-sodium cereals. Amira bread. Low-fat, low-sodium crackers. Whole-wheat flour tortillas.  Meats and other proteins  Skinless chicken or turkey. Ground chicken or turkey. Pork with fat trimmed off.  Fish and seafood. Egg whites. Dried beans, peas, or lentils. Unsalted nuts, nut butters, and seeds. Unsalted canned beans. Lean cuts of beef with fat trimmed off. Low-sodium, lean precooked or cured meat, such as sausages or meat loaves.  Dairy  Low-fat (1%) or fat-free (skim) milk. Reduced-fat, low-fat, or fat-free cheeses. Nonfat, low-sodium ricotta or cottage cheese. Low-fat or nonfat yogurt. Low-fat, low-sodium cheese.  Fats and oils  Soft margarine without trans fats. Vegetable oil. Reduced-fat, low-fat, or light mayonnaise and salad dressings (reduced-sodium). Canola, safflower, olive, avocado, soybean, and sunflower oils. Avocado.  Seasonings and condiments  Herbs. Spices. Seasoning mixes without salt.  Other foods  Unsalted popcorn and pretzels. Fat-free sweets.  The items listed above may not be a complete list of foods and beverages you can eat. Contact a dietitian for more information.  What foods should I avoid?  Fruits  Canned fruit in a light or heavy syrup. Fried fruit. Fruit in cream or butter sauce.  Vegetables  Creamed or fried vegetables. Vegetables in a cheese sauce. Regular canned vegetables (not low-sodium or reduced-sodium). Regular canned tomato sauce and paste (not low-sodium or reduced-sodium). Regular tomato and vegetable juice (not low-sodium or reduced-sodium). Pickles. Olives.  Grains  Baked goods made with fat, such as croissants, muffins, or some breads. Dry pasta or rice meal packs.  Meats and other proteins  Fatty cuts of meat. Ribs. Fried meat. Corey. Bologna, salami, and other precooked or cured meats, such as sausages or meat loaves. Fat from the back of a pig (fatback). Bratwurst. Salted nuts and seeds. Canned beans with added salt. Canned or smoked fish. Whole eggs or egg yolks. Chicken or turkey with skin.  Dairy  Whole or 2% milk, cream, and half-and-half. Whole or full-fat cream cheese. Whole-fat or sweetened yogurt. Full-fat cheese. Nondairy creamers. Whipped toppings.  Processed cheese and cheese spreads.  Fats and oils  Butter. Stick margarine. Lard. Shortening. Ghee. Corey fat. Tropical oils, such as coconut, palm kernel, or palm oil.  Seasonings and condiments  Onion salt, garlic salt, seasoned salt, table salt, and sea salt. Worcestershire sauce. Tartar sauce. Barbecue sauce. Teriyaki sauce. Soy sauce, including reduced-sodium. Steak sauce. Canned and packaged gravies. Fish sauce. Oyster sauce. Cocktail sauce. Store-bought horseradish. Ketchup. Mustard. Meat flavorings and tenderizers. Bouillon cubes. Hot sauces. Pre-made or packaged marinades. Pre-made or packaged taco seasonings. Relishes. Regular salad dressings.  Other foods  Salted popcorn and pretzels.  The items listed above may not be a complete list of foods and beverages you should avoid. Contact a dietitian for more information.  Where to find more information  National Heart, Lung, and Blood [place]: www.nhlbi.nih.gov  American Heart Association: www.heart.org  Academy of Nutrition and Dietetics: www.eatright.org  National Kidney Foundation: www.kidney.org  Summary  The DASH eating plan is a healthy eating plan that has been shown to reduce high blood pressure (hypertension). It may also reduce your risk for type 2 diabetes, heart disease, and stroke.  When on the DASH eating plan, aim to eat more fresh fruits and vegetables, whole grains, lean proteins, low-fat dairy, and heart-healthy fats.  With the DASH eating plan, you should limit salt (sodium) intake to 2,300 mg a day. If you have hypertension, you may need to reduce your sodium intake to 1,500 mg a day.  Work with your health care provider or dietitian to adjust your eating plan to your individual calorie needs.  This information is not intended to replace advice given to you by your health care provider. Make sure you discuss any questions you have with your health care provider.  Document Revised: 11/20/2020 Document Reviewed: 11/20/2020  [place]  Patient Education © 2023 [employer]
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
## 2024-03-14 ENCOUNTER — OFFICE VISIT (OUTPATIENT)
Dept: PULMONOLOGY | Facility: CLINIC | Age: 84
End: 2024-03-14
Payer: MEDICARE

## 2024-03-14 VITALS
SYSTOLIC BLOOD PRESSURE: 138 MMHG | DIASTOLIC BLOOD PRESSURE: 88 MMHG | HEART RATE: 80 BPM | OXYGEN SATURATION: 98 % | BODY MASS INDEX: 35.61 KG/M2 | HEIGHT: 60 IN | WEIGHT: 181.4 LBS

## 2024-03-14 DIAGNOSIS — J44.9 CHRONIC OBSTRUCTIVE PULMONARY DISEASE, UNSPECIFIED COPD TYPE: Primary | ICD-10-CM

## 2024-03-14 DIAGNOSIS — J98.4 PULMONARY SCARRING: ICD-10-CM

## 2024-03-14 DIAGNOSIS — E66.9 OBESITY (BMI 35.0-39.9 WITHOUT COMORBIDITY): ICD-10-CM

## 2024-03-14 DIAGNOSIS — Z87.01 HISTORY OF PNEUMONIA: ICD-10-CM

## 2024-03-14 DIAGNOSIS — Z87.891 FORMER SMOKER: ICD-10-CM

## 2024-03-14 DIAGNOSIS — J98.4 RESTRICTIVE LUNG DISEASE: ICD-10-CM

## 2024-03-14 DIAGNOSIS — J30.9 ALLERGIC RHINITIS, UNSPECIFIED SEASONALITY, UNSPECIFIED TRIGGER: ICD-10-CM

## 2024-03-14 DIAGNOSIS — R91.1 LUNG NODULE: ICD-10-CM

## 2024-03-14 PROCEDURE — 3079F DIAST BP 80-89 MM HG: CPT | Performed by: INTERNAL MEDICINE

## 2024-03-14 PROCEDURE — 3075F SYST BP GE 130 - 139MM HG: CPT | Performed by: INTERNAL MEDICINE

## 2024-03-14 PROCEDURE — 99214 OFFICE O/P EST MOD 30 MIN: CPT | Performed by: INTERNAL MEDICINE

## 2024-03-14 NOTE — PROGRESS NOTES
[facility] OUTPATIENT PROGRESS NOTE    Patient: Paige Martinez  : 1940  Age: 83 y.o.  Date of Service: 2024    REASON FOR CLINIC VISIT:  Chief Complaint   Patient presents with    COPD       Subjective:    History of Present Illness:  Paige Martinez is a 83 y.o. female who presents to the office today to be seen for    Diagnosis Plan   1. Chronic obstructive pulmonary disease, unspecified COPD type        2. Pulmonary scarring        3. Restrictive lung disease        4. Lung nodule        5. History of pneumonia        6. Former smoker        7. Allergic rhinitis, unspecified seasonality, unspecified trigger        8. Obesity (BMI 35.0-39.9 without comorbidity)        .  Other problems per record.    History:    Patient is an elderly  female who was seen in the pulmonary clinic for follow-up visit.    She is a pleasant lady with a former smoker and quit smoking many years ago.  She has moderate chronic obstructive pulmonary disease and restrictive lung disease in the PFT in the past.  She also history of lung nodules and pulmonary scarring.  She has allergic rhinitis.  She does not using oxygen or CPAP at home but has obesity with BMI of 35.43.  She is fairly independent and active for her age she is 83 years old and going to be 84 this year.    Patient currently using Breo Ellipta and albuterol nebulizer and rescue inhaler as needed and for nasal allergies uses Astelin nasal spray fluticasone nasal spray, Singulair and loratadine.  She also took some Medrol Dosepak for acute exacerbation of COPD a few months ago.  She did not have any recent hospital admissions and ER visit and urgent care visit any other new complaints and she told me she does not take any vaccinations    CT scan done in the past shows right upper lobe lung nodule which was negative FDG uptake in the PET scan but no further follow-up CT is done.  Patient does not take any vaccines.  She is otherwise  stable and comfortable did not have any recent hospital admissions and no urgent care visit any other new complaints.    PFT done today:  Not done today    PFT Values          2022    16:00   Pre Drug PFT Results   FVC 80   FEV1 75   FEF 25-75% 62   FEV1/FVC 73   Other Tests PFT Results   TLC 87      DLCO 67   D/VAsb 79     Results for orders placed in visit on 22    Pulmonary Function Test    Narrative  Pulmonary Function Test  Performed by: Kate Denny, RRT  Authorized by: Tushar Thrasher MD    Pre Drug % Predicted  FVC: 80%  FEV1: 75%  FEF 25-75%: 62%  FEV1/FVC: 73%  T%  RV: 107%  DLCO: 67%  D/VAsb: 79%    Interpretation  Overall comments:  Above test results are acceptable and reproducible by ATS criteria  For analysis of the above test results the patient showed evidence of moderate obstructive and mild restrictive dysfunction.  No bronchodilator challenge was done.  Diffusion capacity corrected for alveolar volume is moderately decreased    No prior test result was available for comparison.  Clinical correlation is indicated    Tushar Thrasher MD  Pulmonologist/Intensivist  2022 18:31 CST         Bronchodilator therapy: BreoEllipta and Albuterol nebulizer and rescue inhaler    Smoking Status:   Social History     Tobacco Use   Smoking Status Former    Current packs/day: 0.00    Average packs/day: 1 pack/day for 51.0 years (51.0 ttl pk-yrs)    Types: Cigarettes    Start date:     Quit date:     Years since quitting: 10.2    Passive exposure: Past   Smokeless Tobacco Never     Pulm Rehab: no  Sleep: yes    Support System: lives alone    Code Status:   There are no questions and answers to display.        Review of Systems:  A complete review of systems is performed and all other systems were reviewed and negative as note above in the HPI.  Review of Systems   Constitutional: Negative.    HENT:  Positive for congestion, postnasal drip and sinus pressure.    Eyes:  Negative.    Respiratory:  Positive for chest tightness and shortness of breath.    Cardiovascular: Negative.    Gastrointestinal: Negative.    Endocrine: Negative.    Musculoskeletal:  Positive for arthralgias and back pain.   Skin: Negative.    Allergic/Immunologic: Positive for environmental allergies.   Neurological: Negative.    Hematological: Negative.    Psychiatric/Behavioral: Negative.         CAT/ACT Score:  Not done today    Medications:  Outpatient Encounter Medications as of 3/14/2024   Medication Sig Dispense Refill    albuterol (PROVENTIL) (2.5 MG/3ML) 0.083% nebulizer solution Take 2.5 mg by nebulization Every 4 (Four) Hours As Needed for Wheezing. 150 mL 5    albuterol sulfate  (90 Base) MCG/ACT inhaler Inhale 2 puffs Every 4 (Four) Hours As Needed for Wheezing. 6.7 g 5    atorvastatin (LIPITOR) 10 MG tablet TAKE ONE TABLET DAILY GENERIC FOR LIPITOR 30 tablet 5    Azelastine HCl 137 MCG/SPRAY solution 2 sprays into the nostril(s) as directed by provider 2 (Two) Times a Day. 30 mL 5    azithromycin (ZITHROMAX) 250 MG tablet Take 2 by mouth today then 1 daily for 4 days 6 tablet 0    Cholecalciferol 2000 units capsule Take 1 capsule by mouth Daily.      citalopram (CeleXA) 20 MG tablet TAKE ONE TABLET DAILY GENERIC FOR CELEXA 90 tablet 1    cloNIDine (CATAPRES) 0.1 MG tablet TAKE ONE TABLET BY MOUTH DAILY. 30 tablet 5    Farxiga 10 MG tablet       fluticasone (Flonase Allergy Relief) 50 MCG/ACT nasal spray 2 sprays into the nostril(s) as directed by provider Daily. 16 g 5    Fluticasone Furoate-Vilanterol 100-25 MCG/ACT aerosol powder  Inhale 1 puff Daily for 90 days. 1 each 35    HYDROcodone-acetaminophen (NORCO) 5-325 MG per tablet TAKE 1 TABLET BY MOUTH EVERY 8 (EIGHT) HOURS AS NEEDED FOR MODERATE PAIN. 30 tablet 0    levothyroxine (SYNTHROID, LEVOTHROID) 100 MCG tablet TAKE 1 TABLET BY MOUTH DAILY. 90 tablet 1    levothyroxine (SYNTHROID, LEVOTHROID) 75 MCG tablet TAKE ONE TABLET DAILY  "GENERIC FOR SYNTHROID 90 tablet 0    loratadine (CLARITIN) 10 MG tablet Take 1 tablet by mouth Daily. 90 tablet 3    losartan (COZAAR) 100 MG tablet TAKE ONE TABLET DAILY GENERIC FOR COZAAR 90 tablet 0    methylPREDNISolone (MEDROL) 4 MG dose pack Take as directed on package instructions. 1 each 0    metoprolol succinate XL (TOPROL-XL) 25 MG 24 hr tablet TAKE ONE TABLET DAILY GENERIC FOR TOPROL XL 30 tablet 5    montelukast (SINGULAIR) 10 MG tablet Take 1 tablet by mouth Every Night. 90 tablet 3    Nebulizer misc As Needed.      omeprazole (priLOSEC) 20 MG capsule TAKE 1 CAPSULE DAILY GENERIC FOR PRILOSEC 30 capsule 1     No facility-administered encounter medications on file as of 3/14/2024.       Allergies:  No Known Allergies    Immunizations:  Immunization History   Administered Date(s) Administered    COVID-19 ([facility]) 04/09/2021    PPD Test 08/28/2017       Objective:    Vitals:  /88   Pulse 80   Ht 152.4 cm (60\")   Wt 82.3 kg (181 lb 6.4 oz)   LMP  (LMP Unknown)   SpO2 98% Comment: RA  Breastfeeding No   BMI 35.43 kg/m²     Physical Exam:  General: Patient is a 83 y.o.  female. Looks stated age. Appears to be in no acute distress.  Eyes: EOMI. PERRLA. Vision intact. No scleral icterus.  Ear, Nose, Mouth and Throat: Hearing is grossly intact. No Leukoplakia, pharyngitis, stomatitis or thrush. Swollen nasal mucosa with post nasal drop.  Neck: Range of motion of neck normal. No thyromegaly or masses. Mallampati Class 3  Respiratory: Clear to auscultation bilaterally. No use of accessory muscles. Decreased breath sounds.  Cardiovascular: Normal heart sounds. Regularly regular rhythm without murmur.  Gastrointestinal: Non tender, non distended, soft. Bowel sounds positive in all four quadrants. No organomegaly.  Skin: No obvious rashes, lesions, ulcers or large amount of bruising. No edema.   Neurological: No new motor deficits. Cranial nerves appear intact.  Psychiatric: Patient is alert " and oriented to person, place and time.    Chest Imaging:    Study Result    Narrative & Impression   EXAM/TECHNIQUE: CT chest without contrast     INDICATION: Lung nodules, multiple; R91.1-Solitary pulmonary nodule     COMPARISON: 11/28/2023     DLP: 493 mGy cm. Automated exposure control was also utilized to  decrease patient radiation dose.     FINDINGS:     The central airways are clear. No consolidation or pleural effusion.  Mild centrilobular emphysema with biapical pleural/parenchymal scarring.     No short interval change in 8 mm right upper lobe pulmonary nodule image  57 although this nodule did measure 7 mm in November 2022. Stable 3 mm  left lower lobe pulmonary nodule on image 117.     No enlarged axillary, supraclavicular, or mediastinal lymph nodes. Main  pulmonary artery is nondilated. Thoracic aorta is nonaneurysmal and  contains mild atherosclerotic calcification. Moderate coronary artery  atherosclerotic calcification. No pericardial effusion.     No acute chest wall soft tissue abnormality. Cholelithiasis without  evidence of cholecystitis. Chronic focal hepatic atrophy along the  falciform ligament with parenchymal calcification. Bilateral renal cysts  are partially imaged. No acute osseous finding.     IMPRESSION:     No short interval change in 8 mm pulmonary nodule in the right upper  lobe although this nodule has increased by 1 mm compared to a study from  11/21/2022. Given very minimal change in over a year in a solid nodule,  suspect this is benign but continued imaging surveillance is  recommended.     This report was signed and finalized on 12/1/2023 11:41 AM by Dr. Ross Davis MD.        Study Result    Narrative & Impression   PET CT 1/3/2024 9:25 AM     Reason for study: solitary pulmonary nodule; R91.1-Solitary pulmonary  nodule     Initial staging exam     Comparison: CT chest 12/1/2023. 11/21/2022. Ultrasound 9/6/2023     Radiopharmaceutical: 13.2 mCi F-18 FDG  intravenously.     Technique: Prior to injection of the radiotracer, the patient's blood  glucose is 183 mg/dL. Following injection and a 60 minute distribution  interval, PET scan is performed from the skull base to the midthigh. Low  dose, noncontrast CT is performed in the same anatomic distribution for  attenuation correction of the PET scan and to assist in localizing the  PET findings. Automated exposure control (AEC) protocols are utilized to  ensure dose lowered technique.     CT dose:  mGy*cm     FINDINGS:     HEAD AND NECK:     Focal hypermetabolic uptake at the level of the glottis/supraglottic  region without a CT correlate, max SUV 6.1 (series 2 image 26).     CHEST:  8 mm right upper lobe subpleural nodule without hypermetabolic uptake.     ABDOMEN AND PELVIS:  No abnormal FDG uptake.     MUSCULOSKELETAL:  No abnormal FDG uptake.     Other:      5.3 cm right ovarian cyst without hypermetabolic uptake, this was  demonstrated as a simple cyst on prior ultrasound from 9/6/2023. Left  maxillary sinus mucus retention cyst. Mitral annular calcifications.  Aortic valvular and coronary artery calcifications. Calcified  atherosclerosis. Cholelithiasis. Presumed small bilateral renal cyst.  Nonobstructing right nephrolithiasis. Colonic diverticulosis.     IMPRESSION:     Non-FDG avid 8 mm right upper lobe subpleural nodule. Recommend  continued interval follow-up given minimally increased in size dating  back to 11/21/2022.     Focal hypermetabolic uptake within the region of the  glottis/supraglottic region without a CT correlate. This is nonspecific  and potentially may be related to vocal cord activity. Recommend further  evaluation with CT neck with contrast and/or direct visualization.           This report was signed and finalized on 1/3/2024 1:16 PM by Mika Farnsworth.          Assessment:  1. Chronic obstructive pulmonary disease, unspecified COPD type    2. Pulmonary scarring    3. Restrictive  lung disease    4. Lung nodule    5. History of pneumonia    6. Former smoker    7. Allergic rhinitis, unspecified seasonality, unspecified trigger    8. Obesity (BMI 35.0-39.9 without comorbidity)        Plan/Recommendations:    1.  Patient is currently doing well with the current medications Breo Ellipta and albuterol rescue inhaler which could be continued as before.  She needed no medication refills  2.  For nasal allergies she is using Astelin nasal spray fluticasone nasal spray and loratadine and Singulair which will be continued.  3.  For the lung nodule she did have a follow-up CT scan done and we will get another follow-up CT scan in 6 months time before the next clinic visit.  She will continue other medications as before  4.  She does not want to take any vaccinations.  She will continue follow-up with the primary care provider and advised her to return to pulmonary clinic for a follow-up visit in 6 months time with CT scan of the chest or earlier if needed.    Follow up:  6 Months    Time Spent:  30 minutes    I appreciate the opportunity of participating in this patient's care. I would like to thank the PCP for the referral.  Please feel free to contact me with any other questions.    Tushar Thrasher MD   Pulmonologist/Intensivist     Electronically signed by: Tushar Thrasher MD, 3/14/2024 11:08 CDT

## 2024-03-25 RX ORDER — LOSARTAN POTASSIUM 100 MG/1
TABLET ORAL
Qty: 90 TABLET | Refills: 0 | Status: SHIPPED | OUTPATIENT
Start: 2024-03-25

## 2024-03-26 ENCOUNTER — TELEPHONE (OUTPATIENT)
Dept: FAMILY MEDICINE CLINIC | Facility: CLINIC | Age: 84
End: 2024-03-26
Payer: MEDICARE

## 2024-03-26 DIAGNOSIS — M19.90 OSTEOARTHRITIS, UNSPECIFIED OSTEOARTHRITIS TYPE, UNSPECIFIED SITE: ICD-10-CM

## 2024-03-26 RX ORDER — HYDROCODONE BITARTRATE AND ACETAMINOPHEN 5; 325 MG/1; MG/1
1 TABLET ORAL EVERY 8 HOURS PRN
Qty: 30 TABLET | Refills: 0 | Status: SHIPPED | OUTPATIENT
Start: 2024-03-26

## 2024-03-26 NOTE — TELEPHONE ENCOUNTER
Rx Refill Note  Requested Prescriptions     Pending Prescriptions Disp Refills    HYDROcodone-acetaminophen (NORCO) 5-325 MG per tablet [Pharmacy Med Name: HYDROCODONE BITARTRATE/ACETAMINOPHEN 5-325MG TABLET] 30 tablet 0     Sig: TAKE 1 TABLET BY MOUTH EVERY 8 (EIGHT) HOURS AS NEEDED FOR MODERATE PAIN.      Last office visit with office: 12/29/23  Next office visit with office: 06/28/24    UDS: none    DATE OF LAST REFILL: 02/26/24    Controlled Substance Agreement: not up to date 02/22/23    EDILSON OR BROWNP: 01/04/23         {TIP  Is Refill Pharmacy correct?:  Rubia Duque MA  03/26/24, 13:20 CDT

## 2024-04-24 DIAGNOSIS — M19.90 OSTEOARTHRITIS, UNSPECIFIED OSTEOARTHRITIS TYPE, UNSPECIFIED SITE: ICD-10-CM

## 2024-04-24 RX ORDER — HYDROCODONE BITARTRATE AND ACETAMINOPHEN 5; 325 MG/1; MG/1
1 TABLET ORAL EVERY 8 HOURS PRN
Qty: 30 TABLET | Refills: 0 | Status: SHIPPED | OUTPATIENT
Start: 2024-04-24

## 2024-04-24 NOTE — TELEPHONE ENCOUNTER
Rx Refill Note  Requested Prescriptions     Pending Prescriptions Disp Refills    HYDROcodone-acetaminophen (NORCO) 5-325 MG per tablet [Pharmacy Med Name: HYDROCODONE BITARTRATE/ACETAMINOPHEN 5-325MG TABLET] 30 tablet 0     Sig: TAKE 1 TABLET BY MOUTH EVERY 8 (EIGHT) HOURS AS NEEDED FOR MODERATE PAIN.      Last office visit with office: 12/29/23  Next office visit with office: 06/28/24    UDS: none    DATE OF LAST REFILL: 03/26/24    Controlled Substance Agreement: not up to date    EDILSON OR FREDY: 10/04/23         {TIP  Is Refill Pharmacy correct?:  Rubia Duque MA  04/24/24, 09:25 CDT

## 2024-04-25 ENCOUNTER — OFFICE VISIT (OUTPATIENT)
Dept: OBSTETRICS AND GYNECOLOGY | Age: 84
End: 2024-04-25
Payer: MEDICARE

## 2024-04-25 VITALS
WEIGHT: 180 LBS | HEIGHT: 60 IN | DIASTOLIC BLOOD PRESSURE: 82 MMHG | BODY MASS INDEX: 35.34 KG/M2 | SYSTOLIC BLOOD PRESSURE: 136 MMHG

## 2024-04-25 DIAGNOSIS — N83.201 RIGHT OVARIAN CYST: Primary | ICD-10-CM

## 2024-04-25 DIAGNOSIS — R93.89 ENDOMETRIAL THICKENING ON ULTRASOUND: ICD-10-CM

## 2024-04-25 NOTE — PROGRESS NOTES
"    Glen Meade MD  [facility]  [address]  [place] [address]  Office [phone]  Fax [phone]      [facility]  Paige Martniez  : 1940  MRN: [id number]    Subjective   Subjective     Chief Complaint   Patient presents with    Follow-up     Patient here for thickened endometrium and ovarian cyst follow up.  No questions or concerns. No changes since last visit.       History of Present Illness  Paige Martinez is a 83 y.o. female , , who comes to the office today for follow-up of endometrium and ovarian cyst. No questions or concerns. No pelvic complaints including bleeding or pain. .      Review of Systems   Genitourinary:  Negative for decreased urine volume, difficulty urinating, dyspareunia, dysuria, enuresis, flank pain, frequency, genital sores, hematuria, menstrual problem, pelvic pain, urgency, vaginal bleeding, vaginal discharge and vaginal pain.   All other systems reviewed and are negative.       The following portions of the patient's history were reviewed and updated as appropriate: allergies, current medications, past family history, past medical history, past social history, past surgical history, and problem list.    Objective    Objective     Vitals:   Visit Vitals  /82   Ht 152.4 cm (60\")   Wt 81.6 kg (180 lb)   LMP  (LMP Unknown)   BMI 35.15 kg/m²        Physical Exam  Vitals reviewed.   Constitutional:       General: She is not in acute distress.     Appearance: Normal appearance. She is not ill-appearing.   HENT:      Head: Normocephalic and atraumatic.      Nose: No congestion or rhinorrhea.   Eyes:      General: No scleral icterus.        Right eye: No discharge.         Left eye: No discharge.      Extraocular Movements: Extraocular movements intact.      Conjunctiva/sclera: Conjunctivae normal.   Pulmonary:      Effort: Pulmonary effort is normal. No accessory muscle usage or respiratory distress.   Musculoskeletal:      Right lower leg: No edema. "      Left lower leg: No edema.   Skin:     General: Skin is warm and dry.      Coloration: Skin is not ashen, cyanotic or jaundiced.   Neurological:      General: No focal deficit present.      Mental Status: She is alert and oriented to person, place, and time.   Psychiatric:         Mood and Affect: Mood normal.         Behavior: Behavior is cooperative.             Result Review    Postmenopausal Interp: LOW (01/05/2023 13:52)  Premenopausal Interp: HIGH (01/05/2023 13:52)  Ovarian Malignancy Risk-ANABEL (01/05/2023 13:52)   = 17.3     US Non-ob Transvaginal (04/25/2024 10:36)   1.  Uterus: Small/atrophic , with uterine dimensions 5.3 x 3.8 x 3 cm, and Retroverted     2.  Endometrium:  1.1 cm, trace fluid within endometrium     3.  Myometrium: Normal homogenous texture      4.  Ovaries  Left:    Not visualized   Right:   Simple cyst, 4.6 x 4.4 cm.  Slightly decreased in size compared to last ultrasound. Stable in appearance. Right ovary appears normal, measures 1.6 x 1.6 x 1.7 cm; stable in size and appearance compared to prior ultrasound. .       NM PET/CT Skull Base to Mid Thigh (01/03/2024 12:16)   ABDOMEN AND PELVIS:  No abnormal FDG uptake.     MUSCULOSKELETAL:  No abnormal FDG uptake.     Other:      5.3 cm right ovarian cyst without hypermetabolic uptake, this was  demonstrated as a simple cyst on prior ultrasound from 9/6/2023. Left  maxillary sinus mucus retention cyst. Mitral annular calcifications.  Aortic valvular and coronary artery calcifications. Calcified  atherosclerosis. Cholelithiasis. Presumed small bilateral renal cyst.  Nonobstructing right nephrolithiasis. Colonic diverticulosis.        US Non-ob Transvaginal (09/06/2023 08:33)  Uterus 5 cm, retroverted  Endometrium 1.1cm, cystic changes noted  Right ovary: simple 5.5 cm cyst, stable from last ultrasound, smaller simple cyst adjacent as well 1.7cm  Left ovary appears normal     MRI Pelvis With & Without Contrast (02/24/2023 10:34)  1.  There are 2 right ovarian simple cystic lesions, the largest  measuring 5 cm without septation or mural nodularity. Yearly follow-up  ultrasound imaging recommended to establish long-term stability.  2. Heterogeneous thickened partially enhancing endometrium measuring up  to 11 mm in thickness. Hysteroscopy should be considered.  3. Incidental perineural sacral cysts.     US Non-ob Transvaginal (01/05/2023 10:52)  1.  Uterus: Small/atrophic , with uterine volume of 28 ml and Retroverted     2.  Endometrium: 11 mm      3.  Myometrium: Normal homogenous texture      4.  Ovaries  Left:    Normal/unremarkable   Right:  Simple cyst 5.7 cm         Assessment & Plan   Assessment / Plan     Diagnoses and all orders for this visit:    1. Right ovarian cyst (Primary)    2. Endometrial thickening on ultrasound        Discussion:   Stable appearing right ovarian cyst. Endometrium stable in appearance and thickness from prior ultrasound. PET scan without hypermetabolic uptake in the pelvis - reassuring finding. She wants to continue with expectant management at this time. She has expressed that she does not want to pursue surgical or treatment options of these findings even if malignancy were present. She does does to continue surveillance at this time - yearly ultrasound. Her questions were answered. She expressed understanding.     Follow-up: Return in about 1 year (around 4/25/2025) for GYN US, GYN f/u.    Glen Meade MD

## 2024-05-23 DIAGNOSIS — M19.90 OSTEOARTHRITIS, UNSPECIFIED OSTEOARTHRITIS TYPE, UNSPECIFIED SITE: ICD-10-CM

## 2024-05-23 RX ORDER — HYDROCODONE BITARTRATE AND ACETAMINOPHEN 5; 325 MG/1; MG/1
1 TABLET ORAL EVERY 8 HOURS PRN
Qty: 30 TABLET | Refills: 0 | Status: SHIPPED | OUTPATIENT
Start: 2024-05-23

## 2024-05-23 RX ORDER — ATORVASTATIN CALCIUM 10 MG/1
TABLET, FILM COATED ORAL
Qty: 30 TABLET | Refills: 5 | Status: SHIPPED | OUTPATIENT
Start: 2024-05-23

## 2024-06-18 ENCOUNTER — OFFICE VISIT (OUTPATIENT)
Dept: FAMILY MEDICINE CLINIC | Facility: CLINIC | Age: 84
End: 2024-06-18
Payer: MEDICARE

## 2024-06-18 VITALS
SYSTOLIC BLOOD PRESSURE: 128 MMHG | HEIGHT: 60 IN | OXYGEN SATURATION: 96 % | TEMPERATURE: 97.2 F | WEIGHT: 180 LBS | BODY MASS INDEX: 35.34 KG/M2 | RESPIRATION RATE: 18 BRPM | HEART RATE: 69 BPM | DIASTOLIC BLOOD PRESSURE: 74 MMHG

## 2024-06-18 DIAGNOSIS — N18.32 STAGE 3B CHRONIC KIDNEY DISEASE: ICD-10-CM

## 2024-06-18 DIAGNOSIS — E03.9 ACQUIRED HYPOTHYROIDISM: ICD-10-CM

## 2024-06-18 DIAGNOSIS — Z78.0 POST-MENOPAUSE: ICD-10-CM

## 2024-06-18 DIAGNOSIS — E78.2 MIXED HYPERLIPIDEMIA: ICD-10-CM

## 2024-06-18 DIAGNOSIS — I10 ESSENTIAL HYPERTENSION: Primary | ICD-10-CM

## 2024-06-18 PROCEDURE — 1126F AMNT PAIN NOTED NONE PRSNT: CPT | Performed by: FAMILY MEDICINE

## 2024-06-18 PROCEDURE — 3078F DIAST BP <80 MM HG: CPT | Performed by: FAMILY MEDICINE

## 2024-06-18 PROCEDURE — 1160F RVW MEDS BY RX/DR IN RCRD: CPT | Performed by: FAMILY MEDICINE

## 2024-06-18 PROCEDURE — 3074F SYST BP LT 130 MM HG: CPT | Performed by: FAMILY MEDICINE

## 2024-06-18 PROCEDURE — 99213 OFFICE O/P EST LOW 20 MIN: CPT | Performed by: FAMILY MEDICINE

## 2024-06-18 PROCEDURE — 1159F MED LIST DOCD IN RCRD: CPT | Performed by: FAMILY MEDICINE

## 2024-06-18 RX ORDER — BUMETANIDE 0.5 MG/1
0.5 TABLET ORAL DAILY
COMMUNITY

## 2024-06-18 NOTE — PROGRESS NOTES
Subjective   Paige Martinez is a 83 y.o. female.     Chief Complaint   Patient presents with    Hypertension     6 mo fup        Hypertension       She notes good bp control without cp or ha..sheis tolerating lipitor witjout myagkls-she is seein g nephrologhy for ckd--she is toleriagn synthroid iwhtout heat or cold intoancnes      Current Outpatient Medications:     albuterol (PROVENTIL) (2.5 MG/3ML) 0.083% nebulizer solution, Take 2.5 mg by nebulization Every 4 (Four) Hours As Needed for Wheezing., Disp: 150 mL, Rfl: 5    albuterol sulfate  (90 Base) MCG/ACT inhaler, Inhale 2 puffs Every 4 (Four) Hours As Needed for Wheezing., Disp: 6.7 g, Rfl: 5    atorvastatin (LIPITOR) 10 MG tablet, TAKE ONE TABLET DAILY GENERIC FOR LIPITOR, Disp: 30 tablet, Rfl: 5    Azelastine HCl 137 MCG/SPRAY solution, 2 sprays into the nostril(s) as directed by provider 2 (Two) Times a Day., Disp: 30 mL, Rfl: 5    bumetanide (BUMEX) 0.5 MG tablet, Take 1 tablet by mouth Daily., Disp: , Rfl:     Cholecalciferol 2000 units capsule, Take 1 capsule by mouth Daily., Disp: , Rfl:     citalopram (CeleXA) 20 MG tablet, TAKE ONE TABLET DAILY GENERIC FOR CELEXA, Disp: 90 tablet, Rfl: 1    cloNIDine (CATAPRES) 0.1 MG tablet, TAKE ONE TABLET BY MOUTH DAILY., Disp: 30 tablet, Rfl: 5    fluocinonide (LIDEX) 0.05 % cream, Apply 1 Application topically to the appropriate area as directed 2 (Two) Times a Day., Disp: , Rfl:     fluticasone (Flonase Allergy Relief) 50 MCG/ACT nasal spray, 2 sprays into the nostril(s) as directed by provider Daily., Disp: 16 g, Rfl: 5    HYDROcodone-acetaminophen (NORCO) 5-325 MG per tablet, Take 1 tablet by mouth Every 8 (Eight) Hours As Needed for Mild Pain., Disp: 30 tablet, Rfl: 0    levothyroxine (SYNTHROID, LEVOTHROID) 75 MCG tablet, TAKE ONE TABLET DAILY GENERIC FOR SYNTHROID, Disp: 90 tablet, Rfl: 0    losartan (COZAAR) 100 MG tablet, TAKE ONE TABLET DAILY GENERIC FOR COZAAR, Disp: 90 tablet, Rfl: 0    metoprolol  succinate XL (TOPROL-XL) 25 MG 24 hr tablet, TAKE ONE TABLET DAILY GENERIC FOR TOPROL XL, Disp: 30 tablet, Rfl: 5    Nebulizer misc, As Needed., Disp: , Rfl:     Fluticasone Furoate-Vilanterol 100-25 MCG/ACT aerosol powder , Inhale 1 puff Daily for 90 days., Disp: 1 each, Rfl: 35  No Known Allergies    Class 2 Severe Obesity (BMI >=35 and <=39.9). Obesity-related health conditions include the following: hypertension, dyslipidemias, and GERD. Obesity is unchanged. BMI is is above average; BMI management plan is completed. We discussed portion control and increasing exercise.      Facility age limit for growth %kurt is 20 years.    Past Medical History:   Diagnosis Date    Anxiety     Cholelithiasis     Depression     Diverticulosis     History of adenomatous polyp of colon     History of colon polyps     Hyperlipidemia     Hypertension     Hypothyroidism     Meningiomatosis     Myalgia     Nephrolithiasis     Type 2 diabetes mellitus      Past Surgical History:   Procedure Laterality Date    COLONOSCOPY  12/04/2018    Sigmoid diverticulosis; Non-engorged internal hemorrhoids vein; Repeat 5 years    COLONOSCOPY  07/15/2014    Two 3-5mm hyperplastic polyps in the rectum-one destroyed during removal; Mild sigmoid diverticulosis; Repeat 5 years    COLONOSCOPY  09/01/2009    Two 5mm tubular adenomatous polyps in the transverse colon; Two 5mm hyperplastic polyps in the distal sigmoid colon; Small internal hemorrhoid; Repeat 3 years    ENDOSCOPY  12/04/2018    Normal endoscopy       Review of Systems   Constitutional: Negative.    HENT: Negative.     Eyes: Negative.    Respiratory: Negative.     Cardiovascular: Negative.    Gastrointestinal: Negative.    Endocrine: Negative.    Genitourinary: Negative.    Musculoskeletal: Negative.    Skin: Negative.    Allergic/Immunologic: Negative.    Neurological: Negative.    Hematological: Negative.    Psychiatric/Behavioral: Negative.         Objective /74 (BP Location: Left  "arm, Patient Position: Sitting, Cuff Size: Large Adult)   Pulse 69   Temp 97.2 °F (36.2 °C) (Infrared)   Resp 18   Ht 152.4 cm (60\")   Wt 81.6 kg (180 lb)   LMP  (LMP Unknown)   SpO2 96%   BMI 35.15 kg/m²    Physical Exam  Vitals and nursing note reviewed.   Constitutional:       Appearance: Normal appearance.   HENT:      Head: Normocephalic and atraumatic.      Nose: Nose normal.      Mouth/Throat:      Mouth: Mucous membranes are moist.   Eyes:      Pupils: Pupils are equal, round, and reactive to light.   Cardiovascular:      Rate and Rhythm: Normal rate.   Pulmonary:      Effort: Pulmonary effort is normal.   Abdominal:      General: Abdomen is flat.   Musculoskeletal:         General: Normal range of motion.      Cervical back: Normal range of motion.   Skin:     General: Skin is warm.      Capillary Refill: Capillary refill takes less than 2 seconds.   Neurological:      General: No focal deficit present.      Mental Status: She is alert.   Psychiatric:         Mood and Affect: Mood normal.       Assessment & Plan   Diagnoses and all orders for this visit:    1. Essential hypertension (Primary)  -     TSH  -     Lipid Panel With / Chol / HDL Ratio    2. Mixed hyperlipidemia  -     TSH  -     Lipid Panel With / Chol / HDL Ratio    3. Acquired hypothyroidism    4. Post-menopause  -     DEXA Bone Density Axial    5. Stage 3b chronic kidney disease                 Orders Placed This Encounter   Procedures    DEXA Bone Density Axial     Order Specific Question:   Is patient taking or have taken long term Glucocorticoid (steroids)?     Answer:   No     Order Specific Question:   Does the patient have rheumatoid arthritis?     Answer:   No     Order Specific Question:   Does the patient have secondary osteoporosis?     Answer:   No     Order Specific Question:   Reason for Exam:     Answer:   post menopause     Order Specific Question:   Does this patient have a diabetic monitoring/medication delivering " device on?     Answer:   No     Order Specific Question:   Release to patient     Answer:   Routine Release [[id number]]    TSH     Order Specific Question:   Release to patient     Answer:   Routine Release [[id number]]    Lipid Panel With / Chol / HDL Ratio     Order Specific Question:   Release to patient     Answer:   Routine Release [[id number]]       Follow up: 8 month(s)

## 2024-06-19 LAB
CHOLEST SERPL-MCNC: 188 MG/DL (ref 0–200)
CHOLEST/HDLC SERPL: 3.24 {RATIO}
HDLC SERPL-MCNC: 58 MG/DL (ref 40–60)
LDLC SERPL CALC-MCNC: 105 MG/DL (ref 0–100)
TRIGL SERPL-MCNC: 145 MG/DL (ref 0–150)
TSH SERPL DL<=0.005 MIU/L-ACNC: 1.76 UIU/ML (ref 0.27–4.2)
VLDLC SERPL CALC-MCNC: 25 MG/DL (ref 5–40)

## 2024-06-21 DIAGNOSIS — M19.90 OSTEOARTHRITIS, UNSPECIFIED OSTEOARTHRITIS TYPE, UNSPECIFIED SITE: ICD-10-CM

## 2024-06-21 RX ORDER — HYDROCODONE BITARTRATE AND ACETAMINOPHEN 5; 325 MG/1; MG/1
1 TABLET ORAL EVERY 8 HOURS PRN
Qty: 30 TABLET | Refills: 0 | Status: SHIPPED | OUTPATIENT
Start: 2024-06-21

## 2024-06-26 DIAGNOSIS — K21.9 GASTROESOPHAGEAL REFLUX DISEASE WITHOUT ESOPHAGITIS: ICD-10-CM

## 2024-06-26 RX ORDER — OMEPRAZOLE 20 MG/1
CAPSULE, DELAYED RELEASE ORAL
Qty: 30 CAPSULE | Refills: 1 | Status: SHIPPED | OUTPATIENT
Start: 2024-06-26

## 2024-06-26 NOTE — TELEPHONE ENCOUNTER
Pt came into office stating she is needing a refill on her medication. Pt is wanting her omprazole to be refilled. Pt was made aware she may need an appointment- Please advice

## 2024-07-01 ENCOUNTER — HOSPITAL ENCOUNTER (OUTPATIENT)
Dept: BONE DENSITY | Facility: HOSPITAL | Age: 84
Discharge: HOME | End: 2024-07-01
Admitting: FAMILY MEDICINE
Payer: MEDICARE

## 2024-07-01 PROCEDURE — 77080 DXA BONE DENSITY AXIAL: CPT

## 2024-07-08 RX ORDER — CITALOPRAM 20 MG/1
TABLET ORAL
Qty: 90 TABLET | Refills: 1 | Status: SHIPPED | OUTPATIENT
Start: 2024-07-08

## 2024-07-08 RX ORDER — LOSARTAN POTASSIUM 100 MG/1
TABLET ORAL
Qty: 90 TABLET | Refills: 0 | Status: SHIPPED | OUTPATIENT
Start: 2024-07-08

## 2024-07-08 RX ORDER — LEVOTHYROXINE SODIUM 0.1 MG/1
100 TABLET ORAL DAILY
Qty: 90 TABLET | Refills: 1 | Status: SHIPPED | OUTPATIENT
Start: 2024-07-08

## 2024-07-08 NOTE — TELEPHONE ENCOUNTER
Rx Refill Note  Requested Prescriptions     Pending Prescriptions Disp Refills    losartan (COZAAR) 100 MG tablet [Pharmacy Med Name: LOSARTAN POTASSIUM 100MG TABLET] 90 tablet 0     Sig: TAKE ONE TABLET DAILY GENERIC FOR COZAAR    citalopram (CeleXA) 20 MG tablet [Pharmacy Med Name: CITALOPRAM HYDROBROMIDE 20MG TABLET] 90 tablet 1     Sig: TAKE ONE TABLET DAILY GENERIC FOR CELEXA    levothyroxine (SYNTHROID, LEVOTHROID) 100 MCG tablet [Pharmacy Med Name: LEVOTHYROXINE SODIUM 100MCG TABLET] 90 tablet 1     Sig: TAKE 1 TABLET BY MOUTH DAILY.      Last office visit with prescribing clinician: 6/18/2024   Last telemedicine visit with prescribing clinician: Visit date not found   Next office visit with prescribing clinician: Visit date not found                         Would you like a call back once the refill request has been completed: [] Yes [x] No    If the office needs to give you a call back, can they leave a voicemail: [] Yes [x] No    Breanne Malone MA  07/08/24, 08:50 CDT

## 2024-07-23 DIAGNOSIS — M19.90 OSTEOARTHRITIS, UNSPECIFIED OSTEOARTHRITIS TYPE, UNSPECIFIED SITE: ICD-10-CM

## 2024-07-23 RX ORDER — HYDROCODONE BITARTRATE AND ACETAMINOPHEN 5; 325 MG/1; MG/1
1 TABLET ORAL EVERY 8 HOURS PRN
Qty: 30 TABLET | Refills: 0 | Status: SHIPPED | OUTPATIENT
Start: 2024-07-23

## 2024-07-23 NOTE — TELEPHONE ENCOUNTER
Rx Refill Note  Requested Prescriptions     Pending Prescriptions Disp Refills    HYDROcodone-acetaminophen (NORCO) 5-325 MG per tablet [Pharmacy Med Name: HYDROCODONE BITARTRATE/ACETAMINOPHEN 5-325MG TABLET] 30 tablet 0     Sig: TAKE 1 TABLET BY MOUTH EVERY 8 (EIGHT) HOURS AS NEEDED FOR MILD PAIN.      Last office visit with office: 06/18/2024  Next office visit with office: 12/16/2024    UDS: NONE    DATE OF LAST REFILL: 06/21/2024    Controlled Substance Agreement: not up to date    EDILSON OR FREDY: PAULA         {TIP  Is Refill Pharmacy correct?:  Stephanie Carlson MA  07/23/24, 14:32 CDT

## 2024-07-30 ENCOUNTER — OFFICE VISIT (OUTPATIENT)
Dept: FAMILY MEDICINE CLINIC | Facility: CLINIC | Age: 84
End: 2024-07-30
Payer: MEDICARE

## 2024-07-30 VITALS
OXYGEN SATURATION: 98 % | HEIGHT: 60 IN | BODY MASS INDEX: 35.1 KG/M2 | DIASTOLIC BLOOD PRESSURE: 62 MMHG | WEIGHT: 178.8 LBS | SYSTOLIC BLOOD PRESSURE: 126 MMHG | HEART RATE: 65 BPM

## 2024-07-30 DIAGNOSIS — J44.9 CHRONIC OBSTRUCTIVE PULMONARY DISEASE, UNSPECIFIED COPD TYPE: ICD-10-CM

## 2024-07-30 DIAGNOSIS — M19.90 ARTHRITIS: ICD-10-CM

## 2024-07-30 DIAGNOSIS — K21.9 GASTROESOPHAGEAL REFLUX DISEASE, UNSPECIFIED WHETHER ESOPHAGITIS PRESENT: ICD-10-CM

## 2024-07-30 DIAGNOSIS — I10 ESSENTIAL HYPERTENSION: ICD-10-CM

## 2024-07-30 DIAGNOSIS — J44.1 COPD WITH EXACERBATION: ICD-10-CM

## 2024-07-30 DIAGNOSIS — R69 MULTIPLE CHRONIC DISEASES: ICD-10-CM

## 2024-07-30 DIAGNOSIS — E03.9 ACQUIRED HYPOTHYROIDISM: ICD-10-CM

## 2024-07-30 DIAGNOSIS — E78.2 MIXED HYPERLIPIDEMIA: ICD-10-CM

## 2024-07-30 DIAGNOSIS — N18.32 STAGE 3B CHRONIC KIDNEY DISEASE: ICD-10-CM

## 2024-07-30 PROCEDURE — 3074F SYST BP LT 130 MM HG: CPT | Performed by: FAMILY MEDICINE

## 2024-07-30 PROCEDURE — 1126F AMNT PAIN NOTED NONE PRSNT: CPT | Performed by: FAMILY MEDICINE

## 2024-07-30 PROCEDURE — 3078F DIAST BP <80 MM HG: CPT | Performed by: FAMILY MEDICINE

## 2024-07-30 PROCEDURE — 99214 OFFICE O/P EST MOD 30 MIN: CPT | Performed by: FAMILY MEDICINE

## 2024-07-30 RX ORDER — AZITHROMYCIN 250 MG/1
TABLET, FILM COATED ORAL
Qty: 6 TABLET | Refills: 0 | Status: SHIPPED | OUTPATIENT
Start: 2024-07-30

## 2024-07-30 RX ORDER — METHYLPREDNISOLONE 4 MG/1
TABLET ORAL
Qty: 1 EACH | Refills: 0 | Status: SHIPPED | OUTPATIENT
Start: 2024-07-30

## 2024-07-30 NOTE — PROGRESS NOTES
VANDANAVAISAMAR”.   Subjective   Paige Martinez is a 83 y.o. female presenting with chief complaint of:   Chief Complaint   Patient presents with    Cough    Hypertension     AWV   Last Completed Annual Wellness Visit            ANNUAL WELLNESS VISIT (Yearly) Next due on 12/29/2024 12/29/2023  Level of Service: SC PPPS, SUBSEQ VISIT    11/22/2022  Level of Service: SC PPPS, SUBSEQ VISIT    03/16/2022  E&M Code: SC PPPS, SUBSEQ VISIT    03/15/2021  E&M Code: SC PPPS, SUBSEQ VISIT    10/11/2019  E&M Code: SC PPPS, SUBSEQ VISIT    Only the first 5 history entries have been loaded, but more history exists.                     History of Present Illness :  Alone.   Here for review of chronic problems that includes HTN and others.     Has multiple chronic problems to consider that might have a bearing on today's issues;  an interval appointment.       Chronic/acute problems reviewed today:   1. Chronic obstructive pulmonary disease, unspecified COPD type C exacerbation.  Typically she is not short of breath    2. COPD with exacerbation increased cough and slight sputum production consistent with what she feels is a COPD exacerbation she rarely gets.  No fever   3. Essential hypertension Chronic/stable. Stable here past/no recent home blood pressures.  No significant chest pain, SOB, LE edema, orthopnea, near syncope, dizziness/light headness.   Recent Vitals         4/25/2024 6/18/2024 7/30/2024       BP: 136/82 128/74 126/62     Pulse: -- 69 65     Temp: -- 97.2 °F (36.2 °C) --     Weight: 81.6 kg (180 lb) 81.6 kg (180 lb) 81.1 kg (178 lb 12.8 oz)     BMI (Calculated): 35.2 35.2 34.9              4. Mixed hyperlipidemia-statin Chronic/stable.  Tolerated use of Rx with labs showing improved lipid values and tolerant liver labs. No muscle aches unexpected.      5. Acquired hypothyroidism Chronic/stable. Feels normal thyroid with no significant change skin/hair, GI/stooling, or energy level; trends tired a lot.     6.  Gastroesophageal reflux disease, unspecified whether esophagitis present Chronic/stable.  Controlled heartburn, reflux without dysphagia, melena.  Rx used, periods not used proven currently needed with symptoms -currently doing ok.      7. Stage 3b chronic kidney disease Chronic/stable.  Sees nephrology.  No dysuria.  Previously warned/reminded:   To avoid further kidney function decline  A. Treat any time you think you have infection  B. Stay hydrated (dont get dehydrated); drink at least 60 oz fluid every 24 hr (1800 cc or nearly a 2L)  C. Do not allow any xrays with dye WITHOUT the doctor ordering checking your renal function  D. Do not get new medications without the doctor considering your renal condition  E. Do not use motrin/ibuprofen, alleve/naprosyn and these types of medications     8. Arthritis Chronic/stable.  Various on/off joint pains/soreness/stiffness.  Particular joint problems with various.  No joint swelling.  Treats mainly with reduced activity, Rx listed, Tylenol.  No  NSAIDs, and no injections.      9. Multiple chronic diseases Has multiple chronic problems with increased risks for management (involves polypharmacy, multiple providers, many required labs/imaging/ to manage)       Has an/another acute issue today: none.    The following portions of the patient's history were reviewed and updated as appropriate: allergies, current medications, past family history, past medical history, past social history, past surgical history, and problem list.      Current Outpatient Medications:     albuterol (PROVENTIL) (2.5 MG/3ML) 0.083% nebulizer solution, Take 2.5 mg by nebulization Every 4 (Four) Hours As Needed for Wheezing., Disp: 150 mL, Rfl: 5    albuterol sulfate  (90 Base) MCG/ACT inhaler, Inhale 2 puffs Every 4 (Four) Hours As Needed for Wheezing., Disp: 6.7 g, Rfl: 5    atorvastatin (LIPITOR) 10 MG tablet, TAKE ONE TABLET DAILY GENERIC FOR LIPITOR, Disp: 30 tablet, Rfl: 5    Azelastine  HCl 137 MCG/SPRAY solution, 2 sprays into the nostril(s) as directed by provider 2 (Two) Times a Day., Disp: 30 mL, Rfl: 5    bumetanide (BUMEX) 0.5 MG tablet, Take 1 tablet by mouth Daily., Disp: , Rfl:     Cholecalciferol 2000 units capsule, Take 1 capsule by mouth Daily., Disp: , Rfl:     citalopram (CeleXA) 20 MG tablet, TAKE ONE TABLET DAILY GENERIC FOR CELEXA, Disp: 90 tablet, Rfl: 1    cloNIDine (CATAPRES) 0.1 MG tablet, TAKE ONE TABLET BY MOUTH DAILY., Disp: 30 tablet, Rfl: 5    fluocinonide (LIDEX) 0.05 % cream, Apply 1 Application topically to the appropriate area as directed 2 (Two) Times a Day., Disp: , Rfl:     fluticasone (Flonase Allergy Relief) 50 MCG/ACT nasal spray, 2 sprays into the nostril(s) as directed by provider Daily., Disp: 16 g, Rfl: 5    HYDROcodone-acetaminophen (NORCO) 5-325 MG per tablet, TAKE 1 TABLET BY MOUTH EVERY 8 (EIGHT) HOURS AS NEEDED FOR MILD PAIN., Disp: 30 tablet, Rfl: 0    levothyroxine (SYNTHROID, LEVOTHROID) 100 MCG tablet, TAKE 1 TABLET BY MOUTH DAILY., Disp: 90 tablet, Rfl: 1    levothyroxine (SYNTHROID, LEVOTHROID) 75 MCG tablet, TAKE ONE TABLET DAILY GENERIC FOR SYNTHROID, Disp: 90 tablet, Rfl: 0    losartan (COZAAR) 100 MG tablet, TAKE ONE TABLET DAILY GENERIC FOR COZAAR, Disp: 90 tablet, Rfl: 0    metoprolol succinate XL (TOPROL-XL) 25 MG 24 hr tablet, TAKE ONE TABLET DAILY GENERIC FOR TOPROL XL, Disp: 30 tablet, Rfl: 5    Nebulizer misc, As Needed., Disp: , Rfl:     omeprazole (priLOSEC) 20 MG capsule, TAKE 1 CAPSULE DAILY GENERIC FOR PRILOSEC, Disp: 30 capsule, Rfl: 1    Fluticasone Furoate-Vilanterol 100-25 MCG/ACT aerosol powder , Inhale 1 puff Daily for 90 days., Disp: 1 each, Rfl: 35    Current Outpatient Medications for BP:     bumetanide (BUMEX) 0.5 MG tablet, Take 1 tablet by mouth Daily., Disp: , Rfl:     cloNIDine (CATAPRES) 0.1 MG tablet, TAKE ONE TABLET BY MOUTH DAILY., Disp: 30 tablet, Rfl: 5    losartan (COZAAR) 100 MG tablet, TAKE ONE TABLET DAILY  GENERIC FOR COZAAR, Disp: 90 tablet, Rfl: 0    metoprolol succinate XL (TOPROL-XL) 25 MG 24 hr tablet, TAKE ONE TABLET DAILY GENERIC FOR TOPROL XL, Disp: 30 tablet, Rfl: 5    No problems with medications.    No Known Allergies    Review of Systems   Constitutional:  Positive for fatigue. Negative for activity change, appetite change, chills and fever.   HENT:  Positive for congestion. Negative for dental problem, nosebleeds, sore throat and trouble swallowing.    Respiratory:  Positive for cough. Negative for choking, shortness of breath, wheezing and stridor.    Cardiovascular:  Negative for chest pain, palpitations and leg swelling.   Gastrointestinal: Negative.    Genitourinary:  Negative for dysuria.   Musculoskeletal:  Positive for arthralgias.   Skin:  Negative for wound.     Gyne:   Mammogram: 10.5.20/MM  Bone density:   Bone d-deca/BH/hip -0.1, LS -0.4/7.1.24    Low dose CT chest:  CT chest without  IMPRESSION:-/12.1.23  No short interval change in 8 mm pulmonary nodule in the right upper  lobe although this nodule has increased by 1 mm compared to a study from  11/21/2022. Given very minimal change in over a year in a solid nodule,  suspect this is benign but continued imaging surveillance is  recommended.    GI:   EGD-nl/[place]/[facility]/12.4.18  Colon-p, div/[place]/[facility]/6.29.21/prn    Copy/paste function used for ROS/exam AND each area of these were reviewed, updated, confirmed and supplemented as needed.  Data reviewed:   Recent admit/ER/MD visits: none  Last cardiac testing:   Echo:   Results for orders placed during the hospital encounter of 10/01/19    Transthoracic Echo Complete With Contrast if Necessary Per Protocol    Interpretation Summary  · Left ventricular systolic function is normal. Estimated EF appears to be in the range of 61 - 65%.  · Left ventricular wall thickness is consistent with mild-to-moderate concentric hypertrophy.  · No hemodynamically significant valvular abnormalities  "identified on the study.  · There is a small (<1cm) pericardial effusion with no evidence of cardiac tamponade.    Radiology considered:   DEXA Bone Density Axial    Result Date: 7/1/2024  Impression:  1. Normal. Bone density is no lower than one standard deviation below the mean for young adult woman.  This report was signed and finalized on 7/1/2024 10:46 AM by Dr. Simon Pringle MD.       Lab Results:  Results for orders placed or performed in visit on 06/18/24   TSH    Specimen: Blood    BLOOD   Result Value Ref Range    TSH 1.760 0.270 - 4.200 uIU/mL   Lipid Panel With / Chol / HDL Ratio    Specimen: Blood    BLOOD   Result Value Ref Range    Total Cholesterol 188 0 - 200 mg/dL    Triglycerides 145 0 - 150 mg/dL    HDL Cholesterol 58 40 - 60 mg/dL    VLDL Cholesterol Christopher 25 5 - 40 mg/dL    LDL Chol Calc (NIH) 105 (H) 0 - 100 mg/dL    Chol/HDL Ratio 3.24        A1C:No results for input(s): \"HGBA1C\" in the last 47537 hours.  GLUCOSE:  Lab Results - Last 18 Months   Lab Units 09/21/23  0818 03/28/23  0802   GLUCOSE mg/dL 111* 126*     LIPID:  Lab Results - Last 18 Months   Lab Units 06/18/24  1338 09/21/23  0818 03/28/23  0802   CHOLESTEROL mg/dL 188 179 189   LDL CHOL mg/dL 105* 103* 108*   HDL CHOL mg/dL 58 55 62*   TRIGLYCERIDES mg/dL 145 116 108     PSA:No results found for: \"PSA\"    CBC:  Lab Results - Last 18 Months   Lab Units 09/21/23  0818 03/28/23  0802   WBC 10*3/mm3 8.16 8.63   HEMOGLOBIN g/dL 12.1 12.4   HEMATOCRIT % 36.7 38.8   PLATELETS 10*3/mm3 224 223     BMP/CMP:  Lab Results - Last 18 Months   Lab Units 02/22/24  1229 09/21/23  0818 03/28/23  0802 02/22/23  0903   SODIUM mmol/L  --  140 140  --    POTASSIUM mmol/L  --  4.4 4.6  --    CHLORIDE mmol/L  --  103 102  --    CO2 mmol/L  --  26.6 25.9  --    GLUCOSE mg/dL  --  111* 126*  --    BUN mg/dL  --  25* 14  --    CREATININE mg/dL 1.30 1.29* 1.12* 0.90   EGFR RESULT mL/min/1.73  --  42.0* 49.8*  --    CALCIUM mg/dL  --  9.6 9.6  --  " "      HEPATIC:  Lab Results - Last 18 Months   Lab Units 09/21/23  0818 03/28/23  0802   ALT (SGPT) U/L 12 8   AST (SGOT) U/L 16 12   ALK PHOS U/L 69 68     HEPATITIS C ANTIBODY:   Lab Results   Component Value Date/Time    HEPCVIRUSABY 0.2 09/28/2020 08:33 AM     Vit D:No results for input(s): \"HIMW79AM\" in the last 99875 hours.  THYROID:  Lab Results - Last 18 Months   Lab Units 06/18/24  1338 12/22/23  0827 09/21/23  0818 03/28/23  0802   TSH uIU/mL 1.760 2.270 5.140* 4.000       Objective   /62   Pulse 65   Ht 152.4 cm (60\")   Wt 81.1 kg (178 lb 12.8 oz)   LMP  (LMP Unknown)   SpO2 98%   BMI 34.92 kg/m²   Body mass index is 34.92 kg/m².    Recent Vitals         4/25/2024 6/18/2024 7/30/2024       BP: 136/82 128/74 126/62     Pulse: -- 69 65     Temp: -- 97.2 °F (36.2 °C) --     Weight: 81.6 kg (180 lb) 81.6 kg (180 lb) 81.1 kg (178 lb 12.8 oz)     BMI (Calculated): 35.2 35.2 34.9             Physical Exam    GENERAL:  Well nourished/developed in no acute distress. Obese  SKIN: Turgor excellent, without wound, rash, lesion of significance  HEENT: Normal cephalic without trauma.  Pupils equal round reactive to light. Extraocular motions full without nystagmus.   External canals nonobstructive nontender without reddness. Tymphatic membranes paola with evan structures intact.   Oral cavity without growths, exudates, and moist.  Posterior pharynx without mass, obstruction, redness.  No thyromegaly, mass, tenderness, lymphadenopathy and supple.  CV: Regular rhythm.  No murmur, gallop, edema. Posterior pulses intact.  No carotid bruits.  CHEST: No chest wall tenderness or mass.   LUNGS: Symmetric motion with decreased to auscultation.  No dullness to percussion  ABD: Soft, nontender without mass.   ORTHO: Symmetric extremities without swelling/point tenderness.  Full gross range of motion.  Walking with assistance cane  NEURO: CN 2-12 grossly intact.  Symmetric facies and UE/LE. 2/5 strength throughout. " 1/4 x bicep equal reflexes.  Nonfocal use extremities. Speech clear.    PSYCH: Oriented x 3.  Pleasant calm, well kept.  Purposeful/directed conservation with intact short/long gross memory.     Assessment & Plan     1. Chronic obstructive pulmonary disease, unspecified COPD type    2. COPD with exacerbation    3. Essential hypertension    4. Mixed hyperlipidemia-statin    5. Acquired hypothyroidism    6. Gastroesophageal reflux disease, unspecified whether esophagitis present    7. Stage 3b chronic kidney disease    8. Arthritis    9. Multiple chronic diseases        Data review above:   Discussions/medical decisions/reviews:  Recent Vitals         4/25/2024 6/18/2024 7/30/2024       BP: 136/82 128/74 126/62     Pulse: -- 69 65     Temp: -- 97.2 °F (36.2 °C) --     Weight: 81.6 kg (180 lb) 81.6 kg (180 lb) 81.1 kg (178 lb 12.8 oz)     BMI (Calculated): 35.2 35.2 34.9             Wt Readings from Last 15 Encounters:   07/30/24 1433 81.1 kg (178 lb 12.8 oz)   06/18/24 1408 81.6 kg (180 lb)   04/25/24 1040 81.6 kg (180 lb)   03/14/24 1056 82.3 kg (181 lb 6.4 oz)   02/26/24 1121 83 kg (183 lb)   12/29/23 0750 83.7 kg (184 lb 9.6 oz)   11/28/23 0946 81.6 kg (180 lb)   09/27/23 0940 82.1 kg (181 lb)   09/06/23 0836 81 kg (178 lb 8 oz)   06/09/23 1305 82.6 kg (182 lb)   05/23/23 1119 81.2 kg (179 lb)   03/28/23 0839 81.6 kg (180 lb)   02/22/24 1214 79.4 kg (175 lb)   03/06/23 0937 80.7 kg (178 lb)   02/22/23 1038 80.7 kg (178 lb)       BP ok  Other vitals ok  Weight variable/stable    Screening reviewed/updated offering mammogram  Vaccines discussed (see below) those missing  Z pack and medrol she usually gets for feeling like this    Discussed upcoming senior care of me/Dr Ignacio.  Discussed new physician, Dr Yobani Hein coming as replacement. Decision to: stay at /[place]    Follow up: No follow-ups on file.  Future Appointments   Date Time Provider Department Center   9/20/2024 11:30 AM PAD BIC CT 1  PAD CT BI PAD  "  9/26/2024 11:15 AM Tushar Thrasher MD [facility] PAD PAD   12/16/2024  1:00 PM Gonzalez Hein MD [facility] METR PAD   2/19/2025 11:00 AM Dennys Chilel MD [facility] PAD PAD   5/7/2025  9:30 AM US 3 OBGYN [facility] PAD PAD   5/7/2025 10:00 AM Glen Meade MD [facility] PAD PAD       Data review above:   Rx: reviewed and decisions:   Rx new/changes:   New Medications Ordered This Visit   Medications    azithromycin (ZITHROMAX) 250 MG tablet     Sig: Take 2 tablets the first day, then 1 tablet daily for 4 days.     Dispense:  6 tablet     Refill:  0    methylPREDNISolone (MEDROL) 4 MG dose pack     Sig: Take as directed on package instructions.     Dispense:  1 each     Refill:  0     Pharmacist-tell patient When using steroids Do not use anti-inflammatories such as Motrin/ibuprofen, Alleve/naprosyn, Mobic and like medications       Orders placed:   LAB/Testing/Referrals: reviewed/orders:   Today:   No orders of the defined types were placed in this encounter.    Chronic/recurrent labs above or change to:   Same   Immunization History   Administered Date(s) Administered    COVID-19 (JACOBY) 04/09/2021    PPD Test 08/28/2017     We advised/reaffirmed our support/suggestion for staying complete with covid- covid boosters, seasonal flu/yearly and any missing vaccine from list we supplied; when cannot be given here we suggest contact with local health department office or pharmacy to review missing/needed vaccines and then bring nursing documentation for these vaccines to this office or call this information in. Shingles became \"free\" 1.1.23 for medicare insurance.    Health maintenance:   Body mass index is 34.92 kg/m².         Tobacco use reviewed:   Non-smoker  Paige Martinez  reports that she quit smoking about 10 years ago. Her smoking use included cigarettes. She started smoking about 61 years ago. She has a 51 pack-year smoking history. She has been exposed to tobacco smoke. She has never used smokeless " tobacco.       There are no Patient Instructions on file for this visit.

## 2024-07-31 DIAGNOSIS — Z12.31 ENCOUNTER FOR SCREENING MAMMOGRAM FOR MALIGNANT NEOPLASM OF BREAST: Primary | ICD-10-CM

## 2024-08-20 DIAGNOSIS — M19.90 OSTEOARTHRITIS, UNSPECIFIED OSTEOARTHRITIS TYPE, UNSPECIFIED SITE: ICD-10-CM

## 2024-08-20 RX ORDER — HYDROCODONE BITARTRATE AND ACETAMINOPHEN 5; 325 MG/1; MG/1
1 TABLET ORAL EVERY 8 HOURS PRN
Qty: 30 TABLET | Refills: 0 | Status: SHIPPED | OUTPATIENT
Start: 2024-08-20

## 2024-08-20 NOTE — TELEPHONE ENCOUNTER
Rx Refill Note  Requested Prescriptions     Pending Prescriptions Disp Refills    HYDROcodone-acetaminophen (NORCO) 5-325 MG per tablet [Pharmacy Med Name: HYDROCODONE BITARTRATE/ACETAMINOPHEN 5-325MG TABLET] 30 tablet 0     Sig: TAKE 1 TABLET BY MOUTH EVERY 8 (EIGHT) HOURS AS NEEDED FOR MILD PAIN.      Last office visit with office: 07/30/24  Next office visit with office: 12/16/24    UDS: none    DATE OF LAST REFILL: 07/23/24    Controlled Substance Agreement: not up to date    EDILSON OR FREDY: 08/20/24         {TIP  Is Refill Pharmacy correct?:  Rubia Duque MA  08/20/24, 14:46 CDT

## 2024-09-20 ENCOUNTER — HOSPITAL ENCOUNTER (OUTPATIENT)
Dept: CT IMAGING | Facility: HOSPITAL | Age: 84
Discharge: HOME | End: 2024-09-20
Payer: MEDICARE

## 2024-09-20 DIAGNOSIS — J44.9 CHRONIC OBSTRUCTIVE PULMONARY DISEASE, UNSPECIFIED COPD TYPE: ICD-10-CM

## 2024-09-20 DIAGNOSIS — J98.4 PULMONARY SCARRING: ICD-10-CM

## 2024-09-20 PROCEDURE — 71250 CT THORAX DX C-: CPT

## 2024-09-23 DIAGNOSIS — M19.90 OSTEOARTHRITIS, UNSPECIFIED OSTEOARTHRITIS TYPE, UNSPECIFIED SITE: ICD-10-CM

## 2024-09-23 RX ORDER — HYDROCODONE BITARTRATE AND ACETAMINOPHEN 5; 325 MG/1; MG/1
1 TABLET ORAL EVERY 8 HOURS PRN
Qty: 30 TABLET | Refills: 0 | Status: SHIPPED | OUTPATIENT
Start: 2024-09-23

## 2024-09-23 RX ORDER — CLONIDINE HYDROCHLORIDE 0.1 MG/1
TABLET ORAL
Qty: 30 TABLET | Refills: 5 | Status: SHIPPED | OUTPATIENT
Start: 2024-09-23

## 2024-09-24 ENCOUNTER — TELEPHONE (OUTPATIENT)
Dept: PULMONOLOGY | Facility: CLINIC | Age: 84
End: 2024-09-24
Payer: MEDICARE

## 2024-09-26 ENCOUNTER — OFFICE VISIT (OUTPATIENT)
Dept: PULMONOLOGY | Facility: CLINIC | Age: 84
End: 2024-09-26
Payer: MEDICARE

## 2024-09-26 VITALS
OXYGEN SATURATION: 97 % | WEIGHT: 178 LBS | BODY MASS INDEX: 34.95 KG/M2 | HEIGHT: 60 IN | SYSTOLIC BLOOD PRESSURE: 132 MMHG | DIASTOLIC BLOOD PRESSURE: 78 MMHG | HEART RATE: 92 BPM

## 2024-09-26 DIAGNOSIS — J30.9 ALLERGIC RHINITIS, UNSPECIFIED SEASONALITY, UNSPECIFIED TRIGGER: ICD-10-CM

## 2024-09-26 DIAGNOSIS — R06.09 DYSPNEA ON EXERTION: ICD-10-CM

## 2024-09-26 DIAGNOSIS — Z87.01 HISTORY OF PNEUMONIA: ICD-10-CM

## 2024-09-26 DIAGNOSIS — J98.4 RESTRICTIVE LUNG DISEASE: ICD-10-CM

## 2024-09-26 DIAGNOSIS — J44.9 CHRONIC OBSTRUCTIVE PULMONARY DISEASE, UNSPECIFIED COPD TYPE: Primary | ICD-10-CM

## 2024-09-26 DIAGNOSIS — Z87.891 FORMER SMOKER: ICD-10-CM

## 2024-09-26 DIAGNOSIS — J98.4 PULMONARY SCARRING: ICD-10-CM

## 2024-09-26 DIAGNOSIS — R91.1 LUNG NODULE: ICD-10-CM

## 2024-09-26 DIAGNOSIS — E66.9 OBESITY (BMI 35.0-39.9 WITHOUT COMORBIDITY): ICD-10-CM

## 2024-09-26 DIAGNOSIS — K21.9 GASTROESOPHAGEAL REFLUX DISEASE, UNSPECIFIED WHETHER ESOPHAGITIS PRESENT: ICD-10-CM

## 2024-09-26 PROCEDURE — 3078F DIAST BP <80 MM HG: CPT | Performed by: INTERNAL MEDICINE

## 2024-09-26 PROCEDURE — 3075F SYST BP GE 130 - 139MM HG: CPT | Performed by: INTERNAL MEDICINE

## 2024-09-26 PROCEDURE — 99214 OFFICE O/P EST MOD 30 MIN: CPT | Performed by: INTERNAL MEDICINE

## 2024-09-26 RX ORDER — FLUTICASONE PROPIONATE 50 UG/1
2 SPRAY, METERED NASAL DAILY
Qty: 16 G | Refills: 5 | Status: SHIPPED | OUTPATIENT
Start: 2024-09-26

## 2024-09-26 RX ORDER — ALBUTEROL SULFATE 90 UG/1
2 INHALANT RESPIRATORY (INHALATION) EVERY 4 HOURS PRN
Qty: 6.7 G | Refills: 5 | Status: SHIPPED | OUTPATIENT
Start: 2024-09-26

## 2024-09-26 RX ORDER — LORATADINE 10 MG/1
10 TABLET ORAL DAILY
Qty: 90 TABLET | Refills: 3 | Status: SHIPPED | OUTPATIENT
Start: 2024-09-26

## 2024-09-26 RX ORDER — FLUTICASONE FUROATE AND VILANTEROL 100; 25 UG/1; UG/1
1 POWDER RESPIRATORY (INHALATION) DAILY
Qty: 1 EACH | Refills: 35 | Status: SHIPPED | OUTPATIENT
Start: 2024-09-26 | End: 2024-12-25

## 2024-09-26 RX ORDER — AZELASTINE HYDROCHLORIDE 137 UG/1
2 SPRAY, METERED NASAL 2 TIMES DAILY
Qty: 30 ML | Refills: 5 | Status: SHIPPED | OUTPATIENT
Start: 2024-09-26

## 2024-09-26 RX ORDER — ALBUTEROL SULFATE 0.83 MG/ML
2.5 SOLUTION RESPIRATORY (INHALATION) EVERY 4 HOURS PRN
Qty: 150 ML | Refills: 5 | Status: SHIPPED | OUTPATIENT
Start: 2024-09-26

## 2024-10-14 RX ORDER — METOPROLOL SUCCINATE 25 MG/1
TABLET, EXTENDED RELEASE ORAL
Qty: 30 TABLET | Refills: 5 | Status: SHIPPED | OUTPATIENT
Start: 2024-10-14

## 2024-10-14 RX ORDER — LOSARTAN POTASSIUM 100 MG/1
TABLET ORAL
Qty: 90 TABLET | Refills: 0 | Status: SHIPPED | OUTPATIENT
Start: 2024-10-14

## 2024-10-15 NOTE — PROGRESS NOTES
"Subjective   Paige Martinez is a 79 y.o. female.     Chief Complaint   Patient presents with   • Follow-up     hosp f/u visit        History of Present Illness     recently admitted for vomiting and syncope--seen by neuroogy for \"shking spell\" and mri and eeg ws neg       Current Outpatient Medications:   •  aspirin 81 MG EC tablet, Take 81 mg by mouth., Disp: , Rfl:   •  atorvastatin (LIPITOR) 10 MG tablet, TAKE ONE TABLET DAILY GENERIC FOR LIPITOR, Disp: 30 tablet, Rfl: 5  •  bumetanide (BUMEX) 0.5 MG tablet, Take 1 mg by mouth Daily., Disp: , Rfl:   •  Cholecalciferol 2000 units capsule, Take 2,000 Units by mouth Daily., Disp: , Rfl:   •  citalopram (CeleXA) 20 MG tablet, TAKE ONE TABLET DAILY GENERIC FOR CELEXA, Disp: 90 tablet, Rfl: 1  •  fluocinonide-emollient (LIDEX-E) 0.05 % cream, Apply  topically to the appropriate area as directed 2 (Two) Times a Day., Disp: 30 g, Rfl: 0  •  HYDROcodone-acetaminophen (NORCO) 5-325 MG per tablet, Take 1 tablet by mouth Daily As Needed for Moderate Pain ., Disp: 30 tablet, Rfl: 0  •  levothyroxine (SYNTHROID) 75 MCG tablet, Take 1 tablet by mouth Daily., Disp: 30 tablet, Rfl: 4  •  losartan (COZAAR) 100 MG tablet, TAKE ONE TABLET DAILY GENERIC FOR COZAAR, Disp: 90 tablet, Rfl: 1  •  omeprazole (priLOSEC) 20 MG capsule, Take 20 mg by mouth Daily., Disp: , Rfl:   No Known Allergies    Past Medical History:   Diagnosis Date   • Anxiety    • Cholelithiasis    • Colon polyp    • Depression    • Disease of thyroid gland    • Hyperlipidemia    • Hypertension    • Myalgia    • Nephrolithiasis      Past Surgical History:   Procedure Laterality Date   • COLONOSCOPY  07/15/2014    2 rectal polyps, hyperplastic, diverticulosis       Review of Systems   Constitutional: Negative.    HENT: Negative.    Eyes: Negative.    Respiratory: Negative.    Cardiovascular: Negative.    Gastrointestinal: Negative.    Endocrine: Negative.    Genitourinary: Negative.    Musculoskeletal: Negative.  " "  Skin: Negative.    Allergic/Immunologic: Negative.    Neurological: Negative.    Hematological: Negative.    Psychiatric/Behavioral: Negative.        Objective  /74   Pulse 87   Resp 16   Ht 153.7 cm (60.5\")   Wt 86.2 kg (190 lb)   SpO2 97%   BMI 36.50 kg/m²   Physical Exam   Constitutional: She is oriented to person, place, and time. She appears well-developed and well-nourished.   HENT:   Head: Normocephalic and atraumatic.   Right Ear: External ear normal.   Left Ear: External ear normal.   Nose: Nose normal.   Mouth/Throat: Oropharynx is clear and moist.   Eyes: Pupils are equal, round, and reactive to light. Conjunctivae and EOM are normal.   Neck: Normal range of motion. Neck supple.   Cardiovascular: Regular rhythm, normal heart sounds and intact distal pulses.   Pulmonary/Chest: Effort normal and breath sounds normal.   Abdominal: Soft. Bowel sounds are normal.   Musculoskeletal: Normal range of motion.   Neurological: She is alert and oriented to person, place, and time.   Skin: Skin is warm. Capillary refill takes less than 2 seconds.   Psychiatric: She has a normal mood and affect. Her behavior is normal. Judgment and thought content normal.   Nursing note and vitals reviewed.      Assessment/Plan   Paige was seen today for follow-up.    Diagnoses and all orders for this visit:    Vomiting without nausea, intractability of vomiting not specified, unspecified vomiting type          im glad she is feelign better       No orders of the defined types were placed in this encounter.      Follow up: 3 month(s)  " spouse

## 2024-10-25 ENCOUNTER — OFFICE VISIT (OUTPATIENT)
Dept: FAMILY MEDICINE CLINIC | Facility: CLINIC | Age: 84
End: 2024-10-25
Payer: MEDICARE

## 2024-10-25 VITALS
DIASTOLIC BLOOD PRESSURE: 80 MMHG | WEIGHT: 183.8 LBS | TEMPERATURE: 96.5 F | SYSTOLIC BLOOD PRESSURE: 148 MMHG | BODY MASS INDEX: 36.08 KG/M2 | OXYGEN SATURATION: 100 % | HEIGHT: 60 IN | HEART RATE: 76 BPM

## 2024-10-25 DIAGNOSIS — M19.90 OSTEOARTHRITIS, UNSPECIFIED OSTEOARTHRITIS TYPE, UNSPECIFIED SITE: ICD-10-CM

## 2024-10-25 DIAGNOSIS — J44.1 COPD WITH EXACERBATION: ICD-10-CM

## 2024-10-25 DIAGNOSIS — R30.0 DYSURIA: ICD-10-CM

## 2024-10-25 DIAGNOSIS — Z79.899 CONTROLLED SUBSTANCE AGREEMENT SIGNED: Primary | ICD-10-CM

## 2024-10-25 PROCEDURE — 99214 OFFICE O/P EST MOD 30 MIN: CPT | Performed by: NURSE PRACTITIONER

## 2024-10-25 PROCEDURE — 1126F AMNT PAIN NOTED NONE PRSNT: CPT | Performed by: NURSE PRACTITIONER

## 2024-10-25 PROCEDURE — 3079F DIAST BP 80-89 MM HG: CPT | Performed by: NURSE PRACTITIONER

## 2024-10-25 PROCEDURE — 3077F SYST BP >= 140 MM HG: CPT | Performed by: NURSE PRACTITIONER

## 2024-10-25 RX ORDER — HYDROCODONE BITARTRATE AND ACETAMINOPHEN 5; 325 MG/1; MG/1
1 TABLET ORAL EVERY 8 HOURS PRN
Qty: 30 TABLET | Refills: 0 | Status: CANCELLED | OUTPATIENT
Start: 2024-10-25

## 2024-10-25 RX ORDER — METHYLPREDNISOLONE 4 MG/1
TABLET ORAL
Qty: 1 EACH | Refills: 0 | Status: SHIPPED | OUTPATIENT
Start: 2024-10-25

## 2024-10-25 NOTE — TELEPHONE ENCOUNTER
Rx Refill Note  Requested Prescriptions     Pending Prescriptions Disp Refills    HYDROcodone-acetaminophen (NORCO) 5-325 MG per tablet [Pharmacy Med Name: HYDROCODONE BITARTRATE/ACETAMINOPHEN 5-325MG TABLET] 30 tablet 0     Sig: TAKE 1 TABLET BY MOUTH EVERY 8 (EIGHT) HOURS AS NEEDED FOR MILD PAIN.      Last office visit with office: today  Next office visit with office: 12/16/2024    UDS: today    DATE OF LAST REFILL: 09/23/2024    Controlled Substance Agreement: up to date    EDILSON OR FREDY: PAULA         {TIP  Is Refill Pharmacy correct?:  Stephanie Carlson MA  10/25/24, 13:05 CDT

## 2024-10-26 RX ORDER — HYDROCODONE BITARTRATE AND ACETAMINOPHEN 5; 325 MG/1; MG/1
1 TABLET ORAL EVERY 8 HOURS PRN
Qty: 30 TABLET | Refills: 0 | Status: SHIPPED | OUTPATIENT
Start: 2024-10-26

## 2024-10-27 LAB
APPEARANCE UR: CLEAR
BACTERIA #/AREA URNS HPF: NORMAL /[HPF]
BACTERIA UR CULT: NORMAL
BACTERIA UR CULT: NORMAL
BILIRUB UR QL STRIP: NEGATIVE
CASTS URNS QL MICRO: NORMAL /LPF
COLOR UR: YELLOW
EPI CELLS #/AREA URNS HPF: NORMAL /HPF (ref 0–10)
GLUCOSE UR QL STRIP: NEGATIVE
HGB UR QL STRIP: NEGATIVE
KETONES UR QL STRIP: NEGATIVE
LEUKOCYTE ESTERASE UR QL STRIP: ABNORMAL
MICRO URNS: ABNORMAL
NITRITE UR QL STRIP: NEGATIVE
PH UR STRIP: 6 [PH] (ref 5–7.5)
PROT UR QL STRIP: ABNORMAL
RBC #/AREA URNS HPF: NORMAL /HPF (ref 0–2)
SP GR UR STRIP: 1.01 (ref 1–1.03)
URINALYSIS REFLEX: ABNORMAL
UROBILINOGEN UR STRIP-MCNC: 0.2 MG/DL (ref 0.2–1)
WBC #/AREA URNS HPF: NORMAL /HPF (ref 0–5)

## 2024-10-28 NOTE — PROGRESS NOTES
Reviewed results - [employer] message sent.  If not seen in 3 days (3 day alert set), will send to pool to call the message.      Electronically signed by ASH Elam, 10/28/24, 7:55 AM CDT.

## 2024-10-29 NOTE — PROGRESS NOTES
Subjective   Chief Complaint:  Problems with COPD    History of Present Illness  This is an 83-year-old female presenting today with increased wheezing and shortness of breath.  Reports no fever, chills or signs of viral illness.  History of COPD.  Also advises some dysuria as well.  She has multiple joint pain mainly in knees and hips, history of arthritis-on Norco, at this point not a surgical candidate, reports takes Norco appropriately and it does help.    Past Medical, Surgical, Social, and Family History:  No Known Allergies   Past Medical History:   Diagnosis Date    Anxiety     Cholelithiasis     Depression     Diverticulosis     History of adenomatous polyp of colon     History of colon polyps     Hyperlipidemia     Hypertension     Hypothyroidism     Meningiomatosis     Myalgia     Nephrolithiasis     Type 2 diabetes mellitus       Past Surgical History:   Procedure Laterality Date    COLONOSCOPY  12/04/2018    Sigmoid diverticulosis; Non-engorged internal hemorrhoids vein; Repeat 5 years    COLONOSCOPY  07/15/2014    Two 3-5mm hyperplastic polyps in the rectum-one destroyed during removal; Mild sigmoid diverticulosis; Repeat 5 years    COLONOSCOPY  09/01/2009    Two 5mm tubular adenomatous polyps in the transverse colon; Two 5mm hyperplastic polyps in the distal sigmoid colon; Small internal hemorrhoid; Repeat 3 years    ENDOSCOPY  12/04/2018    Normal endoscopy      Social History     Socioeconomic History    Marital status: Single   Tobacco Use    Smoking status: Former     Current packs/day: 0.00     Average packs/day: 1 pack/day for 51.0 years (51.0 ttl pk-yrs)     Types: Cigarettes     Start date: 1963     Quit date: 2014     Years since quitting: 10.8     Passive exposure: Past    Smokeless tobacco: Never   Vaping Use    Vaping status: Never Used   Substance and Sexual Activity    Alcohol use: No     Comment: Occasionally     Drug use: No    Sexual activity: Defer      Family History   Problem  "Relation Age of Onset    Arthritis Mother     Arthritis Father     Hypertension Sister     Colon cancer Neg Hx     Colon polyps Neg Hx     Esophageal cancer Neg Hx     Liver cancer Neg Hx     Liver disease Neg Hx     Rectal cancer Neg Hx     Stomach cancer Neg Hx        Objective   Vital Signs  /80   Pulse 76   Temp 96.5 °F (35.8 °C) (Infrared)   Ht 152.4 cm (60\")   Wt 83.4 kg (183 lb 12.8 oz)   LMP  (LMP Unknown)   SpO2 100%   BMI 35.90 kg/m²    Physical Exam  Vitals reviewed.   Constitutional:       General: She is not in acute distress.     Appearance: Normal appearance.   Cardiovascular:      Rate and Rhythm: Normal rate and regular rhythm.   Pulmonary:      Effort: Pulmonary effort is normal. No respiratory distress.      Breath sounds: No stridor. Wheezing present. No rhonchi.       Assessment & Plan   Assessment & Plan  Diagnoses and all orders for this visit:    1. Controlled substance agreement signed (Primary)  Comments:  -For Norco  Orders:  -     COMPLIANCE DRUG ANALYSIS, NO THC - Urine, Clean Catch    2. COPD with exacerbation  -     amoxicillin-clavulanate (AUGMENTIN) 875-125 MG per tablet; Take 1 tablet by mouth 2 (Two) Times a Day for 10 days.  Dispense: 20 tablet; Refill: 0  -     methylPREDNISolone (MEDROL) 4 MG dose pack; Take as directed on package instructions.  Dispense: 1 each; Refill: 0    3. Dysuria  -     UA / M With / Rflx Culture([facility] ONLY) - Urine, Clean Catch    4. Osteoarthritis, unspecified osteoarthritis type, unspecified site  Comments:  -Chronic stable continue Norco, contract signed, urine drug screen ordered today    Other orders  -     Microscopic Examination -  -     Urine Culture, Routine -          Follow-up:  The patient will Return for next scheduled follow up as already scheduled.    Records and Results Reviewed:  I reviewed current medications as given by patient and allergy list    : Hybrid TASHA Co- and Dragon Speech Recognition - No " recording technology was used in the exam room during encounter.    Electronically signed by ASH Elam, 10/29/24, 7:13 AM CDT.

## 2024-11-06 LAB — DRUGS UR: NORMAL

## 2024-12-05 DIAGNOSIS — M19.90 OSTEOARTHRITIS, UNSPECIFIED OSTEOARTHRITIS TYPE, UNSPECIFIED SITE: ICD-10-CM

## 2024-12-05 RX ORDER — HYDROCODONE BITARTRATE AND ACETAMINOPHEN 5; 325 MG/1; MG/1
1 TABLET ORAL EVERY 8 HOURS PRN
Qty: 30 TABLET | Refills: 0 | Status: SHIPPED | OUTPATIENT
Start: 2024-12-05

## 2024-12-05 NOTE — TELEPHONE ENCOUNTER
Rx Refill Note  Requested Prescriptions     Pending Prescriptions Disp Refills    HYDROcodone-acetaminophen (NORCO) 5-325 MG per tablet [Pharmacy Med Name: HYDROCODONE BITARTRATE/ACETAMINOPHEN 5-325MG TABLET] 30 tablet 0     Sig: TAKE 1 TABLET BY MOUTH EVERY 8 HOURS AS NEEDED FOR MILD PAIN.      Last office visit with office: 10/25/24  Next office visit with office: 12/16/24    UDS: 10/25/24    DATE OF LAST REFILL: 10/26/24    Controlled Substance Agreement: up to date    ED[place] OR BROWNP: 12/05/24         {TIP  Is Refill Pharmacy correct?:  Rubia Duque MA  12/05/24, 13:50 CST

## 2024-12-16 ENCOUNTER — OFFICE VISIT (OUTPATIENT)
Dept: FAMILY MEDICINE CLINIC | Facility: CLINIC | Age: 84
End: 2024-12-16
Payer: MEDICARE

## 2024-12-16 VITALS
DIASTOLIC BLOOD PRESSURE: 86 MMHG | WEIGHT: 186 LBS | OXYGEN SATURATION: 96 % | SYSTOLIC BLOOD PRESSURE: 180 MMHG | HEART RATE: 81 BPM | HEIGHT: 60 IN | BODY MASS INDEX: 36.52 KG/M2

## 2024-12-16 DIAGNOSIS — F41.9 ANXIETY: ICD-10-CM

## 2024-12-16 DIAGNOSIS — N18.30 STAGE 3 CHRONIC KIDNEY DISEASE, UNSPECIFIED WHETHER STAGE 3A OR 3B CKD: ICD-10-CM

## 2024-12-16 DIAGNOSIS — E03.9 ACQUIRED HYPOTHYROIDISM: ICD-10-CM

## 2024-12-16 DIAGNOSIS — E78.2 MIXED HYPERLIPIDEMIA: ICD-10-CM

## 2024-12-16 DIAGNOSIS — M19.90 ARTHRITIS: ICD-10-CM

## 2024-12-16 DIAGNOSIS — I10 ESSENTIAL HYPERTENSION: Primary | ICD-10-CM

## 2024-12-16 PROCEDURE — G2211 COMPLEX E/M VISIT ADD ON: HCPCS

## 2024-12-16 PROCEDURE — 3077F SYST BP >= 140 MM HG: CPT

## 2024-12-16 PROCEDURE — 99214 OFFICE O/P EST MOD 30 MIN: CPT

## 2024-12-16 PROCEDURE — 1126F AMNT PAIN NOTED NONE PRSNT: CPT

## 2024-12-16 PROCEDURE — 3079F DIAST BP 80-89 MM HG: CPT

## 2024-12-16 NOTE — PROGRESS NOTES
"Chief Complaint  Hypertension, Hypothyroidism, Hyperlipidemia, and COPD    Subjective      Paige Martinez presents to [facility]  History of Present Illness  The patient is an 84-year-old female who presents today for a follow-up visit.    She has been experiencing unexplained weight gain, which she attributes to her thyroid condition.    Her blood pressure readings at home have been consistently around 135/65 or 70, with a recent spike to 217/80. She has not been adhering to her prescribed medication regimen. She owns a blood pressure cuff and monitors her blood pressure daily. She is currently on losartan 100 mg, metoprolol 25 mg, and clonidine for hypertension.    She recently underwent laboratory tests, including blood and urine analysis, under the supervision of Dr. Valenzuela, her nephrologist. She is scheduled for a follow-up appointment with him next week.    She reports that her anxiety levels are manageable. She is currently on citalopram for anxiety management.    She is on Norco for arthritis and aches and pains. She takes half of it sometimes depending on her pain level.    FAMILY HISTORY  Her oldest sister was 99 when she passed away.    MEDICATIONS  losartan, metoprolol, clonidine, atorvastatin, Norco, citalopram, levothyroxine      Objective   Vital Signs:  /86   Pulse 81   Ht 152.4 cm (60\")   Wt 84.4 kg (186 lb)   SpO2 96%   BMI 36.33 kg/m²   Estimated body mass index is 36.33 kg/m² as calculated from the following:    Height as of this encounter: 152.4 cm (60\").    Weight as of this encounter: 84.4 kg (186 lb).            Physical Exam     Physical Exam  Vital Signs  The patient's blood pressure is 180/86.      Result Review :  The following labs/imaging/notes were reviewed and discussed with the patient by Gonzalez Hein MD on 12/16/2024:     Latest Reference Range & Units 06/18/24 13:38   TSH Baseline 0.270 - 4.200 uIU/mL 1.760   Total Cholesterol 0 - 200 mg/dL " 188   HDL Cholesterol 40 - 60 mg/dL 58   LDL Cholesterol  0 - 100 mg/dL 105 (H)   Triglycerides 0 - 150 mg/dL 145   Chol/HDL Ratio  3.24   VLDL Cholesterol Christopher 5 - 40 mg/dL 25   (H): Data is abnormally high        Assessment      Diagnoses and all orders for this visit:    1. Essential hypertension (Primary)    2. Acquired hypothyroidism    3. Mixed hyperlipidemia-statin    4. Stage 3 chronic kidney disease, unspecified whether stage 3a or 3b CKD    5. Anxiety    6. Arthritis             Assessment & Plan  1. Hypertension.  Her blood pressure is significantly elevated at 180/86 during this visit. She is advised to monitor her blood pressure 3 to 4 times per week for the next few weeks and record the readings. She should call in with these readings after a couple of weeks for potential medication adjustment. She is currently on losartan 100 mg, metoprolol 25 mg, and clonidine.    2. Chronic kidney disease.  She recently had lab work done by her nephrologist, Dr. Valenzuela, and is scheduled for a follow-up visit next week. The results of these labs will be reviewed once available to determine any further necessary interventions.    3. Anxiety.  She will continue her current regimen of citalopram.    4. Arthritis.  She takes Norco as needed for arthritis pain and has a controlled substance agreement in place since October. She is advised to continue using Norco only as needed for pain management.    5. Thyroid disorder.  She will continue her current dose of levothyroxine 100 mcg.    6. Cholesterol management.  Her cholesterol levels are well-managed with atorvastatin. She will continue her current atorvastatin regimen.    No orders of the defined types were placed in this encounter.      Follow Up   Return in about 3 months (around 3/16/2025) for Arthritis w/ CS, HTN, hypothyroidism, .  Patient was given instructions and counseling regarding her condition or for health maintenance advice. Please see specific information  pulled into the AVS if appropriate.         Patient or patient representative verbalized consent for the use of Ambient Listening during the visit with  Gonzalez Hein MD for chart documentation. 12/16/2024  13:39 CST

## 2025-01-08 DIAGNOSIS — M19.90 OSTEOARTHRITIS, UNSPECIFIED OSTEOARTHRITIS TYPE, UNSPECIFIED SITE: ICD-10-CM

## 2025-01-08 RX ORDER — HYDROCODONE BITARTRATE AND ACETAMINOPHEN 5; 325 MG/1; MG/1
1 TABLET ORAL EVERY 8 HOURS PRN
Qty: 30 TABLET | Refills: 0 | Status: SHIPPED | OUTPATIENT
Start: 2025-01-08

## 2025-01-08 NOTE — TELEPHONE ENCOUNTER
Rx Refill Note  Requested Prescriptions     Pending Prescriptions Disp Refills    HYDROcodone-acetaminophen (NORCO) 5-325 MG per tablet [Pharmacy Med Name: HYDROCODONE BITARTRATE/ACETAMINOPHEN 5-325MG TABLET] 30 tablet 0     Sig: TAKE 1 TABLET BY MOUTH EVERY 8 HOURS AS NEEDED FOR MILD PAIN.      Last office visit with office: 12/16/24  Next office visit with office: 03/21/25    UDS: 10/25/24    DATE OF LAST REFILL: 12/05/24    Controlled Substance Agreement: up to date    [place] 01/08/25         {TIP  Is Refill Pharmacy correct?:  Rubia Duque MA  01/08/25, 13:01 CST

## 2025-01-21 RX ORDER — LOSARTAN POTASSIUM 100 MG/1
TABLET ORAL
Qty: 90 TABLET | Refills: 0 | Status: SHIPPED | OUTPATIENT
Start: 2025-01-21

## 2025-02-11 DIAGNOSIS — M19.90 OSTEOARTHRITIS, UNSPECIFIED OSTEOARTHRITIS TYPE, UNSPECIFIED SITE: ICD-10-CM

## 2025-02-11 RX ORDER — HYDROCODONE BITARTRATE AND ACETAMINOPHEN 5; 325 MG/1; MG/1
1 TABLET ORAL EVERY 8 HOURS PRN
Qty: 30 TABLET | Refills: 0 | Status: SHIPPED | OUTPATIENT
Start: 2025-02-11

## 2025-02-11 NOTE — TELEPHONE ENCOUNTER
Rx Refill Note  Requested Prescriptions     Pending Prescriptions Disp Refills    HYDROcodone-acetaminophen (NORCO) 5-325 MG per tablet [Pharmacy Med Name: HYDROCODONE BITARTRATE/ACETAMINOPHEN 5-325MG TABLET] 30 tablet 0     Sig: TAKE 1 TABLET BY MOUTH EVERY 8 HOURS AS NEEDED FOR MILD PAIN.      Last office visit with office: 12/16/24  Next office visit with office: 03/21/25    UDS: 10/25/24    DATE OF LAST REFILL: 01/08/25    Controlled Substance Agreement: up to date    [place] 02[place]11[place]25         {TIP  Is Refill Pharmacy correct?:  Rubia Duque MA  02/11/25, 11:17 CST

## 2025-02-24 ENCOUNTER — TELEPHONE (OUTPATIENT)
Dept: NEUROSURGERY | Facility: CLINIC | Age: 85
End: 2025-02-24
Payer: MEDICARE

## 2025-02-24 NOTE — TELEPHONE ENCOUNTER
Caller: Paige Martinez     Relationship: SELF    Best call back number: [phone]     What orders are you requesting (i.e. lab or imaging): MRI PAD BRAIN W WO CONTRAST     In what timeframe would the patient need to come in: 06/18/2025    Where will you receive your lab/imaging services: [facility]    Additional notes:PT SAID SHE WAS UNABLE TO SPEAK WITH IMAGING TO SCHEDULE. WANTS MRI TO COINCIDE WITH VISIT.

## 2025-02-25 DIAGNOSIS — D32.9 MENINGIOMA: Primary | ICD-10-CM

## 2025-03-10 RX ORDER — MONTELUKAST SODIUM 10 MG/1
10 TABLET ORAL NIGHTLY
Qty: 90 TABLET | Refills: 3 | Status: SHIPPED | OUTPATIENT
Start: 2025-03-10

## 2025-03-10 NOTE — TELEPHONE ENCOUNTER
Rx Refill Note  Requested Prescriptions     Pending Prescriptions Disp Refills    montelukast (SINGULAIR) 10 MG tablet [Pharmacy Med Name: MONTELUKAST SODIUM 10MG TABLET] 90 tablet 3     Sig: TAKE 1 TABLET BY MOUTH EVERY NIGHT.      Last office visit with prescribing clinician: 9/26/2024   Last telemedicine visit with prescribing clinician: Visit date not found   Next office visit with prescribing clinician: 4/11/2025                         Would you like a call back once the refill request has been completed: [] Yes [] No    If the office needs to give you a call back, can they leave a voicemail: [] Yes [] No    Juani Woodall MA  03/10/25, 08:39 CDT

## 2025-03-11 RX ORDER — ATORVASTATIN CALCIUM 10 MG/1
10 TABLET, FILM COATED ORAL DAILY
Qty: 30 TABLET | Refills: 5 | Status: SHIPPED | OUTPATIENT
Start: 2025-03-11

## 2025-03-11 NOTE — TELEPHONE ENCOUNTER
Rx Refill Note  Requested Prescriptions     Pending Prescriptions Disp Refills    atorvastatin (LIPITOR) 10 MG tablet [Pharmacy Med Name: ATORVASTATIN CALCIUM 10MG TABLET] 30 tablet 5     Sig: TAKE ONE TABLET DAILY GENERIC FOR LIPITOR      Last office visit with office: 12/16/24  Next office visit with office: 03-21-25    UDS:     DATE OF LAST REFILL: 05/23/24    Controlled Substance Agreement:     EDILSON OR BROWNP:          {TIP  Is Refill Pharmacy correct?:  Rubia Duque MA  03/11/25, 15:58 CDT

## 2025-03-21 ENCOUNTER — OFFICE VISIT (OUTPATIENT)
Dept: FAMILY MEDICINE CLINIC | Facility: CLINIC | Age: 85
End: 2025-03-21
Payer: MEDICARE

## 2025-03-21 VITALS
SYSTOLIC BLOOD PRESSURE: 126 MMHG | BODY MASS INDEX: 36.71 KG/M2 | HEIGHT: 60 IN | OXYGEN SATURATION: 96 % | WEIGHT: 187 LBS | DIASTOLIC BLOOD PRESSURE: 74 MMHG | HEART RATE: 74 BPM

## 2025-03-21 DIAGNOSIS — I10 ESSENTIAL HYPERTENSION: Primary | ICD-10-CM

## 2025-03-21 DIAGNOSIS — M19.90 ARTHRITIS: ICD-10-CM

## 2025-03-21 DIAGNOSIS — E03.9 ACQUIRED HYPOTHYROIDISM: ICD-10-CM

## 2025-03-21 DIAGNOSIS — E78.2 MIXED HYPERLIPIDEMIA: ICD-10-CM

## 2025-03-21 DIAGNOSIS — M19.90 OSTEOARTHRITIS, UNSPECIFIED OSTEOARTHRITIS TYPE, UNSPECIFIED SITE: ICD-10-CM

## 2025-03-21 PROBLEM — M25.562 ACUTE PAIN OF LEFT KNEE: Status: RESOLVED | Noted: 2023-06-09 | Resolved: 2025-03-21

## 2025-03-21 PROBLEM — J44.1 COPD WITH ACUTE EXACERBATION: Status: RESOLVED | Noted: 2023-11-28 | Resolved: 2025-03-21

## 2025-03-21 RX ORDER — HYDROCODONE BITARTRATE AND ACETAMINOPHEN 5; 325 MG/1; MG/1
1 TABLET ORAL EVERY 8 HOURS PRN
Qty: 30 TABLET | Refills: 0 | OUTPATIENT
Start: 2025-03-21

## 2025-03-21 RX ORDER — HYDROCODONE BITARTRATE AND ACETAMINOPHEN 5; 325 MG/1; MG/1
1 TABLET ORAL EVERY 8 HOURS PRN
Qty: 30 TABLET | Refills: 0 | Status: SHIPPED | OUTPATIENT
Start: 2025-03-21

## 2025-03-21 RX ORDER — BUMETANIDE 0.5 MG/1
0.5 TABLET ORAL DAILY
Qty: 90 TABLET | Refills: 1 | Status: SHIPPED | OUTPATIENT
Start: 2025-03-21

## 2025-03-21 RX ORDER — BUMETANIDE 0.5 MG/1
0.5 TABLET ORAL DAILY
COMMUNITY
End: 2025-03-21 | Stop reason: SDUPTHER

## 2025-03-21 NOTE — TELEPHONE ENCOUNTER
Rx Refill Note  Requested Prescriptions     Pending Prescriptions Disp Refills    HYDROcodone-acetaminophen (NORCO) 5-325 MG per tablet [Pharmacy Med Name: HYDROCODONE BITARTRATE/ACETAMINOPHEN 5-325MG TABLET] 30 tablet 0     Sig: TAKE 1 TABLET BY MOUTH EVERY 8 HOURS AS NEEDED FOR MILD PAIN.      Last office visit with office: [id number]  Next office visit with office: [id number]    UDS: none    DATE OF LAST REFILL: [id number]    Controlled Substance Agreement:  [id number]    [facility] OR [facility]: [id number]         {TIP  Is Refill Pharmacy correct?:  Zain Marquis MA  03/21/25, 15:37 CDT

## 2025-03-21 NOTE — PROGRESS NOTES
"Chief Complaint  Hypertension and Hypothyroidism    Subjective      Paige Martinez presents to [facility]  History of Present Illness  The patient is an 84-year-old female who is here today for a follow-up visit.    She has been monitoring her blood pressure at home, which has shown readings as high as 230. Despite not taking her medication this morning, her blood pressure was notably low. She is currently on a regimen of three different antihypertensive medications, the names of which she is uncertain. She also takes clonidine once daily and Bumex, a diuretic, once daily. She has expressed interest in understanding the timing of her medication administration.    She is seeking a refill of her [place] prescription, which she uses for arthritis management. Her last refill was on 02/11/2025.    She has a small spot on her brain and needs to have an MRI done. She has an appointment with her urologist on 06/18/2025 and wants to schedule the MRI on the same day so she does not have to make two trips to [place].    Supplemental Information  She has an ingrown nail and has her toe wrapped up. She has an upcoming appointment with her pulmonologist.    MEDICATIONS  Current: clonidine, Bumex, escitalopram, Norco      Objective   Vital Signs:  /74   Pulse 74   Ht 152.4 cm (60\")   Wt 84.8 kg (187 lb)   SpO2 96%   BMI 36.52 kg/m²   Estimated body mass index is 36.52 kg/m² as calculated from the following:    Height as of this encounter: 152.4 cm (60\").    Weight as of this encounter: 84.8 kg (187 lb).            Physical Exam     Physical Exam  Vital Signs  Blood pressure reading is 126/74.      Result Review :  The following labs/imaging/notes were reviewed and discussed with the patient by Gonzalez Hein MD on 03/21/2025:            Assessment      Diagnoses and all orders for this visit:    1. Essential hypertension (Primary)  -     CBC & Differential; Future  -     Comprehensive " Metabolic Panel; Future  -     Lipid Panel; Future  -     TSH Rfx On Abnormal To Free T4; Future  -     Vitamin B12 & Folate; Future    2. Mixed hyperlipidemia-statin  -     CBC & Differential; Future  -     Comprehensive Metabolic Panel; Future  -     Lipid Panel; Future  -     TSH Rfx On Abnormal To Free T4; Future  -     Vitamin B12 & Folate; Future    3. Acquired hypothyroidism  -     CBC & Differential; Future  -     Comprehensive Metabolic Panel; Future  -     Lipid Panel; Future  -     TSH Rfx On Abnormal To Free T4; Future  -     Vitamin B12 & Folate; Future    4. Arthritis  -     CBC & Differential; Future  -     Comprehensive Metabolic Panel; Future  -     Lipid Panel; Future  -     TSH Rfx On Abnormal To Free T4; Future  -     Vitamin B12 & Folate; Future    Other orders  -     bumetanide (BUMEX) 0.5 MG tablet; Take 1 tablet by mouth Daily.  Dispense: 90 tablet; Refill: 1             Assessment & Plan  1. Hypertension.  Her blood pressure readings have shown significant improvement, currently at 126/74, compared to previous elevated levels. She is advised to bring her home blood pressure monitor to the next visit for accuracy verification. She will continue her current antihypertensive regimen.    2. Arthritis.  She reports ongoing pain and is currently taking Norco for pain management. A refill for [place] will be provided.    3. Health Maintenance.  She is scheduled for an MRI in June 2025. She is advised to coordinate with her neurologist to align the MRI appointment with her follow-up visit to avoid multiple trips. She is instructed to undergo blood work approximately 1 week prior to her next visit.    Follow-up  The patient will follow up in 3 months.    Orders Placed This Encounter   Procedures    Comprehensive Metabolic Panel     Standing Status:   Future     Expected Date:   6/21/2025     Expiration Date:   3/21/2026     Release to patient:   Routine Release [[id number]]    Lipid Panel     Standing  Status:   Future     Expected Date:   6/21/2025     Expiration Date:   3/21/2026     Release to patient:   Routine Release [[id number]]    TSH Rfx On Abnormal To Free T4     Standing Status:   Future     Expected Date:   6/21/2025     Expiration Date:   3/21/2026     Release to patient:   Routine Release [[id number]]    Vitamin B12 & Folate     Standing Status:   Future     Expected Date:   6/21/2025     Expiration Date:   3/21/2026     Release to patient:   Routine Release [[id number]]    CBC & Differential     Standing Status:   Future     Expected Date:   6/21/2025     Expiration Date:   3/21/2026     Manual Differential:   No     Release to patient:   Routine Release [[id number]]       Follow Up   Return in about 3 months (around 6/21/2025) for Medicare Wellness, lab review, HTN, hypothyroid, arthritis per CSA.  Patient was given instructions and counseling regarding her condition or for health maintenance advice. Please see specific information pulled into the AVS if appropriate.         Patient or patient representative verbalized consent for the use of Ambient Listening during the visit with  Gonzalez Hein MD for chart documentation. 3/23/2025  19:02 CDT

## 2025-03-28 NOTE — TELEPHONE ENCOUNTER
Normal serum potassium on file within the past year    Normal serum creatinine on file within the past year     Rx Refill Note  Requested Prescriptions     Pending Prescriptions Disp Refills    losartan (COZAAR) 100 MG tablet [Pharmacy Med Name: LOSARTAN POTASSIUM 100MG TABLET] 90 tablet 0     Sig: TAKE ONE TABLET DAILY GENERIC FOR COZAAR      Last office visit with office: 03/21/2025  Next office visit with office: 06/23/2025    UDS:     DATE OF LAST REFILL: 03/28/2025    Controlled Substance Agreement:     EDIL[place] OR [place]:          {TIP  Is Refill Pharmacy correct?:  Stephanie Carlson MA  03/28/25, 16:04 CDT

## 2025-03-30 RX ORDER — LOSARTAN POTASSIUM 100 MG/1
100 TABLET ORAL DAILY
Qty: 90 TABLET | Refills: 0 | Status: SHIPPED | OUTPATIENT
Start: 2025-03-30

## 2025-04-22 DIAGNOSIS — M19.90 OSTEOARTHRITIS, UNSPECIFIED OSTEOARTHRITIS TYPE, UNSPECIFIED SITE: ICD-10-CM

## 2025-04-22 RX ORDER — HYDROCODONE BITARTRATE AND ACETAMINOPHEN 5; 325 MG/1; MG/1
1 TABLET ORAL EVERY 8 HOURS PRN
Qty: 30 TABLET | Refills: 0 | Status: SHIPPED | OUTPATIENT
Start: 2025-04-22

## 2025-04-22 NOTE — TELEPHONE ENCOUNTER
Rx Refill Note  Requested Prescriptions     Pending Prescriptions Disp Refills    HYDROcodone-acetaminophen (NORCO) 5-325 MG per tablet [Pharmacy Med Name: HYDROCODONE BITARTRATE/ACETAMINOPHEN 5-325MG TABLET] 30 tablet 0     Sig: TAKE 1 TABLET BY MOUTH EVERY 8 (EIGHT) HOURS AS NEEDED FOR MILD PAIN.      Last office visit with office: 03/21/25  Next office visit with office: 06/23/25    UDS: 10/25/24    DATE OF LAST REFILL: 03/21/25    Controlled Substance Agreement: up to date    EDILSON OR BROWNP: 04/22/25         {TIP  Is Refill Pharmacy correct?:  Rubia Duque MA  04/22/25, 09:24 CDT

## 2025-05-07 ENCOUNTER — OFFICE VISIT (OUTPATIENT)
Dept: OBSTETRICS AND GYNECOLOGY | Age: 85
End: 2025-05-07
Payer: MEDICARE

## 2025-05-07 VITALS
WEIGHT: 185 LBS | DIASTOLIC BLOOD PRESSURE: 80 MMHG | SYSTOLIC BLOOD PRESSURE: 160 MMHG | BODY MASS INDEX: 36.32 KG/M2 | RESPIRATION RATE: 20 BRPM | HEIGHT: 60 IN

## 2025-05-07 DIAGNOSIS — R93.89 ENDOMETRIAL THICKENING ON ULTRASOUND: ICD-10-CM

## 2025-05-07 DIAGNOSIS — N83.201 RIGHT OVARIAN CYST: Primary | ICD-10-CM

## 2025-05-07 DIAGNOSIS — I15.8 OTHER SECONDARY HYPERTENSION: ICD-10-CM

## 2025-05-07 PROCEDURE — 3079F DIAST BP 80-89 MM HG: CPT | Performed by: OBSTETRICS & GYNECOLOGY

## 2025-05-07 PROCEDURE — 99213 OFFICE O/P EST LOW 20 MIN: CPT | Performed by: OBSTETRICS & GYNECOLOGY

## 2025-05-07 PROCEDURE — 3077F SYST BP >= 140 MM HG: CPT | Performed by: OBSTETRICS & GYNECOLOGY

## 2025-05-07 NOTE — PROGRESS NOTES
"    Glen Meade MD  [facility]  [address]  [place] [address]  Office [phone]  Fax [phone]      [facility]  Paige Martinez  : 1940  MRN: [id number]    Subjective   Subjective     Chief Complaint   Patient presents with    Ovarian Cyst     Patient is here today for a f/u for right ovarian cyst on 25.  US today.         History of Present Illness  Paige Martinez is a 84 y.o. female , , who comes to the office today for cyst and endometrial lining check. Ultrasound today. No GYN complaints including pain or bleeding. Reports is tired but this is anticipated with age. Forgot medications for blood pressure last night but took them this morning.      Review of Systems   Genitourinary:  Negative for decreased urine volume, difficulty urinating, dyspareunia, dysuria, enuresis, flank pain, frequency, genital sores, hematuria, menstrual problem, pelvic pain, urgency, vaginal bleeding, vaginal discharge and vaginal pain.   All other systems reviewed and are negative.       Objective    Objective     Vitals:   Visit Vitals  /80   Resp 20   Ht 152.4 cm (60\")   Wt 83.9 kg (185 lb)   LMP  (LMP Unknown)   BMI 36.13 kg/m²        Physical Exam  Vitals reviewed.   Constitutional:       General: She is not in acute distress.     Appearance: Normal appearance. She is not ill-appearing.   HENT:      Head: Normocephalic and atraumatic.      Nose: No congestion or rhinorrhea.   Eyes:      General: No scleral icterus.        Right eye: No discharge.         Left eye: No discharge.      Extraocular Movements: Extraocular movements intact.      Conjunctiva/sclera: Conjunctivae normal.   Pulmonary:      Effort: Pulmonary effort is normal. No accessory muscle usage or respiratory distress.   Musculoskeletal:      Right lower leg: No edema.      Left lower leg: No edema.   Skin:     General: Skin is warm and dry.      Coloration: Skin is not ashen, cyanotic or jaundiced.   Neurological:     "  General: No focal deficit present.      Mental Status: She is alert and oriented to person, place, and time.   Psychiatric:         Mood and Affect: Mood normal.         Behavior: Behavior is cooperative.       Result Review    US Non-ob Transvaginal (05/07/2025 10:13)   1.  Uterus: Small/atrophic, with uterine dimensions 5 x 3.7 x 3.3 cm, and Retroverted     2.  Endometrium:  9.5 mm with internal cystic change and fluid . Compared with prior ultrasound, stable in appearance and size.      3.  Myometrium: Heterogenous texture with internal calcifications     4.  Ovaries  Left:    Normal/unremarkable, ovary measures 3 x 1.4 x 2.4 cm  Right:   simple cysts measuring 4.4 x 4.5 x 3.5 cm and 2.1 x 1.6 x 2.2 cm respectively. Ovary measures 4.8 x 5 x 3.8 cm.  Compared with prior ultrasound, larger cyst is stable in appearance and size.         Assessment & Plan   Assessment / Plan     Diagnoses and all orders for this visit:    1. Right ovarian cyst (Primary)    2. Endometrial thickening on ultrasound    3. Other secondary hypertension        Discussion: ultrasound reviewed. She is still asymptomatic. New right ovarian simple cyst is only new change. Recommend repeating ultrasound in 6 months. Follow-up with PCP for HTN. Questions answered. She expressed understanding.     Follow-up: Return in about 6 months (around 11/7/2025) for GYN US, GYN Follow-up.    Glen Meade MD

## 2025-05-07 NOTE — TELEPHONE ENCOUNTER
Rx Refill Note  Requested Prescriptions     Pending Prescriptions Disp Refills    cloNIDine (CATAPRES) 0.1 MG tablet [Pharmacy Med Name: CLONIDINE HYDROCHLORIDE 0.1MG TABLET] 30 tablet 5     Sig: TAKE ONE TABLET BY MOUTH DAILY.      Last office visit with office: 03/21/25  Next office visit with office: 06/23/25    UDS:     DATE OF LAST REFILL: 09-23-24    Controlled Substance Agreement:     [place] OR [place]:          {TIP  Is Refill Pharmacy correct?:  Rubia Duque MA  05/07/25, 09:02 CDT

## 2025-05-08 RX ORDER — CLONIDINE HYDROCHLORIDE 0.1 MG/1
0.1 TABLET ORAL DAILY
Qty: 30 TABLET | Refills: 5 | Status: SHIPPED | OUTPATIENT
Start: 2025-05-08

## 2025-05-09 ENCOUNTER — OFFICE VISIT (OUTPATIENT)
Dept: PULMONOLOGY | Facility: CLINIC | Age: 85
End: 2025-05-09
Payer: MEDICARE

## 2025-05-09 VITALS
BODY MASS INDEX: 36.12 KG/M2 | SYSTOLIC BLOOD PRESSURE: 160 MMHG | OXYGEN SATURATION: 97 % | DIASTOLIC BLOOD PRESSURE: 80 MMHG | WEIGHT: 184 LBS | HEIGHT: 60 IN | HEART RATE: 76 BPM

## 2025-05-09 DIAGNOSIS — E66.9 OBESITY (BMI 35.0-39.9 WITHOUT COMORBIDITY): ICD-10-CM

## 2025-05-09 DIAGNOSIS — J30.9 ALLERGIC RHINITIS, UNSPECIFIED SEASONALITY, UNSPECIFIED TRIGGER: ICD-10-CM

## 2025-05-09 DIAGNOSIS — Z87.891 FORMER SMOKER: ICD-10-CM

## 2025-05-09 DIAGNOSIS — J44.9 CHRONIC OBSTRUCTIVE PULMONARY DISEASE, UNSPECIFIED COPD TYPE: Primary | ICD-10-CM

## 2025-05-09 DIAGNOSIS — J98.4 RESTRICTIVE LUNG DISEASE: ICD-10-CM

## 2025-05-09 DIAGNOSIS — R91.1 LUNG NODULE: ICD-10-CM

## 2025-05-09 DIAGNOSIS — J44.9 CHRONIC OBSTRUCTIVE PULMONARY DISEASE, UNSPECIFIED COPD TYPE: ICD-10-CM

## 2025-05-09 RX ORDER — ALBUTEROL SULFATE 90 UG/1
2 INHALANT RESPIRATORY (INHALATION) EVERY 4 HOURS PRN
Qty: 6.7 G | Refills: 5 | Status: SHIPPED | OUTPATIENT
Start: 2025-05-09

## 2025-05-09 RX ORDER — FLUTICASONE FUROATE AND VILANTEROL 100; 25 UG/1; UG/1
1 POWDER RESPIRATORY (INHALATION) DAILY
Qty: 1 EACH | Refills: 35 | Status: SHIPPED | OUTPATIENT
Start: 2025-05-09 | End: 2025-08-07

## 2025-05-09 NOTE — PROCEDURES
Spirometry with Diffusion Capacity & Lung Volumes    Performed by: Logan Pollard CMA  Authorized by: Tushar Thrasher MD     Pre Drug % Predicted    FVC: 84%   FEV1: 81%   FEF 25-75%: 80%   FEV1/FVC: 74%   T%   RV: 99%   DLCO: 75%   D/VAsb: 79%    Interpretation   Overall comments:   The above test results are acceptable and reproducible by ATS criteria  From analysis of the above test results the patient showed evidence of moderate obstructive airway dysfunction which is confirmed by decrease in FEV1 and low FEF 25:75 ratio.  No bronchodilator challenge was done.  The lung volumes are within normal limits and diffusion capacity corrected for alveolar volume was moderately decreased.    In comparison with the prior test results done a few years ago the PFT did not show any significant worsening values and apparently stable.  Clinical correlation was indicated.    Tushar Thrasher MD  Pulmonologist/Intensivist  2025 12:32 CDT

## 2025-05-09 NOTE — PROGRESS NOTES
[facility] OUTPATIENT PROGRESS NOTE    Patient: Paige Martinez  : 1940  Age: 84 y.o.  Date of Service: May 9, 2025    REASON FOR CLINIC VISIT:  Chief Complaint   Patient presents with    COPD       Subjective:    History of Present Illness:  Paige Martinez is a 84 y.o. female who presents to the office today to be seen for    Diagnosis Plan   1. Chronic obstructive pulmonary disease, unspecified COPD type  albuterol sulfate  (90 Base) MCG/ACT inhaler    XR Chest 1 View      2. Restrictive lung disease  XR Chest 1 View      3. Lung nodule  XR Chest 1 View      4. Former smoker        5. Allergic rhinitis, unspecified seasonality, unspecified trigger        6. Obesity (BMI 35.0-39.9 without comorbidity)        .  Other problems per record.    History:    Patient is a very pleasant elderly  female was seen in the pulmonary clinic for a follow-up visit.    She is a former smoker and her PFT showed moderate obstructive airway dysfunction.  The patient was advised to use Prelief and albuterol rescue note in the past but she is not using Breo anymore and using albuterol occasionally and sometimes gets short of breath.  She was advised to start the Brilinta back on it again.  She is also noted to have a lung nodule and in the last year it was noted the patient had a CT scan of the chest done in 2024 which showed stable lung nodule no other acute finding.    Patient has nasal allergy but not using the allergy medications routinely.  She has Astelin nasal spray fluticasone nasal spray and loratadine which is prescribed and she also uses Singulair.  Advised her to continue allergy medications as advised.  She is not on any oxygen or CPAP.  She lives at home alone but she has grownup children and grandchildren and she is in regular contact with them.  She had no recent hospital admissions and ER visit and urgent care at home with a new complaint and she is up-to-date on her  vaccinations.    PFT done today:  Not done today    PFT Values          2025    10:45   Pre Drug PFT Results   FVC 84   FEV1 81   FEF 25-75% 80   FEV1/FVC 74   Other Tests PFT Results   TLC 92   RV 99   DLCO 75   D/VAsb 79     Results for orders placed in visit on 25    Spirometry with Diffusion Capacity & Lung Volumes    Narrative  Spirometry with Diffusion Capacity & Lung Volumes    Performed by: Logan Pollard CMA  Authorized by: Tushar Thrasher MD  Pre Drug % Predicted  FVC: 84%  FEV1: 81%  FEF 25-75%: 80%  FEV1/FVC: 74%  T%  RV: 99%  DLCO: 75%  D/VAsb: 79%    Interpretation  Overall comments:  The above test results are acceptable and reproducible by ATS criteria  From analysis of the above test results the patient showed evidence of moderate obstructive airway dysfunction which is confirmed by decrease in FEV1 and low FEF 25:75 ratio.  No bronchodilator challenge was done.  The lung volumes are within normal limits and diffusion capacity corrected for alveolar volume was moderately decreased.    In comparison with the prior test results done a few years ago the PFT did not show any significant worsening values and apparently stable.  Clinical correlation was indicated.    Tushar Thrasher MD  Pulmonologist/Intensivist  2025 12:32 CDT      Results for orders placed in visit on 22    Pulmonary Function Test    Narrative  Pulmonary Function Test  Performed by: Kate Denny, RRT  Authorized by: Tushar Thrasher MD    Pre Drug % Predicted  FVC: 80%  FEV1: 75%  FEF 25-75%: 62%  FEV1/FVC: 73%  T%  RV: 107%  DLCO: 67%  D/VAsb: 79%    Interpretation  Overall comments:  Above test results are acceptable and reproducible by ATS criteria  For analysis of the above test results the patient showed evidence of moderate obstructive and mild restrictive dysfunction.  No bronchodilator challenge was done.  Diffusion capacity corrected for alveolar volume is moderately decreased    No  prior test result was available for comparison.  Clinical correlation is indicated    Tushar Thrasher MD  Pulmonologist/Intensivist  2022 18:31 CST         Bronchodilator therapy: BreoEllipta and albuterol rescue nasal but not using the Advair regularly.    Smoking Status:   Social History     Tobacco Use   Smoking Status Former    Current packs/day: 0.00    Average packs/day: 1 pack/day for 51.0 years (51.0 ttl pk-yrs)    Types: Cigarettes    Start date:     Quit date:     Years since quittin.3    Passive exposure: Past   Smokeless Tobacco Never     Pulm Rehab: no  Sleep: yes    Support System: lives alone    Code Status:   There are no questions and answers to display.        Review of Systems:  A complete review of systems is performed and all other systems were reviewed and negative as note above in the HPI.  Review of Systems   Constitutional:  Positive for fatigue.   HENT:  Positive for congestion, postnasal drip and sinus pressure.    Eyes: Negative.    Respiratory:  Positive for chest tightness and shortness of breath.    Cardiovascular: Negative.    Gastrointestinal: Negative.    Endocrine: Negative.    Genitourinary: Negative.    Musculoskeletal:  Positive for arthralgias and back pain.   Skin: Negative.    Allergic/Immunologic: Positive for environmental allergies.   Neurological: Negative.    Hematological: Negative.    Psychiatric/Behavioral: Negative.         CAT/ACT Score:  Not done today    Medications:  Outpatient Encounter Medications as of 2025   Medication Sig Dispense Refill    albuterol (PROVENTIL) (2.5 MG/3ML) 0.083% nebulizer solution Take 2.5 mg by nebulization Every 4 (Four) Hours As Needed for Wheezing. 150 mL 5    albuterol sulfate  (90 Base) MCG/ACT inhaler Inhale 2 puffs Every 4 (Four) Hours As Needed for Wheezing. 6.7 g 5    atorvastatin (LIPITOR) 10 MG tablet TAKE ONE TABLET DAILY GENERIC FOR LIPITOR 30 tablet 5    Azelastine HCl 137 MCG/SPRAY solution  Administer 2 sprays into the nostril(s) as directed by provider 2 (Two) Times a Day. 30 mL 5    bumetanide (BUMEX) 0.5 MG tablet Take 1 tablet by mouth Daily. 90 tablet 1    Cholecalciferol 2000 units capsule Take 1 capsule by mouth Daily.      citalopram (CeleXA) 20 MG tablet TAKE ONE TABLET DAILY GENERIC FOR CELEXA 90 tablet 1    cloNIDine (CATAPRES) 0.1 MG tablet TAKE ONE TABLET BY MOUTH DAILY. 30 tablet 5    Ergocalciferol (VITAMIN D2 PO) Take 50,000 Units by mouth 1 (One) Time Per Week.      fluocinonide (LIDEX) 0.05 % cream Apply 1 Application topically to the appropriate area as directed 2 (Two) Times a Day.      fluticasone (Flonase Allergy Relief) 50 MCG/ACT nasal spray Administer 2 sprays into the nostril(s) as directed by provider Daily. 16 g 5    Fluticasone Furoate-Vilanterol 100-25 MCG/ACT aerosol powder  Inhale 1 puff Daily for 90 days. 1 each 35    HYDROcodone-acetaminophen (NORCO) 5-325 MG per tablet TAKE 1 TABLET BY MOUTH EVERY 8 (EIGHT) HOURS AS NEEDED FOR MILD PAIN. 30 tablet 0    levothyroxine (SYNTHROID, LEVOTHROID) 100 MCG tablet TAKE 1 TABLET BY MOUTH DAILY. 90 tablet 1    loratadine (CLARITIN) 10 MG tablet Take 1 tablet by mouth Daily. 90 tablet 3    losartan (COZAAR) 100 MG tablet TAKE ONE TABLET DAILY GENERIC FOR COZAAR 90 tablet 0    metoprolol succinate XL (TOPROL-XL) 25 MG 24 hr tablet TAKE ONE TABLET DAILY GENERIC FOR TOPROL XL 30 tablet 5    montelukast (SINGULAIR) 10 MG tablet TAKE 1 TABLET BY MOUTH EVERY NIGHT. 90 tablet 3    Nebulizer misc As Needed.      omeprazole (priLOSEC) 20 MG capsule TAKE 1 CAPSULE DAILY GENERIC FOR PRILOSEC 30 capsule 1    [DISCONTINUED] albuterol sulfate  (90 Base) MCG/ACT inhaler Inhale 2 puffs Every 4 (Four) Hours As Needed for Wheezing. 6.7 g 5    [DISCONTINUED] cloNIDine (CATAPRES) 0.1 MG tablet TAKE ONE TABLET BY MOUTH DAILY. 30 tablet 5    [DISCONTINUED] Fluticasone Furoate-Vilanterol 100-25 MCG/ACT aerosol powder  Inhale 1 puff Daily for 90  "days. 1 each 35    [DISCONTINUED] HYDROcodone-acetaminophen (NORCO) 5-325 MG per tablet Take 1 tablet by mouth Every 8 (Eight) Hours As Needed for Mild Pain. 30 tablet 0     No facility-administered encounter medications on file as of 5/9/2025.       Allergies:  No Known Allergies    Immunizations:  Immunization History   Administered Date(s) Administered    COVID-19 (JACOBY) 04/09/2021    PPD Test 08/28/2017       Objective:    Vitals:  /80   Pulse 76   Ht 152.4 cm (60\")   Wt 83.5 kg (184 lb)   LMP  (LMP Unknown)   SpO2 97% Comment: RA  Breastfeeding No   BMI 35.94 kg/m²     Physical Exam:  General: Patient is a 84 y.o. pleasant elderly  female. Looks stated age. Appears to be in no acute distress.  Eyes: EOMI. PERRLA. Vision intact. No scleral icterus.  Ear, Nose, Mouth and Throat: Hearing is grossly intact. No Leukoplakia, pharyngitis, stomatitis or thrush. Swollen nasal mucosa with post nasal drop.  Neck: Range of motion of neck normal. No thyromegaly or masses. Mallampati Class 3  Respiratory: Clear to auscultation bilaterally. No use of accessory muscles. Decreased breath sounds.  Cardiovascular: Normal heart sounds. Regularly regular rhythm without murmur.  Gastrointestinal: Non tender, non distended, soft. Bowel sounds positive in all four quadrants. No organomegaly.  Skin: No obvious rashes, lesions, ulcers or large amount of bruising. No edema.   Neurological: No new motor deficits. Cranial nerves appear intact.  Psychiatric: Patient is alert and oriented to person, place and time.    Chest Imaging:      Study Result    Narrative & Impression   EXAM/TECHNIQUE: CT chest without contrast     INDICATION: COPD, surveillance; J44.9-Chronic obstructive pulmonary  disease, unspecified; J98.4-Other disorders of lung     COMPARISON: 12/1/2023, 1/3/2024     DLP: 368.6 mGy.cm. Automated exposure control was also utilized to  decrease patient radiation dose.     FINDINGS:     The central airways " are clear. No consolidation or pleural effusion.  Mild centrilobular emphysema with mild biapical parenchymal scarring. No  change in 10 mm nodular focus of scarring in the right apex on image 24.     No change in 8 mm right upper lobe pulmonary nodule image 55. No new or  enlarging pulmonary nodule.     No enlarged thoracic lymph nodes. Main pulmonary artery is nondilated.  Thoracic aorta is nonaneurysmal and contains scattered atherosclerotic  calcification. Heavy coronary artery calcification is noted. Mitral  annular calcifications are present. No pericardial effusion.     No acute chest soft tissue abnormality. No acute finding the upper  abdomen. Unchanged area of focal parenchymal atrophy and calcification  in the anterior left liver. Small bilateral renal calculi.  Cholelithiasis. No acute osseous finding.     IMPRESSION:     No acute findings. No change in 8 mm right upper lobe pulmonary nodule.     This report was signed and finalized on 9/20/2024 12:36 PM by Dr. Ross Davis MD.            Assessment:  1. Chronic obstructive pulmonary disease, unspecified COPD type    2. Restrictive lung disease    3. Lung nodule    4. Former smoker    5. Allergic rhinitis, unspecified seasonality, unspecified trigger    6. Obesity (BMI 35.0-39.9 without comorbidity)        Plan/Recommendations:    I reviewed the last CT scan of the chest which showed stable findings it was done in September last year there was stable lung nodule noted..  Follow-up chest x-ray is ordered before next clinic visit in 6 months time  She had moderate obstructive airway dysfunction and will need to continue the bronchodilators properly and I told her to continue using the Breo left eye and albuterol rescue as before and prescription was refilled.  She will continue using fluticasone nasal spray, asked her nasal spray loratadine and Singulair as before and she still has some allergy symptoms.  She is not on any oxygen or CPAP.  No  prescription refills was needed for allergy medications.  She is on her vaccinations and will continue follow-up with a primary care provider and I will see her back in the office in 6 months time or earlier if needed.    Follow up:   6 Months    Time Spent:  30 minutes    I appreciate the opportunity of participating in this patient's care. I would like to thank the PCP for the referral.  Please feel free to contact me with any other questions.    Tushar Thrasher MD   Pulmonologist/Intensivist     Electronically signed by: Tushar Thrasher MD, 5/9/2025 12:32 CDT

## 2025-05-14 DIAGNOSIS — J44.9 CHRONIC OBSTRUCTIVE PULMONARY DISEASE, UNSPECIFIED COPD TYPE: ICD-10-CM

## 2025-05-14 RX ORDER — ALBUTEROL SULFATE 0.83 MG/ML
2.5 SOLUTION RESPIRATORY (INHALATION) EVERY 4 HOURS PRN
Qty: 150 ML | Refills: 5 | Status: SHIPPED | OUTPATIENT
Start: 2025-05-14

## 2025-05-14 NOTE — TELEPHONE ENCOUNTER
Patient called and is needing this called in as soon as possible as she is out of this medication. Thank you   Requested Prescriptions     Pending Prescriptions Disp Refills    albuterol (PROVENTIL) (2.5 MG/3ML) 0.083% nebulizer solution 150 mL 5     Sig: Take 2.5 mg by nebulization Every 4 (Four) Hours As Needed for Wheezing.      Last office visit with prescribing clinician: 5/9/2025   Last telemedicine visit with prescribing clinician: Visit date not found   Next office visit with prescribing clinician: 11/7/2025                         Would you like a call back once the refill request has been completed: [] Yes [] No    If the office needs to give you a call back, can they leave a voicemail: [] Yes [] No    Juani Woodall MA  05/14/25, 13:37 CDT

## 2025-05-23 DIAGNOSIS — M19.90 OSTEOARTHRITIS, UNSPECIFIED OSTEOARTHRITIS TYPE, UNSPECIFIED SITE: ICD-10-CM

## 2025-05-23 RX ORDER — HYDROCODONE BITARTRATE AND ACETAMINOPHEN 5; 325 MG/1; MG/1
1 TABLET ORAL EVERY 8 HOURS PRN
Qty: 30 TABLET | Refills: 0 | Status: SHIPPED | OUTPATIENT
Start: 2025-05-23

## 2025-05-23 RX ORDER — METOPROLOL SUCCINATE 25 MG/1
25 TABLET, EXTENDED RELEASE ORAL DAILY
Qty: 30 TABLET | Refills: 5 | Status: SHIPPED | OUTPATIENT
Start: 2025-05-23

## 2025-05-23 RX ORDER — CITALOPRAM HYDROBROMIDE 20 MG/1
20 TABLET ORAL DAILY
Qty: 90 TABLET | Refills: 1 | Status: SHIPPED | OUTPATIENT
Start: 2025-05-23

## 2025-05-23 NOTE — TELEPHONE ENCOUNTER
Rx Refill Note  Requested Prescriptions     Pending Prescriptions Disp Refills    HYDROcodone-acetaminophen (NORCO) 5-325 MG per tablet [Pharmacy Med Name: HYDROCODONE BITARTRATE/ACETAMINOPHEN 5-325MG TABLET] 30 tablet 0     Sig: TAKE 1 TABLET BY MOUTH EVERY 8 (EIGHT) HOURS AS NEEDED FOR MILD PAIN.    metoprolol succinate XL (TOPROL-XL) 25 MG 24 hr tablet [Pharmacy Med Name: METOPROLOL SUCCINATE ER 25MG ER TABLET ER 24HR] 30 tablet 5     Sig: TAKE ONE TABLET DAILY GENERIC FOR TOPROL XL      Last office visit with office: [id number]  Next office visit with office: [id number]    UDS: [id number]    DATE OF LAST REFILL: [id number]    Controlled Substance Agreement:  [id number]    [facility]: [id number]         {TIP  Is Refill Pharmacy correct?:  Zain Marquis MA  05/23/25, 12:53 CDT

## 2025-06-10 RX ORDER — CLONIDINE HYDROCHLORIDE 0.1 MG/1
0.1 TABLET ORAL DAILY
Qty: 30 TABLET | Refills: 5 | Status: SHIPPED | OUTPATIENT
Start: 2025-06-10

## 2025-06-10 NOTE — TELEPHONE ENCOUNTER
Rx Refill Note  Requested Prescriptions     Pending Prescriptions Disp Refills    cloNIDine (CATAPRES) 0.1 MG tablet [Pharmacy Med Name: CLONIDINE HYDROCHLORIDE 0.1MG TABLET] 30 tablet 5     Sig: TAKE ONE TABLET BY MOUTH DAILY.      Last office visit with office: 03/21/25  Next office visit with office: 06-23-25    UDS:     DATE OF LAST REFILL: 05/08/25    Controlled Substance Agreement:     [place]:          {TIP  Is Refill Pharmacy correct?:  Rubia Duque MA  06/10/25, 09:42 CDT

## 2025-06-16 RX ORDER — LEVOTHYROXINE SODIUM 100 UG/1
100 TABLET ORAL DAILY
Qty: 90 TABLET | Refills: 1 | Status: SHIPPED | OUTPATIENT
Start: 2025-06-16

## 2025-06-17 ENCOUNTER — TELEPHONE (OUTPATIENT)
Dept: NEUROSURGERY | Facility: CLINIC | Age: 85
End: 2025-06-17
Payer: MEDICARE

## 2025-06-17 LAB
ALBUMIN SERPL-MCNC: 4.4 G/DL (ref 3.5–5.2)
ALBUMIN/GLOB SERPL: 1.5 G/DL
ALP SERPL-CCNC: 83 U/L (ref 39–117)
ALT SERPL-CCNC: 12 U/L (ref 1–33)
AST SERPL-CCNC: 18 U/L (ref 1–32)
BASOPHILS # BLD AUTO: 0.04 10*3/MM3 (ref 0–0.2)
BASOPHILS NFR BLD AUTO: 0.5 % (ref 0–1.5)
BILIRUB SERPL-MCNC: 0.8 MG/DL (ref 0–1.2)
BUN SERPL-MCNC: 14 MG/DL (ref 8–23)
BUN/CREAT SERPL: 11.1 (ref 7–25)
CALCIUM SERPL-MCNC: 9.4 MG/DL (ref 8.6–10.5)
CHLORIDE SERPL-SCNC: 101 MMOL/L (ref 98–107)
CHOLEST SERPL-MCNC: 175 MG/DL (ref 0–200)
CO2 SERPL-SCNC: 26.8 MMOL/L (ref 22–29)
CREAT SERPL-MCNC: 1.26 MG/DL (ref 0.57–1)
EGFRCR SERPLBLD CKD-EPI 2021: 42.7 ML/MIN/1.73
EOSINOPHIL # BLD AUTO: 0.26 10*3/MM3 (ref 0–0.4)
EOSINOPHIL NFR BLD AUTO: 3.4 % (ref 0.3–6.2)
ERYTHROCYTE [DISTWIDTH] IN BLOOD BY AUTOMATED COUNT: 12.9 % (ref 12.3–15.4)
FOLATE SERPL-MCNC: 11.1 NG/ML (ref 4.78–24.2)
GLOBULIN SER CALC-MCNC: 3 GM/DL
GLUCOSE SERPL-MCNC: 146 MG/DL (ref 65–99)
HCT VFR BLD AUTO: 35.9 % (ref 34–46.6)
HDLC SERPL-MCNC: 54 MG/DL (ref 40–60)
HGB BLD-MCNC: 11.6 G/DL (ref 12–15.9)
IMM GRANULOCYTES # BLD AUTO: 0.01 10*3/MM3 (ref 0–0.05)
IMM GRANULOCYTES NFR BLD AUTO: 0.1 % (ref 0–0.5)
LDLC SERPL CALC-MCNC: 96 MG/DL (ref 0–100)
LYMPHOCYTES # BLD AUTO: 1.6 10*3/MM3 (ref 0.7–3.1)
LYMPHOCYTES NFR BLD AUTO: 21.1 % (ref 19.6–45.3)
MCH RBC QN AUTO: 30.9 PG (ref 26.6–33)
MCHC RBC AUTO-ENTMCNC: 32.3 G/DL (ref 31.5–35.7)
MCV RBC AUTO: 95.7 FL (ref 79–97)
MONOCYTES # BLD AUTO: 0.64 10*3/MM3 (ref 0.1–0.9)
MONOCYTES NFR BLD AUTO: 8.4 % (ref 5–12)
NEUTROPHILS # BLD AUTO: 5.05 10*3/MM3 (ref 1.7–7)
NEUTROPHILS NFR BLD AUTO: 66.5 % (ref 42.7–76)
NRBC BLD AUTO-RTO: 0 /100 WBC (ref 0–0.2)
PLATELET # BLD AUTO: 225 10*3/MM3 (ref 140–450)
POTASSIUM SERPL-SCNC: 4.4 MMOL/L (ref 3.5–5.2)
PROT SERPL-MCNC: 7.4 G/DL (ref 6–8.5)
RBC # BLD AUTO: 3.75 10*6/MM3 (ref 3.77–5.28)
SODIUM SERPL-SCNC: 140 MMOL/L (ref 136–145)
TRIGL SERPL-MCNC: 145 MG/DL (ref 0–150)
TSH SERPL DL<=0.005 MIU/L-ACNC: 2.64 UIU/ML (ref 0.27–4.2)
VIT B12 SERPL-MCNC: 259 PG/ML (ref 211–946)
VLDLC SERPL CALC-MCNC: 25 MG/DL (ref 5–40)
WBC # BLD AUTO: 7.6 10*3/MM3 (ref 3.4–10.8)

## 2025-06-17 NOTE — TELEPHONE ENCOUNTER
.APPT and MRI HAS BEEN CONFIRMED      EDELMIRA MANRIQUEZ [facility] NEUROSURGERY    Lab Results   Component Value Date    COL yellow 03/28/2019    UAPP clear 03/28/2019    USPG 1.025 03/28/2019    UPH 5.0 03/28/2019    UPROT neg 03/28/2019    UGLU neg 03/28/2019    UKET neg 03/28/2019    UBILI neg 03/28/2019    URBC large 03/28/2019    UNITR neg 03/28/2019    UROB normal 03/28/2019    UWBC neg 03/28/2019

## 2025-06-18 ENCOUNTER — OFFICE VISIT (OUTPATIENT)
Dept: NEUROSURGERY | Facility: CLINIC | Age: 85
End: 2025-06-18
Payer: MEDICARE

## 2025-06-18 ENCOUNTER — HOSPITAL ENCOUNTER (OUTPATIENT)
Dept: MRI IMAGING | Facility: HOSPITAL | Age: 85
Discharge: HOME | End: 2025-06-18
Admitting: NEUROLOGICAL SURGERY
Payer: MEDICARE

## 2025-06-18 VITALS — WEIGHT: 184 LBS | BODY MASS INDEX: 36.12 KG/M2 | HEIGHT: 60 IN

## 2025-06-18 DIAGNOSIS — D32.9 MENINGIOMA: ICD-10-CM

## 2025-06-18 DIAGNOSIS — E66.01 CLASS 2 SEVERE OBESITY DUE TO EXCESS CALORIES WITH SERIOUS COMORBIDITY AND BODY MASS INDEX (BMI) OF 35.0 TO 35.9 IN ADULT: ICD-10-CM

## 2025-06-18 DIAGNOSIS — Z87.891 FORMER SMOKER: ICD-10-CM

## 2025-06-18 DIAGNOSIS — D32.9 MENINGIOMA: Primary | ICD-10-CM

## 2025-06-18 DIAGNOSIS — E66.812 CLASS 2 SEVERE OBESITY DUE TO EXCESS CALORIES WITH SERIOUS COMORBIDITY AND BODY MASS INDEX (BMI) OF 35.0 TO 35.9 IN ADULT: ICD-10-CM

## 2025-06-18 PROCEDURE — 1160F RVW MEDS BY RX/DR IN RCRD: CPT | Performed by: NEUROLOGICAL SURGERY

## 2025-06-18 PROCEDURE — 99212 OFFICE O/P EST SF 10 MIN: CPT | Performed by: NEUROLOGICAL SURGERY

## 2025-06-18 PROCEDURE — 25510000001 GADOPICLENOL 0.5 MMOL/ML SOLUTION: Performed by: NEUROLOGICAL SURGERY

## 2025-06-18 PROCEDURE — 70553 MRI BRAIN STEM W/O & W/DYE: CPT

## 2025-06-18 PROCEDURE — 1159F MED LIST DOCD IN RCRD: CPT | Performed by: NEUROLOGICAL SURGERY

## 2025-06-18 PROCEDURE — A9573 GADOPICLENOL 0.5 MMOL/ML SOLUTION: HCPCS | Performed by: NEUROLOGICAL SURGERY

## 2025-06-18 RX ADMIN — GADOPICLENOL 10 ML: 485.1 INJECTION INTRAVENOUS at 11:25
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
## 2025-06-23 ENCOUNTER — OFFICE VISIT (OUTPATIENT)
Dept: FAMILY MEDICINE CLINIC | Facility: CLINIC | Age: 85
End: 2025-06-23
Payer: MEDICARE

## 2025-06-23 ENCOUNTER — PATIENT ROUNDING (BHMG ONLY) (OUTPATIENT)
Dept: FAMILY MEDICINE CLINIC | Facility: CLINIC | Age: 85
End: 2025-06-23
Payer: MEDICARE

## 2025-06-23 VITALS
OXYGEN SATURATION: 98 % | WEIGHT: 185 LBS | HEART RATE: 74 BPM | HEIGHT: 60 IN | BODY MASS INDEX: 36.32 KG/M2 | SYSTOLIC BLOOD PRESSURE: 168 MMHG | DIASTOLIC BLOOD PRESSURE: 84 MMHG

## 2025-06-23 DIAGNOSIS — E11.9 TYPE 2 DIABETES MELLITUS WITHOUT COMPLICATION, WITHOUT LONG-TERM CURRENT USE OF INSULIN: ICD-10-CM

## 2025-06-23 DIAGNOSIS — R91.1 LUNG NODULE: ICD-10-CM

## 2025-06-23 DIAGNOSIS — I10 ESSENTIAL HYPERTENSION: Primary | ICD-10-CM

## 2025-06-23 DIAGNOSIS — E78.2 MIXED HYPERLIPIDEMIA: ICD-10-CM

## 2025-06-23 DIAGNOSIS — N18.30 STAGE 3 CHRONIC KIDNEY DISEASE, UNSPECIFIED WHETHER STAGE 3A OR 3B CKD: ICD-10-CM

## 2025-06-23 DIAGNOSIS — M19.90 ARTHRITIS: ICD-10-CM

## 2025-06-23 DIAGNOSIS — J44.9 CHRONIC OBSTRUCTIVE PULMONARY DISEASE, UNSPECIFIED COPD TYPE: ICD-10-CM

## 2025-06-23 DIAGNOSIS — F41.9 ANXIETY: ICD-10-CM

## 2025-06-23 DIAGNOSIS — E03.9 ACQUIRED HYPOTHYROIDISM: ICD-10-CM

## 2025-06-23 PROBLEM — Z87.01 HISTORY OF PNEUMONIA: Status: RESOLVED | Noted: 2022-08-16 | Resolved: 2025-06-23

## 2025-06-23 PROBLEM — R06.2 WHEEZING: Status: RESOLVED | Noted: 2023-11-28 | Resolved: 2025-06-23

## 2025-06-23 PROBLEM — R06.02 SOB (SHORTNESS OF BREATH): Status: RESOLVED | Noted: 2023-11-28 | Resolved: 2025-06-23

## 2025-06-23 LAB
EXPIRATION DATE: ABNORMAL
HBA1C MFR BLD: 7.1 % (ref 4.5–5.7)
Lab: ABNORMAL

## 2025-06-23 PROCEDURE — 83036 HEMOGLOBIN GLYCOSYLATED A1C: CPT

## 2025-06-23 PROCEDURE — 99214 OFFICE O/P EST MOD 30 MIN: CPT

## 2025-06-23 PROCEDURE — 1126F AMNT PAIN NOTED NONE PRSNT: CPT

## 2025-06-23 PROCEDURE — 3077F SYST BP >= 140 MM HG: CPT

## 2025-06-23 PROCEDURE — G0439 PPPS, SUBSEQ VISIT: HCPCS

## 2025-06-23 PROCEDURE — 3079F DIAST BP 80-89 MM HG: CPT

## 2025-06-23 PROCEDURE — 3051F HG A1C>EQUAL 7.0%<8.0%: CPT

## 2025-06-23 PROCEDURE — 1170F FXNL STATUS ASSESSED: CPT

## 2025-06-23 RX ORDER — AMLODIPINE BESYLATE 5 MG/1
5 TABLET ORAL DAILY
Qty: 30 TABLET | Refills: 2 | Status: SHIPPED | OUTPATIENT
Start: 2025-06-23

## 2025-06-23 RX ORDER — METFORMIN HYDROCHLORIDE 500 MG/1
500 TABLET, EXTENDED RELEASE ORAL
Qty: 90 TABLET | Refills: 1 | Status: SHIPPED | OUTPATIENT
Start: 2025-06-23

## 2025-06-23 NOTE — PROGRESS NOTES
Subjective   The ABCs of the Annual Wellness Visit  Medicare Wellness Visit      Paige Martinez is a 84 y.o. patient who presents for a Medicare Wellness Visit.    The following portions of the patient's history were reviewed and   updated as appropriate: allergies, current medications, past family history, past medical history, past social history, past surgical history, and problem list.    Compared to one year ago, the patient's physical   health is the same.  Compared to one year ago, the patient's mental   health is the same.    Recent Hospitalizations:  She was not admitted to the hospital during the last year.     Current Medical Providers:  Patient Care Team:  Gonzalez Hein MD as PCP - General (Family Medicine)  Gonzalez Cordero MD as Consulting Physician (Gastroenterology)  Tushar Thrasher MD as Consulting Physician (Pulmonary Disease)  Dennys Chilel MD as Surgeon (Neurosurgery)  Jersey Valenzuela MD as Consulting Physician (Nephrology)    Outpatient Medications Prior to Visit   Medication Sig Dispense Refill    albuterol (PROVENTIL) (2.5 MG/3ML) 0.083% nebulizer solution Take 2.5 mg by nebulization Every 4 (Four) Hours As Needed for Wheezing. 150 mL 5    albuterol sulfate  (90 Base) MCG/ACT inhaler Inhale 2 puffs Every 4 (Four) Hours As Needed for Wheezing. 6.7 g 5    atorvastatin (LIPITOR) 10 MG tablet TAKE ONE TABLET DAILY GENERIC FOR LIPITOR 30 tablet 5    Azelastine HCl 137 MCG/SPRAY solution Administer 2 sprays into the nostril(s) as directed by provider 2 (Two) Times a Day. 30 mL 5    bumetanide (BUMEX) 0.5 MG tablet Take 1 tablet by mouth Daily. 90 tablet 1    Cholecalciferol 2000 units capsule Take 1 capsule by mouth Daily.      citalopram (CeleXA) 20 MG tablet TAKE ONE TABLET DAILY GENERIC FOR CELEXA 90 tablet 1    cloNIDine (CATAPRES) 0.1 MG tablet TAKE ONE TABLET BY MOUTH DAILY. 30 tablet 5    Ergocalciferol (VITAMIN D2 PO) Take 50,000 Units by mouth 1 (One) Time Per Week.       fluocinonide (LIDEX) 0.05 % cream Apply 1 Application topically to the appropriate area as directed 2 (Two) Times a Day.      fluticasone (Flonase Allergy Relief) 50 MCG/ACT nasal spray Administer 2 sprays into the nostril(s) as directed by provider Daily. 16 g 5    Fluticasone Furoate-Vilanterol 100-25 MCG/ACT aerosol powder  Inhale 1 puff Daily for 90 days. 1 each 35    HYDROcodone-acetaminophen (NORCO) 5-325 MG per tablet TAKE 1 TABLET BY MOUTH EVERY 8 (EIGHT) HOURS AS NEEDED FOR MILD PAIN. 30 tablet 0    levothyroxine (SYNTHROID, LEVOTHROID) 100 MCG tablet TAKE 1 TABLET BY MOUTH DAILY. 90 tablet 1    loratadine (CLARITIN) 10 MG tablet Take 1 tablet by mouth Daily. 90 tablet 3    losartan (COZAAR) 100 MG tablet TAKE ONE TABLET DAILY GENERIC FOR COZAAR 90 tablet 0    metoprolol succinate XL (TOPROL-XL) 25 MG 24 hr tablet TAKE ONE TABLET DAILY GENERIC FOR TOPROL XL 30 tablet 5    montelukast (SINGULAIR) 10 MG tablet TAKE 1 TABLET BY MOUTH EVERY NIGHT. 90 tablet 3    Nebulizer misc As Needed.      omeprazole (priLOSEC) 20 MG capsule TAKE 1 CAPSULE DAILY GENERIC FOR PRILOSEC 30 capsule 1     No facility-administered medications prior to visit.     Opioid medication/s are on active medication list.  and I have evaluated her active treatment plan and pain score trends (see table).  Vitals:    06/23/25 0901   PainSc: 0-No pain     I have reviewed the chart for potential of high risk medication and harmful drug interactions in the elderly.        Aspirin is not on active medication list.  Aspirin use is not indicated based on review of current medical condition/s. Risk of harm outweighs potential benefits.  .    Patient Active Problem List   Diagnosis    Essential hypertension    Chronic kidney disease, stage III (moderate)-nephrology    Acquired hypothyroidism    Anxiety    Mixed hyperlipidemia-statin    Edema    Anemia    Heme positive stool    Dark stools    Gastroesophageal reflux disease    Hip pain, acute, left     "Syncope    Obesity (BMI 35.0-39.9 without comorbidity)    Arthritis    Meningioma    Former smoker    Dry skin dermatitis    Seizure    Screening for breast cancer    Left lower quadrant abdominal pain    Diverticulitis    Dyspnea on exertion    COPD (chronic obstructive pulmonary disease)    Pulmonary scarring    Lung nodule    Allergic rhinitis    Restrictive lung disease    Renal mass    Ovarian mass, right    Class 1 obesity due to excess calories with serious comorbidity and body mass index (BMI) of 34.0 to 34.9 in adult    Type 2 diabetes mellitus without complication, without long-term current use of insulin     Advance Care Planning Advance Directive is not on file.  ACP discussion was held with the patient during this visit. Patient does not have an advance directive, information provided.            Objective   Vitals:    25 0901   BP: 168/84   Pulse: 74   SpO2: 98%   Weight: 83.9 kg (185 lb)   Height: 152.4 cm (60\")   PainSc: 0-No pain       Estimated body mass index is 36.13 kg/m² as calculated from the following:    Height as of this encounter: 152.4 cm (60\").    Weight as of this encounter: 83.9 kg (185 lb).           Gait and Balance Evaluation:  Normal      Does the patient have evidence of cognitive impairment? No  Lab Results   Component Value Date    CHLPL 175 2025    TRIG 145 2025    HDL 54 2025    LDL 96 2025    VLDL 25 2025                                                                                                Health  Risk Assessment    Smoking Status:  Social History     Tobacco Use   Smoking Status Former    Current packs/day: 0.00    Average packs/day: 1 pack/day for 51.0 years (51.0 ttl pk-yrs)    Types: Cigarettes    Start date:     Quit date:     Years since quittin.4    Passive exposure: Past   Smokeless Tobacco Never     Alcohol Consumption:  Social History     Substance and Sexual Activity   Alcohol Use No    Comment: Occasionally "        Fall Risk Screen  STEADI Fall Risk Assessment was completed, and patient is at LOW risk for falls.Assessment completed on:2025    Depression Screening   Little interest or pleasure in doing things? Not at all   Feeling down, depressed, or hopeless? Not at all   PHQ-2 Total Score 0      Health Habits and Functional and Cognitive Screenin/23/2025     9:00 AM   Functional & Cognitive Status   Do you have difficulty preparing food and eating? No   Do you have difficulty bathing yourself, getting dressed or grooming yourself? No   Do you have difficulty using the toilet? No   Do you have difficulty moving around from place to place? No   Do you have trouble with steps or getting out of a bed or a chair? Yes   Current Diet Well Balanced Diet   Dental Exam Up to date   Eye Exam Up to date   Exercise (times per week) 3 times per week   Current Exercises Include Walking;House Cleaning;Yard Work   Do you need help using the phone?  No   Are you deaf or do you have serious difficulty hearing?  Yes   Do you need help to go to places out of walking distance? No   Do you need help shopping? No   Do you need help preparing meals?  No   Do you need help with housework?  No   Do you need help with laundry? No   Do you need help taking your medications? No   Do you need help managing money? No   Do you ever drive or ride in a car without wearing a seat belt? No   Have you felt unusual stress, anger or loneliness in the last month? No   Who do you live with? Alone   If you need help, do you have trouble finding someone available to you? No   Have you been bothered in the last four weeks by sexual problems? No   Do you have difficulty concentrating, remembering or making decisions? No           Age-appropriate Screening Schedule:  Refer to the list below for future screening recommendations based on patient's age, sex and/or medical conditions. Orders for these recommended tests are listed in the plan section. The  patient has been provided with a written plan.    Health Maintenance List  Health Maintenance   Topic Date Due    Pneumococcal Vaccine 50+ (1 of 2 - PCV) Never done    TDAP/TD VACCINES (1 - Tdap) Never done    ZOSTER VACCINE (1 of 2) Never done    RSV Vaccine - Adults (1 - 1-dose 75+ series) Never done    ANNUAL WELLNESS VISIT  12/29/2024    INFLUENZA VACCINE  07/01/2025    LIPID PANEL  06/16/2026    COLORECTAL CANCER SCREENING  06/29/2026    DXA SCAN  07/01/2026    COVID-19 Vaccine  Discontinued    MAMMOGRAM  Discontinued    LUNG CANCER SCREENING  Discontinued                                                                                                                                                CMS Preventative Services Quick Reference  Risk Factors Identified During Encounter  Chronic Pain: Natural history and expected course discussed. Questions answered.  Depression/Dysphoria: Current medication is effective, no change recommended  Inadequate Social Support, Isolation, Loneliness, Lack of Transportation, Financial Difficulties, or Caregiver Stress: none identified.   Dental Screening Recommended  Vision Screening Recommended    The above risks/problems have been discussed with the patient.  Pertinent information has been shared with the patient in the After Visit Summary.  An After Visit Summary and PPPS were made available to the patient.    Follow Up:   Next Medicare Wellness visit to be scheduled in 1 year.         Additional E&M Note during same encounter follows:  Patient has additional, significant, and separately identifiable condition(s)/problem(s) that require work above and beyond the Medicare Wellness Visit     Chief Complaint  Chief Complaint   Patient presents with    Medicare Wellness-subsequent    Hypertension    Hypothyroidism    Results     Review labs from last week        Subjective      History of Present Illness  The patient is an 84-year-old female who presents today for a Medicare  "wellness visit to follow up on chronic conditions.    She reports experiencing elevated blood pressure, although she does not monitor it regularly at home. Her current medication regimen includes losartan, metoprolol succinate 25, and clonidine.    She has a history of chronic kidney disease (CKD) and is under the care of a nephrologist. Recent lab work was conducted on 06/16/2025, including blood and urine tests, but she is yet to receive the results.    She is also under the care of a pulmonologist for her chronic obstructive pulmonary disease (COPD). She quit smoking 13 years ago after a 40-year habit. She has been informed of a lung nodule and undergoes annual scans. She has been advised to get an x-ray.    She is on Lipitor for her cholesterol.    She takes Bumex for leg swelling.    She takes citalopram for depression.    She takes Norco for pain and arthritis. She experiences occasional back pain, which she attributes to overexertion. She does not take Norco daily, but as needed, often splitting the dose between morning and evening. She has been on this medication for an extended period.    She has a known thyroid condition and is compliant with her medication.    She reports decreased activity levels compared to the previous year, attributing this to aging. She occasionally uses a cane for mobility support, depending on the distance she needs to walk. She does not consume alcohol but may have a glass of wine occasionally.    SOCIAL HISTORY  She quit smoking 13 years ago. She does not consume alcohol regularly but may have a glass of wine occasionally.    FAMILY HISTORY  She reports no family history of diabetes.          Objective   Vital Signs:  /84   Pulse 74   Ht 152.4 cm (60\")   Wt 83.9 kg (185 lb)   SpO2 98%   BMI 36.13 kg/m²     The following labs/imaging/notes were reviewed and discussed with the patient by Gonzalez Hein MD on 06/23/2025:     Latest Reference Range & Units 06/16/25 08:40 "   Sodium 136 - 145 mmol/L 140   Potassium 3.5 - 5.2 mmol/L 4.4   Chloride 98 - 107 mmol/L 101   CO2 22.0 - 29.0 mmol/L 26.8   BUN 8.0 - 23.0 mg/dL 14.0   Creatinine 0.57 - 1.00 mg/dL 1.26 (H)   BUN/Creatinine Ratio 7.0 - 25.0  11.1   EGFR Result >60.0 mL/min/1.73 42.7 (L)   Glucose 65 - 99 mg/dL 146 (H)   Calcium 8.6 - 10.5 mg/dL 9.4   Alkaline Phosphatase 39 - 117 U/L 83   Total Protein 6.0 - 8.5 g/dL 7.4   Albumin 3.5 - 5.2 g/dL 4.4   A/G Ratio g/dL 1.5   AST (SGOT) 1 - 32 U/L 18   ALT (SGPT) 1 - 33 U/L 12   Total Bilirubin 0.0 - 1.2 mg/dL 0.8   TSH Baseline 0.270 - 4.200 uIU/mL 2.640   Vitamin B-12 211 - 946 pg/mL 259   Folate 4.78 - 24.20 ng/mL 11.10   Total Cholesterol 0 - 200 mg/dL 175   HDL Cholesterol 40 - 60 mg/dL 54   LDL Cholesterol  0 - 100 mg/dL 96   Triglycerides 0 - 150 mg/dL 145   VLDL Cholesterol Christopher 5 - 40 mg/dL 25   Globulin gm/dL 3.0   WBC 3.40 - 10.80 10*3/mm3 7.60   RBC 3.77 - 5.28 10*6/mm3 3.75 (L)   Hemoglobin 12.0 - 15.9 g/dL 11.6 (L)   Hematocrit 34.0 - 46.6 % 35.9   Platelets 140 - 450 10*3/mm3 225   RDW 12.3 - 15.4 % 12.9   MCV 79.0 - 97.0 fL 95.7   MCH 26.6 - 33.0 pg 30.9   MCHC 31.5 - 35.7 g/dL 32.3   Neutrophil Rel % 42.7 - 76.0 % 66.5   Lymphocyte Rel % 19.6 - 45.3 % 21.1   Monocyte Rel % 5.0 - 12.0 % 8.4   Eosinophil Rel % 0.3 - 6.2 % 3.4   Basophil Rel % 0.0 - 1.5 % 0.5   Immature Granulocyte Rel % 0.0 - 0.5 % 0.1   Neutrophils Absolute 1.70 - 7.00 10*3/mm3 5.05   Lymphocytes Absolute 0.70 - 3.10 10*3/mm3 1.60   Monocytes Absolute 0.10 - 0.90 10*3/mm3 0.64   Eosinophils Absolute 0.00 - 0.40 10*3/mm3 0.26   Basophils Absolute 0.00 - 0.20 10*3/mm3 0.04   Immature Grans, Absolute 0.00 - 0.05 10*3/mm3 0.01   nRBC 0.0 - 0.2 /100 WBC 0.0   (H): Data is abnormally high  (L): Data is abnormally low        Physical Exam    Physical Exam          Assessment      Diagnoses and all orders for this visit:    1. Essential hypertension (Primary)    2. Mixed hyperlipidemia-statin    3. Acquired  hypothyroidism    4. Stage 3 chronic kidney disease, unspecified whether stage 3a or 3b CKD    5. Anxiety    6. Lung nodule    7. Chronic obstructive pulmonary disease, unspecified COPD type    8. Arthritis    9. Type 2 diabetes mellitus without complication, without long-term current use of insulin    Other orders  -     amLODIPine (NORVASC) 5 MG tablet; Take 1 tablet by mouth Daily.  Dispense: 30 tablet; Refill: 2  -     metFORMIN ER (GLUCOPHAGE-XR) 500 MG 24 hr tablet; Take 1 tablet by mouth Daily With Breakfast.  Dispense: 90 tablet; Refill: 1             Assessment & Plan  1. Hypertension.  - Blood pressure is elevated at 168/84.  - Currently on losartan, metoprolol succinate 25 mg, and clonidine.  - Amlodipine will be added to her regimen, to be taken once daily.  - Advised to monitor blood pressure 2-3 times a week and report any significant changes. Potential side effects, such as leg swelling, were discussed.    2. Chronic Kidney Disease (CKD).  - GFR has shown slight improvement.  - Recent labs on 06/16/2025 indicate glucose was 146.  - Will continue to follow up with nephrologist for ongoing management.  - A1c will be checked today to screen for diabetes.    3. Chronic Obstructive Pulmonary Disease (COPD).  - Under the care of Dr. Guardado for COPD management.  - Quit smoking 13 years ago.  - Continued monitoring and annual scans for lung nodule are recommended.  - Lung nodule is stable at 8 mm in the right upper lobe.    4. Hyperlipidemia.  - Currently on Lipitor for cholesterol management.  - No changes to medication regimen are necessary at this time.  - Continued monitoring of cholesterol levels.    5. Anxiety.  - On citalopram, which also helps manage anxiety.  - No changes to medication regimen are necessary at this time.  - Continued monitoring of anxiety symptoms.    6. Pain Management.  - Uses Norco as needed for pain and arthritis, taking about 30 pills a month.  - No changes to pain management  plan are necessary at this time.  - Drug screen back in 10/2024 was clear.    7. Thyroid Disorder.  - TSH levels were normal during the last check.  - Will continue current thyroid medication regimen.  - Continued monitoring of thyroid function.    8. Diabetes Mellitus.  - A1c level today is 7.1, indicating diabetes.  - Informed of new diagnosis and started on metformin 500 mg extended release.  - Continued monitoring of blood glucose levels.    9. Leg Swelling.  - On Bumex for leg swelling.  - No changes to medication regimen are necessary at this time.  - Continued monitoring of leg swelling.    Follow-up  - Follow up in 3 months.    No orders of the defined types were placed in this encounter.                Follow Up   Return in about 3 months (around 9/23/2025) for HTN w/ new meds, Arthritis w/ cII, ?DM vs elevated bs. .  Patient was given instructions and counseling regarding her condition or for health maintenance advice. Please see specific information pulled into the AVS if appropriate.    Patient or patient representative verbalized consent for the use of Ambient Listening during the visit with  Gonzalez Hein MD for chart documentation. 6/23/2025  10:15 CDT

## 2025-06-23 NOTE — PROGRESS NOTES
June 23, 2025    Hello, may I speak with Paige Martinez?    My name is Melissa      I am  with [facility]  [address] [facility] [address] [phone].    Before we get started may I verify your date of birth? 1940    I am calling to officially welcome you to our practice and ask about your recent visit. Is this a good time to talk? yes    Tell me about your visit with us. What things went well?  visit went well       We're always looking for ways to make our patients' experiences even better. Do you have recommendations on ways we may improve?  no    Overall were you satisfied with your first visit to our practice? yes       I appreciate you taking the time to speak with me today. Is there anything else I can do for you? no      Thank you, and have a great day.

## 2025-06-26 DIAGNOSIS — M19.90 OSTEOARTHRITIS, UNSPECIFIED OSTEOARTHRITIS TYPE, UNSPECIFIED SITE: ICD-10-CM

## 2025-06-27 RX ORDER — HYDROCODONE BITARTRATE AND ACETAMINOPHEN 5; 325 MG/1; MG/1
1 TABLET ORAL EVERY 8 HOURS PRN
Qty: 30 TABLET | Refills: 0 | Status: SHIPPED | OUTPATIENT
Start: 2025-06-27

## 2025-06-27 NOTE — TELEPHONE ENCOUNTER
Rx Refill Note  Requested Prescriptions     Pending Prescriptions Disp Refills    HYDROcodone-acetaminophen (NORCO) 5-325 MG per tablet [Pharmacy Med Name: HYDROCODONE BITARTRATE/ACETAMINOPHEN 5-325MG TABLET] 30 tablet 0     Sig: TAKE 1 TABLET BY MOUTH EVERY 8 (EIGHT) HOURS AS NEEDED FOR MILD PAIN.      Last office visit with office: 06232025  Next office visit with office: 09242025    UDS: [id number]    DATE OF LAST REFILL: 05232025    Controlled Substance Agreement: [id number]    [facility] OR [facility]: [id number]         {TIP  Is Refill Pharmacy correct?:  Zain Marquis MA  06/27/25, 07:49 CDT

## 2025-07-28 DIAGNOSIS — M19.90 OSTEOARTHRITIS, UNSPECIFIED OSTEOARTHRITIS TYPE, UNSPECIFIED SITE: ICD-10-CM

## 2025-07-28 RX ORDER — HYDROCODONE BITARTRATE AND ACETAMINOPHEN 5; 325 MG/1; MG/1
1 TABLET ORAL EVERY 8 HOURS PRN
Qty: 30 TABLET | Refills: 0 | Status: SHIPPED | OUTPATIENT
Start: 2025-07-28

## 2025-07-28 NOTE — TELEPHONE ENCOUNTER
Rx Refill Note  Requested Prescriptions     Pending Prescriptions Disp Refills    HYDROcodone-acetaminophen (NORCO) 5-325 MG per tablet [Pharmacy Med Name: HYDROCODONE BITARTRATE/ACETAMINOPHEN 5-325MG TABLET] 30 tablet 0     Sig: TAKE 1 TABLET BY MOUTH EVERY 8 (EIGHT) HOURS AS NEEDED FOR MILD PAIN.      Last office visit with office: 06/23/25  Next office visit with office: 09/24/25    UDS: 10/25/24    DATE OF LAST REFILL: 06/27/25    Controlled Substance Agreement: not up to date    [facility]: FREDY Malone MA  07/28/25, 12:43 CDT

## 2025-08-11 RX ORDER — LOSARTAN POTASSIUM 100 MG/1
100 TABLET ORAL DAILY
Qty: 90 TABLET | Refills: 0 | Status: SHIPPED | OUTPATIENT
Start: 2025-08-11